# Patient Record
Sex: MALE | Race: WHITE | NOT HISPANIC OR LATINO | Employment: FULL TIME | URBAN - NONMETROPOLITAN AREA
[De-identification: names, ages, dates, MRNs, and addresses within clinical notes are randomized per-mention and may not be internally consistent; named-entity substitution may affect disease eponyms.]

---

## 2018-01-26 ENCOUNTER — TELEPHONE (OUTPATIENT)
Dept: SURGERY | Facility: CLINIC | Age: 53
End: 2018-01-26

## 2018-02-06 ENCOUNTER — PREP FOR SURGERY (OUTPATIENT)
Dept: OTHER | Facility: HOSPITAL | Age: 53
End: 2018-02-06

## 2018-02-06 DIAGNOSIS — Z12.11 ENCOUNTER FOR SCREENING COLONOSCOPY: Primary | ICD-10-CM

## 2018-02-07 PROBLEM — Z12.11 ENCOUNTER FOR SCREENING COLONOSCOPY: Status: ACTIVE | Noted: 2018-02-07

## 2018-02-08 ENCOUNTER — PREP FOR SURGERY (OUTPATIENT)
Dept: OTHER | Facility: HOSPITAL | Age: 53
End: 2018-02-08

## 2018-02-16 RX ORDER — VITAMIN E 268 MG
400 CAPSULE ORAL DAILY
Status: ON HOLD | COMMUNITY
End: 2019-09-09

## 2018-02-16 RX ORDER — ASPIRIN 325 MG
325 TABLET ORAL DAILY
Status: ON HOLD | COMMUNITY
End: 2019-09-09

## 2018-02-16 RX ORDER — MULTIPLE VITAMINS W/ MINERALS TAB 9MG-400MCG
1 TAB ORAL DAILY
Status: ON HOLD | COMMUNITY
End: 2019-09-09

## 2018-02-16 RX ORDER — LANOLIN ALCOHOL/MO/W.PET/CERES
1000 CREAM (GRAM) TOPICAL 3 TIMES WEEKLY
Status: ON HOLD | COMMUNITY
End: 2019-09-09

## 2018-02-16 RX ORDER — CHLORAL HYDRATE 500 MG
1000 CAPSULE ORAL
Status: ON HOLD | COMMUNITY
End: 2019-09-09

## 2018-02-16 RX ORDER — LOSARTAN POTASSIUM 50 MG/1
50 TABLET ORAL DAILY
Status: ON HOLD | COMMUNITY
End: 2019-09-09

## 2018-02-16 RX ORDER — CHOLECALCIFEROL (VITAMIN D3) 25 MCG
2000 CAPSULE ORAL 2 TIMES DAILY
Status: ON HOLD | COMMUNITY
End: 2019-09-09

## 2018-05-18 ENCOUNTER — HOSPITAL ENCOUNTER (OUTPATIENT)
Facility: HOSPITAL | Age: 53
Setting detail: HOSPITAL OUTPATIENT SURGERY
Discharge: HOME | End: 2018-05-18
Attending: SURGERY | Admitting: SURGERY

## 2018-05-18 ENCOUNTER — ANESTHESIA EVENT (OUTPATIENT)
Dept: GASTROENTEROLOGY | Facility: HOSPITAL | Age: 53
End: 2018-05-18

## 2018-05-18 ENCOUNTER — ANESTHESIA (OUTPATIENT)
Dept: GASTROENTEROLOGY | Facility: HOSPITAL | Age: 53
End: 2018-05-18

## 2018-05-18 VITALS
BODY MASS INDEX: 25.73 KG/M2 | HEART RATE: 68 BPM | WEIGHT: 190 LBS | RESPIRATION RATE: 16 BRPM | HEIGHT: 72 IN | TEMPERATURE: 98.7 F | SYSTOLIC BLOOD PRESSURE: 116 MMHG | OXYGEN SATURATION: 95 % | DIASTOLIC BLOOD PRESSURE: 72 MMHG

## 2018-05-18 DIAGNOSIS — Z12.11 ENCOUNTER FOR SCREENING COLONOSCOPY: ICD-10-CM

## 2018-05-18 PROCEDURE — S0260 H&P FOR SURGERY: HCPCS | Performed by: SURGERY

## 2018-05-18 PROCEDURE — 25010000002 PROPOFOL 10 MG/ML EMULSION: Performed by: NURSE ANESTHETIST, CERTIFIED REGISTERED

## 2018-05-18 PROCEDURE — 25010000002 PROPOFOL 1000 MG/ML EMULSION: Performed by: NURSE ANESTHETIST, CERTIFIED REGISTERED

## 2018-05-18 PROCEDURE — 25010000002 ONDANSETRON PER 1 MG: Performed by: NURSE ANESTHETIST, CERTIFIED REGISTERED

## 2018-05-18 PROCEDURE — 25010000002 FENTANYL CITRATE (PF) 100 MCG/2ML SOLUTION: Performed by: NURSE ANESTHETIST, CERTIFIED REGISTERED

## 2018-05-18 PROCEDURE — 25010000002 MIDAZOLAM PER 1 MG: Performed by: NURSE ANESTHETIST, CERTIFIED REGISTERED

## 2018-05-18 RX ORDER — ONDANSETRON 2 MG/ML
INJECTION INTRAMUSCULAR; INTRAVENOUS AS NEEDED
Status: DISCONTINUED | OUTPATIENT
Start: 2018-05-18 | End: 2018-05-18 | Stop reason: SURG

## 2018-05-18 RX ORDER — LIDOCAINE HYDROCHLORIDE 20 MG/ML
INJECTION, SOLUTION INFILTRATION; PERINEURAL AS NEEDED
Status: DISCONTINUED | OUTPATIENT
Start: 2018-05-18 | End: 2018-05-18 | Stop reason: SURG

## 2018-05-18 RX ORDER — PROPOFOL 10 MG/ML
VIAL (ML) INTRAVENOUS AS NEEDED
Status: DISCONTINUED | OUTPATIENT
Start: 2018-05-18 | End: 2018-05-18 | Stop reason: SURG

## 2018-05-18 RX ORDER — FENTANYL CITRATE 50 UG/ML
INJECTION, SOLUTION INTRAMUSCULAR; INTRAVENOUS AS NEEDED
Status: DISCONTINUED | OUTPATIENT
Start: 2018-05-18 | End: 2018-05-18 | Stop reason: SURG

## 2018-05-18 RX ORDER — MAGNESIUM HYDROXIDE 1200 MG/15ML
LIQUID ORAL AS NEEDED
Status: DISCONTINUED | OUTPATIENT
Start: 2018-05-18 | End: 2018-05-18 | Stop reason: HOSPADM

## 2018-05-18 RX ORDER — MIDAZOLAM HYDROCHLORIDE 1 MG/ML
INJECTION INTRAMUSCULAR; INTRAVENOUS AS NEEDED
Status: DISCONTINUED | OUTPATIENT
Start: 2018-05-18 | End: 2018-05-18 | Stop reason: SURG

## 2018-05-18 RX ORDER — SODIUM CHLORIDE 0.9 % (FLUSH) 0.9 %
3 SYRINGE (ML) INJECTION AS NEEDED
Status: DISCONTINUED | OUTPATIENT
Start: 2018-05-18 | End: 2018-05-18 | Stop reason: HOSPADM

## 2018-05-18 RX ORDER — ONDANSETRON 2 MG/ML
4 INJECTION INTRAMUSCULAR; INTRAVENOUS ONCE AS NEEDED
Status: DISCONTINUED | OUTPATIENT
Start: 2018-05-18 | End: 2018-05-18 | Stop reason: HOSPADM

## 2018-05-18 RX ORDER — SODIUM CHLORIDE, SODIUM LACTATE, POTASSIUM CHLORIDE, CALCIUM CHLORIDE 600; 310; 30; 20 MG/100ML; MG/100ML; MG/100ML; MG/100ML
1000 INJECTION, SOLUTION INTRAVENOUS CONTINUOUS
Status: DISCONTINUED | OUTPATIENT
Start: 2018-05-18 | End: 2018-05-18 | Stop reason: HOSPADM

## 2018-05-18 RX ADMIN — PROPOFOL 120 MCG/KG/MIN: 10 INJECTION, EMULSION INTRAVENOUS at 13:00

## 2018-05-18 RX ADMIN — PROPOFOL 50 MG: 10 INJECTION, EMULSION INTRAVENOUS at 12:59

## 2018-05-18 RX ADMIN — SODIUM CHLORIDE, POTASSIUM CHLORIDE, SODIUM LACTATE AND CALCIUM CHLORIDE 1000 ML: 600; 310; 30; 20 INJECTION, SOLUTION INTRAVENOUS at 10:47

## 2018-05-18 RX ADMIN — ONDANSETRON 4 MG: 2 INJECTION INTRAMUSCULAR; INTRAVENOUS at 12:58

## 2018-05-18 RX ADMIN — FENTANYL CITRATE 50 MCG: 50 INJECTION, SOLUTION INTRAMUSCULAR; INTRAVENOUS at 12:58

## 2018-05-18 RX ADMIN — LIDOCAINE HYDROCHLORIDE 80 MG: 20 INJECTION, SOLUTION INFILTRATION; PERINEURAL at 12:59

## 2018-05-18 RX ADMIN — PROPOFOL 50 MG: 10 INJECTION, EMULSION INTRAVENOUS at 13:08

## 2018-05-18 RX ADMIN — MIDAZOLAM HYDROCHLORIDE 1 MG: 1 INJECTION, SOLUTION INTRAMUSCULAR; INTRAVENOUS at 12:58

## 2018-05-18 NOTE — DISCHARGE INSTRUCTIONS
Pt instructed on PAT PASS information.  Pt verbalizes understanding.  DO NOT EAT, DRINK, SMOKE, USE SMOKELESS TOBACCO OR CHEW GUM AFTER MIDNIGHT THE NIGHT BEFORE SURGERY.  THIS ALSO INCLUDES HARD CANDIES AND MINTS.    DO NOT SHAVE THE AREA TO BE OPERATED ON AT LEAST 48 HOURS PRIOR TO THE PROCEDURE.  DO NOT WEAR MAKE UP OR NAIL POLISH.  DO NOT LEAVE IN ANY PIERCING OR WEAR JEWELRY THE DAY OF SURGERY.      DO NOT USE ADHESIVES IF YOU WEAR DENTURES.    DO NOT WEAR EYE CONTACTS; BRING IN YOUR GLASSES.    ONLY TAKE MEDICATION THE MORNING OF YOUR PROCEDURE IF INSTRUCTED BY YOUR SURGEON WITH ENOUGH WATER TO SWALLOW THE MEDICATION.  IF YOUR SURGEON DID NOT SPECIFY WHICH MEDICATIONS TO TAKE, YOU WILL NEED TO CALL THEIR OFFICE FOR FURTHER INSTRUCTIONS AND DO AS THEY INSTRUCT.    POST-OP    Rest today  No pushing,pulling,tugging,heavy lifting, or strenuous activity   No major decision making,driving,or drinking alcoholic beverages for 24 hours due to the sedation you received  Always use good hand hygiene/washing technique  No driving on pain medication.    To assist you in voiding:  Drink plenty of fluids  Listen to running water while attempting to void.    If you are unable to urinate and you have an uncomfortable urge to void or it has been   6 hours since you were discharged, return to the Emergency room.    Recommend 5 year follow up for colonoscopy.

## 2018-05-18 NOTE — ANESTHESIA POSTPROCEDURE EVALUATION
Patient: Yousuf Barrera    Procedure Summary     Date:  05/18/18 Room / Location:  [facility]    Anesthesia Start:  1255 Anesthesia Stop:      Procedure:  COLONOSCOPY (N/A Anus) Diagnosis:       Encounter for screening colonoscopy      (Encounter for screening colonoscopy [Z12.11])    Surgeon:  Carlos A Moreno MD Provider:  Joshua Davila CRNA    Anesthesia Type:  MAC ASA Status:  2          Anesthesia Type: MAC  Last vitals  BP   116/67   Temp   98   Pulse   65   Resp   20    SpO2   98     Post Anesthesia Care and Evaluation    Patient location during evaluation: bedside  Patient participation: complete - patient participated  Level of consciousness: awake and alert  Pain score: 0  Pain management: adequate  Airway patency: patent  Anesthetic complications: No anesthetic complications  PONV Status: none  Cardiovascular status: acceptable  Respiratory status: acceptable and nasal cannula  Hydration status: acceptable

## 2018-05-18 NOTE — H&P
"    [facility]   HISTORY AND PHYSICAL      Name:  Yousuf Barrera   Age:  53 y.o.  Sex:  male  :  1965  MRN:  [id number]   Visit Number:  [id number]  Admission Date:  2018  Date Of Service:  18  Primary Care Physician:  MELVIN Urias    Chief Complaint:     I need a colonoscopy    History Of Presenting Illness:      Patient here for screening colonoscopy.  Denies any GI complaints.  No family history of colon cancer.  Never had a colonoscopy in the past.    Review Of Systems:     General ROS: Patient denies any fevers, chills or loss of consciousness.  No complaints of generalized weakness  Psychological ROS: Denies any hallucinations and delusions.  Respiratory ROS: Denies cough or shortness of breath.   Cardiovascular ROS: Denies chest pain or palpitations. No history of exertional chest pain.   Gastrointestinal ROS: Denies nausea and vomiting. Denies any abdominal pain. No diarrhea.   Genito-Urinary ROS: Denies dysuria or hematuria.  Neurological ROS: Denies any focal weakness. No loss of consciousness. Denies any numbness.   Dermatological ROS: Denies any redness or pruritis.     Past Medical History:    Past Medical History:   Diagnosis Date   • H/O exercise stress test 2015    HAD DONE WITH WORK.  \"IT WAS OK\"   • Hypertension    • Wears glasses        Past Surgical history:    Past Surgical History:   Procedure Laterality Date   • CYSTOSCOPY  2016   • WISDOM TOOTH EXTRACTION         Social History:    Social History     Social History   • Marital status:      Spouse name: N/A   • Number of children: N/A   • Years of education: N/A     Occupational History   • Not on file.     Social History Main Topics   • Smoking status: Never Smoker   • Smokeless tobacco: Current User     Types: Snuff   • Alcohol use Yes      Comment: OCCASSIONAL-4 DRINKS PER WEEK.  BEER   • Drug use: No   • Sexual activity: Defer     Other Topics Concern   • Not on file     Social " History Narrative   • No narrative on file       Family History:    Family History   Problem Relation Age of Onset   • Hypertension Mother    • Hypertension Father        Allergies:      Tetracyclines & related    Home Medications:    Prior to Admission Medications     Prescriptions Last Dose Informant Patient Reported? Taking?    aspirin 325 MG tablet 5/17/2018  Yes Yes    Take 325 mg by mouth Daily.    Cholecalciferol (VITAMIN D-3) 1000 units capsule 5/17/2018  Yes Yes    Take 2,000 Units by mouth 2 (Two) Times a Day.    losartan (COZAAR) 50 MG tablet 5/18/2018  Yes Yes    Take 50 mg by mouth Daily.    MethylPREDNISolone (MEDROL, LIA,) 4 MG tablet   No No    Starting January 16, 2018 six-day taper as directed.    Multiple Vitamins-Minerals (MULTIVITAMIN WITH MINERALS) tablet tablet 5/17/2018  Yes Yes    Take 1 tablet by mouth Daily.    Omega-3 Fatty Acids (FISH OIL) 1000 MG capsule capsule 5/17/2018  Yes Yes    Take 1,000 mg by mouth Daily With Breakfast.    promethazine-dextromethorphan (PROMETHAZINE-DM) 6.25-15 MG/5ML syrup   No No    Take 5 mL by mouth 4 (Four) Times a Day As Needed for cough (No driving while taking).    Promethazine-Phenyleph-Codeine 6.25-5-10 MG/5ML syrup syrup   No No    Take 5 mL by mouth Every 6 (Six) Hours As Needed (cough).    vitamin B-12 (CYANOCOBALAMIN) 1000 MCG tablet 5/15/2018  Yes Yes    Take 1,000 mcg by mouth 3 (Three) Times a Week.    vitamin E 400 UNIT capsule 5/15/2018  Yes Yes    Take 400 Units by mouth Daily.             ED Medications:    Medications   lactated ringers infusion 1,000 mL (1,000 mL Intravenous New Bag 5/18/18 1047)       Vital Signs:    Temp:  [98.4 °F (36.9 °C)] 98.4 °F (36.9 °C)  Heart Rate:  [72] 72  Resp:  [16] 16  BP: (163)/(98) 163/98    1    02/16/18  1443 05/15/18  0855 05/18/18  1019   Weight: 86.2 kg (190 lb) 86.2 kg (190 lb) 86.2 kg (190 lb)       Body mass index is 25.77 kg/m².    Physical Exam:      General Appearance:  Alert and cooperative,  not in any acute distress.   Head:  Atraumatic and normocephalic, without obvious abnormality.   Eyes:          PERRLA, conjunctivae and sclerae normal, no Icterus. No pallor. Extra-occular movements are within normal limits.   Ears:  Ears appear intact with no abnormalities noted.   Respiratory/Lungs:   Breath sounds heard bilaterally equally.  No crackles or wheezing. No Pleural rub or bronchial breathing. Normal respiratory effort.    Cardiovascular/Heart:  Normal S1 and S2,    GI/Abdomen:   No masses, no hepatosplenomegaly. Soft, non-tender, non-distended, no hernia                 Musculoskeletal/ Extremities:   Moves all extremities well   Skin: No bleeding, bruising or rash, no induration   Psychiatric : Alert and oriented ×3.  No depression or anxiety    Neurologic: Cranial nerves 2 - 12 grossly intact, sensation intact, Motor power is normal and equal bilaterally.       EKG:          Labs:    Lab Results (last 24 hours)     ** No results found for the last 24 hours. **          Radiology:    Imaging Results (last 72 hours)     ** No results found for the last 72 hours. **          Assessment:    Screening colonoscopy     Plan:     I recommend a screening colonoscopy to the patient.  The patient understands the procedure and the reason for the procedure.  The patient also understands the risks which include but are not limited to bleeding and perforation and they understand the ramifications of these potential complications and wish to proceed.    Carlos A Moreno MD  05/18/18  1:01 PM

## 2018-05-23 LAB
LAB AP CASE REPORT: NORMAL
Lab: NORMAL
PATH REPORT.FINAL DX SPEC: NORMAL

## 2018-09-08 ENCOUNTER — TRANSCRIBE ORDERS (OUTPATIENT)
Dept: ADMINISTRATIVE | Facility: HOSPITAL | Age: 53
End: 2018-09-08

## 2018-10-28 ENCOUNTER — HOSPITAL ENCOUNTER (EMERGENCY)
Facility: HOSPITAL | Age: 53
Discharge: HOME | End: 2018-10-28
Attending: EMERGENCY MEDICINE | Admitting: EMERGENCY MEDICINE

## 2018-10-28 ENCOUNTER — APPOINTMENT (OUTPATIENT)
Dept: GENERAL RADIOLOGY | Facility: HOSPITAL | Age: 53
End: 2018-10-28

## 2018-10-28 VITALS
OXYGEN SATURATION: 96 % | HEIGHT: 72 IN | TEMPERATURE: 97.8 F | RESPIRATION RATE: 16 BRPM | SYSTOLIC BLOOD PRESSURE: 154 MMHG | HEART RATE: 86 BPM | DIASTOLIC BLOOD PRESSURE: 89 MMHG | WEIGHT: 185.8 LBS | BODY MASS INDEX: 25.17 KG/M2

## 2018-10-28 DIAGNOSIS — R07.89 ATYPICAL CHEST PAIN: Primary | ICD-10-CM

## 2018-10-28 LAB
ALBUMIN SERPL-MCNC: 4.8 G/DL (ref 3.5–5)
ALBUMIN/GLOB SERPL: 1.7 G/DL (ref 1–2)
ALP SERPL-CCNC: 68 U/L (ref 38–126)
ALT SERPL W P-5'-P-CCNC: 58 U/L (ref 13–69)
ANION GAP SERPL CALCULATED.3IONS-SCNC: 15.3 MMOL/L (ref 10–20)
AST SERPL-CCNC: 52 U/L (ref 15–46)
BASOPHILS # BLD AUTO: 0.04 10*3/MM3 (ref 0–0.2)
BASOPHILS NFR BLD AUTO: 0.5 % (ref 0–2.5)
BILIRUB SERPL-MCNC: 0.4 MG/DL (ref 0.2–1.3)
BUN BLD-MCNC: 8 MG/DL (ref 7–20)
BUN/CREAT SERPL: 8.9 (ref 6.3–21.9)
CALCIUM SPEC-SCNC: 9.3 MG/DL (ref 8.4–10.2)
CHLORIDE SERPL-SCNC: 104 MMOL/L (ref 98–107)
CO2 SERPL-SCNC: 28 MMOL/L (ref 26–30)
CREAT BLD-MCNC: 0.9 MG/DL (ref 0.6–1.3)
DEPRECATED RDW RBC AUTO: 40.2 FL (ref 37–54)
EOSINOPHIL # BLD AUTO: 0.19 10*3/MM3 (ref 0–0.7)
EOSINOPHIL NFR BLD AUTO: 2.6 % (ref 0–7)
ERYTHROCYTE [DISTWIDTH] IN BLOOD BY AUTOMATED COUNT: 12 % (ref 11.5–14.5)
GFR SERPL CREATININE-BSD FRML MDRD: 89 ML/MIN/1.73
GLOBULIN UR ELPH-MCNC: 2.8 GM/DL
GLUCOSE BLD-MCNC: 98 MG/DL (ref 74–98)
HCT VFR BLD AUTO: 51.3 % (ref 42–52)
HGB BLD-MCNC: 18.2 G/DL (ref 14–18)
HOLD SPECIMEN: NORMAL
HOLD SPECIMEN: NORMAL
IMM GRANULOCYTES # BLD: 0.05 10*3/MM3 (ref 0–0.06)
IMM GRANULOCYTES NFR BLD: 0.7 % (ref 0–0.6)
LIPASE SERPL-CCNC: 126 U/L (ref 23–300)
LYMPHOCYTES # BLD AUTO: 2.16 10*3/MM3 (ref 0.6–3.4)
LYMPHOCYTES NFR BLD AUTO: 29 % (ref 10–50)
MCH RBC QN AUTO: 32.7 PG (ref 27–31)
MCHC RBC AUTO-ENTMCNC: 35.5 G/DL (ref 30–37)
MCV RBC AUTO: 92.1 FL (ref 80–94)
MONOCYTES # BLD AUTO: 0.45 10*3/MM3 (ref 0–0.9)
MONOCYTES NFR BLD AUTO: 6 % (ref 0–12)
NEUTROPHILS # BLD AUTO: 4.56 10*3/MM3 (ref 2–6.9)
NEUTROPHILS NFR BLD AUTO: 61.2 % (ref 37–80)
NRBC BLD MANUAL-RTO: 0 /100 WBC (ref 0–0)
PLATELET # BLD AUTO: 193 10*3/MM3 (ref 130–400)
PMV BLD AUTO: 9 FL (ref 6–12)
POTASSIUM BLD-SCNC: 4.3 MMOL/L (ref 3.5–5.1)
PROT SERPL-MCNC: 7.6 G/DL (ref 6.3–8.2)
RBC # BLD AUTO: 5.57 10*6/MM3 (ref 4.7–6.1)
SODIUM BLD-SCNC: 143 MMOL/L (ref 137–145)
TROPONIN I SERPL-MCNC: <0.012 NG/ML (ref 0–0.03)
TROPONIN I SERPL-MCNC: <0.012 NG/ML (ref 0–0.03)
WBC NRBC COR # BLD: 7.45 10*3/MM3 (ref 4.8–10.8)
WHOLE BLOOD HOLD SPECIMEN: NORMAL
WHOLE BLOOD HOLD SPECIMEN: NORMAL

## 2018-10-28 PROCEDURE — 85025 COMPLETE CBC W/AUTO DIFF WBC: CPT

## 2018-10-28 PROCEDURE — 83690 ASSAY OF LIPASE: CPT | Performed by: PHYSICIAN ASSISTANT

## 2018-10-28 PROCEDURE — 71045 X-RAY EXAM CHEST 1 VIEW: CPT

## 2018-10-28 PROCEDURE — 84484 ASSAY OF TROPONIN QUANT: CPT

## 2018-10-28 PROCEDURE — 99284 EMERGENCY DEPT VISIT MOD MDM: CPT

## 2018-10-28 PROCEDURE — 93005 ELECTROCARDIOGRAM TRACING: CPT

## 2018-10-28 PROCEDURE — 80053 COMPREHEN METABOLIC PANEL: CPT

## 2018-10-28 PROCEDURE — 84484 ASSAY OF TROPONIN QUANT: CPT | Performed by: PHYSICIAN ASSISTANT

## 2018-10-28 RX ORDER — SODIUM CHLORIDE 0.9 % (FLUSH) 0.9 %
10 SYRINGE (ML) INJECTION AS NEEDED
Status: DISCONTINUED | OUTPATIENT
Start: 2018-10-28 | End: 2018-10-28 | Stop reason: HOSPADM

## 2018-10-28 RX ORDER — CLONIDINE HYDROCHLORIDE 0.1 MG/1
0.1 TABLET ORAL ONCE
Status: COMPLETED | OUTPATIENT
Start: 2018-10-28 | End: 2018-10-28

## 2018-10-28 RX ADMIN — CLONIDINE HYDROCHLORIDE 0.1 MG: 0.1 TABLET ORAL at 16:27

## 2019-09-08 ENCOUNTER — HOSPITAL ENCOUNTER (EMERGENCY)
Facility: HOSPITAL | Age: 54
Discharge: PSYCHIATRIC OR SUBSTANCE USE FACILITY | End: 2019-09-09
Attending: STUDENT IN AN ORGANIZED HEALTH CARE EDUCATION/TRAINING PROGRAM | Admitting: STUDENT IN AN ORGANIZED HEALTH CARE EDUCATION/TRAINING PROGRAM

## 2019-09-08 DIAGNOSIS — F32.A DEPRESSION, UNSPECIFIED DEPRESSION TYPE: Primary | ICD-10-CM

## 2019-09-08 DIAGNOSIS — F41.9 ANXIETY: ICD-10-CM

## 2019-09-08 LAB
ALBUMIN SERPL-MCNC: 4.9 G/DL (ref 3.5–5.2)
ALBUMIN/GLOB SERPL: 1.8 G/DL
ALP SERPL-CCNC: 41 U/L (ref 39–117)
ALT SERPL W P-5'-P-CCNC: 24 U/L (ref 1–41)
AMPHET+METHAMPHET UR QL: NEGATIVE
AMPHETAMINES UR QL: NEGATIVE
ANION GAP SERPL CALCULATED.3IONS-SCNC: 19.3 MMOL/L (ref 5–15)
APAP SERPL-MCNC: <5 MCG/ML (ref 10–30)
AST SERPL-CCNC: 31 U/L (ref 1–40)
BACTERIA UR QL AUTO: ABNORMAL /HPF
BARBITURATES UR QL SCN: NEGATIVE
BASOPHILS # BLD AUTO: 0.07 10*3/MM3 (ref 0–0.2)
BASOPHILS NFR BLD AUTO: 0.8 % (ref 0–1.5)
BENZODIAZ UR QL SCN: NEGATIVE
BILIRUB SERPL-MCNC: 0.5 MG/DL (ref 0.2–1.2)
BILIRUB UR QL STRIP: NEGATIVE
BUN BLD-MCNC: 9 MG/DL (ref 6–20)
BUN/CREAT SERPL: 11.1 (ref 7–25)
BUPRENORPHINE SERPL-MCNC: NEGATIVE NG/ML
CALCIUM SPEC-SCNC: 9.9 MG/DL (ref 8.6–10.5)
CANNABINOIDS SERPL QL: NEGATIVE
CHLORIDE SERPL-SCNC: 106 MMOL/L (ref 98–107)
CLARITY UR: CLEAR
CO2 SERPL-SCNC: 22.7 MMOL/L (ref 22–29)
COCAINE UR QL: NEGATIVE
COLOR UR: YELLOW
CREAT BLD-MCNC: 0.81 MG/DL (ref 0.76–1.27)
DEPRECATED RDW RBC AUTO: 41.1 FL (ref 37–54)
EOSINOPHIL # BLD AUTO: 0.33 10*3/MM3 (ref 0–0.4)
EOSINOPHIL NFR BLD AUTO: 3.9 % (ref 0.3–6.2)
ERYTHROCYTE [DISTWIDTH] IN BLOOD BY AUTOMATED COUNT: 11.9 % (ref 12.3–15.4)
ETHANOL BLD-MCNC: 188 MG/DL (ref 0–10)
ETHANOL BLD-MCNC: 343 MG/DL (ref 0–10)
ETHANOL UR QL: 0.19 %
ETHANOL UR QL: 0.34 %
GFR SERPL CREATININE-BSD FRML MDRD: 100 ML/MIN/1.73
GLOBULIN UR ELPH-MCNC: 2.7 GM/DL
GLUCOSE BLD-MCNC: 81 MG/DL (ref 65–99)
GLUCOSE UR STRIP-MCNC: NEGATIVE MG/DL
HCT VFR BLD AUTO: 53.8 % (ref 37.5–51)
HGB BLD-MCNC: 18.3 G/DL (ref 13–17.7)
HGB UR QL STRIP.AUTO: ABNORMAL
HOLD SPECIMEN: NORMAL
HYALINE CASTS UR QL AUTO: ABNORMAL /LPF
IMM GRANULOCYTES # BLD AUTO: 0.12 10*3/MM3 (ref 0–0.05)
IMM GRANULOCYTES NFR BLD AUTO: 1.4 % (ref 0–0.5)
KETONES UR QL STRIP: ABNORMAL
LEUKOCYTE ESTERASE UR QL STRIP.AUTO: NEGATIVE
LYMPHOCYTES # BLD AUTO: 3.35 10*3/MM3 (ref 0.7–3.1)
LYMPHOCYTES NFR BLD AUTO: 40.1 % (ref 19.6–45.3)
MCH RBC QN AUTO: 32 PG (ref 26.6–33)
MCHC RBC AUTO-ENTMCNC: 34 G/DL (ref 31.5–35.7)
MCV RBC AUTO: 94.2 FL (ref 79–97)
METHADONE UR QL SCN: NEGATIVE
MONOCYTES # BLD AUTO: 0.51 10*3/MM3 (ref 0.1–0.9)
MONOCYTES NFR BLD AUTO: 6.1 % (ref 5–12)
MUCOUS THREADS URNS QL MICRO: ABNORMAL /HPF
NEUTROPHILS # BLD AUTO: 3.98 10*3/MM3 (ref 1.7–7)
NEUTROPHILS NFR BLD AUTO: 47.7 % (ref 42.7–76)
NITRITE UR QL STRIP: NEGATIVE
NRBC BLD AUTO-RTO: 0 /100 WBC (ref 0–0.2)
OPIATES UR QL: NEGATIVE
OXYCODONE UR QL SCN: NEGATIVE
PCP UR QL SCN: NEGATIVE
PH UR STRIP.AUTO: 6 [PH] (ref 5–8)
PLATELET # BLD AUTO: 206 10*3/MM3 (ref 140–450)
PMV BLD AUTO: 9 FL (ref 6–12)
POTASSIUM BLD-SCNC: 4.1 MMOL/L (ref 3.5–5.2)
PROPOXYPH UR QL: NEGATIVE
PROT SERPL-MCNC: 7.6 G/DL (ref 6–8.5)
PROT UR QL STRIP: NEGATIVE
RBC # BLD AUTO: 5.71 10*6/MM3 (ref 4.14–5.8)
RBC # UR: ABNORMAL /HPF
REF LAB TEST METHOD: ABNORMAL
SALICYLATES SERPL-MCNC: 0.9 MG/DL
SODIUM BLD-SCNC: 148 MMOL/L (ref 136–145)
SP GR UR STRIP: 1.01 (ref 1–1.03)
SQUAMOUS #/AREA URNS HPF: ABNORMAL /HPF
TRICYCLICS UR QL SCN: NEGATIVE
TSH SERPL DL<=0.05 MIU/L-ACNC: 0.5 UIU/ML (ref 0.27–4.2)
UROBILINOGEN UR QL STRIP: ABNORMAL
WBC NRBC COR # BLD: 8.36 10*3/MM3 (ref 3.4–10.8)
WBC UR QL AUTO: ABNORMAL /HPF
WHOLE BLOOD HOLD SPECIMEN: NORMAL
WHOLE BLOOD HOLD SPECIMEN: NORMAL

## 2019-09-08 PROCEDURE — 25010000002 MAGNESIUM SULFATE PER 500 MG OF MAGNESIUM: Performed by: STUDENT IN AN ORGANIZED HEALTH CARE EDUCATION/TRAINING PROGRAM

## 2019-09-08 PROCEDURE — 96365 THER/PROPH/DIAG IV INF INIT: CPT

## 2019-09-08 PROCEDURE — 80307 DRUG TEST PRSMV CHEM ANLYZR: CPT | Performed by: STUDENT IN AN ORGANIZED HEALTH CARE EDUCATION/TRAINING PROGRAM

## 2019-09-08 PROCEDURE — 80306 DRUG TEST PRSMV INSTRMNT: CPT | Performed by: STUDENT IN AN ORGANIZED HEALTH CARE EDUCATION/TRAINING PROGRAM

## 2019-09-08 PROCEDURE — 99285 EMERGENCY DEPT VISIT HI MDM: CPT

## 2019-09-08 PROCEDURE — 80053 COMPREHEN METABOLIC PANEL: CPT | Performed by: STUDENT IN AN ORGANIZED HEALTH CARE EDUCATION/TRAINING PROGRAM

## 2019-09-08 PROCEDURE — 85025 COMPLETE CBC W/AUTO DIFF WBC: CPT | Performed by: STUDENT IN AN ORGANIZED HEALTH CARE EDUCATION/TRAINING PROGRAM

## 2019-09-08 PROCEDURE — 81001 URINALYSIS AUTO W/SCOPE: CPT | Performed by: STUDENT IN AN ORGANIZED HEALTH CARE EDUCATION/TRAINING PROGRAM

## 2019-09-08 PROCEDURE — 93005 ELECTROCARDIOGRAM TRACING: CPT | Performed by: STUDENT IN AN ORGANIZED HEALTH CARE EDUCATION/TRAINING PROGRAM

## 2019-09-08 PROCEDURE — 25010000002 THIAMINE PER 100 MG: Performed by: STUDENT IN AN ORGANIZED HEALTH CARE EDUCATION/TRAINING PROGRAM

## 2019-09-08 PROCEDURE — 84443 ASSAY THYROID STIM HORMONE: CPT | Performed by: STUDENT IN AN ORGANIZED HEALTH CARE EDUCATION/TRAINING PROGRAM

## 2019-09-08 RX ORDER — ACETAMINOPHEN 500 MG
1000 TABLET ORAL ONCE
Status: COMPLETED | OUTPATIENT
Start: 2019-09-08 | End: 2019-09-08

## 2019-09-08 RX ADMIN — ACETAMINOPHEN 1000 MG: 500 TABLET, FILM COATED ORAL at 17:22

## 2019-09-08 RX ADMIN — FOLIC ACID 1000 ML/HR: 5 INJECTION, SOLUTION INTRAMUSCULAR; INTRAVENOUS; SUBCUTANEOUS at 16:34

## 2019-09-08 NOTE — ED PROVIDER NOTES
"Subjective   54-year-old male is brought to the emergency department by wife for depression.  Wife states he has stress from work as well as home.  Patient at this time doing little more than crying and nodding his head.  Patient's daughter does have psychiatric issues and has been seen in this facility before.  Patient works as a  at the [employer] locally.  Wife states they have had a lot of stress upon him here recently but she is not exactly sure what or why because it is a closed shop.  Currently patient acknowledges that he wants help and that is why he is here.  Wife states that he is taking Zoloft and was started on this 3 to 4 weeks ago for depression.  Wife states that he requested that she take him to [facility], but she brought him here first because she wanted to make sure there was nothing medically wrong with him.  Patient has no history of psychiatric hospitalizations.            Review of Systems   All other systems reviewed and are negative.      Past Medical History:   Diagnosis Date   • Alcohol abuse    • H/O exercise stress test 2015    HAD DONE WITH WORK.  \"IT WAS OK\"   • Hypertension    • Wears glasses    • Withdrawal symptoms, alcohol (CMS/[facility])        Allergies   Allergen Reactions   • Tetracyclines & Related Other (See Comments)     Unsure of reaction-its been a long time ago       Past Surgical History:   Procedure Laterality Date   • COLONOSCOPY N/A 5/18/2018    Procedure: COLONOSCOPY WITH COLD SNARE POLYECTOMY X 1;  Surgeon: Carlos A Moreno MD;  Location: [facility] ENDOSCOPY;  Service: Gastroenterology   • CYSTOSCOPY  2016   • WISDOM TOOTH EXTRACTION         Family History   Problem Relation Age of Onset   • Hypertension Mother    • Hypertension Father        Social History     Socioeconomic History   • Marital status:      Spouse name: Not on file   • Number of children: Not on file   • Years of education: Not on file   • Highest education level: Not on file "   Tobacco Use   • Smoking status: Never Smoker   • Smokeless tobacco: Current User     Types: Snuff   Substance and Sexual Activity   • Alcohol use: Yes     Comment: OCCASSIONAL-4 DRINKS PER WEEK.  BEER   • Drug use: No   • Sexual activity: Defer           Objective   Physical Exam   Nursing note and vitals reviewed.    GEN: Patient is tearful but in no life-threatening distress  Head: Normocephalic, atraumatic  Eyes: Pupils equal round reactive to light  ENT: Posterior pharynx normal in appearance, oral mucosa is moist  Chest: Nontender to palpation  Cardiovascular: Tachycardic in 110s  Lungs: Clear to auscultation bilaterally  Abdomen: Soft, nontender, nondistended, no peritoneal signs  Extremities: No edema, normal appearance  Neuro: GCS 15  Psych: Poor eye contact, tearful and crying at times, seems sad and depressed to me    Procedures           ED Course  ED Course as of Sep 09 0716   Sun Sep 08, 2019   1409 EKG shows sinus tachycardia with a rate of 115.  Left axis deviation.  No significant ST segments.  Abnormal EKG secondary to rate.  Interpreted by me.  [DT]      ED Course User Index  [DT] Hunter Braun MD                  MDM  Number of Diagnoses or Management Options  Anxiety:   Depression, unspecified depression type:   Diagnosis management comments: I called Jolynn with behavioral health after talking with the patient and wife.  Patient does voluntarily want to be placed at [facility].  I did explain that I was unsure if we could do this but we do everything in our power to assist him.  Patient did express that he is not homicidal or suicidal, but he is just depressed and needs help.       Amount and/or Complexity of Data Reviewed  Clinical lab tests: reviewed  Decide to obtain previous medical records or to obtain history from someone other than the patient: yes  Obtain history from someone other than the patient: yes  Review and summarize past medical records: yes        Final diagnoses:    Depression, unspecified depression type   Anxiety              Hunter Braun MD  09/09/19 0716       Hunter Braun MD  09/09/19 0716

## 2019-09-08 NOTE — CONSULTS
1450 to 1600 and ongoing    D:  Received request for behavioral health assessment from Dr. Braun at 1400.  Arrived at 1445 and reviewed chart.  Reji is a 54 year old  male residing in [facility] with his wife and daughter.  He presents to ED requesting referral to inpatient for psych.  He is not being monitored 1-1 by security due to denying SI/HI.  Reji serves as his own guardian and was agreeable to speak with me.  He was accompanied by his wife and requested she remain in the room during the consult.  I met with Yousuf and his wife, Rosalie, at bedside.  Upon assessment, Yousuf is very tearful and has difficulty engaging in the assessment.  He reports he has been under a lot of stress lately.  He reports his daughter has been to [facility] for inpatient and that this was helpful for her so he is hoping it will help him.  Rc and Rosalie do not wish to elaborate on their daughter's mental health concerns but indicate an event or situation r/t their daughter has been very stressful on the family as a whole.  Yousuf reports working full time at the [employer] and having difficulty concentrating and focusing on his work throughout the week.  Yousuf and Rosalie both report Yousuf has not been sleep well for several weeks and he has hardly had any appetite for months.  They report he has lost 30 pounds unintentionally since November of last year.  Yousuf blames himself for what the family is going through and states he worries constantly about his wife and and daughter.  He denies SI and HI, although he reports impaired functioining due to stress and worry.  He initially denies any alcohol or substance abuse, reporting drinking 1-2 beers daily.  When his bloodwork resulted, his PEDRITO was found to be 0.343.  Dr. Braun and I discussed this and spoke with Yousuf and Rosalie about his alcohol level and needing to wait until it is below 0.200 before a referral can be sent to [facility].  Yousuf later disclosed drinking half a  "pint of whiskey last night through this morning.  He continues to deny drinking more than 1-2 beers regularly and reports he last drank liquor over a week ago prior to last night.  Robert requested to speak with me individually and Yousuf provided his consent.  She reports she and Yousuf live in the same home but have been living \"separate lives\" for quite some time.  She was unaware of Yousuf's drinking although reports noticing some bourbon missing.    A:  Upon assessment, pt appears disheveled.  He was cooperative but evasive regarding specific stressors or motivation for seeking inpatient treatment other than \"I just know I need help.\"  He displays normal psychomotor activity and poor eye contact.  Mood is hopeless, helpless, frightened, guilty, and \"confused.\"  He is very tearful throughout the interview.  He rates his anxiety and depression both at a 10/10 (10 = most severe).  Affect is congruent.  Speech is normal in tone and was mildly slurred (likely due to intoxication), no aphasia noted.  C-SSRS was administered and pt denies having a death wish, suicidal thoughts, or preparatory behavior.  He denies any Hx of attempts or self-injurious behavior.  He reports declining ADLs due to stress and anxiety, with poor sleep and no appetite.  He reports difficulty with concentration and functioning at work.  He denies AVH and does not appear to be responding to internal stimuli.  Thought is worried, blocking, no delusional thought content.  Pt is alert and oriented x4.  Impulse control poor, judgement impaired, insight limited.    P:  Yousuf and Rosalie are requesting referral to [facility] for inpatient psychiatric treatment.  I believe it would be worth sending a referral and that Yousuf could benefit from this level of care given his impairment in daily functioning.  Given Yousuf's PEDRITO and presentation, I suspect he may be drinking more frequently than he reports and may also benefit from treatment for this, as well.  " Yousuf and Rosalie are aware that his PEDRITO will need to be .200 or below before we can send the referral to [facility] and .100 before we can send to [facility].  Yousuf is agreeable to remain in the ED and will be given fluids and monitored for alcohol withdrawal symptoms.  I will brief the night shift therapist, ABENA Clancy.  Dr. Braun is also agreeable with this plan.    Jolynn Bolaños [facility]

## 2019-09-09 ENCOUNTER — HOSPITAL ENCOUNTER (INPATIENT)
Facility: HOSPITAL | Age: 54
LOS: 6 days | Discharge: HOME | End: 2019-09-15
Attending: PSYCHIATRY & NEUROLOGY | Admitting: PSYCHIATRY & NEUROLOGY

## 2019-09-09 VITALS
TEMPERATURE: 98.3 F | DIASTOLIC BLOOD PRESSURE: 92 MMHG | HEART RATE: 84 BPM | HEIGHT: 72 IN | SYSTOLIC BLOOD PRESSURE: 135 MMHG | OXYGEN SATURATION: 95 % | BODY MASS INDEX: 23.03 KG/M2 | RESPIRATION RATE: 16 BRPM | WEIGHT: 170 LBS

## 2019-09-09 PROBLEM — F33.2 SEVERE EPISODE OF RECURRENT MAJOR DEPRESSIVE DISORDER, WITHOUT PSYCHOTIC FEATURES: Status: ACTIVE | Noted: 2019-09-09

## 2019-09-09 PROBLEM — F10.239 ALCOHOL DEPENDENCE WITH WITHDRAWAL: Status: ACTIVE | Noted: 2019-09-09

## 2019-09-09 LAB
ETHANOL BLD-MCNC: 91 MG/DL (ref 0–10)
ETHANOL UR QL: 0.09 %
MAGNESIUM SERPL-MCNC: 2.2 MG/DL (ref 1.6–2.6)

## 2019-09-09 PROCEDURE — 80307 DRUG TEST PRSMV CHEM ANLYZR: CPT | Performed by: STUDENT IN AN ORGANIZED HEALTH CARE EDUCATION/TRAINING PROGRAM

## 2019-09-09 PROCEDURE — 83735 ASSAY OF MAGNESIUM: CPT | Performed by: STUDENT IN AN ORGANIZED HEALTH CARE EDUCATION/TRAINING PROGRAM

## 2019-09-09 PROCEDURE — 99222 1ST HOSP IP/OBS MODERATE 55: CPT | Performed by: PSYCHIATRY & NEUROLOGY

## 2019-09-09 PROCEDURE — HZ2ZZZZ DETOXIFICATION SERVICES FOR SUBSTANCE ABUSE TREATMENT: ICD-10-PCS | Performed by: PSYCHIATRY & NEUROLOGY

## 2019-09-09 RX ORDER — ECHINACEA PURPUREA EXTRACT 125 MG
2 TABLET ORAL AS NEEDED
Status: DISCONTINUED | OUTPATIENT
Start: 2019-09-09 | End: 2019-09-15 | Stop reason: HOSPADM

## 2019-09-09 RX ORDER — LORAZEPAM 1 MG/1
1 TABLET ORAL
Status: COMPLETED | OUTPATIENT
Start: 2019-09-12 | End: 2019-09-12

## 2019-09-09 RX ORDER — LORAZEPAM 2 MG/1
2 TABLET ORAL
Status: DISPENSED | OUTPATIENT
Start: 2019-09-09 | End: 2019-09-10

## 2019-09-09 RX ORDER — ACETAMINOPHEN 325 MG/1
650 TABLET ORAL EVERY 6 HOURS PRN
Status: DISCONTINUED | OUTPATIENT
Start: 2019-09-09 | End: 2019-09-15 | Stop reason: HOSPADM

## 2019-09-09 RX ORDER — LORAZEPAM 0.5 MG/1
0.5 TABLET ORAL
Status: COMPLETED | OUTPATIENT
Start: 2019-09-13 | End: 2019-09-13

## 2019-09-09 RX ORDER — LORAZEPAM 1 MG/1
1 TABLET ORAL EVERY 4 HOURS PRN
Status: ACTIVE | OUTPATIENT
Start: 2019-09-12 | End: 2019-09-13

## 2019-09-09 RX ORDER — ONDANSETRON 4 MG/1
4 TABLET, FILM COATED ORAL EVERY 6 HOURS PRN
Status: DISCONTINUED | OUTPATIENT
Start: 2019-09-09 | End: 2019-09-15 | Stop reason: HOSPADM

## 2019-09-09 RX ORDER — BENZONATATE 100 MG/1
100 CAPSULE ORAL 3 TIMES DAILY PRN
Status: DISCONTINUED | OUTPATIENT
Start: 2019-09-09 | End: 2019-09-15 | Stop reason: HOSPADM

## 2019-09-09 RX ORDER — BENZTROPINE MESYLATE 1 MG/ML
1 INJECTION INTRAMUSCULAR; INTRAVENOUS ONCE AS NEEDED
Status: DISCONTINUED | OUTPATIENT
Start: 2019-09-09 | End: 2019-09-15 | Stop reason: HOSPADM

## 2019-09-09 RX ORDER — MULTIVITAMIN
1 TABLET ORAL DAILY
Status: DISCONTINUED | OUTPATIENT
Start: 2019-09-09 | End: 2019-09-15 | Stop reason: HOSPADM

## 2019-09-09 RX ORDER — VITAMIN C
2 TAB ORAL DAILY
Status: DISCONTINUED | OUTPATIENT
Start: 2019-09-09 | End: 2019-09-15 | Stop reason: HOSPADM

## 2019-09-09 RX ORDER — THIAMINE MONONITRATE (VIT B1) 100 MG
100 TABLET ORAL DAILY
Status: DISCONTINUED | OUTPATIENT
Start: 2019-09-09 | End: 2019-09-15 | Stop reason: HOSPADM

## 2019-09-09 RX ORDER — NICOTINE 21 MG/24HR
1 PATCH, TRANSDERMAL 24 HOURS TRANSDERMAL
Status: DISCONTINUED | OUTPATIENT
Start: 2019-09-09 | End: 2019-09-15 | Stop reason: HOSPADM

## 2019-09-09 RX ORDER — ALUMINA, MAGNESIA, AND SIMETHICONE 2400; 2400; 240 MG/30ML; MG/30ML; MG/30ML
15 SUSPENSION ORAL EVERY 6 HOURS PRN
Status: DISCONTINUED | OUTPATIENT
Start: 2019-09-09 | End: 2019-09-15 | Stop reason: HOSPADM

## 2019-09-09 RX ORDER — TRAZODONE HYDROCHLORIDE 50 MG/1
50 TABLET ORAL NIGHTLY PRN
Status: DISCONTINUED | OUTPATIENT
Start: 2019-09-09 | End: 2019-09-15 | Stop reason: HOSPADM

## 2019-09-09 RX ORDER — LOPERAMIDE HYDROCHLORIDE 2 MG/1
2 CAPSULE ORAL
Status: DISCONTINUED | OUTPATIENT
Start: 2019-09-09 | End: 2019-09-15 | Stop reason: HOSPADM

## 2019-09-09 RX ORDER — LORAZEPAM 2 MG/1
2 TABLET ORAL EVERY 4 HOURS PRN
Status: ACTIVE | OUTPATIENT
Start: 2019-09-10 | End: 2019-09-11

## 2019-09-09 RX ORDER — LORAZEPAM 2 MG/1
2 TABLET ORAL
Status: COMPLETED | OUTPATIENT
Start: 2019-09-10 | End: 2019-09-10

## 2019-09-09 RX ORDER — HYDROXYZINE 50 MG/1
50 TABLET, FILM COATED ORAL EVERY 6 HOURS PRN
Status: DISCONTINUED | OUTPATIENT
Start: 2019-09-09 | End: 2019-09-15 | Stop reason: HOSPADM

## 2019-09-09 RX ORDER — LORAZEPAM 0.5 MG/1
2 TABLET ORAL ONCE
Status: COMPLETED | OUTPATIENT
Start: 2019-09-09 | End: 2019-09-09

## 2019-09-09 RX ORDER — LOSARTAN POTASSIUM 50 MG/1
50 TABLET ORAL DAILY
COMMUNITY

## 2019-09-09 RX ORDER — LORAZEPAM 0.5 MG/1
0.5 TABLET ORAL EVERY 4 HOURS PRN
Status: DISPENSED | OUTPATIENT
Start: 2019-09-13 | End: 2019-09-14

## 2019-09-09 RX ORDER — FAMOTIDINE 20 MG/1
20 TABLET, FILM COATED ORAL 2 TIMES DAILY PRN
Status: DISCONTINUED | OUTPATIENT
Start: 2019-09-09 | End: 2019-09-15 | Stop reason: HOSPADM

## 2019-09-09 RX ORDER — IBUPROFEN 400 MG/1
400 TABLET ORAL EVERY 6 HOURS PRN
Status: DISCONTINUED | OUTPATIENT
Start: 2019-09-09 | End: 2019-09-15 | Stop reason: HOSPADM

## 2019-09-09 RX ORDER — LOSARTAN POTASSIUM 50 MG/1
50 TABLET ORAL DAILY
Status: DISCONTINUED | OUTPATIENT
Start: 2019-09-09 | End: 2019-09-15 | Stop reason: HOSPADM

## 2019-09-09 RX ORDER — BENZTROPINE MESYLATE 1 MG/1
2 TABLET ORAL ONCE AS NEEDED
Status: DISCONTINUED | OUTPATIENT
Start: 2019-09-09 | End: 2019-09-15 | Stop reason: HOSPADM

## 2019-09-09 RX ADMIN — VITAMIN C 2 TABLET: TAB at 08:09

## 2019-09-09 RX ADMIN — Medication 100 MG: at 08:09

## 2019-09-09 RX ADMIN — Medication 1 TABLET: at 08:09

## 2019-09-09 RX ADMIN — HYDROXYZINE HYDROCHLORIDE 50 MG: 50 TABLET, FILM COATED ORAL at 15:31

## 2019-09-09 RX ADMIN — LORAZEPAM 2 MG: 2 TABLET ORAL at 08:10

## 2019-09-09 RX ADMIN — LOSARTAN POTASSIUM 50 MG: 50 TABLET, FILM COATED ORAL at 13:27

## 2019-09-09 RX ADMIN — LORAZEPAM 2 MG: 2 TABLET ORAL at 15:31

## 2019-09-09 RX ADMIN — HYDROXYZINE HYDROCHLORIDE 50 MG: 50 TABLET, FILM COATED ORAL at 08:10

## 2019-09-09 RX ADMIN — SERTRALINE 50 MG: 50 TABLET, FILM COATED ORAL at 13:29

## 2019-09-09 RX ADMIN — LORAZEPAM 2 MG: 0.5 TABLET ORAL at 04:01

## 2019-09-09 RX ADMIN — LORAZEPAM 2 MG: 2 TABLET ORAL at 20:13

## 2019-09-09 NOTE — ED NOTES
RN attempted to call report to Rowena at the [facility] however it was noted that the pt had not had a magnesium level collected with his earlier lab draw on dayshift. Admitting RN stated that they could not take the pt until the magnesium level had resulted. MD Ogden notified as well as Mica Angeles with . Will add magnesium on to be resulted.      Tiffany Galan RN  09/09/19 0316

## 2019-09-09 NOTE — CONSULTS
"[id number]    Data:  Therapist completed reassessment of pt after BAC reached .91 at 0040 for presentation to the [facility].  Pt presented to the ED accompanied by his wife and was assessed by on call therapist, Jolynn Bolaños [facility] per request of Dr. Braun (see other consult note).  Pt presented initially due to depression, but the labs showed elevated BAC at .343 at 1421.  Pt requested admission to [facility] for depression and possibly detox, but minimized drinking amount and frequency earlier in the ED visit.  Night shift therapist reassessed pt for possible admission to [facility] after a denial by [facility] as they accept referrals at .200.  Met with pt alone at bedside as pt's wife Rosalie had gone home to sleep and tend to their daughter.  Inquired re: current stressors that resulted in ED trip.  Pt reported, consistent with earlier report, that he hs been \"drinking heavily\" since June.  Pt reports he drinks 1-2 beers daily, and reports he drinks liquor on the weekends.  Pt reported that he drank a pint of Kana beam last night and after he got up early this morning to let the dogs out, he drank 2 shots of Weakley.  Pt stated, \"I wish I knew why I did it.\"  Pt reported he went back to bed around 11 and his wife was unable to reach him after Bahai and became very worried about his mental state.  Pt reports when she got home he was \"super emotional\" and told his wife \"I need help\" and then they came to the ED.  Pt reports their 13 year old daughter has had two stays at [facility] adolescent unit due to mental health issues and SI.  Pt also reports that he has been so worried about his daughter that \"I forgot about our marriage.\" Pt stated regarding their daughter \"her mental health changed our lives.\"  Pt reported his daughter is doing well now, so when pressed regarding why he's feeling so depressed if she is doing well, pt stated that he \"worries about losing her\" and reports he \"fears she may " "be taken away.\"  Pt reports that he and his wife have been seeing a therapist at [facility] every 2 weeks.  Pt also reports taking Zoloft for about one month. Pt continues to deny SI but cried all throughout the assessment.  When asked about his desire for psychiatric care vs. Detox, pt states \"I think I need both.\"    Assessment:  At time of reassessment pt was A & O x 4, disheveled, and more cooperative with assessment than with previous assessment, but still guarded with some responses.  Pt's psychomotor activity ws normal, eye contact fair.  Pt endorsed feelings of anxiety, depression, fear, guilt, hopelessness and helplessness. Speech/tone/fluency all were normal.  Pt denies HI/SI.  Therapist administered C-SSRS, results show pt to deny death wish, plan, and preparatory behaviors.  Pt also denies AH/VH, no perceptual disturbances noted.  Pt's thought processes appeared to be depressed in nature as evidenced by crying throughout the assessment.  Pt was somewhat guarded about sharing information, though he did share more during this assessment than in previous ones.  Pt's impulse control appears to be poor, judgment intact, insight poor.  Pt continues to be voluntary and wants help.  Therapist reassessed CIWA score and found score to be 8 evidenced by mild tremor, observed sweat/redness in face, mild anxiety, and mild headache.     Plan:    Faxed referral to the [facility] per pt request.  Phoned and spoke with Kirstie, Lead RN, at 0120 who confirmed updated information received by fax.  She consulted with Dr. Nino who has accepted pt to the detox unit and requested 2 mg of Ativan prior to transport due to possibility of increased withdrawal sx.  Medical team notified.  [facility] contacted, ETA 0320.  Report to be called to Abigail at ext. [phone].    Mica Batista,  MSW, CSW  Certified Therapist  "

## 2019-09-09 NOTE — PLAN OF CARE
Problem: Patient Care Overview  Goal: Plan of Care Review  Outcome: Ongoing (interventions implemented as appropriate)   09/09/19 0629   Coping/Psychosocial   Plan of Care Reviewed With patient   Coping/Psychosocial   Patient Agreement with Plan of Care agrees   Plan of Care Review   Progress no change   OTHER   Outcome Summary Pt new admit. pt with flat affect and some type speech impediment.        Problem: Overarching Goals (Adult)  Goal: Adheres to Safety Considerations for Self and Others  Outcome: Ongoing (interventions implemented as appropriate)    Goal: Optimized Coping Skills in Response to Life Stressors  Outcome: Ongoing (interventions implemented as appropriate)    Goal: Develops/Participates in The[facility] [place] to Support Successful Transition  Outcome: Ongoing (interventions implemented as appropriate)      Problem: Impaired Control (Excessive Substance Use) (Adult)  Goal: Participates in Recovery Program (Excessive Substance Use)  Outcome: Ongoing (interventions implemented as appropriate)      Problem: Social/Occupational/Functional Impairment (Excessive Substance Use) (Adult)  Goal: Improved Social/Occupational/Functional Skills (Excessive Substance Use)  Outcome: Ongoing (interventions implemented as appropriate)      Problem: Safety Awareness Impairment (Excessive Substance Use) (Adult)  Goal: Enhanced Safety Awareness (Excessive Substance Use)  Outcome: Ongoing (interventions implemented as appropriate)      Problem: Physiological Impairment (Excessive Substance Use) (Adult)  Goal: Improved Physiologic Symptoms (Excessive Substance Use)  Outcome: Ongoing (interventions implemented as appropriate)

## 2019-09-09 NOTE — ED NOTES
"Brief Updates    [id number]    On call therapist, Jolynn Bolaños, notified this night shift therapist at 1623 that pt was in ED and BAC would be redrawn at approximately 1900.  Arrived to facility for shift and staffed case directly with Jolynn Bolaños [id number], she reported BAC would be redrawn at 2000. BAC redrawn at 2021, level resulted at 2040 and found to be .188, acceptable for referral to [facility].  Reassessed pt at 2030-2050, pt accompanied by his wife Rosalie at bedside.  Pt continues to report desire for inpatient admission r/t sx of depression.  Confirmed pt had been feeling depressed to the point of functional limitation for the past two days.  Pt nodded affirmatively, but wife spoke up and stated, \"it's been a few months.\"  Advised we would be sending referral to [facility] shortly per their request, pt remains agreeable for secondary referral to the [facility].  Therapist completed CIWA score; score found to be 10 based on mild nausea, sweating/flushing, anxiety, severe headache; also, elevated blood pressure noted.  Continued to discuss referral based on detox/psych.  Pt continues to maintain daily drinking of \"1-2 beers daily\" but reports consumption of 1/2 pint of Kana Beam \"last night.\"  Inquired re: why pt drank last night, pt states, \"because it is Saturday.\"  Pt's wife then spoke up and stated, \"This is all a surprise to me, but I don't think this is an isolated event.\"  Completed a new C-SSRS, results show pt to continue to deny death wish, suicidal thoughts, planning, and preparatory behaviors.    After assessment in room, pt's wife spoke to therapist alone in hallway, she was tearful and reported that pt had disclosed he had been drinking heavily since June.  She was very upset because she was unaware of the issue completely.        2130 Referral faxed to [facility]  following printing of all clinicals and obtaining MD signature. Disposition remains pending at this time. "     [id number] Facilitated contact between [facility] intake and pt.  After phone call, pt stated he wanted to pursue inpatient at [facility] if denied by [facility].      2300 Received phone call from Craisa Wiley in intake.  Their psychiatrist had denied pt admission due to lack of acute psych factors and pt's reported minimized amounts of drinking, though he does recommend rehab.  Carisa disclosed to therapist that pt reported to her he had been drinking a fifth daily for the past week.    Therapist to fax to [facility], disposition remains pending.    2320 Faxed to [facility].  Spoke with lead GLADIS Thacker.  Advised we would be redrawing ETOH level around 0030 and after the redraw would send an updated CIWA, brief note, and ETOH level to [facility] for review by the psychiatrist.    0041  ETOH resulted and found to be .91.  Reassessment of pt to be completed for [facility].  See new note.    KARY Clancy, CSW  Certified Therapist                       Mica Batista CSW  09/09/19 0131       Mica Batista CSW  09/09/19 0220

## 2019-09-09 NOTE — PLAN OF CARE
Problem: Patient Care Overview  Goal: Plan of Care Review  Outcome: Ongoing (interventions implemented as appropriate)   09/09/19 1037   Coping/Psychosocial   Plan of Care Reviewed With patient   Coping/Psychosocial   Patient Agreement with Plan of Care agrees   Plan of Care Review   Progress no change   OTHER   Outcome Summary Began social history assessment. Reviewed treatment plans.    Coping/Psychosocial   Consent Given to Review Plan with No consent at this time.      Goal: Individualization and Mutuality  Outcome: Ongoing (interventions implemented as appropriate)   09/09/19 1037   Personal Strengths/Vulnerabilities   Patient Personal Strengths resilient;resourceful;family/social support;self-reliant;positive vocational history   Patient Vulnerabilities Alcohol misuse. Ineffective coping.    Individualization   Patient Specific Goals (Include Timeframe) Identify 1-2 cognitive distortions.    Patient Specific Interventions CBT.      Goal: Discharge Needs Assessment  Outcome: Ongoing (interventions implemented as appropriate)   09/09/19 1037   Discharge Needs Assessment   Readmission Within the Last 30 Days no previous admission in last 30 days   Concerns to be Addressed discharge planning;substance/tobacco abuse/use   Patient/Family Anticipates Transition to home with family   Patient/Family Anticipated Services at Transition mental health services   Transportation Concerns car, none   Transportation Anticipated family or friend will provide   Offered/Gave Vendor List no   Patient's Choice of Community Agency(s) Patient interested in outpatient therapy, but unable to discuss options at this time.    Current Discharge Risk substance use/abuse   Discharge Coordination/Progress Patient interested in outpatient therapy.    Discharge Needs Assessment,    Outpatient/Agency/Support Group Needs outpatient counseling   Anticipated Discharge Disposition home or self-care     Goal: Interprofessional Rounds/Family  Conf  Outcome: Ongoing (interventions implemented as appropriate)   09/09/19 1037   Interdisciplinary Rounds/Family Conf   Summary Will discuss patient's case with the treatment team. Discussed with nursing staff post-session. Some disorientation apparent based on their assessment.    Interdisciplinary Rounds/Family Conf   Participants patient;social work;psychiatrist;nursing       [id number]  D: Met with the patient in the office, beginning the social history and reviewing treatment plans. Patient agreeable. The patient is a 54-year-old  male currently residing in [facility] where he has been living with his spouse.  He has high school education. He is currently employed at the [employer]. He has been drinking alcohol heavily since June. He reported stress related to his daughter's recent hospitalization at [facility]. It was difficult to complete the social history assessment with the patient, as he was falling asleep between questions and seemed somewhat disoriented and confused. He did report interest in oupatient therapy upon dishcarge.     A: The patient's affect appeared depressed. He appeared to be experiencing difficulty articulating responses to questions, beginning his answers with a word or two and then pausing long enough to fall asleep. He was polite. Mild disorientation apparent.     P: The patient has been placed on a detox regimen. He will be provided with individual and group therapy. He will follow up with outpatient therapy. Options will be discussed with the patient when he better able to comprehend such information.

## 2019-09-09 NOTE — H&P
"INITIAL PSYCHIATRIC HISTORY & PHYSICAL    Patient Identification:  Name:   Yousuf Barrera  Age:   54 y.o.  Sex:   male  :   1965  MRN:   [id number]  Visit Number:   [id number]  Primary Care Physician:   Isidra Pemberton APRN    SUBJECTIVE    CC/Focus of Exam: alcohol abuse      HPI: Yousuf Barrera is a 54 y.o. male who was admitted on 2019 with complaints of alcohol abuse. Patient is a Direct Admission from [facility] where he initially presented along with Wife reporting depression, further requesting assistance with detoxification.  Review of intake documentation indicates Patient has endorsed drinking 1 pint of Kana Beam for the past month daily.  Patient currently displays fatigue , gives vague, minimal responses information regarding use and duration has varied, reliability  is questionable,  Collateral information has been gathered from medical record and staff. Patient currently reports drinking \" booze\" for the past couple of months at rate of \"typically\" 1-2 drinks. Noted initial ethanol level of 343 in Ed. UDS is negative. He  has denied drug use. When questioned about history of etoh and drug use he reports \" partied\" in high school, does not elaborate.  According to intake,   Patient inially presented to [facility] reporting depression, including recently starting on medication Zoloft 3-4 weeks ago.  Per note,  Patient's initially reported being  under stress at work and home. Patient initially reported  working full time at the [employer] and having difficulty concentrating and focusing on his work throughout the week. Reportedly, he's  not been sleeping well for weeks  . It is reported  he has lost 30 pounds unintentionally since November of last year.  Noted CIWA score of 8 on Unit following medication Ativan for signs and symptoms of withdrawal . He denies suicidal or homicidal ideation. He denies hallucination . He was admitted to the Adult Psychiatric Unit for safety and further " "stabilization.         repNoted initial ethanol level of 343 .   Available medical/psychiatric records reviewed and incorporated into the current document.     PAST PSYCHIATRIC HX: Patient has denied   history of psychiatric hospitalizations.    SUBSTANCE USE HX:  UDS is negative. See hpi for current use .     SOCIAL HX:.  Patient is ,  currently residing in [facility] where he has been living with his spouse. He is high school educated and currently employed at the [employer] Per note,  Patient's has been under stress at work and home, reportedly Daughter  ( stepDaughter) has history of psychiatric issues, treatment .    Past Medical History:   Diagnosis Date   • Alcohol abuse    • H/O exercise stress test 2015    HAD DONE WITH WORK.  \"IT WAS OK\"   • Hypertension    • Wears glasses    • Withdrawal symptoms, alcohol (CMS/HCC)        Past Surgical History:   Procedure Laterality Date   • COLONOSCOPY N/A 5/18/2018    Procedure: COLONOSCOPY WITH COLD SNARE POLYECTOMY X 1;  Surgeon: Carlos A Moreno MD;  Location: [facility] ENDOSCOPY;  Service: Gastroenterology   • CYSTOSCOPY  2016   • WISDOM TOOTH EXTRACTION         Family History   Problem Relation Age of Onset   • Hypertension Mother    • Hypertension Father          Medications Prior to Admission   Medication Sig Dispense Refill Last Dose   • losartan (COZAAR) 50 MG tablet Take 50 mg by mouth Daily.   9/8/2019 at Unknown time   • sertraline (ZOLOFT) 50 MG tablet Take 50 mg by mouth Daily.   9/8/2019 at Unknown time           ALLERGIES:  Tetracyclines & related    Temp:  [97.3 °F (36.3 °C)-98.4 °F (36.9 °C)] 98 °F (36.7 °C)  Heart Rate:  [] 76  Resp:  [16-20] 18  BP: (130-152)/() 143/78    REVIEW OF SYSTEMS:  Constitutional: Negative.    HENT: Negative.    Eyes: Negative.    Respiratory: Negative.    Cardiovascular: Negative.    Gastrointestinal: Negative.    Endocrine: Negative.    Genitourinary: Negative.    Musculoskeletal: Negative.  "   Skin: Negative.    Allergic/Immunologic: Negative.    Neurological: Negative.    Psychiatric/Behavioral: Positive for dysphoric mood. The patient is nervous/anxious.       OBJECTIVE    PHYSICAL EXAM:  Constitutional: oriented to person, place, and time. Appears well-developed and well-nourished.   HENT:   Head: Normocephalic and atraumatic.   Right Ear: External ear normal.   Left Ear: External ear normal.   Mouth/Throat: Oropharynx is clear and moist.   Eyes: Pupils are equal, round, and reactive to light. Conjunctivae and EOM are normal.   Neck: Normal range of motion. Neck supple.   Cardiovascular: Normal rate, regular rhythm and normal heart sounds.    Pulmonary/Chest: Effort normal and breath sounds normal. No respiratory distress. No wheezes.   Abdominal: Soft. Bowel sounds are normal.No distension. There is no tenderness.   Musculoskeletal: Normal range of motion. No edema or deformity.   Neurological:Alert and oriented to person, place, and time. No cranial nerve deficit. Coordination normal.   Skin: Skin is warm and dry. No rash noted. No erythema.     MENTAL STATUS EXAM:   Hygiene:   fair  Cooperation:  Cooperative  Eye Contact:  Poor  Psychomotor Behavior:  Slow  Affect:  Fatigued   Hopelessness: Denies  Speech:  Minimal  Thought Progress:  Linear  Thought Content:  Mood congruent  Suicidal:  None  Homicidal:  None  Hallucinations:  None  Delusion:  None  Memory:  Deficits  Orientation:  Person and Situation  Reliability:  fair  Insight:  Fair  Judgement:  Poor  Impulse Control:  Poor  Physical/Medical Issues:  See medical list       Imaging Results (last 24 hours)     ** No results found for the last 24 hours. **           ECG/EMG Results (most recent)     None           Lab Results   Component Value Date    GLUCOSE 81 09/08/2019    BUN 9 09/08/2019    CREATININE 0.81 09/08/2019    EGFRIFNONA 100 09/08/2019    BCR 11.1 09/08/2019    CO2 22.7 09/08/2019    CALCIUM 9.9 09/08/2019    ALBUMIN 4.90  09/08/2019    AST 31 09/08/2019    ALT 24 09/08/2019       Lab Results   Component Value Date    WBC 8.36 09/08/2019    HGB 18.3 (H) 09/08/2019    HCT 53.8 (H) 09/08/2019    MCV 94.2 09/08/2019     09/08/2019       Pain Management Panel     Pain Management Panel Latest Ref Rng & Units 9/8/2019    AMPHETAMINES SCREEN, URINE Negative Negative    BARBITURATES SCREEN Negative Negative    BENZODIAZEPINE SCREEN, URINE Negative Negative    BUPRENORPHINEUR Negative Negative    COCAINE SCREEN, URINE Negative Negative    METHADONE SCREEN, URINE Negative Negative    METHAMPHETAMINEUR Negative Negative          Brief Urine Lab Results  (Last result in the past 365 days)      Color   Clarity   Blood   Leuk Est   Nitrite   Protein   CREAT   Urine HCG        09/08/19 1547 Yellow Clear Trace Negative Negative Negative               Reviewed labs and studies done with this admission.       ASSESSMENT & PLAN:        Alcohol dependence with withdrawal (CMS/HCC)  - Ativan detox       Severe episode of recurrent major depressive disorder, without psychotic features (CMS/HCC)  - Zoloft      The patient has been admitted for safety and stabilization.  Patient will be monitored for suicidality daily and maintained on 30 minute rounds for safety   The patient will have individual and group therapy with a master's level therapist. A master treatment plan will be developed and agreed upon by the patient and his/her treatment team.  The patient's estimated length of stay in the hospital is 5-7 days.       Written by Flor Buchanan RN, acting as scribe for Dr. TRISH Littlejohn . Dr. TRISH Littlejohn's signature on this note affirms that the note adequately documents the care provided.     Flor Buchanan RN  09/09/19  12:21 PM      IElise MD, personally performed the services described in this documentation as scribed by the above named individual in my presence, and it is both accurate and complete.

## 2019-09-09 NOTE — PLAN OF CARE
Problem: Patient Care Overview  Goal: Plan of Care Review  Outcome: Ongoing (interventions implemented as appropriate)   09/09/19 0942   Coping/Psychosocial   Plan of Care Reviewed With patient   Coping/Psychosocial   Patient Agreement with Plan of Care agrees   Plan of Care Review   Progress no change   OTHER   Outcome Summary Patient avoids social contact. Appears to be minimizing symptoms. Patient disoriented to date thought it was October. Rates anxiety 6. Denies depression, cravings, or wd's symptoms. Patient has had elevated B/p and fatigue. Will continue to monitor.       Problem: Overarching Goals (Adult)  Goal: Adheres to Safety Considerations for Self and Others  Outcome: Ongoing (interventions implemented as appropriate)    Goal: Optimized Coping Skills in Response to Life Stressors  Outcome: Ongoing (interventions implemented as appropriate)    Goal: Develops/Participates in Therapeutic [place] to Support Successful Transition  Outcome: Ongoing (interventions implemented as appropriate)

## 2019-09-10 PROCEDURE — 99232 SBSQ HOSP IP/OBS MODERATE 35: CPT | Performed by: PSYCHIATRY & NEUROLOGY

## 2019-09-10 RX ADMIN — LORAZEPAM 2 MG: 2 TABLET ORAL at 21:16

## 2019-09-10 RX ADMIN — Medication 100 MG: at 08:18

## 2019-09-10 RX ADMIN — LORAZEPAM 2 MG: 2 TABLET ORAL at 15:17

## 2019-09-10 RX ADMIN — SERTRALINE 50 MG: 50 TABLET, FILM COATED ORAL at 08:18

## 2019-09-10 RX ADMIN — VITAMIN C 2 TABLET: TAB at 08:18

## 2019-09-10 RX ADMIN — LOSARTAN POTASSIUM 50 MG: 50 TABLET, FILM COATED ORAL at 08:18

## 2019-09-10 RX ADMIN — Medication 1 TABLET: at 08:18

## 2019-09-10 RX ADMIN — LORAZEPAM 2 MG: 2 TABLET ORAL at 08:18

## 2019-09-10 RX ADMIN — HYDROXYZINE HYDROCHLORIDE 50 MG: 50 TABLET, FILM COATED ORAL at 21:16

## 2019-09-10 NOTE — PLAN OF CARE
Problem: Patient Care Overview  Goal: Plan of Care Review  Outcome: Ongoing (interventions implemented as appropriate)   09/10/19 4383   Coping/Psychosocial   Plan of Care Reviewed With patient   Coping/Psychosocial   Patient Agreement with Plan of Care agrees   Plan of Care Review   Progress improving

## 2019-09-10 NOTE — PROGRESS NOTES
"INPATIENT PSYCHIATRIC PROGRESS NOTE    Name:  Yousuf Barrera  :  1965  MRN:  [id number]  Visit Number:  [id number]  Length of stay:  1    SUBJECTIVE  CC/Focus of Exam: alcohol use and withdrawals    INTERVAL HISTORY:  The patient reports he is feeling better and his mood is also becoming more stable.  Depression rating 5/10  Anxiety rating 4/10  Sleep: good  Withdrawal sx: denies  Cravin/10    Review of Systems   Respiratory: Negative.    Cardiovascular: Negative.        OBJECTIVE    Temp:  [97.4 °F (36.3 °C)-97.8 °F (36.6 °C)] 97.8 °F (36.6 °C)  Heart Rate:  [62-85] 73  Resp:  [18-20] 18  BP: (133-163)/() 153/90    MENTAL STATUS EXAM:  Appearance:Casually dressed, good hygeine.   Cooperation:Cooperative  Psychomotor: No psychomotor agitation/retardation, No EPS, No motor tics  Speech-normal rate, amount.  Mood \"good\"   Affect-congruent, appropriate, stable  Thought Content-goal directed, no delusional material present  Thought process-linear, organized.  Suicidality: No SI  Homicidality: No HI  Perception: No AH/VH  Insight-fair   Judgement-fair    Lab Results (last 24 hours)     ** No results found for the last 24 hours. **             Imaging Results (last 24 hours)     ** No results found for the last 24 hours. **             ECG/EMG Results (most recent)     None           ALLERGIES: Tetracyclines & related      Current Facility-Administered Medications:   •  acetaminophen (TYLENOL) tablet 650 mg, 650 mg, Oral, Q6H PRN, Taras Nino MD  •  aluminum-magnesium hydroxide-simethicone (MAALOX MAX) 400-400-40 MG/5ML suspension 15 mL, 15 mL, Oral, Q6H PRN, Taras Nino MD  •  benzonatate (TESSALON) capsule 100 mg, 100 mg, Oral, TID PRN, Taras Nino MD  •  benztropine (COGENTIN) tablet 2 mg, 2 mg, Oral, Once PRN **OR** benztropine (COGENTIN) injection 1 mg, 1 mg, Intramuscular, Once PRN, Taras Nino MD  •  famotidine (PEPCID) tablet 20 mg, 20 mg, Oral, BID PRN, Taras Nino MD  •  " hydrOXYzine (ATARAX) tablet 50 mg, 50 mg, Oral, Q6H PRN, Taras Nino MD, 50 mg at 09/09/19 1531  •  ibuprofen (ADVIL,MOTRIN) tablet 400 mg, 400 mg, Oral, Q6H PRN, Taras Nino MD  •  loperamide (IMODIUM) capsule 2 mg, 2 mg, Oral, Q2H PRN, Taras Nino MD  •  LORazepam (ATIVAN) tablet 2 mg, 2 mg, Oral, 3 times per day, 2 mg at 09/10/19 0818 **FOLLOWED BY** [START ON 9/11/2019] LORazepam (ATIVAN) tablet 1.5 mg, 1.5 mg, Oral, 3 times per day **FOLLOWED BY** [START ON 9/12/2019] LORazepam (ATIVAN) tablet 1 mg, 1 mg, Oral, 3 times per day **FOLLOWED BY** [START ON 9/13/2019] LORazepam (ATIVAN) tablet 0.5 mg, 0.5 mg, Oral, 3 times per day, Taras Nino MD  •  LORazepam (ATIVAN) tablet 2 mg, 2 mg, Oral, Q4H PRN **FOLLOWED BY** [START ON 9/11/2019] LORazepam (ATIVAN) tablet 1.5 mg, 1.5 mg, Oral, Q4H PRN **FOLLOWED BY** [START ON 9/12/2019] LORazepam (ATIVAN) tablet 1 mg, 1 mg, Oral, Q4H PRN **FOLLOWED BY** [START ON 9/13/2019] LORazepam (ATIVAN) tablet 0.5 mg, 0.5 mg, Oral, Q4H PRN, Taras Nino MD  •  losartan (COZAAR) tablet 50 mg, 50 mg, Oral, Daily, Elise Littlejohn MD, 50 mg at 09/10/19 0818  •  magnesium hydroxide (MILK OF MAGNESIA) suspension 2400 mg/10mL 10 mL, 10 mL, Oral, Daily PRN, Taras Nino MD  •  multivitamin (DAILY BRETT) tablet 1 tablet, 1 tablet, Oral, Daily, Taras Nino MD, 1 tablet at 09/10/19 0818  •  nicotine (NICODERM CQ) 21 MG/24HR patch 1 patch, 1 patch, Transdermal, Q24H, Taras Nino MD  •  ondansetron (ZOFRAN) tablet 4 mg, 4 mg, Oral, Q6H PRN, Taras Nino MD  •  sertraline (ZOLOFT) tablet 50 mg, 50 mg, Oral, Daily, Elise Littlejohn MD, 50 mg at 09/10/19 0818  •  sodium chloride nasal spray 2 spray, 2 spray, Each Nare, PRN, Taras Nino MD  •  thiamine (VITAMIN B-1) tablet 100 mg, 100 mg, Oral, Daily, Taras Nino MD, 100 mg at 09/10/19 0818  •  traZODone (DESYREL) tablet 50 mg, 50 mg, Oral, Nightly PRN, Taras Nino MD  •  vitamin b complex (TOTAL B/C) tablet 2 tablet, 2  tablet, Oral, Daily, Taras Nino MD, 2 tablet at 09/10/19 0818    ASSESSMENT & PLAN:      Alcohol dependence with withdrawal (CMS/HCC)  - Ativan detox       Severe episode of recurrent major depressive disorder, without psychotic features (CMS/HCC)  - Zoloft    Suicide precautions: Suicide precuation Level 4 (q30 min checks)    Behavioral Health Treatment Plan and Problem List: I have reviewed and approved the Behavioral Health Treatment Plan and Problem list.  The patient has had a chance to review and agrees with the treatment plan.     Clinician:  Elise Littlejohn MD  09/10/19  2:42 PM

## 2019-09-10 NOTE — PLAN OF CARE
Problem: Mood Impairment (Depressive Signs/Symptoms) (Adult)  Goal: Improved Mood Symptoms (Depressive Signs/Symptoms)  Outcome: Ongoing (interventions implemented as appropriate)      Problem: Feelings of Worthlessness, Hopelessness, Excessive Guilt (Depressive Signs/Symptoms) (Adult)  Goal: Enhanced Self-Esteem/Confidence (Depressive Signs/Symptoms)  Outcome: Ongoing (interventions implemented as appropriate)      Problem: Decreased Participation/Engagement (Depressive Signs/Symptoms) (Adult)  Goal: Increased Participation/Engagement (Depressive Signs/Symptoms)  Outcome: Ongoing (interventions implemented as appropriate)      Problem: Sleep Impairment (Depressive Signs/Symptoms) (Adult)  Goal: Improved Sleep Hygiene (Depressive Signs/Symptoms)  Outcome: Ongoing (interventions implemented as appropriate)      Problem: Weight/Appetite Change (Depressive Signs/Symptoms) (Adult)  Goal: Nutrition/Weight Optimized (Depressive Signs/Symptoms)  Outcome: Ongoing (interventions implemented as appropriate)

## 2019-09-10 NOTE — NURSING NOTE
Pt had to woke up for assessment. Pt ambulated down the [place]wick and was slow to respond. Voice very low, mumbling and evasive to questions. Pt finally states that in May his 13 year old daughter broke up with her boyfriend and 2 days later pictures started being shown on social media, Pt reports his daughter was upset and became suicidal and went to [facility] for 6 days. Pt reports he is having a hard time dealing with the situation and that's when he started drinking. Pt still evasive when he talks about that situation. Noted that pt is slow to respond to answers.

## 2019-09-10 NOTE — PROGRESS NOTES
[id number]  D: Met with the patient in the office, assessing his needs, discussing aftercare treatment plans, and completing the social history assessment.  Patient agreeable.  (See social history assessment for additional information).  The patient did not identify himself as an alcoholic.  He believed he would be able to continue drinking socially with his friends after work.  It is also notable the patient reported feeling alone he reported believing his coworkers and friends could not relate to the situation he was facing with his daughter.  He was evasive about the situation with his daughter, only disclosing that she had been admitted to [facility] at the psychiatric unit some months ago.  It seemed she had experienced a suicide attempt, but the patient did not disclose this detail.  The patient reported his daughter had also experienced a falling out with her youth group at [employer], and the patient and his wife had been questioning whether or not they wanted to continue attending there.    The situation had reached a point where the patient had begun to contemplate moving away with is family and starting over someplace else. He felt judged by his peers, and was struggling with thoughts of unfairness.     Utilized CBT techniques to lightly challenge the patient's distorted thoughts.     A: The patient's affect appeared depressed.  He was tearful on more than one occasion.  He seemed to be experiencing feelings of resentment, and it appeared alcohol had been the patient's way of coping with the situations of late.  The patient seemed to have a poor degree of insight in regard to his alcohol consumption.  It was not clear based on his report whether he could be considered an alcoholic or not.  Perhaps the patient was minimizing his alcohol use.  He did seem, however, the patient was minimizing the risk of continued drinking post discharge.    P: The patient will continue hospitalization.  He reported already  having been established with a therapist in the [facility].  He and his wife had began seeing a therapist around the time that his daughter was hospitalized.  He will follow-up with his usual therapist upon discharge.  He has not yet disclosed the agency where he receives treatment.

## 2019-09-10 NOTE — PLAN OF CARE
Problem: Patient Care Overview  Goal: Plan of Care Review  Outcome: Ongoing (interventions implemented as appropriate)  Pt with flat affect. Pt slow to answer. Pt very evasive. Pt did report incident that happened in May see note   09/09/19 0264   Coping/Psychosocial   Plan of Care Reviewed With patient   Coping/Psychosocial   Patient Agreement with Plan of Care agrees   Plan of Care Review   Progress no change       Problem: Overarching Goals (Adult)  Goal: Adheres to Safety Considerations for Self and Others  Outcome: Ongoing (interventions implemented as appropriate)    Goal: Optimized Coping Skills in Response to Life Stressors  Outcome: Ongoing (interventions implemented as appropriate)    Goal: Develops/Participates in Therapeutic [place] to Support Successful Transition  Outcome: Ongoing (interventions implemented as appropriate)      Problem: Impaired Control (Excessive Substance Use) (Adult)  Goal: Participates in Recovery Program (Excessive Substance Use)  Outcome: Ongoing (interventions implemented as appropriate)      Problem: Social/Occupational/Functional Impairment (Excessive Substance Use) (Adult)  Goal: Improved Social/Occupational/Functional Skills (Excessive Substance Use)  Outcome: Ongoing (interventions implemented as appropriate)      Problem: Safety Awareness Impairment (Excessive Substance Use) (Adult)  Goal: Enhanced Safety Awareness (Excessive Substance Use)  Outcome: Ongoing (interventions implemented as appropriate)      Problem: Physiological Impairment (Excessive Substance Use) (Adult)  Goal: Improved Physiologic Symptoms (Excessive Substance Use)  Outcome: Ongoing (interventions implemented as appropriate)

## 2019-09-11 PROCEDURE — 99232 SBSQ HOSP IP/OBS MODERATE 35: CPT | Performed by: PSYCHIATRY & NEUROLOGY

## 2019-09-11 RX ADMIN — Medication 1 TABLET: at 10:23

## 2019-09-11 RX ADMIN — Medication 100 MG: at 10:23

## 2019-09-11 RX ADMIN — TRAZODONE HYDROCHLORIDE 50 MG: 50 TABLET ORAL at 21:08

## 2019-09-11 RX ADMIN — LOSARTAN POTASSIUM 50 MG: 50 TABLET, FILM COATED ORAL at 10:23

## 2019-09-11 RX ADMIN — LORAZEPAM 1.5 MG: 1 TABLET ORAL at 21:08

## 2019-09-11 RX ADMIN — SERTRALINE 50 MG: 50 TABLET, FILM COATED ORAL at 10:23

## 2019-09-11 RX ADMIN — VITAMIN C 2 TABLET: TAB at 10:23

## 2019-09-11 RX ADMIN — LORAZEPAM 1.5 MG: 1 TABLET ORAL at 14:18

## 2019-09-11 RX ADMIN — LORAZEPAM 1.5 MG: 1 TABLET ORAL at 08:21

## 2019-09-11 NOTE — PROGRESS NOTES
"[id number]    DATA:      Therapist met individually with patient this date assessing his needs, discussing aftercare plans, and contacting his wife, Roslaie, via speaker phone. Patient agreeable. Patient reported feeling better from a withdrawal standpoint. The patient reported the worst part was not having his choice of tobacco product. He reported feeling distressed about not knowing his next steps. He had been talking with some of his peers on the unit, and they were going to 30-day-programs upon discharge. He wondered if he needed such a level of treatment, and he reported feelings of guilt for having been in treatment here already. He did not like the idea that his wife had to compensate for his responsibilities while he was away. The therapist suggested he would not benefit from residential program because he did not consider himself an alcoholic. Suggested outpatient therapy instead. The patient seemed agreeable, but did not directly verbalize a decision. He and his wife had been seeing a couples therapist on a weekly basis for some months now.     The patient then began to passive-aggressively complain about the hospital having yet to contact his wife. He made statements such as, \"At [facility] they would call every day with an update, and I don't think that's happening here.\" The therapist had offered to call his wife yesterday during their session and speak with her together on speaker phone. The patient did not make a decision. It was the same during the session this day. The therapist shared his observation that the patient was struggling with indecisiveness, and set the boundary that the patient needed to make the decision rather than the therapist making the decision. However, the patient continued to avoid making a specific decision. Instead, he began to verbalize worrisome thoughts, stating, \"I just don't know how this conversation will go. Maybe it would be better to call her later.\"    He " "eventually decided to call Rosalie. Introduced self as the patient's therapist. Explained we were calling simply to make contact, without an agenda for the conversation. Asked if she had questions. She reported no questions, but she did have a concern. After having talked with the patient last night over the phone she was concerned the patient wanted to abstain from liquor, and that he wanted to continue drinking beer. Gave the patient an opportunity to respond. He reported her report was accurate, and then he changed the subject, explaining he was calling because they hadn't received a packet of information from this facility, not like they had from [facility], and he wanted her to be informed. Rosalie reported no further questions.    After the phone call, the patient reported believing there was more to Rosalie's concerns. Earlier in the session, the patient had reported having doubts that his marriage would still be there. He reported he was \"reading between the lines\" to come to this conclusion.     Clinical Maneuvering/Intervention:     Therapist assisted patient in processing above session content; acknowledged and normalized patient’s thoughts, feelings, and concerns. Allowed patient to freely discuss issues without interruption or judgment. Provided safe, confidential environment to facilitate the development of positive therapeutic relationship and encourage open, honest communication. At the end of the session, introduced the patient to CBT and provided an information worksheet as an introduction. Also, provided the U Guide Map on the [place] Prayer, encouraging the patient to complete the worksheet today.      ASSESSMENT:      The patient affect appeared depressed. He was tearful. His speech was of slower rate, and his tone was quiet. His motor movements seemed slower as well. His presentation seemed indicative of an experience of depression and strong feelings of sadness. He made the comment " "early in the session, \"Maybe it's too late to get help.\" He seemed to be experiencing a large degree of hopelessness, sometimes answering questions with, \"I don't know.\" It seemed he was also experiencing difficulty assuming responsibility for the part he had played, as evidenced by his passive-aggressive comments, which seemed to be directed at placing blame on the hospital. When asked about his wife's concerns, that he wanted to continue drinking beer, he became tearful and appeared to be experiencing grief at the idea of losing alcohol in his life.      PLAN:       Patient to remain hospitalized this date.     Treatment team will focus efforts on stabilizing patient's acute symptoms while providing education on healthy coping and crisis management to reduce hospitalizations.   Patient requires daily psychiatrist evaluation and 24/7 nursing supervision to promote patient  safety.     Therapist will offer 1-4 individual sessions (20-30 minutes each), 1 therapy group daily, family education, and appropriate referral.    Therapist recommends outpatient therapy.   "

## 2019-09-11 NOTE — PROGRESS NOTES
"INPATIENT PSYCHIATRIC PROGRESS NOTE    Name:  Yousuf Barrera  :  1965  MRN:  [id number]  Visit Number:  [id number]  Length of stay:  2    SUBJECTIVE  CC/Focus of Exam: alcohol use and withdrawals    INTERVAL HISTORY:  The patient reports he thinks he has come a long way since he came to the hospital. States that he is not craving alcohol at this time but he would like to use tobacco and is trying to figure out a way to get some snuff.  Depression rating 2/10  Anxiety rating 2/10  Sleep: good  Withdrawal sx: denies  Craving: None for alcohol but high for snuff    Review of Systems   Respiratory: Negative.    Cardiovascular: Negative.        OBJECTIVE    Temp:  [97.3 °F (36.3 °C)-98.3 °F (36.8 °C)] 97.8 °F (36.6 °C)  Heart Rate:  [62-85] 74  Resp:  [18] 18  BP: (129-155)/() 150/100    MENTAL STATUS EXAM:  Appearance:Casually dressed, good hygeine.   Cooperation:Cooperative  Psychomotor: No psychomotor agitation/retardation, No EPS, No motor tics  Speech-normal rate, amount.  Mood \"good\"   Affect-congruent, appropriate, stable  Thought Content-goal directed, no delusional material present  Thought process-linear, organized.  Suicidality: No SI  Homicidality: No HI  Perception: No AH/VH  Insight-fair   Judgement-fair    Lab Results (last 24 hours)     ** No results found for the last 24 hours. **             Imaging Results (last 24 hours)     ** No results found for the last 24 hours. **             ECG/EMG Results (most recent)     None           ALLERGIES: Tetracyclines & related      Current Facility-Administered Medications:   •  acetaminophen (TYLENOL) tablet 650 mg, 650 mg, Oral, Q6H PRN, Taras Nino MD  •  aluminum-magnesium hydroxide-simethicone (MAALOX MAX) 400-400-40 MG/5ML suspension 15 mL, 15 mL, Oral, Q6H PRN, Taras Nino MD  •  benzonatate (TESSALON) capsule 100 mg, 100 mg, Oral, TID PRN, Taras Nino MD  •  benztropine (COGENTIN) tablet 2 mg, 2 mg, Oral, Once PRN **OR** benztropine " (COGENTIN) injection 1 mg, 1 mg, Intramuscular, Once PRN, Taras Nino MD  •  famotidine (PEPCID) tablet 20 mg, 20 mg, Oral, BID PRN, Taras Nino MD  •  hydrOXYzine (ATARAX) tablet 50 mg, 50 mg, Oral, Q6H PRN, Taras Nino MD, 50 mg at 09/10/19 2116  •  ibuprofen (ADVIL,MOTRIN) tablet 400 mg, 400 mg, Oral, Q6H PRN, Taras Nino MD  •  loperamide (IMODIUM) capsule 2 mg, 2 mg, Oral, Q2H PRN, Taras Nino MD  •  [COMPLETED] LORazepam (ATIVAN) tablet 2 mg, 2 mg, Oral, 3 times per day, 2 mg at 09/10/19 2116 **FOLLOWED BY** LORazepam (ATIVAN) tablet 1.5 mg, 1.5 mg, Oral, 3 times per day, 1.5 mg at 19 0821 **FOLLOWED BY** [START ON 2019] LORazepam (ATIVAN) tablet 1 mg, 1 mg, Oral, 3 times per day **FOLLOWED BY** [START ON 2019] LORazepam (ATIVAN) tablet 0.5 mg, 0.5 mg, Oral, 3 times per day, Taras Nino MD  •  [] LORazepam (ATIVAN) tablet 2 mg, 2 mg, Oral, Q4H PRN **FOLLOWED BY** LORazepam (ATIVAN) tablet 1.5 mg, 1.5 mg, Oral, Q4H PRN **FOLLOWED BY** [START ON 2019] LORazepam (ATIVAN) tablet 1 mg, 1 mg, Oral, Q4H PRN **FOLLOWED BY** [START ON 2019] LORazepam (ATIVAN) tablet 0.5 mg, 0.5 mg, Oral, Q4H PRN, Taras Nino MD  •  losartan (COZAAR) tablet 50 mg, 50 mg, Oral, Daily, Elise Littlejohn MD, 50 mg at 19 1023  •  magnesium hydroxide (MILK OF MAGNESIA) suspension 2400 mg/10mL 10 mL, 10 mL, Oral, Daily PRN, Taras Nino MD  •  multivitamin (DAILY BRETT) tablet 1 tablet, 1 tablet, Oral, Daily, Taras Nino MD, 1 tablet at 19 1023  •  nicotine (NICODERM CQ) 21 MG/24HR patch 1 patch, 1 patch, Transdermal, Q24H, Taras Nino MD  •  ondansetron (ZOFRAN) tablet 4 mg, 4 mg, Oral, Q6H PRN, Taras Nino MD  •  sertraline (ZOLOFT) tablet 50 mg, 50 mg, Oral, Daily, Elise Littlejohn MD, 50 mg at 19 1023  •  sodium chloride nasal spray 2 spray, 2 spray, Each Nare, PRN, Taras Nino MD  •  thiamine (VITAMIN B-1) tablet 100 mg, 100 mg, Oral, Daily, Taras Nino,  MD, 100 mg at 09/11/19 1023  •  traZODone (DESYREL) tablet 50 mg, 50 mg, Oral, Nightly PRN, Taras Nino MD  •  vitamin b complex (TOTAL B/C) tablet 2 tablet, 2 tablet, Oral, Daily, Taras Nino MD, 2 tablet at 09/11/19 1023    ASSESSMENT & PLAN:      Alcohol dependence with withdrawal (CMS/HCC)  - Ativan detox       Severe episode of recurrent major depressive disorder, without psychotic features (CMS/HCC)  - Zoloft      HTN  - Cozaar    Suicide precautions: Suicide precuation Level 4 (q30 min checks)    Behavioral Health Treatment Plan and Problem List: I have reviewed and approved the Behavioral Health Treatment Plan and Problem list.  The patient has had a chance to review and agrees with the treatment plan.     Clinician:  Elise Littlejohn MD  09/11/19  1:59 PM

## 2019-09-11 NOTE — PLAN OF CARE
Problem: Patient Care Overview  Goal: Plan of Care Review  Outcome: Ongoing (interventions implemented as appropriate)   09/11/19 0142   Coping/Psychosocial   Plan of Care Reviewed With patient   Coping/Psychosocial   Patient Agreement with Plan of Care agrees   Plan of Care Review   Progress improving       Problem: Overarching Goals (Adult)  Goal: Adheres to Safety Considerations for Self and Others  Outcome: Ongoing (interventions implemented as appropriate)    Goal: Optimized Coping Skills in Response to Life Stressors  Outcome: Outcome(s) achieved Date Met: 09/11/19    Goal: Develops/Participates in Therapeutic [place] to Support Successful Transition  Outcome: Ongoing (interventions implemented as appropriate)      Problem: Impaired Control (Excessive Substance Use) (Adult)  Goal: Participates in Recovery Program (Excessive Substance Use)  Outcome: Ongoing (interventions implemented as appropriate)      Problem: Social/Occupational/Functional Impairment (Excessive Substance Use) (Adult)  Goal: Improved Social/Occupational/Functional Skills (Excessive Substance Use)  Outcome: Ongoing (interventions implemented as appropriate)      Problem: Safety Awareness Impairment (Excessive Substance Use) (Adult)  Goal: Enhanced Safety Awareness (Excessive Substance Use)  Outcome: Ongoing (interventions implemented as appropriate)      Problem: Physiological Impairment (Excessive Substance Use) (Adult)  Goal: Improved Physiologic Symptoms (Excessive Substance Use)  Outcome: Outcome(s) achieved Date Met: 09/11/19      Problem: Mood Impairment (Depressive Signs/Symptoms) (Adult)  Goal: Improved Mood Symptoms (Depressive Signs/Symptoms)  Outcome: Ongoing (interventions implemented as appropriate)      Problem: Feelings of Worthlessness, Hopelessness, Excessive Guilt (Depressive Signs/Symptoms) (Adult)  Goal: Enhanced Self-Esteem/Confidence (Depressive Signs/Symptoms)  Outcome: Ongoing (interventions implemented as  appropriate)      Problem: Decreased Participation/Engagement (Depressive Signs/Symptoms) (Adult)  Goal: Increased Participation/Engagement (Depressive Signs/Symptoms)  Outcome: Ongoing (interventions implemented as appropriate)      Problem: Sleep Impairment (Depressive Signs/Symptoms) (Adult)  Goal: Improved Sleep Hygiene (Depressive Signs/Symptoms)  Outcome: Outcome(s) achieved Date Met: 09/11/19      Problem: Weight/Appetite Change (Depressive Signs/Symptoms) (Adult)  Goal: Nutrition/Weight Optimized (Depressive Signs/Symptoms)  Outcome: Ongoing (interventions implemented as appropriate)

## 2019-09-11 NOTE — PLAN OF CARE
Problem: Patient Care Overview  Goal: Plan of Care Review  Outcome: Ongoing (interventions implemented as appropriate)   09/11/19 7925   Coping/Psychosocial   Plan of Care Reviewed With patient   Coping/Psychosocial   Patient Agreement with Plan of Care agrees   Plan of Care Review   Progress improving   OTHER   Outcome Summary pt calm and cooperative.

## 2019-09-12 PROCEDURE — 99232 SBSQ HOSP IP/OBS MODERATE 35: CPT | Performed by: PSYCHIATRY & NEUROLOGY

## 2019-09-12 RX ADMIN — LORAZEPAM 1 MG: 1 TABLET ORAL at 14:08

## 2019-09-12 RX ADMIN — VITAMIN C 2 TABLET: TAB at 08:22

## 2019-09-12 RX ADMIN — TRAZODONE HYDROCHLORIDE 50 MG: 50 TABLET ORAL at 21:08

## 2019-09-12 RX ADMIN — LOSARTAN POTASSIUM 50 MG: 50 TABLET, FILM COATED ORAL at 08:22

## 2019-09-12 RX ADMIN — Medication 1 TABLET: at 08:22

## 2019-09-12 RX ADMIN — LORAZEPAM 1 MG: 1 TABLET ORAL at 08:25

## 2019-09-12 RX ADMIN — SERTRALINE 50 MG: 50 TABLET, FILM COATED ORAL at 08:22

## 2019-09-12 RX ADMIN — Medication 100 MG: at 08:22

## 2019-09-12 RX ADMIN — LORAZEPAM 1 MG: 1 TABLET ORAL at 21:08

## 2019-09-12 NOTE — PROGRESS NOTES
[id number]  [id number]  [id number]  DATA:      Therapist met individually with patient this date, assessing his needs and discussing aftercare plans. The patient again reported he did not know what to do from here. The therapist explained he could not make that decision for him. Presented the options of residential treatment and IOP. Explained his decision would be based on his values and beliefs. Provided illustrations of these concepts and related them back to his recovery and the choices therein. The patient was agreeable. He seemed to be more inclined to choose IOP, hoping his supervisor would allow him to work some hours, and reasoning it would be better that he was home and able to help with responsibilities there.    Met again with the patient for the purpose of contacting his spouse, Rosalie. She was able to talk for about 10 minutes. Explained the option of IOP briefly. Rosalie informed the therapist and patient she was experiencing difficulty considering any options for the patient because she was still so angry and upset about the patient's behavior. She explained how distressed she had been this week with having to take on extra responsibilities at home, including the stress related to their daughter's situations. She did not share details of this situation. She seemed to be stifling tears as she expressed her feelings of anger and resentment. The patient was silent. When prompted by the therapist to provide a response, the patient apologized for his behavior.     Attempted to contact the patient's supervisor Nicko twice during both sessions. Left messages both times. The patient needs to talk to his supervisor about FMLA and IOP.     Able to reach Nicko using the patient's personal cell phone during the last meeting today. Patient was informed they would be flexible with his IOP hours. The supervisor was supportive of the patient's recovery. His job was still secure.      Clinical  Maneuvering/Intervention:     Therapist assisted patient in processing above session content; acknowledged and normalized patient’s thoughts, feelings, and concerns. Allowed patient to freely discuss issues without interruption or judgment. Provided safe, confidential environment to facilitate the development of positive therapeutic relationship and encourage open, honest communication.       ASSESSMENT:      The patient's affect appeared depressed. He seemed discouraged to the point of indecisiveness. He also seemed discouraged to the point he was withdrawing from conversation when confronted by his spouse. He seemed to be experiencing strong feelings of guilt, while also feeling strong feelings of resentment, which he was keeping to himself.      PLAN:       Patient to remain hospitalized this date.     Treatment team will focus efforts on stabilizing patient's acute symptoms while providing education on healthy coping and crisis management to reduce hospitalizations.   Patient requires daily psychiatrist evaluation and 24/7 nursing supervision to promote patient  safety.     Therapist will offer 1-4 individual sessions (20-30 minutes each), 1 therapy group daily, family education, and appropriate referral.    Therapist recommends IOP. Patient will most likely have an intake appointment scheduled for Monday around 12:00. Attempted to contact Ivana at [facility] to schedule an appointment, and she was not available at this time.

## 2019-09-12 NOTE — DISCHARGE INSTR - APPOINTMENTS
[facility]  Intensive Outpatient Program (IOP)  [address]  [address]  [place], [place]  [phone]  Appt: 9-16-19@ 1:00Pm with Jolynn

## 2019-09-12 NOTE — PLAN OF CARE
Problem: Patient Care Overview  Goal: Plan of Care Review  Outcome: Ongoing (interventions implemented as appropriate)   09/12/19 0930   Coping/Psychosocial   Plan of Care Reviewed With patient   Coping/Psychosocial   Patient Agreement with Plan of Care agrees   Plan of Care Review   Progress improving   OTHER   Outcome Summary Patient calm and cooperative. Rates anxiety and depression 2/10. Denies cravings or wd's. Pt. concerned about transportation back home at discharge. No problems noted at this time.        Problem: Overarching Goals (Adult)  Goal: Adheres to Safety Considerations for Self and Others  Outcome: Ongoing (interventions implemented as appropriate)    Goal: Develops/Participates in Therapeutic [place] to Support Successful Transition  Outcome: Ongoing (interventions implemented as appropriate)

## 2019-09-12 NOTE — PROGRESS NOTES
"INPATIENT PSYCHIATRIC PROGRESS NOTE    Name:  Yousuf Barrera  :  1965  MRN:  [id number]  Visit Number:  [id number]  Length of stay:  3    SUBJECTIVE  CC/Focus of Exam: alcohol use and withdrawals    INTERVAL HISTORY:  The patient reports he is feeling better and the withdrawals are not as intense as previously.  Depression rating 2/10  Anxiety rating 3/10  Sleep: good  Withdrawal sx: denies  Craving: None for alcohol but high for snuff    Review of Systems   Respiratory: Negative.    Cardiovascular: Negative.        OBJECTIVE    Temp:  [97.3 °F (36.3 °C)-98.1 °F (36.7 °C)] 97.3 °F (36.3 °C)  Heart Rate:  [66-84] 66  Resp:  [18] 18  BP: (109-168)/() 147/90    MENTAL STATUS EXAM:  Appearance:Casually dressed, good hygeine.   Cooperation:Cooperative  Psychomotor: No psychomotor agitation/retardation, No EPS, No motor tics  Speech-normal rate, amount.  Mood \"good\"   Affect-congruent, appropriate, stable  Thought Content-goal directed, no delusional material present  Thought process-linear, organized.  Suicidality: No SI  Homicidality: No HI  Perception: No AH/VH  Insight-fair   Judgement-fair    Lab Results (last 24 hours)     ** No results found for the last 24 hours. **             Imaging Results (last 24 hours)     ** No results found for the last 24 hours. **             ECG/EMG Results (most recent)     None           ALLERGIES: Tetracyclines & related      Current Facility-Administered Medications:   •  acetaminophen (TYLENOL) tablet 650 mg, 650 mg, Oral, Q6H PRN, Taras Nino MD  •  aluminum-magnesium hydroxide-simethicone (MAALOX MAX) 400-400-40 MG/5ML suspension 15 mL, 15 mL, Oral, Q6H PRN, Taras Nino MD  •  benzonatate (TESSALON) capsule 100 mg, 100 mg, Oral, TID PRN, Taras Nino MD  •  benztropine (COGENTIN) tablet 2 mg, 2 mg, Oral, Once PRN **OR** benztropine (COGENTIN) injection 1 mg, 1 mg, Intramuscular, Once PRN, Taras Nino MD  •  famotidine (PEPCID) tablet 20 mg, 20 mg, Oral, " BID PRN, Taras Nino MD  •  hydrOXYzine (ATARAX) tablet 50 mg, 50 mg, Oral, Q6H PRN, Taras Nino MD, 50 mg at 09/10/19 2116  •  ibuprofen (ADVIL,MOTRIN) tablet 400 mg, 400 mg, Oral, Q6H PRN, Taras Nino MD  •  loperamide (IMODIUM) capsule 2 mg, 2 mg, Oral, Q2H PRN, Taras Nino MD  •  [COMPLETED] LORazepam (ATIVAN) tablet 2 mg, 2 mg, Oral, 3 times per day, 2 mg at 09/10/19 2116 **FOLLOWED BY** [COMPLETED] LORazepam (ATIVAN) tablet 1.5 mg, 1.5 mg, Oral, 3 times per day, 1.5 mg at 19 **FOLLOWED BY** LORazepam (ATIVAN) tablet 1 mg, 1 mg, Oral, 3 times per day, 1 mg at 19 **FOLLOWED BY** [START ON 2019] LORazepam (ATIVAN) tablet 0.5 mg, 0.5 mg, Oral, 3 times per day, Taras Nino MD  •  [] LORazepam (ATIVAN) tablet 2 mg, 2 mg, Oral, Q4H PRN **FOLLOWED BY** [] LORazepam (ATIVAN) tablet 1.5 mg, 1.5 mg, Oral, Q4H PRN **FOLLOWED BY** LORazepam (ATIVAN) tablet 1 mg, 1 mg, Oral, Q4H PRN **FOLLOWED BY** [START ON 2019] LORazepam (ATIVAN) tablet 0.5 mg, 0.5 mg, Oral, Q4H PRN, Taras Nino MD  •  losartan (COZAAR) tablet 50 mg, 50 mg, Oral, Daily, Elise Littlejohn MD, 50 mg at 19 0822  •  magnesium hydroxide (MILK OF MAGNESIA) suspension 2400 mg/10mL 10 mL, 10 mL, Oral, Daily PRN, Taras Nino MD  •  multivitamin (DAILY BRETT) tablet 1 tablet, 1 tablet, Oral, Daily, Taras Nino MD, 1 tablet at 19  •  nicotine (NICODERM CQ) 21 MG/24HR patch 1 patch, 1 patch, Transdermal, Q24H, Taras Nino MD  •  ondansetron (ZOFRAN) tablet 4 mg, 4 mg, Oral, Q6H PRN, Taras Nino MD  •  sertraline (ZOLOFT) tablet 50 mg, 50 mg, Oral, Daily, Elise Littlejohn MD, 50 mg at 19  •  sodium chloride nasal spray 2 spray, 2 spray, Each Nare, PRN, Taras Nino MD  •  thiamine (VITAMIN B-1) tablet 100 mg, 100 mg, Oral, Daily, Taras Nino MD, 100 mg at 19  •  traZODone (DESYREL) tablet 50 mg, 50 mg, Oral, Nightly PRN, Taras Nino MD, 50 mg at  09/11/19 2108  •  vitamin b complex (TOTAL B/C) tablet 2 tablet, 2 tablet, Oral, Daily, Taras Nnio MD, 2 tablet at 09/12/19 0822    ASSESSMENT & PLAN:      Alcohol dependence with withdrawal (CMS/HCC)  - Ativan detox       Severe episode of recurrent major depressive disorder, without psychotic features (CMS/HCC)  - Zoloft      HTN  - Cozaar    Suicide precautions: Suicide precuation Level 4 (q30 min checks)    Behavioral Health Treatment Plan and Problem List: I have reviewed and approved the Behavioral Health Treatment Plan and Problem list.  The patient has had a chance to review and agrees with the treatment plan.     Clinician:  Elise Littlejohn MD  09/12/19  12:58 PM

## 2019-09-13 PROCEDURE — 99232 SBSQ HOSP IP/OBS MODERATE 35: CPT | Performed by: PSYCHIATRY & NEUROLOGY

## 2019-09-13 RX ADMIN — VITAMIN C 2 TABLET: TAB at 08:28

## 2019-09-13 RX ADMIN — HYDROXYZINE HYDROCHLORIDE 50 MG: 50 TABLET, FILM COATED ORAL at 21:59

## 2019-09-13 RX ADMIN — LORAZEPAM 0.5 MG: 0.5 TABLET ORAL at 14:11

## 2019-09-13 RX ADMIN — LORAZEPAM 0.5 MG: 0.5 TABLET ORAL at 21:58

## 2019-09-13 RX ADMIN — LORAZEPAM 0.5 MG: 0.5 TABLET ORAL at 18:41

## 2019-09-13 RX ADMIN — Medication 100 MG: at 08:28

## 2019-09-13 RX ADMIN — TRAZODONE HYDROCHLORIDE 50 MG: 50 TABLET ORAL at 21:58

## 2019-09-13 RX ADMIN — LOSARTAN POTASSIUM 50 MG: 50 TABLET, FILM COATED ORAL at 08:28

## 2019-09-13 RX ADMIN — SERTRALINE 50 MG: 50 TABLET, FILM COATED ORAL at 08:28

## 2019-09-13 RX ADMIN — LORAZEPAM 0.5 MG: 0.5 TABLET ORAL at 08:30

## 2019-09-13 RX ADMIN — Medication 1 TABLET: at 08:28

## 2019-09-13 RX ADMIN — IBUPROFEN 400 MG: 400 TABLET, FILM COATED ORAL at 18:41

## 2019-09-13 NOTE — PROGRESS NOTES
Data: Met with patient and introduced myself as covering therapist. Patient was agreeable. Today is the patient's last day of detox. He was agreeable for IOP referral at [facility]. Coordinated an appt for patient for 9-16-19.     Patient has concerns about his wife. He fears that she will divorce him. He says that she had no idea that he was abusing alcohol. He says that he thinks he will be ok without the alcohol when he leaves but it having a much more difficult time going without nicotene.     We discussed patient getting a sponsor; attending AA meetings and celebrate Recovery.    Assessment: Patient was somewhat tearful during our conversation especially when he was talking about his family. He is afraid that his wife will divorce him. Encouraged patient to focus on himself at this time.    Plan: Patient will plan to discharge home tomorrow. Will follow up at [facility]

## 2019-09-13 NOTE — PROGRESS NOTES
"INPATIENT PSYCHIATRIC PROGRESS NOTE    Name:  Yousuf Barrera  :  1965  MRN:  [id number]  Visit Number:  [id number]  Length of stay:  4    SUBJECTIVE  CC/Focus of Exam: alcohol use and withdrawals    INTERVAL HISTORY:  The patient reports he has not had any problems with alcohol withdrawals but is struggling with nicotine cravings. Today is his last day of detox and he is agreeable to being discharged tomorrow.  Depression rating 2/10  Anxiety rating 3/10  Sleep: good  Withdrawal sx: denies  Craving: None for alcohol but high for snuff    Review of Systems   Respiratory: Negative.    Cardiovascular: Negative.        OBJECTIVE    Temp:  [96.9 °F (36.1 °C)-97.7 °F (36.5 °C)] 97.4 °F (36.3 °C)  Heart Rate:  [59-72] 61  Resp:  [18] 18  BP: (106-156)/() 138/85    MENTAL STATUS EXAM:  Appearance:Casually dressed, good hygeine.   Cooperation:Cooperative  Psychomotor: No psychomotor agitation/retardation, No EPS, No motor tics  Speech-normal rate, amount.  Mood \"good\"   Affect-congruent, appropriate, stable  Thought Content-goal directed, no delusional material present  Thought process-linear, organized.  Suicidality: No SI  Homicidality: No HI  Perception: No AH/VH  Insight-fair   Judgement-fair    Lab Results (last 24 hours)     ** No results found for the last 24 hours. **             Imaging Results (last 24 hours)     ** No results found for the last 24 hours. **             ECG/EMG Results (most recent)     None           ALLERGIES: Tetracyclines & related      Current Facility-Administered Medications:   •  acetaminophen (TYLENOL) tablet 650 mg, 650 mg, Oral, Q6H PRN, Taras Nino MD  •  aluminum-magnesium hydroxide-simethicone (MAALOX MAX) 400-400-40 MG/5ML suspension 15 mL, 15 mL, Oral, Q6H PRN, Taras Nino MD  •  benzonatate (TESSALON) capsule 100 mg, 100 mg, Oral, TID PRN, Taras Nino MD  •  benztropine (COGENTIN) tablet 2 mg, 2 mg, Oral, Once PRN **OR** benztropine (COGENTIN) injection 1 mg, " 1 mg, Intramuscular, Once PRN, Taras Nino MD  •  famotidine (PEPCID) tablet 20 mg, 20 mg, Oral, BID PRN, Taras Nino MD  •  hydrOXYzine (ATARAX) tablet 50 mg, 50 mg, Oral, Q6H PRN, Taras Nino MD, 50 mg at 09/10/19 2116  •  ibuprofen (ADVIL,MOTRIN) tablet 400 mg, 400 mg, Oral, Q6H PRN, Taras Nino MD  •  loperamide (IMODIUM) capsule 2 mg, 2 mg, Oral, Q2H PRN, Taras Nino MD  •  [COMPLETED] LORazepam (ATIVAN) tablet 2 mg, 2 mg, Oral, 3 times per day, 2 mg at 09/10/19 2116 **FOLLOWED BY** [COMPLETED] LORazepam (ATIVAN) tablet 1.5 mg, 1.5 mg, Oral, 3 times per day, 1.5 mg at 19 **FOLLOWED BY** [COMPLETED] LORazepam (ATIVAN) tablet 1 mg, 1 mg, Oral, 3 times per day, 1 mg at 19 **FOLLOWED BY** LORazepam (ATIVAN) tablet 0.5 mg, 0.5 mg, Oral, 3 times per day, Taras Nino MD, 0.5 mg at 19 0830  •  [] LORazepam (ATIVAN) tablet 2 mg, 2 mg, Oral, Q4H PRN **FOLLOWED BY** [] LORazepam (ATIVAN) tablet 1.5 mg, 1.5 mg, Oral, Q4H PRN **FOLLOWED BY** [] LORazepam (ATIVAN) tablet 1 mg, 1 mg, Oral, Q4H PRN **FOLLOWED BY** LORazepam (ATIVAN) tablet 0.5 mg, 0.5 mg, Oral, Q4H PRN, Taras Nion MD  •  losartan (COZAAR) tablet 50 mg, 50 mg, Oral, Daily, Elise Littlejohn MD, 50 mg at 19 0828  •  magnesium hydroxide (MILK OF MAGNESIA) suspension 2400 mg/10mL 10 mL, 10 mL, Oral, Daily PRN, Taras Nino MD  •  multivitamin (DAILY BRETT) tablet 1 tablet, 1 tablet, Oral, Daily, Taras Nino MD, 1 tablet at 19  •  nicotine (NICODERM CQ) 21 MG/24HR patch 1 patch, 1 patch, Transdermal, Q24H, Taras Nino MD  •  ondansetron (ZOFRAN) tablet 4 mg, 4 mg, Oral, Q6H PRN, Taras Nino MD  •  sertraline (ZOLOFT) tablet 50 mg, 50 mg, Oral, Daily, Elise Littlejohn MD, 50 mg at 19  •  sodium chloride nasal spray 2 spray, 2 spray, Each Nare, PRN, Taras Nino MD  •  thiamine (VITAMIN B-1) tablet 100 mg, 100 mg, Oral, Daily, Taras iNno MD, 100 mg at  09/13/19 0828  •  traZODone (DESYREL) tablet 50 mg, 50 mg, Oral, Nightly PRN, Taras Nino MD, 50 mg at 09/12/19 2108  •  vitamin b complex (TOTAL B/C) tablet 2 tablet, 2 tablet, Oral, Daily, Taras Nino MD, 2 tablet at 09/13/19 0828    ASSESSMENT & PLAN:      Alcohol dependence with withdrawal (CMS/HCC)  - Ativan detox       Severe episode of recurrent major depressive disorder, without psychotic features (CMS/HCC)  - Zoloft      HTN  - Cozaar    Suicide precautions: Suicide precuation Level 4 (q30 min checks)    Behavioral Health Treatment Plan and Problem List: I have reviewed and approved the Behavioral Health Treatment Plan and Problem list.  The patient has had a chance to review and agrees with the treatment plan.     Clinician:  Elise Littlejohn MD  09/13/19  9:21 AM

## 2019-09-13 NOTE — PLAN OF CARE
Problem: Patient Care Overview  Goal: Plan of Care Review  Outcome: Ongoing (interventions implemented as appropriate)   09/13/19 0534   Coping/Psychosocial   Plan of Care Reviewed With patient   Coping/Psychosocial   Patient Agreement with Plan of Care agrees   Plan of Care Review   Progress improving       Problem: Overarching Goals (Adult)  Goal: Adheres to Safety Considerations for Self and Others  Outcome: Ongoing (interventions implemented as appropriate)    Goal: Develops/Participates in Therapeutic [place] to Support Successful Transition  Outcome: Ongoing (interventions implemented as appropriate)      Problem: Impaired Control (Excessive Substance Use) (Adult)  Goal: Participates in Recovery Program (Excessive Substance Use)  Outcome: Ongoing (interventions implemented as appropriate)      Problem: Social/Occupational/Functional Impairment (Excessive Substance Use) (Adult)  Goal: Improved Social/Occupational/Functional Skills (Excessive Substance Use)  Outcome: Ongoing (interventions implemented as appropriate)      Problem: Safety Awareness Impairment (Excessive Substance Use) (Adult)  Goal: Enhanced Safety Awareness (Excessive Substance Use)  Outcome: Ongoing (interventions implemented as appropriate)      Problem: Mood Impairment (Depressive Signs/Symptoms) (Adult)  Goal: Improved Mood Symptoms (Depressive Signs/Symptoms)  Outcome: Ongoing (interventions implemented as appropriate)      Problem: Feelings of Worthlessness, Hopelessness, Excessive Guilt (Depressive Signs/Symptoms) (Adult)  Goal: Enhanced Self-Esteem/Confidence (Depressive Signs/Symptoms)  Outcome: Ongoing (interventions implemented as appropriate)      Problem: Decreased Participation/Engagement (Depressive Signs/Symptoms) (Adult)  Goal: Increased Participation/Engagement (Depressive Signs/Symptoms)  Outcome: Ongoing (interventions implemented as appropriate)      Problem: Weight/Appetite Change (Depressive Signs/Symptoms) (Adult)  Goal:  Nutrition/Weight Optimized (Depressive Signs/Symptoms)  Outcome: Ongoing (interventions implemented as appropriate)

## 2019-09-13 NOTE — PLAN OF CARE
Problem: Patient Care Overview  Goal: Plan of Care Review  Outcome: Ongoing (interventions implemented as appropriate)   09/13/19 7691   Coping/Psychosocial   Plan of Care Reviewed With patient   Coping/Psychosocial   Patient Agreement with Plan of Care agrees   Plan of Care Review   Progress improving   OTHER   Outcome Summary pt to be discharged tomorrow

## 2019-09-13 NOTE — PLAN OF CARE
Problem: Patient Care Overview  Goal: Plan of Care Review  Outcome: Ongoing (interventions implemented as appropriate)   09/13/19 0525   Coping/Psychosocial   Plan of Care Reviewed With patient   Coping/Psychosocial   Patient Agreement with Plan of Care agrees   Plan of Care Review   Progress improving       Problem: Overarching Goals (Adult)  Goal: Adheres to Safety Considerations for Self and Others  Outcome: Ongoing (interventions implemented as appropriate)    Goal: Develops/Participates in Therapeutic [place] to Support Successful Transition  Outcome: Ongoing (interventions implemented as appropriate)      Problem: Impaired Control (Excessive Substance Use) (Adult)  Goal: Participates in Recovery Program (Excessive Substance Use)  Outcome: Ongoing (interventions implemented as appropriate)      Problem: Social/Occupational/Functional Impairment (Excessive Substance Use) (Adult)  Goal: Improved Social/Occupational/Functional Skills (Excessive Substance Use)  Outcome: Ongoing (interventions implemented as appropriate)      Problem: Safety Awareness Impairment (Excessive Substance Use) (Adult)  Goal: Enhanced Safety Awareness (Excessive Substance Use)  Outcome: Ongoing (interventions implemented as appropriate)      Problem: Mood Impairment (Depressive Signs/Symptoms) (Adult)  Goal: Improved Mood Symptoms (Depressive Signs/Symptoms)  Outcome: Ongoing (interventions implemented as appropriate)      Problem: Feelings of Worthlessness, Hopelessness, Excessive Guilt (Depressive Signs/Symptoms) (Adult)  Goal: Enhanced Self-Esteem/Confidence (Depressive Signs/Symptoms)  Outcome: Ongoing (interventions implemented as appropriate)      Problem: Decreased Participation/Engagement (Depressive Signs/Symptoms) (Adult)  Goal: Increased Participation/Engagement (Depressive Signs/Symptoms)  Outcome: Ongoing (interventions implemented as appropriate)      Problem: Weight/Appetite Change (Depressive Signs/Symptoms) (Adult)  Goal:  Nutrition/Weight Optimized (Depressive Signs/Symptoms)  Outcome: Ongoing (interventions implemented as appropriate)

## 2019-09-14 PROCEDURE — 99232 SBSQ HOSP IP/OBS MODERATE 35: CPT | Performed by: PSYCHIATRY & NEUROLOGY

## 2019-09-14 RX ADMIN — HYDROXYZINE HYDROCHLORIDE 50 MG: 50 TABLET, FILM COATED ORAL at 21:26

## 2019-09-14 RX ADMIN — Medication 1 TABLET: at 08:50

## 2019-09-14 RX ADMIN — VITAMIN C 2 TABLET: TAB at 08:50

## 2019-09-14 RX ADMIN — HYDROXYZINE HYDROCHLORIDE 50 MG: 50 TABLET, FILM COATED ORAL at 08:50

## 2019-09-14 RX ADMIN — LOSARTAN POTASSIUM 50 MG: 50 TABLET, FILM COATED ORAL at 08:50

## 2019-09-14 RX ADMIN — Medication 100 MG: at 08:50

## 2019-09-14 RX ADMIN — IBUPROFEN 400 MG: 400 TABLET, FILM COATED ORAL at 08:54

## 2019-09-14 RX ADMIN — TRAZODONE HYDROCHLORIDE 50 MG: 50 TABLET ORAL at 21:26

## 2019-09-14 RX ADMIN — SERTRALINE 50 MG: 50 TABLET, FILM COATED ORAL at 08:50

## 2019-09-14 RX ADMIN — IBUPROFEN 400 MG: 400 TABLET, FILM COATED ORAL at 21:26

## 2019-09-14 NOTE — PLAN OF CARE
Problem: Patient Care Overview  Goal: Plan of Care Review  Outcome: Ongoing (interventions implemented as appropriate)   09/14/19 9187   Coping/Psychosocial   Plan of Care Reviewed With patient   Coping/Psychosocial   Patient Agreement with Plan of Care agrees   Plan of Care Review   Progress improving

## 2019-09-14 NOTE — PLAN OF CARE
Problem: Patient Care Overview  Goal: Plan of Care Review  Outcome: Ongoing (interventions implemented as appropriate)   09/13/19 2123   Coping/Psychosocial   Plan of Care Reviewed With patient   Coping/Psychosocial   Patient Agreement with Plan of Care agrees   Plan of Care Review   Progress improving   OTHER   Outcome Summary Pt calm. Pt reports discharge tomorrow. Pt rates anxiety and depression 3/10. Pt denies SI, HI or AVH. Pt reports appetite as fair and sleep as good. 9/13/19 2136       Problem: Overarching Goals (Adult)  Goal: Adheres to Safety Considerations for Self and Others  Outcome: Ongoing (interventions implemented as appropriate)    Goal: Develops/Participates in Therapeutic [place] to Support Successful Transition  Outcome: Ongoing (interventions implemented as appropriate)

## 2019-09-14 NOTE — PROGRESS NOTES
"INPATIENT PSYCHIATRIC PROGRESS NOTE    Name:  Yousuf Barrera  :  1965  MRN:  [id number]  Visit Number:  [id number]  Length of stay:  5    SUBJECTIVE  CC/Focus of Exam: alcohol use and withdrawals    INTERVAL HISTORY:  The patient reports he is feeling anxious and his blood pressure was up earlier today and he doesn't feel ready to go and wants to wait one more day for stabilization otherwise he is afraid that he may relapse.  Depression rating 2/10  Anxiety rating 5/10  Sleep: good  Withdrawal sx: denies  Craving: None for alcohol but high for snuff    Review of Systems   Respiratory: Negative.    Cardiovascular: Negative.        OBJECTIVE    Temp:  [97 °F (36.1 °C)-98 °F (36.7 °C)] 97.4 °F (36.3 °C)  Heart Rate:  [62-83] 62  Resp:  [16-18] 18  BP: (123-171)/() 140/84    MENTAL STATUS EXAM:  Appearance:Casually dressed, good hygeine.   Cooperation:Cooperative  Psychomotor: No psychomotor agitation/retardation, No EPS, No motor tics  Speech-normal rate, amount.  Mood \"good\"   Affect-congruent, appropriate, stable  Thought Content-goal directed, no delusional material present  Thought process-linear, organized.  Suicidality: No SI  Homicidality: No HI  Perception: No AH/VH  Insight-fair   Judgement-fair    Lab Results (last 24 hours)     ** No results found for the last 24 hours. **             Imaging Results (last 24 hours)     ** No results found for the last 24 hours. **             ECG/EMG Results (most recent)     None           ALLERGIES: Tetracyclines & related      Current Facility-Administered Medications:   •  acetaminophen (TYLENOL) tablet 650 mg, 650 mg, Oral, Q6H PRN, Taras Nino MD  •  aluminum-magnesium hydroxide-simethicone (MAALOX MAX) 400-400-40 MG/5ML suspension 15 mL, 15 mL, Oral, Q6H PRN, Taras Nino MD  •  benzonatate (TESSALON) capsule 100 mg, 100 mg, Oral, TID PRN, Taras Nino MD  •  benztropine (COGENTIN) tablet 2 mg, 2 mg, Oral, Once PRN **OR** benztropine (COGENTIN) " injection 1 mg, 1 mg, Intramuscular, Once PRN, Taras Nino MD  •  famotidine (PEPCID) tablet 20 mg, 20 mg, Oral, BID PRN, Taras Nino MD  •  hydrOXYzine (ATARAX) tablet 50 mg, 50 mg, Oral, Q6H PRN, Taras Nino MD, 50 mg at 09/14/19 0850  •  ibuprofen (ADVIL,MOTRIN) tablet 400 mg, 400 mg, Oral, Q6H PRN, Taras Nino MD, 400 mg at 09/14/19 0854  •  loperamide (IMODIUM) capsule 2 mg, 2 mg, Oral, Q2H PRN, Taras Nino MD  •  losartan (COZAAR) tablet 50 mg, 50 mg, Oral, Daily, Elise Littlejohn MD, 50 mg at 09/14/19 0850  •  magnesium hydroxide (MILK OF MAGNESIA) suspension 2400 mg/10mL 10 mL, 10 mL, Oral, Daily PRN, Taras Nino MD  •  multivitamin (DAILY BRETT) tablet 1 tablet, 1 tablet, Oral, Daily, Taras Nino MD, 1 tablet at 09/14/19 0850  •  nicotine (NICODERM CQ) 21 MG/24HR patch 1 patch, 1 patch, Transdermal, Q24H, Taras Nino MD  •  ondansetron (ZOFRAN) tablet 4 mg, 4 mg, Oral, Q6H PRN, Taras Nino MD  •  sertraline (ZOLOFT) tablet 50 mg, 50 mg, Oral, Daily, Elise Littlejohn MD, 50 mg at 09/14/19 0850  •  sodium chloride nasal spray 2 spray, 2 spray, Each Nare, PRN, Taras Nion MD  •  thiamine (VITAMIN B-1) tablet 100 mg, 100 mg, Oral, Daily, Taras Nino MD, 100 mg at 09/14/19 0850  •  traZODone (DESYREL) tablet 50 mg, 50 mg, Oral, Nightly PRN, Taras Nino MD, 50 mg at 09/13/19 2158  •  vitamin b complex (TOTAL B/C) tablet 2 tablet, 2 tablet, Oral, Daily, Taras Nino MD, 2 tablet at 09/14/19 0850    ASSESSMENT & PLAN:      Alcohol dependence with withdrawal (CMS/HCC)  - Ativan detox       Severe episode of recurrent major depressive disorder, without psychotic features (CMS/HCC)  - Zoloft      HTN  - Cozaar    Suicide precautions: Suicide precuation Level 4 (q30 min checks)    Behavioral Health Treatment Plan and Problem List: I have reviewed and approved the Behavioral Health Treatment Plan and Problem list.  The patient has had a chance to review and agrees with the treatment  plan.     Clinician:  Elise Littlejohn MD  09/14/19  1:45 PM

## 2019-09-15 VITALS
RESPIRATION RATE: 18 BRPM | TEMPERATURE: 97.4 F | OXYGEN SATURATION: 98 % | SYSTOLIC BLOOD PRESSURE: 152 MMHG | DIASTOLIC BLOOD PRESSURE: 103 MMHG | HEIGHT: 72 IN | HEART RATE: 75 BPM | BODY MASS INDEX: 23 KG/M2 | WEIGHT: 169.8 LBS

## 2019-09-15 PROBLEM — F10.20 ALCOHOL DEPENDENCE: Status: ACTIVE | Noted: 2019-09-15

## 2019-09-15 PROCEDURE — 99238 HOSP IP/OBS DSCHRG MGMT 30/<: CPT | Performed by: PSYCHIATRY & NEUROLOGY

## 2019-09-15 RX ORDER — TRAZODONE HYDROCHLORIDE 50 MG/1
50 TABLET ORAL NIGHTLY PRN
Qty: 30 TABLET | Refills: 0 | Status: SHIPPED | OUTPATIENT
Start: 2019-09-15 | End: 2019-10-09 | Stop reason: SDUPTHER

## 2019-09-15 RX ORDER — HYDROXYZINE HYDROCHLORIDE 25 MG/1
25 TABLET, FILM COATED ORAL EVERY 8 HOURS PRN
Qty: 90 TABLET | Refills: 0 | Status: SHIPPED | OUTPATIENT
Start: 2019-09-15 | End: 2019-10-09 | Stop reason: SDUPTHER

## 2019-09-15 RX ADMIN — Medication 1 TABLET: at 08:07

## 2019-09-15 RX ADMIN — HYDROXYZINE HYDROCHLORIDE 50 MG: 50 TABLET, FILM COATED ORAL at 13:49

## 2019-09-15 RX ADMIN — Medication 100 MG: at 08:07

## 2019-09-15 RX ADMIN — LOSARTAN POTASSIUM 50 MG: 50 TABLET, FILM COATED ORAL at 08:07

## 2019-09-15 RX ADMIN — IBUPROFEN 400 MG: 400 TABLET, FILM COATED ORAL at 13:51

## 2019-09-15 RX ADMIN — VITAMIN C 2 TABLET: TAB at 08:07

## 2019-09-15 RX ADMIN — SERTRALINE 50 MG: 50 TABLET, FILM COATED ORAL at 08:07

## 2019-09-15 NOTE — PLAN OF CARE
Problem: Patient Care Overview  Goal: Plan of Care Review  Outcome: Ongoing (interventions implemented as appropriate)   09/14/19 2046   Coping/Psychosocial   Plan of Care Reviewed With patient   Coping/Psychosocial   Patient Agreement with Plan of Care agrees   Plan of Care Review   Progress improving   OTHER   Outcome Summary Pt calm and cooperative. Pt reports discharge delayed until 9/15/19. Pt rates anxiety and depression 6/10. Pt denies SI, HI, or AVH. Pt reports appetite as fair and sleep as good. Pt denies cravings and w/d at this time. 9/14/19 2048       Problem: Overarching Goals (Adult)  Goal: Adheres to Safety Considerations for Self and Others  Outcome: Ongoing (interventions implemented as appropriate)    Goal: Develops/Participates in Therapeutic [place] to Support Successful Transition  Outcome: Ongoing (interventions implemented as appropriate)

## 2019-09-16 ENCOUNTER — APPOINTMENT (OUTPATIENT)
Dept: PSYCHIATRY | Facility: HOSPITAL | Age: 54
End: 2019-09-16

## 2019-09-16 ENCOUNTER — OFFICE VISIT (OUTPATIENT)
Dept: PSYCHIATRY | Facility: CLINIC | Age: 54
End: 2019-09-16

## 2019-09-16 DIAGNOSIS — F10.20 UNCOMPLICATED ALCOHOL DEPENDENCE (HCC): Primary | ICD-10-CM

## 2019-09-16 DIAGNOSIS — F33.2 SEVERE EPISODE OF RECURRENT MAJOR DEPRESSIVE DISORDER, WITHOUT PSYCHOTIC FEATURES (HCC): ICD-10-CM

## 2019-09-16 PROCEDURE — 90837 PSYTX W PT 60 MINUTES: CPT | Performed by: COUNSELOR

## 2019-09-16 NOTE — PROGRESS NOTES
"Date of Service: September 16, 2019  Time In: 1325   Time Out: 1425      PROGRESS NOTE  Data:  Yousuf Barrera is a 54 y.o. male who presents to [facility] for IOP screening and hospital follow-up with Jolynn Bolaños [facility].     HPI:  Yousuf presents as a hospital follow-up after being discharged from [facility] detox unit yesterday after a 6 day inpatient stay for alcohol abuse.  I am familiar with Yousuf as I completed his initial assessment for inpatient level of care on 9/8/19 when he presented to the [facility] ED.  At that time, Yousuf did not disclose he had been consuming alcohol and his PEDRITO resulted at 0.343.  He did eventual disclose he had been drinking up to half a pint of whiskey daily for the past 3-4 months.  Today, Yousuf reports he has been feeling much better and less depressed.  He states \"I was hiding behind a bottle\" and discussed more openly about his daughter's struggles with her own mental health and how he began drinking as a way of coping.  Yousuf reports his daughter was admitted and treated inpatient on two separate occassions for running away, self-harming, and suicidal ideation.  He reports difficulty coping at that time and states \"I guess we didn't know where to go for help ourselves\" referring to himself and his wife, Rosalie.  He reports her and his wife did ultimately start seeing a marriage counselor every two weeks at [facility].  He reports no other treatment history.  He reports using alcohol at age 20 when he would mostly drink beer socially.  He reports drinking liquor again during social occasions such as football games.  He estimates drinking 1-2 beers daily for most of his life until May of this year when he started drinking liquor almost every day.  He was evasive about his pattern of use leading up to that point.  He identifies hiding his drinking from his wife and family until it reached a point where his wife was concerned " for his mental health, prompting their trip to the ED on 9/8/19.  At that time, his wife was unaware of how much Yousuf was drinking and thought much of his behavior (memory problems, confusion, impaired functioning, etc) were due to anxiety and depression.  He denies any family history of substance use or mental health problems except for his daughter.  He and his wife Rosalie have been  for 19 years, and they have the one daughter together.  Yousuf is currently employed at the [employer] and reports his supervisor and employer are supportive of Yousuf's desire to participate in IOP to help him maintain sobriety.  He reports his wife is supportive, although there is strain in the relationship due to his dishonesty about his alcohol use.  He as informed of the family education component of IOP and encouraged to invite family.  He answered yes  to experiencing two or more of the following problems related to substance use in the past 12 months: using more than intended or over longer period than intended; difficulty quitting or cutting back use; spending a great deal of time obtaining, using, or recovering from using; craving or strong desire or urge to use;  work and/or school problems; financial problems; family problems; using in dangerous situations; physical or mental health problems; relapse; feelings of guilt or remorse about use; times when used and/or drank alone; needing to use more in order to achieve the desired effect; illness or withdrawal when stopping or cutting back use; using to relieve or avoid getting ill or developing withdrawal symptoms; and black outs and/or memory issues when using.  He reports having strong cravings yesterday when he got home from detox.  He reports he was able to avoid using by staying busy and considering the consequences if he were to use.      Clinical Maneuvering/Intervention:  Assisted patient in processing above session content; acknowledged and normalized  patient’s thoughts, feelings, and concerns. Allowed patient to freely discuss issues without interruption or judgment. Provided safe, confidential environment to facilitate the development of positive therapeutic relationship and encourage open, honest communication. Assisted patient in identifying risk factors which would indicate the need for higher level of care including thoughts to harm self or others and/or self-harming behavior and encouraged patient to contact this office, call 911, or present to the nearest emergency room should any of these events occur. Discussed crisis intervention services and means to access.  Patient adamantly and convincingly denies current suicidal or homicidal ideation or perceptual disturbance.    Assessment     Diagnoses and all orders for this visit:    Uncomplicated alcohol dependence (CMS/HCC)    Severe episode of recurrent major depressive disorder, without psychotic features (CMS/HCC)           Mental Status Exam  Hygiene:  fair  Dress:  casual  Attitude:  Cooperative  Motor Activity:  Restless  Speech:  Talkative  Mood:  euthymic  Affect:  calm and pleasant  Thought Processes:  Goal directed and Linear  Thought Content:  normal  Suicidal Thoughts:  denies  Homicidal Thoughts:  denies  Crisis Safety Plan: yes, to come to the emergency room.  Hallucinations:  denies    Patient's Support Network Includes:  wife, daughter and employer    Progress toward goal: Not at goal    Functional Status: Moderate impairment     Level of Functioning/Impairment (ASAM):  I.    Intoxication/Withdrawal:  No problem = 0  II.   Medical Conditions/Complications: No problem = 0  III.  Behavioral/Emotional/Cognitive: Mild problem = 1 (MDD)  IV.  Readiness to Change: Moderate problem = 2 (external and internal motivation for change, limited insight into relapse risk, ambivalence re: abstaining from all alcohol)  V.   Relapse Risk: Moderate problem = 2 (moderate to severe cravings, limited insight  into relapse risk, poor coping skills, limited recovery experience)  VI:  Recovery Environment: Mild problem = 1 (family supportive, limited knowledge on the nature of addiction as a family disease, strained relationships, no involvement w/ support groups)  Total ASAM Score = 6    Prognosis: Fair with Ongoing Treatment     Plan     Patient is voluntarily requesting admission to IOP Phase I at [facility].  Patient is receptive to [facility] for assistance with maintaining sobriety.  Patient is agreeable to explore 12-step support group attendance.  Patient expressed strong desire to maintain sobriety and participate in group therapy.  Patient weaknesses include a history of substance misuse and lack of healthy coping skills.  Patient strengths include educated, articulate, and employed.  Patient is agreeable to reporting any issues that may pose a threat to his/her sobriety and is also agreeable to reporting relapse on alcohol or other drugs.  Patient is aware that failure to comply with the group rules, attendance, and substance use policies may result in a referral to a higher level of care or dismissal from [facility].      Patient will be admitted to the IOP program and will start on the next scheduled day Tuesday 9/17/19 at 9AM.  Patient will meet the APRN for medication assessment and medical screening.  Patient will adhere to medication regimen as prescribed and report any adverse side effects.  Patient will contact this office, call 911, or present to the nearest emergency room should suicidal or homicidal ideations occur.  Therapist will use Motivation Interviewing, Cognitive Behavioral Therapy, Solution Focused Therapy, and psychoeducation to assist patient with improving functioning, maintaining stability, and avoiding decompensation and the need for higher level of care.             JES Avina

## 2019-09-17 ENCOUNTER — OFFICE VISIT (OUTPATIENT)
Dept: PSYCHIATRY | Facility: HOSPITAL | Age: 54
End: 2019-09-17

## 2019-09-17 VITALS — BODY MASS INDEX: 22.75 KG/M2 | HEIGHT: 72 IN | WEIGHT: 168 LBS

## 2019-09-17 DIAGNOSIS — F41.9 ANXIETY DISORDER, UNSPECIFIED TYPE: ICD-10-CM

## 2019-09-17 DIAGNOSIS — F33.2 SEVERE EPISODE OF RECURRENT MAJOR DEPRESSIVE DISORDER, WITHOUT PSYCHOTIC FEATURES (HCC): ICD-10-CM

## 2019-09-17 DIAGNOSIS — F10.20 UNCOMPLICATED ALCOHOL DEPENDENCE (HCC): Primary | ICD-10-CM

## 2019-09-17 DIAGNOSIS — G47.00 INSOMNIA, UNSPECIFIED TYPE: ICD-10-CM

## 2019-09-17 PROCEDURE — 99214 OFFICE O/P EST MOD 30 MIN: CPT | Performed by: NURSE PRACTITIONER

## 2019-09-17 RX ORDER — NALTREXONE HYDROCHLORIDE 50 MG/1
50 TABLET, FILM COATED ORAL DAILY
Qty: 30 TABLET | Refills: 0 | Status: SHIPPED | OUTPATIENT
Start: 2019-09-17 | End: 2019-10-09

## 2019-09-17 NOTE — PROGRESS NOTES
"Yousuf Barrera is a 54 y.o. male who is here today for initial appointment.     Chief Complaint:     ICD-10-CM ICD-9-CM   1. Uncomplicated alcohol dependence (CMS/HCC) F10.20 303.90   2. Insomnia, unspecified type G47.00 780.52   3. Anxiety disorder, unspecified type F41.9 300.00       HPI: Pt presents for initial visit and medication management of anxiety symptoms and alcohol dependency.  Pt is currently taking Hydroxyzine prn BID and Trazodone 50 mg. Pt reports psychiatric medication history of Zoloft. Pt was discharged from the [facility] on 9/15 for alcohol abuse. Pt is currently 9 days sober. Pt states he began abusing alcohol 112 days prior to his admission. States he began with a couple of shots after work then escalated to 6 shots of liquor. Pt reports that he was having issues with his 13 year old daughter and he did not know how to cope with those issues and \"hid behind alcohol\". States his daughter is doing well now.m,Pt denies any prior history of heavy alcohol consumption. Pt denies any current depressive symptoms. States his anxiety has been up and down since his discharge from the hospital. Reports he has some craving for alcohol when he is in certain environments. States he went into [employer] the other day for groceries and thought about alcohol since that is where he purchased is alcohol.    Pt reports the presence/absence of the following anxiety symptoms: (-) excessive worry, (-) excessive fear, (+) difficulty relaxing, (+) restlessness, (-) insomnia, (-) easily fatigued, (-) irritability, (-) poor concentration, (+) racing thoughts, and (-) panic episodes.     Pt reports the presence/absence of the following depressive symptoms: (-) depressed mood, (-) reduced appetite, (-) increased appetite, (-) poor concentration, (-) hopelessness, (-) worthlessness, (-) insomnia, (-) hypersomnia, (-) psychomotor agitation, (-) irritability, (-) anhedonia, (-) amotivation, (-) fatigue, (-) suicidal ideation, " "(-) suicidal plan, (-) suicidal intent, (-) recurrent thoughts about death, and (-) homicidal ideation.    Past Medical History:   Past Medical History:   Diagnosis Date   • Alcohol abuse    • Anxiety    • H/O exercise stress test 2015    HAD DONE WITH WORK.  \"IT WAS OK\"   • Hypertension    • Wears glasses    • Withdrawal symptoms, alcohol (CMS/HCC)        Past Surgical History:   Past Surgical History:   Procedure Laterality Date   • COLONOSCOPY N/A 5/18/2018    Procedure: COLONOSCOPY WITH COLD SNARE POLYECTOMY X 1;  Surgeon: Carlos A Moreno MD;  [facility] ENDOSCOPY;  Service: Gastroenterology   • CYSTOSCOPY  2016   • WISDOM TOOTH EXTRACTION         Social History:   Social History     Socioeconomic History   • Marital status:      Spouse name: Not on file   • Number of children: 1   • Years of education: Not on file   • Highest education level: Some college, no degree   Tobacco Use   • Smoking status: Never Smoker   • Smokeless tobacco: Current User     Types: Snuff   Substance and Sexual Activity   • Alcohol use: No     Frequency: Never   • Drug use: No   • Sexual activity: Defer       Allergy:  Allergies   Allergen Reactions   • Tetracyclines & Related Other (See Comments)     Unsure of reaction-its been a long time ago       Current Medications:   Current Outpatient Medications   Medication Sig Dispense Refill   • hydrOXYzine (ATARAX) 25 MG tablet Take 1 tablet by mouth Every 8 (Eight) Hours As Needed for Anxiety. 90 tablet 0   • losartan (COZAAR) 50 MG tablet Take 50 mg by mouth Daily.     • traZODone (DESYREL) 50 MG tablet Take 1 tablet by mouth At Night As Needed for Sleep. 30 tablet 0   • naltrexone (DEPADE) 50 MG tablet Take 1 tablet by mouth Daily. 30 tablet 0     No current facility-administered medications for this visit.        Lab Results:   Admission on 09/08/2019, Discharged on 09/09/2019   Component Date Value Ref Range Status   • Extra Tube 09/08/2019 hold for add-on   Final    " Auto resulted   • Extra Tube 09/08/2019 Hold for add-ons.   Final    Auto resulted.   • Extra Tube 09/08/2019 hold for add-on   Final    Auto resulted   • Extra Tube 09/08/2019 Hold for add-ons.   Final    Auto resulted.   • Extra Tube 09/08/2019 Hold for add-ons.   Final    Auto resulted.   • Glucose 09/08/2019 81  65 - 99 mg/dL Final   • BUN 09/08/2019 9  6 - 20 mg/dL Final   • Creatinine 09/08/2019 0.81  0.76 - 1.27 mg/dL Final   • Sodium 09/08/2019 148* 136 - 145 mmol/L Final   • Potassium 09/08/2019 4.1  3.5 - 5.2 mmol/L Final   • Chloride 09/08/2019 106  98 - 107 mmol/L Final   • CO2 09/08/2019 22.7  22.0 - 29.0 mmol/L Final   • Calcium 09/08/2019 9.9  8.6 - 10.5 mg/dL Final   • Total Protein 09/08/2019 7.6  6.0 - 8.5 g/dL Final   • Albumin 09/08/2019 4.90  3.50 - 5.20 g/dL Final   • ALT (SGPT) 09/08/2019 24  1 - 41 U/L Final   • AST (SGOT) 09/08/2019 31  1 - 40 U/L Final   • Alkaline Phosphatase 09/08/2019 41  39 - 117 U/L Final   • Total Bilirubin 09/08/2019 0.5  0.2 - 1.2 mg/dL Final   • eGFR Non African Amer 09/08/2019 100  >60 mL/min/1.73 Final   • Globulin 09/08/2019 2.7  gm/dL Final   • A/G Ratio 09/08/2019 1.8  g/dL Final   • BUN/Creatinine Ratio 09/08/2019 11.1  7.0 - 25.0 Final   • Anion Gap 09/08/2019 19.3* 5.0 - 15.0 mmol/L Final   • Color, UA 09/08/2019 Yellow  Yellow, Straw Final   • Appearance, UA 09/08/2019 Clear  Clear Final   • pH, UA 09/08/2019 6.0  5.0 - 8.0 Final   • Specific Gravity, UA 09/08/2019 1.011  1.005 - 1.030 Final   • Glucose, UA 09/08/2019 Negative  Negative Final   • Ketones, UA 09/08/2019 Trace* Negative Final   • Bilirubin, UA 09/08/2019 Negative  Negative Final   • Blood, UA 09/08/2019 Trace* Negative Final   • Protein, UA 09/08/2019 Negative  Negative Final   • Leuk Esterase, UA 09/08/2019 Negative  Negative Final   • Nitrite, UA 09/08/2019 Negative  Negative Final   • Urobilinogen, UA 09/08/2019 0.2 E.U./dL  0.2 - 1.0 E.U./dL Final   • Ethanol 09/08/2019 343* 0 - 10 mg/dL  Final   • Ethanol % 09/08/2019 0.343  % Final   • Acetaminophen 09/08/2019 <5.0* 10.0 - 30.0 mcg/mL Final   • THC, Screen, Urine 09/08/2019 Negative  Negative Final   • Phencyclidine (PCP), Urine 09/08/2019 Negative  Negative Final   • Cocaine Screen, Urine 09/08/2019 Negative  Negative Final   • Methamphetamine, Ur 09/08/2019 Negative  Negative Final   • Opiate Screen 09/08/2019 Negative  Negative Final   • Amphetamine Screen, Urine 09/08/2019 Negative  Negative Final   • Benzodiazepine Screen, Urine 09/08/2019 Negative  Negative Final   • Tricyclic Antidepressants Screen 09/08/2019 Negative  Negative Final   • Methadone Screen, Urine 09/08/2019 Negative  Negative Final   • Barbiturates Screen, Urine 09/08/2019 Negative  Negative Final   • Oxycodone Screen, Urine 09/08/2019 Negative  Negative Final   • Propoxyphene Screen 09/08/2019 Negative  Negative Final   • Buprenorphine, Screen, Urine 09/08/2019 Negative  Negative Final   • Salicylate 09/08/2019 0.9  <=30.0 mg/dL Final   • TSH 09/08/2019 0.504  0.270 - 4.200 uIU/mL Final   • WBC 09/08/2019 8.36  3.40 - 10.80 10*3/mm3 Final   • RBC 09/08/2019 5.71  4.14 - 5.80 10*6/mm3 Final   • Hemoglobin 09/08/2019 18.3* 13.0 - 17.7 g/dL Final   • Hematocrit 09/08/2019 53.8* 37.5 - 51.0 % Final   • MCV 09/08/2019 94.2  79.0 - 97.0 fL Final   • MCH 09/08/2019 32.0  26.6 - 33.0 pg Final   • MCHC 09/08/2019 34.0  31.5 - 35.7 g/dL Final   • RDW 09/08/2019 11.9* 12.3 - 15.4 % Final   • RDW-SD 09/08/2019 41.1  37.0 - 54.0 fl Final   • MPV 09/08/2019 9.0  6.0 - 12.0 fL Final   • Platelets 09/08/2019 206  140 - 450 10*3/mm3 Final   • Neutrophil % 09/08/2019 47.7  42.7 - 76.0 % Final   • Lymphocyte % 09/08/2019 40.1  19.6 - 45.3 % Final   • Monocyte % 09/08/2019 6.1  5.0 - 12.0 % Final   • Eosinophil % 09/08/2019 3.9  0.3 - 6.2 % Final   • Basophil % 09/08/2019 0.8  0.0 - 1.5 % Final   • Immature Grans % 09/08/2019 1.4* 0.0 - 0.5 % Final   • Neutrophils, Absolute 09/08/2019 3.98  1.70  "- 7.00 10*3/mm3 Final   • Lymphocytes, Absolute 09/08/2019 3.35* 0.70 - 3.10 10*3/mm3 Final   • Monocytes, Absolute 09/08/2019 0.51  0.10 - 0.90 10*3/mm3 Final   • Eosinophils, Absolute 09/08/2019 0.33  0.00 - 0.40 10*3/mm3 Final   • Basophils, Absolute 09/08/2019 0.07  0.00 - 0.20 10*3/mm3 Final   • Immature Grans, Absolute 09/08/2019 0.12* 0.00 - 0.05 10*3/mm3 Final   • nRBC 09/08/2019 0.0  0.0 - 0.2 /100 WBC Final   • RBC, UA 09/08/2019 3-5* None Seen /HPF Final   • WBC, UA 09/08/2019 None Seen  None Seen /HPF Final   • Bacteria, UA 09/08/2019 None Seen  None Seen /HPF Final   • Squamous Epithelial Cells, UA 09/08/2019 None Seen  None Seen, 0-2 /HPF Final   • Hyaline Casts, UA 09/08/2019 None Seen  None Seen /LPF Final   • Mucus, UA 09/08/2019 Trace  None Seen, Trace /HPF Final   • Methodology 09/08/2019 Manual Light Microscopy   Final   • Ethanol 09/08/2019 188* 0 - 10 mg/dL Final   • Ethanol % 09/08/2019 0.188  % Final   • Ethanol 09/09/2019 91* 0 - 10 mg/dL Final   • Ethanol % 09/09/2019 0.091  % Final   • Magnesium 09/09/2019 2.2  1.6 - 2.6 mg/dL Final       Review of Symptoms:   Review of Systems   Constitutional: Negative.  Negative for activity change, appetite change, unexpected weight gain and unexpected weight loss.   Respiratory: Negative.    Cardiovascular: Negative.  Negative for chest pain.   Gastrointestinal: Negative.  Negative for diarrhea, nausea and vomiting.   Genitourinary: Negative.    Musculoskeletal: Negative.    Skin: Negative for rash and bruise.   Neurological: Negative.  Negative for dizziness, seizures and speech difficulty.   Psychiatric/Behavioral: Negative.  Negative for agitation, behavioral problems, decreased concentration, dysphoric mood, hallucinations, self-injury, sleep disturbance, suicidal ideas, negative for hyperactivity, depressed mood and stress. The patient is not nervous/anxious.        Physical Exam:   Physical Exam  Height 182.9 cm (72\"), weight 76.2 kg (168 " lb).    Mental Status Exam:   Appearance: appropriate  Hygiene:   good  Cooperation:  Cooperative  Eye Contact:  Good  Psychomotor Behavior:  Appropriate  Mood:euthymic  Affect:  Appropriate  Hopelessness: Denies  Speech:  Normal  Thought Process:  Goal directed  Thought Content:  Normal  Suicidal:  None  Homicidal:  None  Hallucinations:  None  Delusion:  None  Memory:  Intact  Orientation:  Person, Place, Time and Situation  Reliability:  good  Insight:  Fair  Judgement:  Good  Impulse Control:  Good  Physical/Medical Issues:  No           Assessment/Plan   Diagnoses and all orders for this visit:    Uncomplicated alcohol dependence (CMS/HCC)  -     naltrexone (DEPADE) 50 MG tablet; Take 1 tablet by mouth Daily.    Insomnia, unspecified type    Anxiety disorder, unspecified type    Continue current doses of Hydroxyzine prn and Trazodone    Add Naltrexone      A psychological evaluation was conducted in order to assess past and current level of functioning. Areas assessed included, but were not limited to: perception of social support, perception of ability to face and deal with challenges in life (positive functioning), anxiety symptoms, depressive symptoms, perspective on beliefs/belief system, coping skills for stress, intelligence level,  Therapeutic rapport was established. Interventions conducted today were geared towards incorporating medication management along with support for continued therapy. Education was also provided as to the med management with this provider and what to expect in subsequent sessions.    We discussed risks, benefits,goals and side effects of the above medication and the patient was agreeable with the plan.Patient was educated on the importance of compliance with treatment and follow-up appointments. Patient is aware to contact the [facility] with any worsening of symptoms. To call for questions or concerns and return early if necessary. Patent is agreeable to go to the Emergency  Department or call 911 should they begin SI/HI.     Treatment Plan: stabilize mood,  patient will stay out of the hospital and be at optimal level of functioning, take all medication as prescribed. Patient verbalized  understanding and agreement to plan.     Return in about 4 weeks (around 10/15/2019) for Follow Up.    Rosanne John, JUAN, APRN, PMHNP-BC, FNP-C

## 2019-09-17 NOTE — PROGRESS NOTES
NAME: Yousuf Barrera  DATE: 09/17/2019    Phase I  9:00AM - 12:00PM    IOP GROUP NOTE    DATA:     3 hour IOP group therapy session (Check-ins, Coping Skills, Relapse Prevention)     Check Ins:  Therapist introduced self and welcomed new group members.  Therapist reviewed IOP group rules and guidelines.   Therapist continued facilitation of rapport building strategies between group members. Therapist asked that each patient check in with home life and recovery efforts and identify triggers, cravings, and high risk situations that arise between group sessions.  Group members discussed barriers and benefits of attending recovery meetings.  Therapist provided empathy and support during group session.       Session Content/Coping Skills:  Therapist facilitated learning activity to promote group interaction, team building, and self-disclosure.  Therapist assisted group members with identifying coping skills or “Sobriety Survival Skills.”  Group members openly discussed times when they have used healthy coping skills effectively and times when they have not.  For the last hour of group, a peer support leader facilitated discussion on experience in early recovery and the value of self-help/support groups.    Response:  Yousuf was present for IOP group and was a positive participant.  Today was his first day in IOP group.  He introduced himself and shared about what led him to seek treatment for alcohol abuse.  He disclosed drinking liquor for 112 days straight and identified that initial stage of going from drinking beer to liquor as a changing point for when his alcohol use became out of control.  He initially focused on the 112 days of heavier drinking, reporting he would drink up to 6 or 7 shots of whiskey per day.  He was receptive to feedback about whether he might have been drinking more heavily than he ever realized since he disclosed his alcohol level when he first presented to the hospital for treatment was in the  "300s.  He reported completing 6 days of detox and not craving at all until he got home on Sunday.  He reported going to a tobacco and alcohol store for snuff because it was cheaper there and how he realizes now that this was putting himself in a high-risk situation.  He opened up more as group went along disclosing that he started drinking liquor as a way to cope with feelings of fear and inadequacy after his 13 year old daughter ran away from home and was admitted inpt for suicidal ideation.  He explored how this was a situation he never anticipated and did not know how to deal when it happened.  He found that drinking a couple of shots of liquor helped him return to \"normal\" and this quickly progressed to him drinking several shots of liquor everyday.  He made a comment about not being as \"severe\" as some of his peers on the detox unit because he had not been drinking as much or for as long and was very receptive when another peer pointed out he was drinking enough to where his PEDRITO was in the 300s yet he was still very composed to the point his wife had no idea (per pt report).    ASSESSMENT:     Lab Review  Pending    Mental Status Exam  Hygiene:  good  Dress: casual  Attitude: cooperative and agreeable   Motor Activity: appropriate  Eye Contact:  good  Speech: regular rate and rhythm   Mood:  calm and conversant  Affect:  Appropriate  Thought Processes:  Goal directed and Linear  Thought Content:  Normal  Suicidal Thoughts:  denies  Homicidal Thoughts:  denies  Crisis Safety Plan: yes, to come to the emergency room.  Hallucinations:  denies  Reliability: fair  Insight: fair  Judgement: fair  Impulse Control: fair    Patient's Support Network Includes: The patient receives support from his wife, daughter and employer.    Progress toward goal: Not at goal    Level of Functioning/Impairment (ASAM):  I.    Intoxication/Withdrawal:  No problem = 0  II.   Medical Conditions/Complications: No problem = 0  III. "  Behavioral/Emotional/Cognitive: Mild problem = 1 (MDD)  IV.  Readiness to Change: Moderate problem = 2 (external and internal motivation for change, limited insight into relapse risk, ambivalence re: abstaining from all alcohol)  V.   Relapse Risk: Moderate problem = 2 (moderate to severe cravings, limited insight into relapse risk, poor coping skills, limited recovery experience)  VI:  Recovery Environment: Mild problem = 1 (family supportive, limited knowledge on the nature of addiction as a family disease, strained relationships, no involvement w/ support groups)  Total ASAM Score = 6     Prognosis: Fair with Ongoing Treatment     Family issues related to recovery:  limited knowledge on the disease concept, limited family recovery experience    Recovery/spiritual support group attendance: no    Sponsor:  no    Identification of environmental and personal barriers to recovery: coping with limited and/or negative emotions, feelings of remorse and guilt, work, family, overall understanding of disease concept     Motivation for treatment:  improved mental and physical health, improving overall relationships, and long-term sobriety    Self-reported number of days sober:  9    Impression/Formulation:     Diagnosis Plan   1. Uncomplicated alcohol dependence (CMS/HCC)  naltrexone (DEPADE) 50 MG tablet   2. Insomnia, unspecified type     3. Anxiety disorder, unspecified type         CLINICAL MANUVERING/INTERVENTIONS:  Therapist utilized a person-centered approach to build rapport with group member.  Therapist implemented motivational interviewing techniques to assist client with exploring and resolving ambivalence associated with commitment to change behaviors related to substance use and addiction.  Therapist applied cognitive behavioral strategies to facilitate identification of maladaptive patterns of thinking and behavior that contribute to cleint's risk for continued substance use and relapse.  Therapist employed  group interaction activities to build rapport among group members, promote sobriety, and emphasize relapse prevention.  Therapist promoted safe nonjudgmental environment by providing group members with unconditional positive regard and encouraging group members to comply with group rules and guidelines.  Therapist utilized dialectical behavior techniques to teach and model emotional regulation and relaxation.  Therapist assisted group member with identifying and implementing healthier coping strategies.  Group member was encouraged to attend community support group meetings, make positive daily choices, and maintain healthy boundaries.  Group member was encouraged to invite family members to family educational group on Wednesdays.      BASELINE SCORES 09/17/19  DASES: 82  URICA (Alcohol): 11.42 (Preparation/Action)  URICA (Drugs): 2.57 (Pre-contemplation)  PHQ-9: 10  HALI-7: 12    UPDATED SCORES n/a    PLAN:  Continue [facility] IOP Phase I   Aftercare:  [facility] Phase II  Program Assignments:  Personal recovery plan, relapse prevention plan, attendance of recovery support group meetings, exploration of sponsorship, drug/alcohol screens.      Please note that portions of this note were completed with a voice recognition program. Efforts were made to edit dictation, but occasionally words are mistranscribed.      This document signed by Jolynn Bolaños [facility], September 17, 2019, 1:04 PM

## 2019-09-17 NOTE — TREATMENT PLAN
[facility]    Intensive Outpatient Program for Chemical Dependence (CD IOP)      Individualized Treatment Plan    DATE: 9/17/19   TIME: 1334         Long Term Goal:  Yousuf Barrera will establish a sustained recovery and will maintain total abstinence from mind-altering substances other than what is prescribed and/or approved by the attending physician or psychiatric nurse practitioner.  He will learn, practice, and utilize behavioral and cognitive coping skills to help maintain sobriety.    Patient Care Needs:  Yousuf Barrera presents with severe alcohol dependence and is at high risk for relapse without continued treatment.  He has a history of consistently using alcohol/drugs until intoxicated or passed out.  He has been unable to stop or cut down use of alcohol/drugs once starting despite verbalized desire to do so and the negative consequences from continued use.  Brents use has negatively impacted social, occupational, and/or physical functioning.    Objectives:      1.  Patient will participate in a medical evaluation to assess the effects of chemical dependence and will cooperate with an evaluation by the attending psychiatrist or psychiatric nurse practitioner for psychotropic medication if appropriate.    2.  Patient will adhere to the [facility] group rules and guidelines.    3.  Patient will attend group sessions as scheduled.  Patient will notify therapist and support staff of any absences or tardies.  Patient will provide a valid and verifiable excuse for absences to be considered excused.    4.  Patient will participate in random drug and alcohol screenings and will exhibit negative results on said screenings.    5.  Patient will identify high risk situations, people, and places to avoid or manage in order to maintain sobriety.    6.  Patient will participate in group discussions by giving and receiving feedback appropriately.    7.  Patient will develop a positive network of  support by attending recovery groups or other spiritual fellowships each week outside of IOP group and by exploring sponsorship.    8.  Patient will identify, practice, and implement at least 5 healthy coping strategies to manage difficult emotions while remaining abstinent.    9.  Patient will develop a personal recovery plan and share it with the group prior to discharge.    10.  Patient will encourage family participation in weekly family education groups, if appropriate.    11.  Patient will exhibit increased internal and external motivation to change as assessed by observable behaviors in conjunction with the URICA assessment tool.    12.  Patient will utilize healthy coping skills to manage depressive symptoms without using alcohol or other mind-altering substances.  The PHQ-9 depression scale will be utilized to help monitor frequency of depressive symptoms.    13.  Patient will  utilize healthy coping skills to manage depressive symptoms without using alcohol or other mind-altering substances.  The HALI-7 questionnaire will be utilized to help monitor frequency of anxiety symptoms.     Interventions:  Patient will attend biweekly appointments with the attending psychiatrist or psychiatric nurse practitioner for medication management unless scheduled otherwise.  Patient will comply with recommendations and appointments with his/her medical treatment team.  Therapist will introduce and review IOP group rules, and patient will be provided a hard copy of the group rules upon entering the program.  IOP group sessions will be offered 4 times per week, Monday through Thursday, from 9 AM to 12 PM, during Phase I of treatment, with the exception of some federal holidays. Patient will be provided with random and regular urine drug and alcohol screenings. Therapist will utilize motivational interviewing techniques to assist patient with exploring and resolving ambivalence associated with commitment to change behaviors  related to substance use and addiction.  Therapist will utilize behavioral and cognitive techniques to assist patient with identifying and recognizing patterns of irrational beliefs that contribute to patient's risk for continued substance abuse and relapse.  Therapist will provide psychoeducation to assist patient in increasing *his/her knowledge on the disease concept and the effects of chemical dependence.  Therapist will provide and utilize evidenced-based recovery literature to assist patient in identifying healthier coping strategies.  Therapist will assist patient in developing a relapse prevention plan and a personal recovery plan.  Therapist will assist patient with exploring self-help strategies and working the 12 Steps.  Therapist will encourage patient to maintain sponsorship and/or interpersonal connection with [facility].  Therapist will provide weekly family education groups and will encourage patient to invite family member(s) to participate. Treatment team members will provide patient with progress reports as needed.    Team Members:  Patient - Yousuf Barrera  Medical Provider - MELVIN Eaton  [facility] Licensed Therapist - Jolynn Bolaños [facility] Director - Omer George LCSW, [facility]  Support Staff       I have discussed and reviewed this treatment plan with the patient.  It has been printed for signatures.

## 2019-09-18 ENCOUNTER — OFFICE VISIT (OUTPATIENT)
Dept: PSYCHIATRY | Facility: HOSPITAL | Age: 54
End: 2019-09-18

## 2019-09-18 DIAGNOSIS — G47.00 INSOMNIA, UNSPECIFIED TYPE: ICD-10-CM

## 2019-09-18 DIAGNOSIS — F41.9 ANXIETY DISORDER, UNSPECIFIED TYPE: ICD-10-CM

## 2019-09-18 DIAGNOSIS — F10.20 UNCOMPLICATED ALCOHOL DEPENDENCE (HCC): Primary | ICD-10-CM

## 2019-09-18 NOTE — PROGRESS NOTES
NAME: Yousuf Barrera  DATE: 09/18/2019    Phase I  9:00AM - 12:00PM    IOP GROUP NOTE    DATA:     3 hour IOP group therapy session (Check-ins, Coping Skills, Relapse Prevention)     Check Ins:  Therapist continued facilitation of rapport building strategies between group members. Therapist asked that each patient check in with home life and recovery efforts and identify triggers, cravings, and high risk situations that arise between group sessions.  Group members discussed barriers and benefits of attending recovery meetings.  Therapist provided empathy and support during group session.        Session Content/Coping Skills:  Therapist introduced the concept of change as a natural process.  Therapist used psychoeducation to introduce the stages of change: not thinking (pre-contemplation), thinking (contemplation), planning (preparation), changing (action), sustaining (maintenance), and relapsing.  Group members openly discussed the stages of change and applied them to their own experiences in addiction and recovery.  Therapist introduced the disease model and provided psychoeducation on the chronic and progressive nature of substance dependence.  Therapist utilized a video on classical conditioning to demonstrate how a trigger produces an automatic physical and emotional response.  Group members openly discussed their biggest triggers and brainstormed ways to avoid and manage triggers and high-risk situations.  Group members were given a homework assignment to develop a relapse prevention plan identifying 3 warnings signs, 3 supports, 3 coping skills, 3 outcomes of relapse, and 3 outcomes of sobriety.    Response:  Yousuf was present for IOP group and was a positive participant.  He was very engaged in group discussion, asking questions and relating the topics to his own experiences.  He reported relating to another group member in that he feels like a bad father and a bad  because of his alcohol use.  He  "discussed lying about and hiding his use from his family and everyone else.  He was able to identify many qualities and actions in himself that are actually evidence against these negative thoughts of being a bad father and .  He reflected on not being present and supportive of his wife when she needed him when their daughter was dealing with mental health issues.  He also discussed realizing he and his wife both needed their own support to help cope with their own thoughts and feelings when their daughter was dealing with her mental health.  Yousuf reported feeling shocked and scared when his daughter ran away because he had no idea she had been struggling so much.  He also reported feeling embarrassed that his perception of his family was distorted and he didn't \"see this coming.\"  He discussed feeling more vulnerable to triggers now that he is back home and back to work.  He is going to try Naltrexone for cravings management and plans on taking it an hour before leaving work since between work and home is when he would typically obtain alcohol then use it throughout the evening.  He discussed how the last 2 weeks of using, he was starting to drink in the mornings on weekends which is something he never did before.     ASSESSMENT:      Lab Review  Pending     Mental Status Exam  Hygiene:  good  Dress: casual  Attitude: cooperative and agreeable   Motor Activity: appropriate  Eye Contact:  good  Speech: regular rate and rhythm   Mood:  calm and conversant  Affect:  Appropriate  Thought Processes:  Goal directed and Linear  Thought Content:  Normal  Suicidal Thoughts:  denies  Homicidal Thoughts:  denies  Crisis Safety Plan: yes, to come to the emergency room.  Hallucinations:  denies  Reliability: fair  Insight: fair  Judgement: fair  Impulse Control: fair     Patient's Support Network Includes: The patient receives support from his wife, daughter and employer.     Progress toward goal: Not at goal     Level " of Functioning/Impairment (ASAM):  I.    Intoxication/Withdrawal:  No problem = 0  II.   Medical Conditions/Complications: No problem = 0  III.  Behavioral/Emotional/Cognitive: Mild problem = 1 (MDD)  IV.  Readiness to Change: Moderate problem = 2 (external and internal motivation for change, limited insight into relapse risk, ambivalence re: abstaining from all alcohol)  V.   Relapse Risk: Moderate problem = 2 (moderate to severe cravings, limited insight into relapse risk, poor coping skills, limited recovery experience)  VI:  Recovery Environment: Mild problem = 1 (family supportive, limited knowledge on the nature of addiction as a family disease, strained relationships, no involvement w/ support groups)  Total ASAM Score = 6     Prognosis: Fair with Ongoing Treatment      Family issues related to recovery:  limited knowledge on the disease concept, limited family recovery experience     Recovery/spiritual support group attendance: no     Sponsor:  no     Identification of environmental and personal barriers to recovery: coping with limited and/or negative emotions, feelings of remorse and guilt, work, family, overall understanding of disease concept      Motivation for treatment:  improved mental and physical health, improving overall relationships, and long-term sobriety     Self-reported number of days sober:  10    Impression/Formulation:     Diagnosis Plan   1. Uncomplicated alcohol dependence (CMS/HCC)     2. Insomnia, unspecified type     3. Anxiety disorder, unspecified type         CLINICAL MANUVERING/INTERVENTIONS:  Therapist utilized a person-centered approach to build rapport with group member.  Therapist implemented motivational interviewing techniques to assist client with exploring and resolving ambivalence associated with commitment to change behaviors related to substance use and addiction.  Therapist applied cognitive behavioral strategies to facilitate identification of maladaptive patterns of  thinking and behavior that contribute to dimitri's risk for continued substance use and relapse.  Therapist employed group interaction activities to build rapport among group members, promote sobriety, and emphasize relapse prevention.  Therapist promoted safe nonjudgmental environment by providing group members with unconditional positive regard and encouraging group members to comply with group rules and guidelines.  Therapist utilized dialectical behavior techniques to teach and model emotional regulation and relaxation.  Therapist assisted group member with identifying and implementing healthier coping strategies.  Group member was encouraged to attend community support group meetings, make positive daily choices, and maintain healthy boundaries.  Group member was encouraged to invite family members to family educational group on Wednesdays.      BASELINE SCORES 09/17/19  DASES: 82  URICA (Alcohol): 11.42 (Preparation/Action)  URICA (Drugs): 2.57 (Pre-contemplation)  PHQ-9: 10  HALI-7: 12     UPDATED SCORES n/a    PLAN:  Continue [facility] IOP Phase I   Aftercare:  [facility] Phase II  Program Assignments:  Personal recovery plan, relapse prevention plan, attendance of recovery support group meetings, exploration of sponsorship, drug/alcohol screens.      Please note that portions of this note were completed with a voice recognition program. Efforts were made to edit dictation, but occasionally words are mistranscribed.      This document signed by Jolynn Bolaños [facility], September 18, 2019, 1:17 PM

## 2019-09-19 ENCOUNTER — OFFICE VISIT (OUTPATIENT)
Dept: PSYCHIATRY | Facility: HOSPITAL | Age: 54
End: 2019-09-19

## 2019-09-19 DIAGNOSIS — F10.20 UNCOMPLICATED ALCOHOL DEPENDENCE (HCC): Primary | ICD-10-CM

## 2019-09-19 DIAGNOSIS — F41.9 ANXIETY DISORDER, UNSPECIFIED TYPE: ICD-10-CM

## 2019-09-19 DIAGNOSIS — F33.2 MAJOR DEPRESSIVE DISORDER, RECURRENT, SEVERE WITHOUT PSYCHOTIC FEATURES (HCC): ICD-10-CM

## 2019-09-19 DIAGNOSIS — G47.00 INSOMNIA, UNSPECIFIED TYPE: ICD-10-CM

## 2019-09-19 NOTE — PROGRESS NOTES
"NAME: Yousuf Barrera  DATE: 09/19/2019    Phase I  9:00AM - 12:00PM    IOP GROUP NOTE    DATA:     3 hour IOP group therapy session (Check-ins, Coping Skills, Relapse Prevention)     Check Ins:  Therapist continued facilitation of rapport building strategies between group members. Therapist asked that each patient check in with home life and recovery efforts and identify triggers, cravings, and high risk situations that arise between group sessions.  Group members discussed barriers and benefits of attending recovery meetings.  Therapist provided empathy and support during group session.      Session Content/Coping Skills:  Therapist assisted group members with processing relapse within the group.  Group members discussed what relapse means to them and shared their relapse prevention plans from last night’s homework assignment.  Therapist introduced the modality of artistic expression in therapy and addiction recovery. Therapist facilitated discussion by asking group members to brainstorm the benefits of creating art in therapy versus traditional talk therapy.  Therapist facilitated an art activity: [employer].  Group members were instructed to write about something they are wanting to let go or move on from.  Group members were invited to share this with the rest of the group.    Response:  Yousuf was present for IOP group and was a positive participant.  He reported taking Naltrexone yesterday afternoon with the only side effect being some unsteadiness.  When processing group member relapse, he had to be reminded to focus on his own feelings and experience rather than the person who relapsed.  He was able to identify feeling \"shocked\" but had difficulty relating this to his own feelings about relapse.  He shared his relapse prevention plan with the group, identifying getting off work as a trigger and isolating and boredom as warning signs.  He discussed taking alternate routes to and from work to avoid " "going by the store he typically purchased alcohol from.  He identified his therapist, wife, and supervisor as social supports he can reach out to.  He reported disclosing his alcohol use problem with his supervisor who was very supportive and assisted Yousuf with arranging his schedule so he can attend IOP group.  Yousuf identified staying busy, taking it one step at a time, and thinking about the consequences as effective coping skills.  He discussed the possibility of losing his marriage, his confidence, and his job if he were to relapse and keeping these things if he stayed sober.  During the art activity, Yousuf shared that he was letting go of \"my frustration over ridiculous medical and hospital bills\" explaining he has been sent multiple copies of varying bills from the hospital and his insurance company over his daughter's hospitalization.  I attempted to explore this further with him.  He denied feeling stressed over finances and expenses and maintained the issue was the fact that he was being sent multiple copies of the same bill.  He then went on to vent about his frustrations over his work having multiple operating systems requiring Yousuf and his co-workers to have multiple log-ins and passwords as well as changing their passwords every so often.  He appeared to keep it surface level as we attempted to explore his thoughts and feelings on a deeper level stating \"I know it's a third party vendor, but it just doesn't make any sense.  We all talk about it at work.\"       ASSESSMENT:      Lab Review  Pending     Mental Status Exam  Hygiene:  good  Dress: casual  Attitude: cooperative and agreeable   Motor Activity: appropriate  Eye Contact:  good  Speech: regular rate and rhythm   Mood:  calm and conversant  Affect:  Appropriate  Thought Processes:  Goal directed and Linear  Thought Content:  Normal  Suicidal Thoughts:  denies  Homicidal Thoughts:  denies  Crisis Safety Plan: yes, to come to the emergency " room.  Hallucinations:  denies  Reliability: fair  Insight: fair  Judgement: fair  Impulse Control: fair     Patient's Support Network Includes: The patient receives support from his wife, daughter and employer.     Progress toward goal: Not at goal     Level of Functioning/Impairment (ASAM):  I.    Intoxication/Withdrawal:  No problem = 0  II.   Medical Conditions/Complications: No problem = 0  III.  Behavioral/Emotional/Cognitive: Mild problem = 1 (MDD)  IV.  Readiness to Change: Moderate problem = 2 (external and internal motivation for change, limited insight into relapse risk, ambivalence re: abstaining from all alcohol)  V.   Relapse Risk: Moderate problem = 2 (moderate to severe cravings, limited insight into relapse risk, poor coping skills, limited recovery experience)  VI:  Recovery Environment: Mild problem = 1 (family supportive, limited knowledge on the nature of addiction as a family disease, strained relationships, no involvement w/ support groups)  Total ASAM Score = 6     Prognosis: Fair with Ongoing Treatment      Family issues related to recovery:  limited knowledge on the disease concept, limited family recovery experience     Recovery/spiritual support group attendance: no     Sponsor:  no     Identification of environmental and personal barriers to recovery: coping with limited and/or negative emotions, feelings of remorse and guilt, work, family, overall understanding of disease concept      Motivation for treatment:  improved mental and physical health, improving overall relationships, and long-term sobriety     Self-reported number of days sober:  11    Impression/Formulation:     Diagnosis Plan   1. Uncomplicated alcohol dependence (CMS/HCC)     2. Insomnia, unspecified type     3. Anxiety disorder, unspecified type         CLINICAL MANUVERING/INTERVENTIONS:  Therapist utilized a person-centered approach to build rapport with group member.  Therapist implemented motivational interviewing  techniques to assist client with exploring and resolving ambivalence associated with commitment to change behaviors related to substance use and addiction.  Therapist applied cognitive behavioral strategies to facilitate identification of maladaptive patterns of thinking and behavior that contribute to cleint's risk for continued substance use and relapse.  Therapist employed group interaction activities to build rapport among group members, promote sobriety, and emphasize relapse prevention.  Therapist promoted safe nonjudgmental environment by providing group members with unconditional positive regard and encouraging group members to comply with group rules and guidelines.  Therapist utilized dialectical behavior techniques to teach and model emotional regulation and relaxation.  Therapist assisted group member with identifying and implementing healthier coping strategies.  Group member was encouraged to attend community support group meetings, make positive daily choices, and maintain healthy boundaries.  Group member was encouraged to invite family members to family educational group on Wednesdays.      BASELINE SCORES 09/17/19  DASES: 82  URICA (Alcohol): 11.42 (Preparation/Action)  URICA (Drugs): 2.57 (Pre-contemplation)  PHQ-9: 10  HALI-7: 12     UPDATED SCORES n/a    PLAN:  Continue [facility] IOP Phase I   Aftercare:  [facility] Phase II  Program Assignments:  Personal recovery plan, relapse prevention plan, attendance of recovery support group meetings, exploration of sponsorship, drug/alcohol screens.      Please note that portions of this note were completed with a voice recognition program. Efforts were made to edit dictation, but occasionally words are mistranscribed.      This document signed by Jolynn Bolaños [facility], September 19, 2019, 2:33 PM

## 2019-09-23 ENCOUNTER — OFFICE VISIT (OUTPATIENT)
Dept: PSYCHIATRY | Facility: HOSPITAL | Age: 54
End: 2019-09-23

## 2019-09-23 DIAGNOSIS — F10.20 UNCOMPLICATED ALCOHOL DEPENDENCE (HCC): Primary | ICD-10-CM

## 2019-09-23 DIAGNOSIS — G47.00 INSOMNIA, UNSPECIFIED TYPE: ICD-10-CM

## 2019-09-23 DIAGNOSIS — F41.9 ANXIETY DISORDER, UNSPECIFIED TYPE: ICD-10-CM

## 2019-09-23 NOTE — PROGRESS NOTES
"NAME: Yousuf Barrera  DATE: 09/23/2019    Phase I  9:00AM - 12:00PM    IOP GROUP NOTE    DATA:     3 hour IOP group therapy session (Check-ins, Coping Skills, Relapse Prevention)     Check Ins:  Therapist continued facilitation of rapport building strategies between group members. Therapist asked that each patient check in with home life and recovery efforts and identify triggers, cravings, and high risk situations that arise between group sessions.  Group members discussed barriers and benefits of attending recovery meetings.  Therapist provided empathy and support during group session.  Therapist facilitated reading the daily motivation passages from Daily Reflections ([employer] 1991) and Just for Today (The 12 Steps and 12 Traditions, 1991) and invited group members to reflect and discuss.      Session Content/Coping Skills:    Therapist facilitated continuation of the art activity: T[employer].  Group members were instructed to write about something they are wanting to let go or move on from.  They were then directed to tear up their page of writing and use the pieces along with various art materials to create a work of art.  Group members reflected on their experience and shared new insights gained from the activity.     Response:  Yousuf was present for IOP group and was a positive participant.  He reported \"testing\" himself this weekend by not taking Naltrexone and stated \"Boy, that was a mistake.\"  He reported having intense cravings to use alcohol Saturday morning.  He could not identify a specific trigger.  He reported leaving the house early with the plan of attending an AA meeting at noon.  He reported driving to various places but intentionally avoiding any route that had a liquor store.  He was able to manage not using and went to an AA meeting at noon.  He discussed going to his first AA meeting the night before and feeling uncomfortable because he was one of only 2 men in the group.  He " reported feeling much more comfortable at the second meeting and shared his story.  He asked questions about sponsorship and working the steps.  He plans on attending AA meetings regularly and likened them to his men's group at [employer] which he really enjoys.  He participated in the art activity and shared about his experience with the rest of the group.  He reflected on how frustrated he had been last week when he started writing about letting go of frustrations at work and with redundant medical bills.  He reflected on how today he was much more relaxed.  He shared his final art piece and explained it had a plane on it because it represented peace and calm.      ASSESSMENT:      Lab Review  Pending     Mental Status Exam  Hygiene:  good  Dress: casual  Attitude: cooperative and agreeable   Motor Activity: appropriate  Eye Contact:  good  Speech: regular rate and rhythm   Mood:  calm and conversant  Affect:  Appropriate  Thought Processes:  Goal directed and Linear  Thought Content:  Normal  Suicidal Thoughts:  denies  Homicidal Thoughts:  denies  Crisis Safety Plan: yes, to come to the emergency room.  Hallucinations:  denies  Reliability: fair  Insight: fair  Judgement: fair  Impulse Control: fair     Patient's Support Network Includes: The patient receives support from his wife, daughter and employer.     Progress toward goal: Not at goal     Level of Functioning/Impairment (ASAM):  I.    Intoxication/Withdrawal:  No problem = 0  II.   Medical Conditions/Complications: No problem = 0  III.  Behavioral/Emotional/Cognitive: Mild problem = 1 (MDD)  IV.  Readiness to Change: Moderate problem = 2 (external and internal motivation for change, limited insight into relapse risk, ambivalence re: abstaining from all alcohol)  V.   Relapse Risk: Moderate problem = 2 (moderate to severe cravings, limited insight into relapse risk, poor coping skills, limited recovery experience)  VI:  Recovery Environment: Mild problem = 1  (family supportive, limited knowledge on the nature of addiction as a family disease, strained relationships, no involvement w/ support groups)  Total ASAM Score = 6     Prognosis: Fair with Ongoing Treatment      Family issues related to recovery:  limited knowledge on the disease concept, limited family recovery experience     Recovery/spiritual support group attendance: no     Sponsor:  no     Identification of environmental and personal barriers to recovery: coping with limited and/or negative emotions, feelings of remorse and guilt, work, family, overall understanding of disease concept      Motivation for treatment:  improved mental and physical health, improving overall relationships, and long-term sobriety     Self-reported number of days sober:  15    Impression/Formulation:     Diagnosis Plan   1. Uncomplicated alcohol dependence (CMS/HCC)     2. Insomnia, unspecified type     3. Anxiety disorder, unspecified type         CLINICAL MANUVERING/INTERVENTIONS:  Therapist utilized a person-centered approach to build rapport with group member.  Therapist implemented motivational interviewing techniques to assist client with exploring and resolving ambivalence associated with commitment to change behaviors related to substance use and addiction.  Therapist applied cognitive behavioral strategies to facilitate identification of maladaptive patterns of thinking and behavior that contribute to cleint's risk for continued substance use and relapse.  Therapist employed group interaction activities to build rapport among group members, promote sobriety, and emphasize relapse prevention.  Therapist promoted safe nonjudgmental environment by providing group members with unconditional positive regard and encouraging group members to comply with group rules and guidelines.  Therapist utilized dialectical behavior techniques to teach and model emotional regulation and relaxation.  Therapist assisted group member with  identifying and implementing healthier coping strategies.  Group member was encouraged to attend community support group meetings, make positive daily choices, and maintain healthy boundaries.  Group member was encouraged to invite family members to family educational group on Wednesdays.      BASELINE SCORES 09/17/19  DASES: 82  URICA (Alcohol): 11.42 (Preparation/Action)  URICA (Drugs): 2.57 (Pre-contemplation)  PHQ-9: 10  HALI-7: 12     UPDATED SCORES n/a    PLAN:  Continue [facility] IOP Phase I   Aftercare:  [facility] Phase II  Program Assignments:  Personal recovery plan, relapse prevention plan, attendance of recovery support group meetings, exploration of sponsorship, drug/alcohol screens.      Please note that portions of this note were completed with a voice recognition program. Efforts were made to edit dictation, but occasionally words are mistranscribed.      This document signed by Jolynn Bolaños [facility], September 23, 2019, 12:56 PM

## 2019-09-24 ENCOUNTER — LAB (OUTPATIENT)
Dept: LAB | Facility: HOSPITAL | Age: 54
End: 2019-09-24

## 2019-09-24 ENCOUNTER — OFFICE VISIT (OUTPATIENT)
Dept: PSYCHIATRY | Facility: HOSPITAL | Age: 54
End: 2019-09-24

## 2019-09-24 DIAGNOSIS — F33.2 MAJOR DEPRESSIVE DISORDER, RECURRENT, SEVERE WITHOUT PSYCHOTIC FEATURES (HCC): ICD-10-CM

## 2019-09-24 DIAGNOSIS — F10.20 UNCOMPLICATED ALCOHOL DEPENDENCE (HCC): ICD-10-CM

## 2019-09-24 DIAGNOSIS — F10.20 UNCOMPLICATED ALCOHOL DEPENDENCE (HCC): Primary | ICD-10-CM

## 2019-09-24 DIAGNOSIS — G47.00 INSOMNIA, UNSPECIFIED TYPE: ICD-10-CM

## 2019-09-24 DIAGNOSIS — F41.9 ANXIETY DISORDER, UNSPECIFIED TYPE: ICD-10-CM

## 2019-09-24 LAB
AMPHET+METHAMPHET UR QL: NEGATIVE
AMPHETAMINES UR QL: NEGATIVE
BARBITURATES UR QL SCN: NEGATIVE
BENZODIAZ UR QL SCN: NEGATIVE
BUPRENORPHINE SERPL-MCNC: NEGATIVE NG/ML
CANNABINOIDS SERPL QL: NEGATIVE
COCAINE UR QL: NEGATIVE
METHADONE UR QL SCN: NEGATIVE
OPIATES UR QL: NEGATIVE
OXYCODONE UR QL SCN: NEGATIVE
PCP UR QL SCN: NEGATIVE
PROPOXYPH UR QL: NEGATIVE
TRICYCLICS UR QL SCN: NEGATIVE

## 2019-09-24 PROCEDURE — 80306 DRUG TEST PRSMV INSTRMNT: CPT

## 2019-09-24 PROCEDURE — 80307 DRUG TEST PRSMV CHEM ANLYZR: CPT

## 2019-09-24 NOTE — PROGRESS NOTES
NAME: Yousuf Barrera  DATE: 09/24/2019    Phase I  9:00AM - 12:00PM    IOP GROUP NOTE    DATA:     3 hour IOP group therapy session (Check-ins, Coping Skills, Relapse Prevention)     Check Ins:  Therapist introduced self and welcomed new group members.  Therapist reviewed IOP group rules and guidelines.  Therapist continued facilitation of rapport building strategies between group members. Therapist asked that each patient check in with home life and recovery efforts and identify triggers, cravings, and high risk situations that arise between group sessions.  Group members discussed barriers and benefits of attending recovery meetings.  Therapist provided empathy and support during group session.  Therapist facilitated reading the daily motivation passages from Daily Reflections ([employer] 1991) and Just for Today (The 12 Steps and 12 Traditions, 1991) and invited group members to reflect and discuss.      Session Content/Coping Skills:  Therapist introduced Step One of the 12 Steps and facilitated discussion by inviting group members to share their personal experiences with denial while in active addiction.  Group members openly shared about their own justifications and rationalizations for using and areas of life where they feel/have felt powerless.  Group members explored unmanageability in specific areas of their lives more in depth, including legal, family and social relationships, finances, and spirituality due to their substance use behavior.  Group members openly discussed the concept of unmanageability.  The last hour of group was co-facilitated by a .    Response:  Yousuf was present for IOP group and was a positive participant.  He introduced himself to the new group member and shared about what led him to IOP group.  He reflected on the concept of powerlessness and being powerless over alcohol sharing how even though he was drinking up to a half pint of whiskey daily, he would not drink on  "Sundays which gave him a false sense of control.  The last 3 weeks before going to detox, his drinking had progressed to every day including Sundays.  He reflected on being in denial and telling himself he did not have a problem because he was able to not use on Sundays despite needing to use every other day of the week.  He shared more about having his first intense craving over the weekend and how his family responded.  He had left the house early and when his wife asked why he was honest with her and told her because he was having a craving and needed to get out.  She assumed that meant he went out and drank.  He reported feeling frustrated by this initially but then recognizing how he had been hiding his drinking from her the whole time and understanding why she would jump to that conclusion.  He discussed feeling frustrated that his wife doesn't understand that this is a normal part of recovery and considered having her come with him to a meeting to help her understand.  He was receptive to feedback from the group about expecting someone who does not have the \"baffling, cunning, and powerful diease\" to understand when the person in recovery sometimes doesn't understand either.  He was receptive to considering Al-Anon for his wife for her own benefit rather than going to AA with him for his own benefit.  He discussed going to 2 AA meetings last week and how helpful he found them to be and his plans to attend more moving forward.     ASSESSMENT:      Lab Review  Negative on 9/24/19, ethanol still pending     Mental Status Exam  Hygiene:  good  Dress: casual  Attitude: cooperative and agreeable   Motor Activity: appropriate  Eye Contact:  good  Speech: regular rate and rhythm   Mood:  calm and conversant  Affect:  Appropriate  Thought Processes:  Goal directed and Linear  Thought Content:  Normal  Suicidal Thoughts:  denies  Homicidal Thoughts:  denies  Crisis Safety Plan: yes, to come to the emergency " room.  Hallucinations:  denies  Reliability: fair  Insight: fair  Judgement: fair  Impulse Control: fair     Patient's Support Network Includes: The patient receives support from his wife, daughter and employer.     Progress toward goal: Not at goal     Level of Functioning/Impairment (ASAM):  I.    Intoxication/Withdrawal:  No problem = 0  II.   Medical Conditions/Complications: No problem = 0  III.  Behavioral/Emotional/Cognitive: Mild problem = 1 (anxiety)  IV.  Readiness to Change: Moderate problem = 2 (external and internal motivation for change, limited insight into relapse risk, ambivalence re: abstaining from all alcohol)  V.   Relapse Risk: Moderate problem = 2 (moderate to severe cravings, limited insight into relapse risk, poor coping skills, limited recovery experience)  VI:  Recovery Environment: Mild problem = 1 (family supportive, limited knowledge on the nature of addiction as a family disease, strained relationships)  Total ASAM Score = 6     Prognosis: Fair with Ongoing Treatment      Family issues related to recovery:  limited knowledge on the disease concept, limited family recovery experience     Recovery/spiritual support group attendance: yes, attended 2 last week     Sponsor:  no, searching     Identification of environmental and personal barriers to recovery: coping with limited and/or negative emotions, feelings of remorse and guilt, work, family, overall understanding of disease concept      Motivation for treatment:  improved mental and physical health, improving overall relationships, and long-term sobriety     Self-reported number of days sober:  16    Impression/Formulation:     Diagnosis Plan   1. Uncomplicated alcohol dependence (CMS/HCC)     2. Insomnia, unspecified type     3. Anxiety disorder, unspecified type         CLINICAL MANUVERING/INTERVENTIONS:  Therapist utilized a person-centered approach to build rapport with group member.  Therapist implemented motivational  interviewing techniques to assist client with exploring and resolving ambivalence associated with commitment to change behaviors related to substance use and addiction.  Therapist applied cognitive behavioral strategies to facilitate identification of maladaptive patterns of thinking and behavior that contribute to cleint's risk for continued substance use and relapse.  Therapist employed group interaction activities to build rapport among group members, promote sobriety, and emphasize relapse prevention.  Therapist promoted safe nonjudgmental environment by providing group members with unconditional positive regard and encouraging group members to comply with group rules and guidelines.  Therapist utilized dialectical behavior techniques to teach and model emotional regulation and relaxation.  Therapist assisted group member with identifying and implementing healthier coping strategies.  Group member was encouraged to attend community support group meetings, make positive daily choices, and maintain healthy boundaries.  Group member was encouraged to invite family members to family educational group on Wednesdays.      BASELINE SCORES 09/17/19  DASES: 82  URICA (Alcohol): 11.42 (Preparation/Action)  URICA (Drugs): 2.57 (Pre-contemplation)  PHQ-9: 10  HALI-7: 12     UPDATED SCORES n/a    PLAN:  Continue [facility] IOP Phase I   Aftercare:  [facility] Phase II  Program Assignments:  Personal recovery plan, relapse prevention plan, attendance of recovery support group meetings, exploration of sponsorship, drug/alcohol screens.      Please note that portions of this note were completed with a voice recognition program. Efforts were made to edit dictation, but occasionally words are mistranscribed.      This document signed by Jolynn Bolaños [facility], September 24, 2019, 12:29 PM

## 2019-09-25 ENCOUNTER — OFFICE VISIT (OUTPATIENT)
Dept: PSYCHIATRY | Facility: HOSPITAL | Age: 54
End: 2019-09-25

## 2019-09-25 DIAGNOSIS — F10.20 UNCOMPLICATED ALCOHOL DEPENDENCE (HCC): Primary | ICD-10-CM

## 2019-09-25 DIAGNOSIS — F41.9 ANXIETY DISORDER, UNSPECIFIED TYPE: ICD-10-CM

## 2019-09-25 LAB — ETHANOL UR-MCNC: NEGATIVE %

## 2019-09-25 NOTE — PROGRESS NOTES
"NAME: Yousuf Barrera  DATE: 09/25/2019    Phase I  9:00AM - 12:00PM    IOP GROUP NOTE    DATA:     3 hour IOP group therapy session (Check-ins, Coping Skills, Relapse Prevention)     Check Ins:  Therapist continued facilitation of rapport building strategies between group members. Therapist asked that each patient check in with home life and recovery efforts and identify triggers, cravings, and high risk situations that arise between group sessions.  Group members discussed barriers and benefits of attending recovery meetings.  Therapist provided empathy and support during group session.  Therapist facilitated reading the daily motivation passages from Daily Reflections ([employer] 1991) and Just for Today (The 12 Steps and 12 Traditions, 1991) and invited group members to reflect and discuss.      Session Content/Coping Skills:  Therapist facilitated discussion by inviting group members to brainstorm the benefits of support group meetings and sharing their stories with others in recovery.  Group members discussed challenges in early recovery and brainstormed ways to overcome these challenges and avoid using.  Group members discussed examples of their own denial when in active addiction and examples of what denial might look like in recovery and contribute to relapse if not checked.    Response:  Yousuf was present for IOP group and was a positive participant.  He was less conversant today but was attentive and did engage in the group discussion.  He reported he could relate to another group member regarding keeping his emotions and feelings to himself, particularly in the context of the fear, shame, and helplessness he felt when his daughter was struggling with her own mental health issues.  He reflected on a \"hiding behind a bottle\" and using alcohol to mask his feelings.  His daughter knows he was hospitalized for alcohol use and that he had been intoxicated the day he went to the hospital, but they have not spoken " about it or Yousuf's current recovery efforts since.  He stated he does not know if he will talk to her about it unless she comes to him and asks him directly.  He said his wife talk to her some while he was gone but they have not really talked about it together as a family and he is hesitant to initiate the conversation himself.  He reflected on when he had his first craving over the weekend and how he expected it to happen during the week after work since this is when he would typically drink.  He stated he realizes now that during the week he is busy with work and does not have as much downtime.  He is working on coming up with a plan for this coming weekend to help himself stay busy.  He also plans on taking naltrexone as an added protective factor against cravings.  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  He plans on attending his men's group at [facility] this evening and then going to an AA meeting after.     ASSESSMENT:      Lab Review  Negative on 9/24/19, ethanol still pending     Mental Status Exam  Hygiene:  good  Dress: casual  Attitude: cooperative and agreeable   Motor Activity: appropriate  Eye Contact:  good  Speech: regular rate and rhythm   Mood:  calm  Affect:  Appropriate  Thought Processes:  Goal directed and Linear  Thought Content:  Normal  Suicidal Thoughts:  denies  Homicidal Thoughts:  denies  Crisis Safety Plan: yes, to come to the emergency room.  Hallucinations:  denies  Reliability: fair  Insight: fair  Judgement: fair  Impulse Control: fair     Patient's Support Network Includes: The patient receives support from his wife, daughter and employer.     Progress toward goal: Not at goal     Level of Functioning/Impairment (ASAM):  I.    Intoxication/Withdrawal:  No problem = 0  II.   Medical  Conditions/Complications: No problem = 0  III.  Behavioral/Emotional/Cognitive: Mild problem = 1 (anxiety)  IV.  Readiness to Change: Moderate problem = 2 (external and internal motivation for change, limited insight into relapse risk, ambivalence re: abstaining from all alcohol)  V.   Relapse Risk: Moderate problem = 2 (moderate to severe cravings, limited insight into relapse risk, poor coping skills, limited recovery experience)  VI:  Recovery Environment: Mild problem = 1 (family supportive, limited knowledge on the nature of addiction as a family disease, strained relationships)  Total ASAM Score = 6     Prognosis: Fair with Ongoing Treatment      Family issues related to recovery:  limited knowledge on the disease concept, limited family recovery experience     Recovery/spiritual support group attendance: yes, attended 2 last week     Sponsor:  no, searching     Identification of environmental and personal barriers to recovery: coping with limited and/or negative emotions, feelings of remorse and guilt, work, family, overall understanding of disease concept      Motivation for treatment:  improved mental and physical health, improving overall relationships, and long-term sobriety     Self-reported number of days sober:  17    Impression/Formulation:     Diagnosis Plan   1. Uncomplicated alcohol dependence (CMS/HCC)     2. Anxiety disorder, unspecified type         CLINICAL MANUVERING/INTERVENTIONS:  Therapist utilized a person-centered approach to build rapport with group member.  Therapist implemented motivational interviewing techniques to assist client with exploring and resolving ambivalence associated with commitment to change behaviors related to substance use and addiction.  Therapist applied cognitive behavioral strategies to facilitate identification of maladaptive patterns of thinking and behavior that contribute to cleint's risk for continued substance use and relapse.  Therapist employed group  interaction activities to build rapport among group members, promote sobriety, and emphasize relapse prevention.  Therapist promoted safe nonjudgmental environment by providing group members with unconditional positive regard and encouraging group members to comply with group rules and guidelines.  Therapist utilized dialectical behavior techniques to teach and model emotional regulation and relaxation.  Therapist assisted group member with identifying and implementing healthier coping strategies.  Group member was encouraged to attend community support group meetings, make positive daily choices, and maintain healthy boundaries.  Group member was encouraged to invite family members to family educational group on Wednesdays.      BASELINE SCORES 09/17/19  DASES: 82  URICA (Alcohol): 11.42 (Preparation/Action)  URICA (Drugs): 2.57 (Pre-contemplation)  PHQ-9: 10  HALI-7: 12     UPDATED SCORES n/a    PLAN:  Continue [facility] IOP Phase I   Aftercare:  [facility] Phase II  Program Assignments:  Personal recovery plan, relapse prevention plan, attendance of recovery support group meetings, exploration of sponsorship, drug/alcohol screens.      Please note that portions of this note were completed with a voice recognition program. Efforts were made to edit dictation, but occasionally words are mistranscribed.      This document signed by Jolynn Bolaños [facility], September 25, 2019, 12:42 PM

## 2019-09-26 ENCOUNTER — OFFICE VISIT (OUTPATIENT)
Dept: PSYCHIATRY | Facility: HOSPITAL | Age: 54
End: 2019-09-26

## 2019-09-26 DIAGNOSIS — F41.9 ANXIETY DISORDER, UNSPECIFIED TYPE: ICD-10-CM

## 2019-09-26 DIAGNOSIS — F10.20 UNCOMPLICATED ALCOHOL DEPENDENCE (HCC): Primary | ICD-10-CM

## 2019-09-26 NOTE — PROGRESS NOTES
"NAME: Mohsen Barrera  DATE: 09/26/2019    Phase I  9:00AM - 12:00PM    IOP GROUP NOTE    DATA:     3 hour IOP group therapy session (Check-ins, Coping Skills, Relapse Prevention)     Check Ins:  Therapist continued facilitation of rapport building strategies between group members. Therapist asked that each patient check in with home life and recovery efforts and identify triggers, cravings, and high risk situations that arise between group sessions.  Group members discussed barriers and benefits of attending recovery meetings.  Therapist provided empathy and support during group session.  Therapist facilitated reading the daily motivation passages from Daily Reflections ([employer] 1991) and Just for Today (The 12 Steps and 12 Traditions, 1991) and invited group members to reflect and discuss.      Session Content/Coping Skills:  Therapist utilized an article on self-acceptance to facilitate discussion.   Group members reflected and discussed self-acceptance versus condoning.  Group members reflected on the article and discussed their own struggles and successes with accepting where they are in their recovery, where they would like to see themselves, and what actions they can take to get there.    Response:  Mohsen was present for IOP group and was a positive participant.  He reported going to an AA meeting last night and how the meeting was \"more intense\" than the previous one he attended because of the number of people and content of the meeting.  He ran into someone he previously met through Buddhism but this person didn't recognize him.  When mohsen talked to the person, the person seemed like they might have been impaired or recovering from recent substance use.  Mohsen reflected on feeling neutral in that moment and thinking about how the AA community has been so accepting of him as well as others regardless of where they are in their addiction and/or recovery.  He recommended the meetings he has been attending and " "shared about how they have been helpful for him.  He discussed accepting himself as a person recovering from alcoholism and where is at in his recovery today.  He reported changing his \"start\" date of sobriety to the day after he entered detox since he had been intoxicated the day he went in.  He considers that day the beginning of his recovery but not technically his first day sober.  He discussed not wanting to bring up his progress with his family (wife and daughter) and waiting for them to bring it up.  He reflected on believing his wife still has a lot of resentment over his secrecy when he was using.  He is unsure if he is afraid to bring up his substance use because it is uncomfortable for him or if because it is uncomfortable for his family or both.     ASSESSMENT:      Lab Review  Negative on 9/24/19     Mental Status Exam  Hygiene:  good  Dress: casual  Attitude: cooperative and agreeable   Motor Activity: appropriate  Eye Contact:  good  Speech: regular rate and rhythm   Mood:  calm  Affect:  Appropriate  Thought Processes:  Goal directed and Linear  Thought Content:  Normal  Suicidal Thoughts:  denies  Homicidal Thoughts:  denies  Crisis Safety Plan: yes, to come to the emergency room.  Hallucinations:  denies  Reliability: fair  Insight: fair  Judgement: fair  Impulse Control: fair     Patient's Support Network Includes: The patient receives support from his wife, daughter and employer.     Progress toward goal: Not at goal     Level of Functioning/Impairment (ASAM):  I.    Intoxication/Withdrawal:  No problem = 0  II.   Medical Conditions/Complications: No problem = 0  III.  Behavioral/Emotional/Cognitive: Mild problem = 1 (anxiety)  IV.  Readiness to Change: Moderate problem = 2 (external and internal motivation for change, limited insight into relapse risk, ambivalence re: abstaining from all alcohol)  V.   Relapse Risk: Moderate problem = 2 (moderate to severe cravings, limited insight into relapse " risk, poor coping skills, limited recovery experience)  VI:  Recovery Environment: Mild problem = 1 (family supportive, limited knowledge on the nature of addiction as a family disease, strained relationships)  Total ASAM Score = 6     Prognosis: Fair with Ongoing Treatment      Family issues related to recovery:  limited knowledge on the disease concept, limited family recovery experience     Recovery/spiritual support group attendance: yes, attended 2 last week     Sponsor:  no, searching     Identification of environmental and personal barriers to recovery: coping with limited and/or negative emotions, feelings of remorse and guilt, work, family, overall understanding of disease concept      Motivation for treatment:  improved mental and physical health, improving overall relationships, and long-term sobriety     Self-reported number of days sober:  17    Impression/Formulation:     Diagnosis Plan   1. Uncomplicated alcohol dependence (CMS/HCC)     2. Anxiety disorder, unspecified type         CLINICAL MANUVERING/INTERVENTIONS:  Therapist utilized a person-centered approach to build rapport with group member.  Therapist implemented motivational interviewing techniques to assist client with exploring and resolving ambivalence associated with commitment to change behaviors related to substance use and addiction.  Therapist applied cognitive behavioral strategies to facilitate identification of maladaptive patterns of thinking and behavior that contribute to cleint's risk for continued substance use and relapse.  Therapist employed group interaction activities to build rapport among group members, promote sobriety, and emphasize relapse prevention.  Therapist promoted safe nonjudgmental environment by providing group members with unconditional positive regard and encouraging group members to comply with group rules and guidelines.  Therapist utilized dialectical behavior techniques to teach and model emotional  regulation and relaxation.  Therapist assisted group member with identifying and implementing healthier coping strategies.  Group member was encouraged to attend community support group meetings, make positive daily choices, and maintain healthy boundaries.  Group member was encouraged to invite family members to family educational group on Wednesdays.      BASELINE SCORES 09/17/19  DASES: 82  URICA (Alcohol): 11.42 (Preparation/Action)  URICA (Drugs): 2.57 (Pre-contemplation)  PHQ-9: 10  HALI-7: 12     UPDATED SCORES n/a    PLAN:  Continue [facility] IOP Phase I   Aftercare:  [facility] Phase II  Program Assignments:  Personal recovery plan, relapse prevention plan, attendance of recovery support group meetings, exploration of sponsorship, drug/alcohol screens.      Please note that portions of this note were completed with a voice recognition program. Efforts were made to edit dictation, but occasionally words are mistranscribed.      This document signed by Jolynn Bolaños [facility], September 26, 2019, 12:31 PM

## 2019-09-30 ENCOUNTER — OFFICE VISIT (OUTPATIENT)
Dept: PSYCHIATRY | Facility: HOSPITAL | Age: 54
End: 2019-09-30

## 2019-09-30 DIAGNOSIS — F10.20 UNCOMPLICATED ALCOHOL DEPENDENCE (HCC): Primary | ICD-10-CM

## 2019-09-30 DIAGNOSIS — F41.9 ANXIETY DISORDER, UNSPECIFIED TYPE: ICD-10-CM

## 2019-09-30 NOTE — PROGRESS NOTES
NAME: Yousuf Barrera  DATE: 09/30/2019    Phase I  9:00AM - 12:00PM    IOP GROUP NOTE    DATA:     3 hour IOP group therapy session (Check-ins, Coping Skills, Relapse Prevention)     Check Ins:  Therapist continued facilitation of rapport building strategies between group members. Therapist asked that each patient check in with home life and recovery efforts and identify triggers, cravings, and high risk situations that arise between group sessions.  Group members discussed barriers and benefits of attending recovery meetings.  Therapist provided empathy and support during group session.  Therapist facilitated reading the daily motivation passages from Daily Reflections ([employer] 1991) and Just for Today (The 12 Steps and 12 Traditions, 1991) and invited group members to reflect and discuss.      Session Content/Coping Skills:  Therapist introduced the developmental process of recovery: the pink cloud or honeymoon, reality, decisions, I can do it myself, and emotional recovery (Jared, Brandy, and Alejandro) and common challenges in early recovery.  Therapist facilitated discussion throughout by inviting group members to relate the material to their own experiences in recovery.  Group members identified high-risk situations that may pose a risk to recovery and discussed new approaches for avoiding and handling those situations.    Response:  Yousuf was present for IOP group and was a positive participant.  He discussed having another strong craving over the weekend Saturday morning.  He reported having taken Naltrexone a few hours prior and did not think it was very effective.  He reported keeping himself busy around the house until it was time for him to go to an AA meeting.  He spoke about it at the meeting and was given positive feedback from others afterwards about how what he spoke on had been helpful to them.  He also reported having a lot of cravings for sweets since he has been sober.  He identified himself in the  "honeymoon stage of recovery and discussed the toll his secrecy while in active addiction has taken on his relationship with his wife.  He and another group member related on how when they got out of detox they thought they were \"cured\" and how ongoing therapy and psychoeducation has been helpful during times where they have been craving or thinking about using.  He also discussed how helpful going to AA has been and what he has been learning from that.     ASSESSMENT:      Lab Review  Negative on 9/24/19     Mental Status Exam  Hygiene:  good  Dress: casual  Attitude: cooperative and agreeable   Motor Activity: appropriate  Eye Contact:  good  Speech: regular rate and rhythm   Mood:  calm  Affect:  Appropriate  Thought Processes:  Goal directed and Linear  Thought Content:  Normal  Suicidal Thoughts:  denies  Homicidal Thoughts:  denies  Crisis Safety Plan: yes, to come to the emergency room.  Hallucinations:  denies  Reliability: fair  Insight: fair  Judgement: fair  Impulse Control: fair     Patient's Support Network Includes: The patient receives support from his wife, daughter and employer.     Progress toward goal: Not at goal     Level of Functioning/Impairment (ASAM):  I.    Intoxication/Withdrawal:  No problem = 0  II.   Medical Conditions/Complications: No problem = 0  III.  Behavioral/Emotional/Cognitive: Mild problem = 1 (anxiety)  IV.  Readiness to Change: Moderate problem = 2 (external and internal motivation for change, limited insight into relapse risk, ambivalence re: abstaining from all alcohol)  V.   Relapse Risk: Moderate problem = 2 (moderate to severe cravings, limited insight into relapse risk, poor coping skills, limited recovery experience)  VI:  Recovery Environment: Mild problem = 1 (family supportive, limited knowledge on the nature of addiction as a family disease, strained relationships)  Total ASAM Score = 6     Prognosis: Fair with Ongoing Treatment      Family issues related to " recovery:  limited knowledge on the disease concept, limited family recovery experience     Recovery/spiritual support group attendance: yes, 2-3 per week     Sponsor:  nopamela     Identification of environmental and personal barriers to recovery: coping with limited and/or negative emotions, feelings of remorse and guilt, work, family, overall understanding of disease concept      Motivation for treatment:  improved mental and physical health, improving overall relationships, and long-term sobriety     Self-reported number of days sober:  21    Impression/Formulation:     Diagnosis Plan   1. Uncomplicated alcohol dependence (CMS/HCC)     2. Anxiety disorder, unspecified type         CLINICAL MANUVERING/INTERVENTIONS:  Therapist utilized a person-centered approach to build rapport with group member.  Therapist implemented motivational interviewing techniques to assist client with exploring and resolving ambivalence associated with commitment to change behaviors related to substance use and addiction.  Therapist applied cognitive behavioral strategies to facilitate identification of maladaptive patterns of thinking and behavior that contribute to cleint's risk for continued substance use and relapse.  Therapist employed group interaction activities to build rapport among group members, promote sobriety, and emphasize relapse prevention.  Therapist promoted safe nonjudgmental environment by providing group members with unconditional positive regard and encouraging group members to comply with group rules and guidelines.  Therapist utilized dialectical behavior techniques to teach and model emotional regulation and relaxation.  Therapist assisted group member with identifying and implementing healthier coping strategies.  Group member was encouraged to attend community support group meetings, make positive daily choices, and maintain healthy boundaries.  Group member was encouraged to invite family members to  family educational group on Wednesdays.      BASELINE SCORES 09/17/19  DASES: 82  URICA (Alcohol): 11.42 (Preparation/Action)  URICA (Drugs): 2.57 (Pre-contemplation)  PHQ-9: 10  HALI-7: 12     UPDATED SCORES n/a    PLAN:  Continue [facility] IOP Phase I   Aftercare:  [facility] Phase II  Program Assignments:  Personal recovery plan, relapse prevention plan, attendance of recovery support group meetings, exploration of sponsorship, drug/alcohol screens.      Please note that portions of this note were completed with a voice recognition program. Efforts were made to edit dictation, but occasionally words are mistranscribed.      This document signed by Jolynn Bolaños [facility], September 30, 2019, 3:10 PM

## 2019-10-01 ENCOUNTER — OFFICE VISIT (OUTPATIENT)
Dept: PSYCHIATRY | Facility: HOSPITAL | Age: 54
End: 2019-10-01

## 2019-10-01 DIAGNOSIS — F10.20 UNCOMPLICATED ALCOHOL DEPENDENCE (HCC): Primary | ICD-10-CM

## 2019-10-01 DIAGNOSIS — F41.9 ANXIETY DISORDER, UNSPECIFIED TYPE: ICD-10-CM

## 2019-10-01 PROCEDURE — H0015 ALCOHOL AND/OR DRUG SERVICES: HCPCS | Performed by: SOCIAL WORKER

## 2019-10-01 PROCEDURE — 99213 OFFICE O/P EST LOW 20 MIN: CPT | Performed by: NURSE PRACTITIONER

## 2019-10-01 NOTE — PROGRESS NOTES
".NAME: Yousuf Barrera  DATE: 10/01/2019    Phase I  9:00AM - 12:00PM    IOP GROUP NOTE    DATA:     3 hour IOP group therapy session (Check-ins, Coping Skills, Relapse Prevention)     Check Ins:  Therapist introduced self to group and continued facilitation of rapport building strategies between group members. Therapist asked that each patient check in with home life and recovery efforts and identify triggers, cravings, and high risk situations that arise between group sessions.  Group members discussed barriers and benefits of attending recovery meetings.  Therapist provided empathy and support during group session.  Therapist encouraged group members to reflect on their individual stories in addiction and share what led them to seek treatment.    Session Content/Coping Skills:  Therapist reviewed Stages of Change in Recovery and engaged group members to identify, define, and discuss Stages of Change as applicable to addiction recovery.  Therapist engaged group members to identify individual examples of each stage of change in their own individual recovery efforts, and apply this to recovery in general.  Therapist facilitated discussion stemming from previous group session of the developmental processes of recovery.  Therapist facilitated group discussion for group members to identify specific internal and external motivators that help move us through various stages of change in recovery.    Response:  Yousuf was present and timely for IOP group and was an engaged participant.  Yousuf discussed having cravings over the weekend, and reflected on how during the week he finds \"staying busy\" or \"keeping distracted\" often \"leaves less room for cravings.\"  Yousuf reflected on how distractions can be healthy, but having too unscheduled free time, particularly on the weekends, is a trigger for him.  He discussed that his wife is off work this Saturday morning, and he is \"interested to see how that will have an effect on my cravings " "or not.\"  He spoke about being on \"the [place]\" yet also recognizing that he \"has a long way to go within the journey of recovery.\"   Yousuf discussed how he feels as though he is just in the beginning of his recovery journey, yet also demonstrated signs of the \"maintenance stage.\"  We redirected to the importance of taking things one day at a time.  He shared that he \"hid behind the bottle\" for so long.  He discussed some interpersonal relationship struggles with his wife, and voiced feeling somewhat misunderstood by her in regards to his addiction.  We discussed how family education and support within addiction can be very helpful, yet also explored Yousuf developing his own support network specifically through AA and the recovery community.  Yousuf reports feeling anxious about the 12 Steps and reported to be somewhat fearful of immersing himself fully.  We processed how Yousuf feels \"like I need to know all about the steps and what is expected before I go into it and commit.\"  Yousuf was able to acknowledge the benefit of starting at [facility] and taking it one day at a time.    ASSESSMENT:     Lab Review  Negative on 9/24/19    Mental Status Exam  Hygiene:  good  Dress: casual  Attitude: cooperative and agreeable   Motor Activity: appropriate  Eye Contact:  good  Speech: regular rate and rhythm   Mood:  calm and conversant  Affect:  Appropriate  Thought Processes:  Linear  Thought Content:  Normal  Suicidal Thoughts:  denies  Homicidal Thoughts:  denies  Crisis Safety Plan: yes, to come to the emergency room.  Hallucinations:  denies  Reliability: fair  Insight: fair  Judgement: fair  Impulse Control: fair    Patient's Support Network Includes: The patient receives support from his wife, daughter, and employer.  Patient is exploring AA Meetings and wants to establish a home group.    Progress toward goal: Not at goal    Level of Functioning/Impairment (ASAM):  I.    Intoxication/Withdrawal:  No problem = 0  II.   " "Medical Conditions/Complications: No problem = 0  III.  Behavioral/Emotional/Cognitive: Mild problem = 1 (anxiety, anticipatory anxious)  IV.  Readiness to Change: Moderate problem = 2 (external and internal motivation for change, limited insight into relapse risk, ambivalence re: abstaining from all alcohol)  V.   Relapse Risk: Moderate problem = 2 (moderate to severe cravings, limited insight into relapse risk, poor coping skills, limited recovery experience)  VI:  Recovery Environment: Mild problem = 1 (family supportive, limited knowledge on the nature of addiction as a family disease, strained relationships, not currently established in recovery community (no home group or sponsor))  Total ASAM Score = 6     Prognosis: Fair with Ongoing Treatment     Family issues related to recovery: limited knowledge on the disease concept, limited family recovery experience.  Patient voices perceiving his wife as resentful and disappointed in him at times - highly suggest family psycho education.    Recovery/spiritual support group attendance: yes, 2-3 per week    Sponsor:  no, searching    Identification of environmental and personal barriers to recovery: coping with limited and/or negative emotions, feelings of remorse and guilt, work, family, overall understanding of disease concept     Motivation for treatment: Improved mental and physical health, improving overall relationships with emphasis on being a \"\" and \"father\", and long-term sobriety    Self-reported number of days sober:  22    Impression/Formulation:    1. Uncomplicated alcohol dependence (CMS/HCC)      2. Anxiety disorder, unspecified type         CLINICAL MANUVERING/INTERVENTIONS:  Therapist utilized a person-centered approach to build rapport with group member.  Therapist implemented motivational interviewing techniques to assist client with exploring and resolving ambivalence associated with commitment to change behaviors related to substance use " and addiction.  Therapist applied cognitive behavioral strategies to facilitate identification of maladaptive patterns of thinking and behavior that contribute to cleint's risk for continued substance use and relapse.  Therapist employed group interaction activities to build rapport among group members, promote sobriety, and emphasize relapse prevention.  Therapist promoted safe nonjudgmental environment by providing group members with unconditional positive regard and encouraging group members to comply with group rules and guidelines.  Therapist utilized dialectical behavior techniques to teach and model emotional regulation and relaxation.  Therapist assisted group member with identifying and implementing healthier coping strategies.  Group member was encouraged to attend community support group meetings, make positive daily choices, and maintain healthy boundaries.  Group member was encouraged to invite family members to family educational group on Wednesdays.      BASELINE SCORES 10/01/19  DASES: 82  URICA (Alcohol): 11.42 (Preparation/Action)  URICA (Drugs): 2.57 (Pre-contemplation)  PHQ-9: 10  HALI-7: 12    UPDATED SCORES:  N/A    PLAN:  Continue [facility] IOP Phase I   Aftercare:  [facility] Phase II  Program Assignments:  Personal recovery plan, relapse prevention plan, attendance of recovery support group meetings, exploration of sponsorship, drug/alcohol screens.      Please note that portions of this note were completed with a voice recognition program. Efforts were made to edit dictation, but occasionally words are mistranscribed.      This document signed by Chelsea Salazar [facility], October 1, 2019, 12:38 PM

## 2019-10-02 ENCOUNTER — OFFICE VISIT (OUTPATIENT)
Dept: PSYCHIATRY | Facility: HOSPITAL | Age: 54
End: 2019-10-02

## 2019-10-02 DIAGNOSIS — F10.20 UNCOMPLICATED ALCOHOL DEPENDENCE (HCC): Primary | ICD-10-CM

## 2019-10-02 DIAGNOSIS — F41.9 ANXIETY DISORDER, UNSPECIFIED TYPE: ICD-10-CM

## 2019-10-02 PROCEDURE — H0015 ALCOHOL AND/OR DRUG SERVICES: HCPCS | Performed by: SOCIAL WORKER

## 2019-10-07 ENCOUNTER — OFFICE VISIT (OUTPATIENT)
Dept: PSYCHIATRY | Facility: HOSPITAL | Age: 54
End: 2019-10-07

## 2019-10-07 DIAGNOSIS — F10.20 UNCOMPLICATED ALCOHOL DEPENDENCE (HCC): Primary | ICD-10-CM

## 2019-10-07 DIAGNOSIS — F41.9 ANXIETY DISORDER, UNSPECIFIED TYPE: ICD-10-CM

## 2019-10-07 NOTE — TREATMENT PLAN
[facility]    Intensive Outpatient Program for Chemical Dependence (CD IOP)      Treatment Plan Reassessment (biweekly)       Long Term Goal:  Yousuf will establish a sustained recovery and will maintain total abstinence from mind-altering substances other than what is prescribed and/or approved by the attending physician. He will learn, practice, and utilize behavioral and cognitive coping skills to help maintain sobriety.    Patient Care Needs:  Yousuf presents with severe alcohol dependence an co-occurring unspecified anxiety disorder and is at high risk for relapse without continued treatment.  He has a history of using drugs/alcohol until intoxicated or passed out.  He has been unable to stop or cut down use of alcohol/drugs once starting despite verbalized desire to do so and the negative consequences from continued use.  Yousuf's use has negatively impacted social, occupational, and/or physical functioning.     1.  Patient will participate in a medical evaluation to assess the effects of chemical dependence and will cooperate with an evaluation by the attending psychiatrist or psychiatric nurse practitioner for psychotropic medication if appropriate.    2.  Patient will adhere to the [facility] group rules.    3.  Patient will attend group sessions as scheduled. Patient will notify therapist and support staff of any absences or tardies. Patient will provide a valid and verifiable excuse for absences to be considered excused.    4.  Patient will participate in random drug and alcohol screenings and will exhibit negative results on said screenings.    5.  Patient will identify high risk situations, people, and places to avoid or manage in order to maintain sobriety.    6.  Patient will participate in group discussions by giving and receiving feedback appropriately.    7.  Patient will develop a positive network of support by attending a minimum of three recovery/spiritual support  groups each week (two outside of IOP group) and by obtaining or maintaining a sponsor or sober support person.    8.  Patient will identify, practice, and implement at least 5 healthy coping strategies to manage difficult emotions while remaining abstinent.    9.  Patient will develop a relapse prevention plan and share it with the group.    10.  Patient will write a personal recovery plan and share it with the group prior to discharge.    11.  Patient will encourage family participation in weekly family education groups, if appropriate.    12.  Patient will exhibit increased internal and external motivation to change as assessed by his/her cooperation, behavior, and URICA score.    13. Patient will show a decrease in score on the PHQ-9 depression scale indicating improvement in frequency of depressive symptoms.    14. Patient will demonstrate a decrease in score on the HALI-7 questionnaire indicating improvement in frequency of anxiety symptoms.  1. Patient participated in an evaluation for medication management with MELVIN Eaton.  Patient is meeting with medical provider as scheduled for medication management and follow-up.    2.  Patient read and signed IOP Group Rules. Patient is adhering to group rules.    3.  Patient has 0 unexcused absences.    4.  Patient has displayed negative results on urine drug and alcohol screenings thus far.    5.  Patient has encountered the following high-risk situations since beginning treatment: strong cravings, family with limited knowledge of addiction as a family disease, and work stress.     6.  Patient provides and receives feedback daily. When not actively participating, patient is attentive and appropriate.    7.  Patient attends recovery/spiritual support group meetings at least 2 times per week. Patient does not have a sponsor/sober support person and is not working the 12 steps.    8.  Patient has identified and implemented the following coping strategies during  high risk situations: riding it out, going to a meeting, and staying busy.    9.  Patient has identified high-risk people, places, and things as well as healthy coping strategies for avoiding relapse.    10.  Patient is developing a personal recovery plan.    11.  No family participation.    12.  Patient reports weekly cravings that are severe.  he is currently taking Naltrexone. Patient's URICA score and behavior indicate he is in the preparation/action stage of change.    13.  Current score on PHQ-9 is 2. Score has decreased.     14.  Current score on HALI-7 is 1. Score has   decreased. 1.  Partially completed.  Patient participated initial evaluation for medication management with MELVIN Eaton.  Patient will continue participating in as scheduled medication management and follow-up appointments with psychiatric medical provider.    2.  Patient is making progress toward goal and will continue working toward objective.     3.  Patient will continue working toward objective.     4.  Patient will continue working toward objective.    5.  Partially completed. Patient will continue working toward objective.    6.  Partially completed. Patient will continue working toward objective.    7.  Patient is making progress toward goal.     8.  Partially completed. Patient will continue working toward objective.      9.  Partially completed. Patient will continue working toward objective.     10.  Partially completed. Patient will continue working toward objective.     11.  Patient will continue to encouraged family participation.    12.  Partially completed.  Patient will continue implementing behavioral changes to promote long-term sobriety and recovery.    13.  Patient's PHQ-9 score indicates a decrease in frequency of depressive symptoms.    14.  Patient's HALI-7 score indicates a decrease in frequency of anxiety symptoms.      Next Target Date: 10/15/19    Team Members:  Patient - Yousuf Barrera  Medical Provider -  Rosanne John, MELVIN  [facility] Licensed Therapist - Jolynn Bolaños, [facility]  IOP Director - Omer George, [facility]  Support Staff         I have discussed and reviewed this treatment plan with the patient.  It has been printed for signatures.

## 2019-10-07 NOTE — PROGRESS NOTES
"NAME: Yousuf Barrera  DATE: 10/07/2019    Phase I  9:00AM - 12:00PM    IOP GROUP NOTE    DATA:     3 hour IOP group therapy session (Check-ins, Coping Skills, Relapse Prevention)     Check Ins:  Therapist continued facilitation of rapport building strategies between group members. Therapist asked that each patient check in with home life and recovery efforts and identify triggers, cravings, and high risk situations that arise between group sessions.  Group members discussed barriers and benefits of attending recovery meetings.  Therapist provided empathy and support during group session.  Therapist facilitated reading the daily motivation passages from Daily Reflections ([employer] 1991) and Just for Today (The 12 Steps and 12 Traditions, 1991) and invited group members to reflect and discuss.      Session Content/Coping Skills:  Therapist introduced the film “My Name is Campos JACKSON” about the  of Alcoholics Anonymous and his journey through active addiction, the then new concept of addiction as a disease, and community support assisting with one’s recovery.  Therapist facilitated discussion by inviting group members to reflect on various themes of the film thus far and their own journeys through addiction and recovery.    Response:  Yousuf was present for IOP group and was a positive participant.  Yousuf discussed what he learned last week with different therapists covering IOP group.  He reflected on discussion regarding effective communication and was able to connect healthy communication to setting healthy boundaries.  He reported feeling \"in a funk\" today and for much of the weekend and wondered if it was part of recovery, due to the change in weather, or both.  He reported going out to dinner with his wife and in-laws of theirs and having strong cravings for alcohol jail through the dinner after a  walked by with lavern.  He discussed how he had not reacted so strongly to the smell or sight of beer but " when he smelled the bourbon he had immediate strong cravings.  He reflected on communication with his wife and feeling like he can't be open with her about his struggles because she doesn't understand and can come across what sounds like as more judgmental than supportive.  He ended up getting through the craving by distracting himself in conversation with his father-in-law and trying to focus on different things on the televisions in the restaurant.  He reflected on how at this particular restaurant, he would often times stop at the bar and order a double shot of bourbon on his way to or from the bathroom without anyone in his party knowing.  Yousuf also reflected on the film thus far, commenting on how severe the main character's alcoholism was and how if Yousuf himself had continued to drink the way he had been he would likely be dead.     ASSESSMENT:      Lab Review  Negative on 9/24/19     Mental Status Exam  Hygiene:  good  Dress: casual  Attitude: cooperative and agreeable   Motor Activity: appropriate  Eye Contact:  good  Speech: regular rate and rhythm   Mood:  calm and conversant  Affect:  Appropriate  Thought Processes:  Linear  Thought Content:  Normal  Suicidal Thoughts:  denies  Homicidal Thoughts:  denies  Crisis Safety Plan: yes, to come to the emergency room.  Hallucinations:  denies  Reliability: fair  Insight: fair  Judgement: fair  Impulse Control: fair     Patient's Support Network Includes: The patient receives support from his wife, daughter, and employer.  Patient is exploring AA Meetings and wants to establish a home group.     Progress toward goal: Not at goal     Level of Functioning/Impairment (ASAM):  I.    Intoxication/Withdrawal:  No problem = 0  II.   Medical Conditions/Complications: No problem = 0  III.  Behavioral/Emotional/Cognitive: Mild problem = 1 (anxiety, anticipatory anxiety)  IV.  Readiness to Change: Moderate problem = 2 (external and internal motivation for change, limited  insight into relapse risk, ambivalence re: abstaining from all alcohol)  V.   Relapse Risk: Moderate problem = 2 (moderate to severe cravings, limited insight into relapse risk, poor coping skills, limited recovery experience)  VI:  Recovery Environment: Mild problem = 1 (family supportive, limited knowledge on the nature of addiction as a family disease, strained relationships, not currently established in recovery community (no home group or sponsor))  Total ASAM Score = 6     Prognosis: Fair with Ongoing Treatment      Family issues related to recovery: limited knowledge on the disease concept, limited family recovery experience.  Patient voices perceiving his wife as resentful and disappointed in him at times - highly suggest family psycho education.     Recovery/spiritual support group attendance: yes, 2-3 per week     Sponsor:  no, searching     Identification of environmental and personal barriers to recovery: coping with limited and/or negative emotions, feelings of remorse and guilt, work, family, overall understanding of disease concept      Motivation for treatment: Improved mental and physical health, improving overall relationships with emphasis on being a  and father, and long-term sobriety     Self-reported number of days sober:  28    Impression/Formulation:     Diagnosis Plan   1. Uncomplicated alcohol dependence (CMS/HCC)     2. Anxiety disorder, unspecified type         CLINICAL MANUVERING/INTERVENTIONS:  Therapist utilized a person-centered approach to build rapport with group member.  Therapist implemented motivational interviewing techniques to assist client with exploring and resolving ambivalence associated with commitment to change behaviors related to substance use and addiction.  Therapist applied cognitive behavioral strategies to facilitate identification of maladaptive patterns of thinking and behavior that contribute to cleint's risk for continued substance use and relapse.   Therapist employed group interaction activities to build rapport among group members, promote sobriety, and emphasize relapse prevention.  Therapist promoted safe nonjudgmental environment by providing group members with unconditional positive regard and encouraging group members to comply with group rules and guidelines.  Therapist utilized dialectical behavior techniques to teach and model emotional regulation and relaxation.  Therapist assisted group member with identifying and implementing healthier coping strategies.  Group member was encouraged to attend community support group meetings, make positive daily choices, and maintain healthy boundaries.  Group member was encouraged to invite family members to family educational group on Wednesdays.      BASELINE SCORES 09/17/19  DASES: 82  URICA (Alcohol): 11.42 (Preparation/Action)  URICA (Drugs): 2.57 (Pre-contemplation)  PHQ-9: 10  HALI-7: 12    UPDATED SCORES:  10/1/19  PHQ-9: 2  HALI-7: 1       PLAN:  Continue [facility] IOP Phase I   Aftercare:  [facility] Phase II  Program Assignments:  Personal recovery plan, relapse prevention plan, attendance of recovery support group meetings, exploration of sponsorship, drug/alcohol screens.      Please note that portions of this note were completed with a voice recognition program. Efforts were made to edit dictation, but occasionally words are mistranscribed.      This document signed by Jolynn Bolaños [facility], October 7, 2019, 12:11 PM

## 2019-10-08 ENCOUNTER — OFFICE VISIT (OUTPATIENT)
Dept: PSYCHIATRY | Facility: HOSPITAL | Age: 54
End: 2019-10-08

## 2019-10-08 ENCOUNTER — LAB (OUTPATIENT)
Dept: LAB | Facility: HOSPITAL | Age: 54
End: 2019-10-08

## 2019-10-08 DIAGNOSIS — F10.20 UNCOMPLICATED ALCOHOL DEPENDENCE (HCC): ICD-10-CM

## 2019-10-08 DIAGNOSIS — F41.9 ANXIETY DISORDER, UNSPECIFIED TYPE: ICD-10-CM

## 2019-10-08 DIAGNOSIS — F33.2 MAJOR DEPRESSIVE DISORDER, RECURRENT, SEVERE WITHOUT PSYCHOTIC FEATURES (HCC): ICD-10-CM

## 2019-10-08 DIAGNOSIS — F10.20 UNCOMPLICATED ALCOHOL DEPENDENCE (HCC): Primary | ICD-10-CM

## 2019-10-08 PROCEDURE — 80307 DRUG TEST PRSMV CHEM ANLYZR: CPT

## 2019-10-08 PROCEDURE — 80306 DRUG TEST PRSMV INSTRMNT: CPT

## 2019-10-08 NOTE — PROGRESS NOTES
NAME: Yousuf Barrera  DATE: 10/08/2019    Phase I  9:00AM - 12:00PM    IOP GROUP NOTE    DATA:     3 hour IOP group therapy session (Check-ins, Coping Skills, Relapse Prevention)     Check Ins:  Therapist continued facilitation of rapport building strategies between group members. Therapist asked that each patient check in with home life and recovery efforts and identify triggers, cravings, and high risk situations that arise between group sessions.  Group members discussed barriers and benefits of attending recovery meetings.  Therapist provided empathy and support during group session.  Therapist facilitated reading the daily motivation passages from Daily Reflections ([employer] 1991) and Just for Today (The 12 Steps and 12 Traditions, 1991) and invited group members to reflect and discuss.      Session Content/Coping Skills:  Therapist facilitated discussion by inviting group members to review the film thus far.  Therapist facilitated viewing of the remainder of the film “My Name is Campos BONILLA.”  Group members identified and discussed signs of denial, such as minimization, justification, lying, and rationalization from the film and their own experiences.  Therapist utilized a handout from [employer] to assist group members with learning about and identifying high risk situations for relapse as well as coping skills for each situation.       Response:  Yousuf was present for IOP group and was a positive participant.  He reported being in a better mood today and discussed the daily reflection regarding making and sticking to changes in lifestyle and behavior in order to promote long-term sobriety and keep from from getting complacent.  He did not make it to an AA meeting last night and reflected on how he can tell a difference in his mood when he goes several days without attending.  He plans on going tonight and discussed more about his desire to obtain a sponsor and what he is looking for in a sponsor.  He identified  "negative emotions as one of his personal high risk situations and gave the example of fear relating to his daughter's struggle with mental issues.  He also identified enhancement of positive emotions as a recent trigger and shared about having thoughts about using while watching a TV show with his wife that depicts several scenes of people drinking and partying.  He reflected on \"making up something\" to allow him to get up and remove himself from the particular triggering scene of the show.  He discussed his wife's efforts to work on her own resentments regarding his drinking and secrecy by going to therapy and reflected on the experience of the co-dependent in the film.     ASSESSMENT:      Lab Review  Negative on 9/24/19  Negative on 10/8/19     Mental Status Exam  Hygiene:  good  Dress: casual  Attitude: cooperative and agreeable   Motor Activity: appropriate  Eye Contact:  good  Speech: regular rate and rhythm   Mood:  calm and conversant  Affect:  Appropriate  Thought Processes:  Linear  Thought Content:  Normal  Suicidal Thoughts:  denies  Homicidal Thoughts:  denies  Crisis Safety Plan: yes, to come to the emergency room.  Hallucinations:  denies  Reliability: fair  Insight: fair  Judgement: fair  Impulse Control: fair     Patient's Support Network Includes: The patient receives support from his wife, daughter, and employer.  Patient is exploring AA Meetings and wants to establish a home group.     Progress toward goal: Not at goal     Level of Functioning/Impairment (ASAM):  I.    Intoxication/Withdrawal:  No problem = 0  II.   Medical Conditions/Complications: No problem = 0  III.  Behavioral/Emotional/Cognitive: Mild problem = 1 (anxiety, anticipatory anxiety)  IV.  Readiness to Change: Moderate problem = 2 (external and internal motivation for change, limited insight into relapse risk, ambivalence re: abstaining from all alcohol)  V.   Relapse Risk: Moderate problem = 2 (moderate to severe cravings, " limited insight into relapse risk, poor coping skills, limited recovery experience)  VI:  Recovery Environment: Mild problem = 1 (family supportive, limited knowledge on the nature of addiction as a family disease, strained relationships, not currently established in recovery community (no home group or sponsor))  Total ASAM Score = 6     Prognosis: Fair with Ongoing Treatment      Family issues related to recovery: limited knowledge on the disease concept, limited family recovery experience.  Patient voices perceiving his wife as resentful and disappointed in him at times - highly suggest family psycho education.     Recovery/spiritual support group attendance: yes, 2-3 per week     Sponsor:  no, searching     Identification of environmental and personal barriers to recovery: coping with limited and/or negative emotions, feelings of remorse and guilt, work, family, overall understanding of disease concept      Motivation for treatment: Improved mental and physical health, improving overall relationships with emphasis on being a  and father, and long-term sobriety     Self-reported number of days sober:  29    Impression/Formulation:     Diagnosis Plan   1. Uncomplicated alcohol dependence (CMS/HCC)     2. Anxiety disorder, unspecified type         CLINICAL MANUVERING/INTERVENTIONS:  Therapist utilized a person-centered approach to build rapport with group member.  Therapist implemented motivational interviewing techniques to assist client with exploring and resolving ambivalence associated with commitment to change behaviors related to substance use and addiction.  Therapist applied cognitive behavioral strategies to facilitate identification of maladaptive patterns of thinking and behavior that contribute to cleint's risk for continued substance use and relapse.  Therapist employed group interaction activities to build rapport among group members, promote sobriety, and emphasize relapse prevention.   Therapist promoted safe nonjudgmental environment by providing group members with unconditional positive regard and encouraging group members to comply with group rules and guidelines.  Therapist utilized dialectical behavior techniques to teach and model emotional regulation and relaxation.  Therapist assisted group member with identifying and implementing healthier coping strategies.  Group member was encouraged to attend community support group meetings, make positive daily choices, and maintain healthy boundaries.  Group member was encouraged to invite family members to family educational group on Wednesdays.      BASELINE SCORES 09/17/19  DASES: 82  URICA (Alcohol): 11.42 (Preparation/Action)  URICA (Drugs): 2.57 (Pre-contemplation)  PHQ-9: 10  HALI-7: 12     UPDATED SCORES:  10/1/19  PHQ-9: 2  HALI-7: 1    PLAN:  Continue [facility] IOP Phase I   Aftercare:  [facility] Phase II  Program Assignments:  Personal recovery plan, relapse prevention plan, attendance of recovery support group meetings, exploration of sponsorship, drug/alcohol screens.      Please note that portions of this note were completed with a voice recognition program. Efforts were made to edit dictation, but occasionally words are mistranscribed.      This document signed by Jolynn Bolaños [facility], October 8, 2019, 2:51 PM

## 2019-10-09 ENCOUNTER — OFFICE VISIT (OUTPATIENT)
Dept: PSYCHIATRY | Facility: HOSPITAL | Age: 54
End: 2019-10-09

## 2019-10-09 VITALS — WEIGHT: 170 LBS | HEIGHT: 72 IN | BODY MASS INDEX: 23.03 KG/M2

## 2019-10-09 DIAGNOSIS — F41.9 ANXIETY DISORDER, UNSPECIFIED TYPE: ICD-10-CM

## 2019-10-09 DIAGNOSIS — G47.00 INSOMNIA, UNSPECIFIED TYPE: ICD-10-CM

## 2019-10-09 DIAGNOSIS — F10.20 UNCOMPLICATED ALCOHOL DEPENDENCE (HCC): Primary | ICD-10-CM

## 2019-10-09 LAB — ETHANOL UR-MCNC: NEGATIVE %

## 2019-10-09 PROCEDURE — 99214 OFFICE O/P EST MOD 30 MIN: CPT | Performed by: NURSE PRACTITIONER

## 2019-10-09 RX ORDER — HYDROXYZINE HYDROCHLORIDE 25 MG/1
25 TABLET, FILM COATED ORAL EVERY 8 HOURS PRN
Qty: 60 TABLET | Refills: 2 | Status: SHIPPED | OUTPATIENT
Start: 2019-10-09 | End: 2020-01-14 | Stop reason: SDUPTHER

## 2019-10-09 RX ORDER — ACAMPROSATE CALCIUM 333 MG/1
666 TABLET, DELAYED RELEASE ORAL 3 TIMES DAILY
Qty: 180 TABLET | Refills: 0 | Status: SHIPPED | OUTPATIENT
Start: 2019-10-09 | End: 2019-11-25 | Stop reason: SDUPTHER

## 2019-10-09 RX ORDER — TRAZODONE HYDROCHLORIDE 50 MG/1
50 TABLET ORAL NIGHTLY PRN
Qty: 30 TABLET | Refills: 2 | Status: SHIPPED | OUTPATIENT
Start: 2019-10-09 | End: 2020-01-14 | Stop reason: SDUPTHER

## 2019-10-09 NOTE — PROGRESS NOTES
"NAME: Yousuf Barrera  DATE: 10/09/2019    Phase I  9:00AM - 12:00PM    IOP GROUP NOTE    DATA:     3 hour IOP group therapy session (Check-ins, Coping Skills, Relapse Prevention)     Check Ins:  Therapist continued facilitation of rapport building strategies between group members. Therapist asked that each patient check in with home life and recovery efforts and identify triggers, cravings, and high risk situations that arise between group sessions.  Group members discussed barriers and benefits of attending recovery meetings.  Therapist provided empathy and support during group session.  Therapist facilitated reading the daily motivation passages from Daily Reflections ([employer] 1991) and Just for Today (The 12 Steps and 12 Traditions, 1991) and invited group members to reflect and discuss.      Session Content/Coping Skills:  Therapist facilitated further discussion from the Daily Reflection on the concept of a “spiritual awakening” by reading from the Big Book (AA, 1939) and inviting group members to reflect and share about their own conceptualization of a Higher Power.  Therapist provided psychoeducation on managing cravings and triggers utilizing a handout from [employer].  Group members explored previous cravings, discussing the identified trigger and emotional state leading up to the craving.  Group members explored effective coping skills for managing cravings and reflected on what has been helpful for them thus far.    Response:  Yousuf was present for IOP group and was a positive participant.  He discussed going to \"the worst (A.A.) meeting\" last night and what he did not like about the meeting.  He vented about there being a lot of distractions with people coming in and out and being disruptive.  As we explored this he was able to describe feeling disappointed as he had expected the meeting to go differently and has been looking forward to attending a meeting after not going for a few days.  He required some " "redirection but was able to identify positive aspects of the meeting, including the last 10 minutes after the facilitator set limits and reigned in the meeting.  He was receptive to feedback from others about how he stuck with it even though it was not a good experience and did not use that as an excuse to use or stop going to meetings all together.  He discussed more about what he is looking for in a sponsor and stated \"I just need to do it to find out if that's what I need\" in regards to a more strict sponsor who requires daily check-ins, weekly face to face meetings, and will help hold Yousuf accountable while still sharing their own experiences as someone recovering from alcohol addiction.  Despite the negative experience at the meeting last night, when exploring tools for managing cravings, he identified going to an AA meeting as being very helpful for him.  He reflected more on his goal to attend meetings at least twice a week regardless of his mental state in order to stay on track.  He was also prescribed a new medication to help address cravings and is hoping that it will be helpful.  Although his cravings have only been on the weekends, they continue to be intense and he has not seen much improvement in their duration or intensity with Naltrexone.     ASSESSMENT:      Lab Review  Negative on 9/24/19  Negative on 10/8/19     Mental Status Exam  Hygiene:  good  Dress: casual  Attitude: cooperative and agreeable   Motor Activity: appropriate  Eye Contact:  good  Speech: regular rate and rhythm   Mood:  calm and conversant  Affect:  Appropriate  Thought Processes:  Linear  Thought Content:  Normal  Suicidal Thoughts:  denies  Homicidal Thoughts:  denies  Crisis Safety Plan: yes, to come to the emergency room.  Hallucinations:  denies  Reliability: fair  Insight: fair  Judgement: fair  Impulse Control: fair     Patient's Support Network Includes: The patient receives support from his wife, daughter, and employer. "  Patient is exploring AA Meetings and wants to establish a home group.     Progress toward goal: Not at goal     Level of Functioning/Impairment (ASAM):  I.    Intoxication/Withdrawal:  No problem = 0  II.   Medical Conditions/Complications: No problem = 0  III.  Behavioral/Emotional/Cognitive: Mild problem = 1 (anxiety, anticipatory anxiety)  IV.  Readiness to Change: Moderate problem = 2 (external and internal motivation for change, limited insight into relapse risk, ambivalence re: abstaining from all alcohol)  V.   Relapse Risk: Moderate problem = 2 (moderate to severe cravings, limited insight into relapse risk, poor coping skills, limited recovery experience)  VI:  Recovery Environment: Mild problem = 1 (family supportive, limited knowledge on the nature of addiction as a family disease, strained relationships, not currently established in recovery community (no home group or sponsor))  Total ASAM Score = 6     Prognosis: Fair with Ongoing Treatment      Family issues related to recovery: limited knowledge on the disease concept, limited family recovery experience.  Patient voices perceiving his wife as resentful and disappointed in him at times - highly suggest family psycho education.     Recovery/spiritual support group attendance: yes, 2-3 per week     Sponsor:  no, searching     Identification of environmental and personal barriers to recovery: coping with limited and/or negative emotions, feelings of remorse and guilt, work, family, overall understanding of disease concept      Motivation for treatment: Improved mental and physical health, improving overall relationships with emphasis on being a  and father, and long-term sobriety     Self-reported number of days sober:  30    Impression/Formulation:     Diagnosis Plan   1. Uncomplicated alcohol dependence (CMS/HCC)  acamprosate (CAMPRAL) 333 MG EC tablet   2. Anxiety disorder, unspecified type  hydrOXYzine (ATARAX) 25 MG tablet   3. Insomnia,  unspecified type  traZODone (DESYREL) 50 MG tablet       CLINICAL MANUVERING/INTERVENTIONS:  Therapist utilized a person-centered approach to build rapport with group member.  Therapist implemented motivational interviewing techniques to assist client with exploring and resolving ambivalence associated with commitment to change behaviors related to substance use and addiction.  Therapist applied cognitive behavioral strategies to facilitate identification of maladaptive patterns of thinking and behavior that contribute to cleint's risk for continued substance use and relapse.  Therapist employed group interaction activities to build rapport among group members, promote sobriety, and emphasize relapse prevention.  Therapist promoted safe nonjudgmental environment by providing group members with unconditional positive regard and encouraging group members to comply with group rules and guidelines.  Therapist utilized dialectical behavior techniques to teach and model emotional regulation and relaxation.  Therapist assisted group member with identifying and implementing healthier coping strategies.  Group member was encouraged to attend community support group meetings, make positive daily choices, and maintain healthy boundaries.  Group member was encouraged to invite family members to family educational group on Wednesdays.      BASELINE SCORES 09/17/19  DASES: 82  URICA (Alcohol): 11.42 (Preparation/Action)  URICA (Drugs): 2.57 (Pre-contemplation)  PHQ-9: 10  HALI-7: 12     UPDATED SCORES:  10/1/19  PHQ-9: 2  HALI-7: 1    PLAN:  Continue [facility] IOP Phase I   Aftercare:  [facility] Phase II  Program Assignments:  Personal recovery plan, relapse prevention plan, attendance of recovery support group meetings, exploration of sponsorship, drug/alcohol screens.      Please note that portions of this note were completed with a voice recognition program. Efforts were  made to edit dictation, but occasionally words are mistranscribed.      This document signed by Jolynn Bolaños [facility], October 9, 2019, 1:20 PM

## 2019-10-10 ENCOUNTER — OFFICE VISIT (OUTPATIENT)
Dept: PSYCHIATRY | Facility: HOSPITAL | Age: 54
End: 2019-10-10

## 2019-10-10 DIAGNOSIS — F41.9 ANXIETY DISORDER, UNSPECIFIED TYPE: ICD-10-CM

## 2019-10-10 DIAGNOSIS — G47.00 INSOMNIA, UNSPECIFIED TYPE: ICD-10-CM

## 2019-10-10 DIAGNOSIS — F10.20 UNCOMPLICATED ALCOHOL DEPENDENCE (HCC): Primary | ICD-10-CM

## 2019-10-10 NOTE — PROGRESS NOTES
NAME: Yousuf Barrera  DATE: 10/10/2019    Phase I  9:00AM - 12:00PM    IOP GROUP NOTE    DATA:     3 hour IOP group therapy session (Check-ins, Coping Skills, Relapse Prevention)     Check Ins:  Therapist introduced self and welcomed new group member.  Therapist reviewed IOP group rules and norms.  Therapist continued facilitation of rapport building strategies between group members. Therapist asked that each patient check in with home life and recovery efforts and identify triggers, cravings, and high risk situations that arise between group sessions.  Group members discussed barriers and benefits of attending recovery meetings.  Therapist provided empathy and support during group session.  Therapist facilitated reading the daily motivation passages from Daily Reflections ([employer] 1991) and Just for Today (The 12 Steps and 12 Traditions, 1991) and invited group members to reflect and discuss.      Session Content/Coping Skills:    Therapist invited group members to share about what led them to treatment and what they have learned about themselves thus far.  Group members shared their stories and welcomed the newcomer to the group.  Group members discussed commonalities and what has been helpful for them so far in recovery.  Therapist facilitated Recovery Bingo to promote introspection, disclosure, and learning helpful concepts related to addiction recovery.  Group members answered questions and prompts corresponding to BINGO squares about themselves and addiction recovery.    Response:  Yousuf was present for IOP group and was a positive participant.  He was open and engaged and shared his story with the new group member.  He took an active role engaging the newcomer and accepting the newcomer into the group by sharing about his own experience when he first started with IOP group and what the process of group has been like for him so far.  He discussed how he is not sure what actually prompted him to come to the ER  "for help that first day but is glad that he did reflecting on the real possibility that he could be dead if he had not sought treatment when did.  He shared about his own denial hiding his alcohol use from his family and justifying that he had \"control\" over his drinking because he was able to stop drinking on Sundays, until it got to where he was drinking every single day of the week.  He discussed his fears that he won't \"be ready\" to move on to Phase 2 and spent some time exploring how he will know or what it will look like when he is ready.  He stated he did not know yet what it would look like and was encouraged to explore this more in the group and on his own.  He reflected that he is not sure he will be ready to move on to Phase 2 until he finds a home group and has a sponsor and has been actively searching and working on reaching this goal.       ASSESSMENT:      Lab Review  Negative on 9/24/19  Negative on 10/8/19     Mental Status Exam  Hygiene:  good  Dress: casual  Attitude: cooperative and agreeable   Motor Activity: appropriate  Eye Contact:  good  Speech: regular rate and rhythm   Mood:  calm and conversant  Affect:  Appropriate  Thought Processes:  Linear  Thought Content:  Normal  Suicidal Thoughts:  denies  Homicidal Thoughts:  denies  Crisis Safety Plan: yes, to come to the emergency room.  Hallucinations:  denies  Reliability: fair  Insight: fair  Judgement: fair  Impulse Control: fair     Patient's Support Network Includes: The patient receives support from his wife, daughter, and employer.  Patient is exploring AA Meetings and wants to establish a home group.     Progress toward goal: Not at goal     Level of Functioning/Impairment (ASAM):  I.    Intoxication/Withdrawal:  No problem = 0  II.   Medical Conditions/Complications: No problem = 0  III.  Behavioral/Emotional/Cognitive: Mild problem = 1 (anxiety, insomnia)  IV.  Readiness to Change: Moderate problem = 2 (external and internal " motivation to change, fearful of future change, doesn't think he will be ready to phase up)  V.   Relapse Risk: Moderate problem = 2 (moderate to severe cravings, poor coping skills, limited recovery experience)  VI:  Recovery Environment: Mild problem = 1 (family supportive, limited knowledge on the nature of addiction as a family disease, strained relationships, not currently established in recovery community (no home group or sponsor))  Total ASAM Score = 6     Prognosis: Fair with Ongoing Treatment      Family issues related to recovery: limited knowledge on the disease concept, limited family recovery experience.  Patient voices perceiving his wife as resentful and disappointed in him at times - highly suggest family psycho education.     Recovery/spiritual support group attendance: yes, 2-3 per week     Sponsor:  no, searching     Identification of environmental and personal barriers to recovery: coping with limited and/or negative emotions, feelings of remorse and guilt, work, family, overall understanding of disease concept      Motivation for treatment: Improved mental and physical health, improving overall relationships with emphasis on being a  and father, and long-term sobriety     Self-reported number of days sober:  30    Impression/Formulation:     Diagnosis Plan   1. Uncomplicated alcohol dependence (CMS/HCC)     2. Anxiety disorder, unspecified type     3. Insomnia, unspecified type         CLINICAL MANUVERING/INTERVENTIONS:  Therapist utilized a person-centered approach to build rapport with group member.  Therapist implemented motivational interviewing techniques to assist client with exploring and resolving ambivalence associated with commitment to change behaviors related to substance use and addiction.  Therapist applied cognitive behavioral strategies to facilitate identification of maladaptive patterns of thinking and behavior that contribute to cleint's risk for continued substance  use and relapse.  Therapist employed group interaction activities to build rapport among group members, promote sobriety, and emphasize relapse prevention.  Therapist promoted safe nonjudgmental environment by providing group members with unconditional positive regard and encouraging group members to comply with group rules and guidelines.  Therapist utilized dialectical behavior techniques to teach and model emotional regulation and relaxation.  Therapist assisted group member with identifying and implementing healthier coping strategies.  Group member was encouraged to attend community support group meetings, make positive daily choices, and maintain healthy boundaries.  Group member was encouraged to invite family members to family educational group on Wednesdays.      BASELINE SCORES 09/17/19  DASES: 82  URICA (Alcohol): 11.42 (Preparation/Action)  URICA (Drugs): 2.57 (Pre-contemplation)  PHQ-9: 10  HALI-7: 12     UPDATED SCORES:  10/1/19  PHQ-9: 2  HALI-7: 1    PLAN:  Continue [facility] IOP Phase I   Aftercare:  [facility] Phase II  Program Assignments:  Personal recovery plan, relapse prevention plan, attendance of recovery support group meetings, exploration of sponsorship, drug/alcohol screens.      Please note that portions of this note were completed with a voice recognition program. Efforts were made to edit dictation, but occasionally words are mistranscribed.      This document signed by Jolynn Bolaños [facility], October 10, 2019, 12:57 PM

## 2019-10-14 ENCOUNTER — OFFICE VISIT (OUTPATIENT)
Dept: PSYCHIATRY | Facility: HOSPITAL | Age: 54
End: 2019-10-14

## 2019-10-14 DIAGNOSIS — F10.20 UNCOMPLICATED ALCOHOL DEPENDENCE (HCC): Primary | ICD-10-CM

## 2019-10-14 DIAGNOSIS — F41.9 ANXIETY DISORDER, UNSPECIFIED TYPE: ICD-10-CM

## 2019-10-14 NOTE — PROGRESS NOTES
"NAME: Yousuf Barrera  DATE: 10/02/2019    Phase I  9:00AM - 12:00PM    IOP GROUP NOTE    DATA:     3 hour IOP group therapy session (Check-ins, Coping Skills, Relapse Prevention)     Check Ins:  Therapist continued facilitation of rapport building strategies between group members. Therapist asked that each patient check in with home life and recovery efforts and identify triggers, cravings, and high risk situations that arise between group sessions.  Group members discussed barriers and benefits of attending recovery meetings.  Therapist provided empathy and support during group session.     Session Content/Coping Skills:  Facilitated discussion on the topic of healthy boundaries and healthy communication skills. Provided psychoeducation materials to group participants to aid them in identifying problems areas with their boundaries. Discussed communication styles and guided participants in identifying areas of improvement concerning their communication styles, so that they could more productively communicate their boundaries.     Response:  Positive participant. Reported he had decided to think about his progress as \"recovery\" rather than \"sobriety.\" In regard to personal boundaries, he reported struggling most often in the area of emotional boundaries. He also discussed how Saturday mornings had revealed themselves as difficult for him, and he planned to make some changes to help maintain his sobriety in those more difficult windows of time. He shared good insights, and demonstrated a positive attitude. Participant identified himself as struggling with inferential statements and with minimizing. He reported tending to do well in the areas of tactfulness and active listening.     ASSESSMENT:     Mental Status Exam  Hygiene:  good  Dress: casual  Attitude: cooperative and agreeable   Motor Activity: appropriate  Eye Contact:  good  Speech: regular rate and rhythm   Mood:  calm and conversant and talkative  Affect:  " Appropriate  Thought Processes:  Goal directed and Linear  Thought Content:  Normal  Suicidal Thoughts:  denies  Homicidal Thoughts:  denies  Crisis Safety Plan: yes, to come to the emergency room.  Hallucinations:  denies  Reliability: good  Insight: good  Judgement: good  Impulse Control: good    Patient's Support Network Includes: The patient receives support from his wife.    Progress toward goal: Not at goal    Level of Functioning/Impairment (ASAM):  I.    Intoxication/Withdrawal:  0  II.   Medical Conditions/Complications:  0  III.  Behavioral/Emotional/Cognitive: 1  IV.  Readiness to Change: 2  V.   Relapse Risk: 2  VI:  Recovery Environment: 1  Total ASAM Score = 6      Prognosis: Fair with Ongoing Treatment     Family issues related to recovery:  No change, see previous encounters    Recovery/spiritual support group attendance: 3 groups per week    Sponsor:  no,                      If no, are you looking?  yes    Identification of environmental and personal barriers to recovery: coping with limited and/or negative emotions, feelings of remorse and guilt, work, family, overall understanding of disease concept     Motivation for treatment:  family, improved mental and physical health, improving overall relationships, and long-term sobriety    Self-reported number of days sober:  23    Impression/Formulation:     Diagnosis Plan   1. Uncomplicated alcohol dependence (CMS/HCC)     2. Anxiety disorder, unspecified type         CLINICAL MANUVERING/INTERVENTIONS:  Therapist utilized a person-centered approach to build rapport with group member.  Therapist implemented motivational interviewing techniques to assist client with exploring and resolving ambivalence associated with commitment to change behaviors related to substance use and addiction.  Therapist applied cognitive behavioral strategies to facilitate identification of maladaptive patterns of thinking and behavior that contribute to cleint's risk for  continued substance use and relapse.  Therapist employed group interaction activities to build rapport among group members, promote sobriety, and emphasize relapse prevention.  Therapist promoted safe nonjudgmental environment by providing group members with unconditional positive regard and encouraging group members to comply with group rules and guidelines.  Therapist utilized dialectical behavior techniques to teach and model emotional regulation and relaxation.  Therapist assisted group member with identifying and implementing healthier coping strategies.  Group member was encouraged to attend community support group meetings, make positive daily choices, and maintain healthy boundaries.  Group member was encouraged to invite family members to family educational group on Wednesdays.      BASELINE SCORES 10/1/19  DASES: 82  URICA (Alcohol): 11.42 (Preparation/Action)  URICA (Drugs): 2.57 (Pre-contemplation)  PHQ-9: 10  HALI-7: 12    UPDATED SCORES N/A    PLAN:  Continue [facility] IOP Phase I   Aftercare:  [facility] Phase II  Program Assignments:  Personal recovery plan, relapse prevention plan, attendance of recovery support group meetings, exploration of sponsorship, drug/alcohol screens.      Please note that portions of this note were completed with a voice recognition program. Efforts were made to edit dictation, but occasionally words are mistranscribed.      This document signed by Joshua Hernandez LCSW, October 14, 2019, 9:56 AM

## 2019-10-14 NOTE — PROGRESS NOTES
"NAME: Yousuf Barrera  DATE: 10/14/2019    Phase I  9:00AM - 12:00PM    IOP GROUP NOTE    DATA:     3 hour IOP group therapy session (Check-ins, Coping Skills, Relapse Prevention)     Check Ins:  Therapist continued facilitation of rapport building strategies between group members. Therapist asked that each patient check in with home life and recovery efforts and identify triggers, cravings, and high risk situations that arise between group sessions.  Group members discussed barriers and benefits of attending recovery meetings.  Therapist provided empathy and support during group session.  Therapist facilitated reading the daily motivation passages from Daily Reflections ([employer] 1991) and Just for Today (The 12 Steps and 12 Traditions, 1991) and invited group members to reflect and discuss.      Session Content/Coping Skills:  Therapist facilitated continuation of Recovery Bingo activity initiated last week.  The goal of the activity is to promote introspection, self-disclosure, and learning new concepts related to addiction recovery.  Group members answered questions and prompts corresponding to BINGO squares about themselves and addiction recovery.  Topics included “white knuckling,” slip versus relapse, effects of substance use on others, causes of addiction, and financial and emotional consequences of substance use.    Response:  Yousuf was present for IOP group and was a positive participant.  He reported attending 2 different AA meetings over the weekend and finding what he believes might end up as his home group.  He reflected on the differences between some of the meetings he has attended so far and discussed his preference for a more strict and structured meeting where his peers will \"call me on my BS.\"  He discussed how he did not feel judged by this approach and was appreciative of the additional guidance he received.  He obtained some phone numbers and reached out just to make contact with them last " "night.  He believes he may have found his sponsor but has not formally asked the person, yet.  He demonstrated improved insight on his family dynamics, reflecting on how his daughter was his \"rock\" before she started struggling with her own mental health issues.  He stated in order to be the rock for his family, he needs his own rock/support outside of his family, particularly from people who are also in recovery and have been where he is.  He had difficulty completing the statement \"I feel hurt when...\" stating this was difficult for him to think about and he didn't know how to answer it.  He was encouraged to reflect on this more on his own and share with the group the following day.     ASSESSMENT:      Lab Review  Negative on 9/24/19  Negative on 10/8/19     Mental Status Exam  Hygiene:  good  Dress: casual  Attitude: cooperative and agreeable   Motor Activity: appropriate  Eye Contact:  good  Speech: regular rate and rhythm   Mood:  calm and conversant  Affect:  Appropriate  Thought Processes:  Linear  Thought Content:  Normal  Suicidal Thoughts:  denies  Homicidal Thoughts:  denies  Crisis Safety Plan: yes, to come to the emergency room.  Hallucinations:  denies  Reliability: fair  Insight: fair  Judgement: fair  Impulse Control: fair     Patient's Support Network Includes: The patient receives support from his wife, daughter, and employer.  Patient has been attending AA meetings and wants to establish a home group and find a sponsor.  He has received some phone numbers and reached out to peers.     Progress toward goal: Not at goal     Level of Functioning/Impairment (ASAM):  I.    Intoxication/Withdrawal:  No problem = 0  II.   Medical Conditions/Complications: No problem = 0  III.  Behavioral/Emotional/Cognitive: Mild problem = 1 (anxiety, insomnia)  IV.  Readiness to Change: Moderate problem = 2 (external and internal motivation to change, fearful of future change, doesn't think he will be ready to phase " up)  V.   Relapse Risk: Moderate problem = 2 (moderate to severe cravings, poor coping skills, limited recovery experience)  VI:  Recovery Environment: Mild problem = 1 (family supportive, limited knowledge on the nature of addiction as a family disease, strained relationships, not currently established in recovery community (no home group or sponsor))  Total ASAM Score = 6     Prognosis: Fair with Ongoing Treatment      Family issues related to recovery: limited knowledge on the disease concept, limited family recovery experience.  Patient voices perceiving his wife as resentful and disappointed in him at times - highly suggest family psycho education.     Recovery/spiritual support group attendance: yes, 2-3 per week     Sponsor:  no, searching     Identification of environmental and personal barriers to recovery: coping with limited and/or negative emotions, feelings of remorse and guilt, work stress, strained family dynamics      Motivation for treatment: Improved mental and physical health, improving overall relationships with emphasis on being a  and father, and long-term sobriety     Self-reported number of days sober:  35    Impression/Formulation:     Diagnosis Plan   1. Uncomplicated alcohol dependence (CMS/HCC)     2. Anxiety disorder, unspecified type         CLINICAL MANUVERING/INTERVENTIONS:  Therapist utilized a person-centered approach to build rapport with group member.  Therapist implemented motivational interviewing techniques to assist client with exploring and resolving ambivalence associated with commitment to change behaviors related to substance use and addiction.  Therapist applied cognitive behavioral strategies to facilitate identification of maladaptive patterns of thinking and behavior that contribute to cleint's risk for continued substance use and relapse.  Therapist employed group interaction activities to build rapport among group members, promote sobriety, and emphasize  relapse prevention.  Therapist promoted safe nonjudgmental environment by providing group members with unconditional positive regard and encouraging group members to comply with group rules and guidelines.  Therapist utilized dialectical behavior techniques to teach and model emotional regulation and relaxation.  Therapist assisted group member with identifying and implementing healthier coping strategies.  Group member was encouraged to attend community support group meetings, make positive daily choices, and maintain healthy boundaries.  Group member was encouraged to invite family members to family educational group on Wednesdays.      BASELINE SCORES 09/17/19  DASES: 82  URICA (Alcohol): 11.42 (Preparation/Action)  URICA (Drugs): 2.57 (Pre-contemplation)  PHQ-9: 10  HALI-7: 12     UPDATED SCORES:  10/1/19  PHQ-9: 2  HALI-7: 1    PLAN:  Continue [facility] IOP Phase I   Aftercare:  [facility] Phase II  Program Assignments:  Personal recovery plan, relapse prevention plan, attendance of recovery support group meetings, exploration of sponsorship, drug/alcohol screens.      Please note that portions of this note were completed with a voice recognition program. Efforts were made to edit dictation, but occasionally words are mistranscribed.      This document signed by Jolynn Bolaños [facility], October 14, 2019, 12:48 PM

## 2019-10-15 ENCOUNTER — OFFICE VISIT (OUTPATIENT)
Dept: PSYCHIATRY | Facility: HOSPITAL | Age: 54
End: 2019-10-15

## 2019-10-15 DIAGNOSIS — F10.20 UNCOMPLICATED ALCOHOL DEPENDENCE (HCC): Primary | ICD-10-CM

## 2019-10-15 DIAGNOSIS — F41.9 ANXIETY DISORDER, UNSPECIFIED TYPE: ICD-10-CM

## 2019-10-15 NOTE — TREATMENT PLAN
[facility]    Intensive Outpatient Program for Chemical Dependence (CD IOP)      Treatment Plan Reassessment (biweekly)       Long Term Goal:  Yousuf will establish a sustained recovery and will maintain total abstinence from mind-altering substances other than what is prescribed and/or approved by the attending physician. He will learn, practice, and utilize behavioral and cognitive coping skills to help maintain sobriety.    Patient Care Needs:  Yousuf presents with uncomplicated alcohol dependence and is at high risk for relapse without continued treatment.  He has a history of using drugs/alcohol until intoxicated or passed out.  He has been unable to stop or cut down use of alcohol/drugs once starting despite verbalized desire to do so and the negative consequences from continued use.  Yousuf's use has negatively impacted social, occupational, and/or physical functioning.     1.  Patient will participate in a medical evaluation to assess the effects of chemical dependence and will cooperate with an evaluation by the attending psychiatrist or psychiatric nurse practitioner for psychotropic medication if appropriate.    2.  Patient will adhere to the IOP group rules.    3.  Patient will attend group sessions as scheduled. Patient will notify therapist and support staff of any absences or tardies. Patient will provide a valid and verifiable excuse for absences to be considered excused.    4.  Patient will participate in random drug and alcohol screenings and will exhibit negative results on said screenings.    5.  Patient will identify high risk situations, people, and places to avoid or manage in order to maintain sobriety.    6.  Patient will participate in group discussions by giving and receiving feedback appropriately.    7.  Patient will develop a positive network of support by attending a minimum of three recovery/spiritual support groups each week (two outside of IOP group)  and by obtaining or maintaining a sponsor or sober support person.    8.  Patient will identify, practice, and implement at least 5 healthy coping strategies to manage difficult emotions while remaining abstinent.    9.  Patient will develop a relapse prevention plan and share it with the group.    10.  Patient will write a personal recovery plan and share it with the group prior to discharge.    11.  Patient will encourage family participation in weekly family education groups, if appropriate.    12.  Patient will exhibit increased internal and external motivation to change as assessed by his/her cooperation, behavior, and URICA score.    13. Patient will show a decrease in score on the PHQ-9 depression scale indicating improvement in frequency of depressive symptoms.    14. Patient will demonstrate a decrease in score on the HALI-7 questionnaire indicating improvement in frequency of anxiety symptoms.  1. Patient participated in an evaluation for medication management with MELVIN Eaton.  Patient is meeting with medical provider as scheduled for medication management and follow-up.    2.  Patient read and signed IOP Group Rules. Patient is adhering to group rules.    3.  Patient has 0 unexcused absences.    4.  Patient has displayed negative results on urine drug and alcohol screenings thus far.    5.  Patient has encountered the following high-risk situations since beginning treatment: relationship conflict, strong cravings, and being around alcohol.     6.  Patient provides and receives feedback daily. When not actively participating, patient is attentive and appropriate.    7.  Patient attends recovery/spiritual support group meetings at least 2-3 times per week. Patient does not have a sponsor/sober support person and is not working the 12 steps.    8.  Patient has identified and implemented the following coping strategies during high risk situations: go to a meeting and talk about it, distract  self/stay busy, pause, and play the tape through.    9.  Patient has identified high-risk people, places, and things as well as healthy coping strategies for avoiding relapse.    10.  Patient is/is not developing a personal recovery plan.    11.  No family participation.    12.  Patient reports cravings have improved.  Patient's URICA score and behavior indicate he is in the preparation/action stage of change.    13.  Current score on PHQ-9 is 3. Score has decreased from initial assessment, increased since most recent assessment.     14.  Current score on HALI-7 is 2. Score has decreased from initial assessment, increased since most recent assessment. 1.  Partially completed.  Patient participated initial evaluation for medication management with MELVIN Eaton.  Patient will continue participating in as scheduled medication management and follow-up appointments with psychiatric medical provider.    2.  Patient is making progress toward goal and will continue working toward objective.     3.  Patient will continue working toward objective.     4.  Patient will continue working toward objective.    5.  Partially completed. Patient will continue working toward objective.    6.  Partially completed. Patient will continue working toward objective.    7.  Patient is making progress toward goal.  He is currently considering someone for sponsorship.     8.  Partially completed. Patient will continue working toward objective.      9.  Partially completed. Patient will continue working toward objective.     10.  Partially completed. Patient will continue working toward objective.     11.  Patient will continue to encouraged family participation.    12.  Partially completed.  Patient will continue implementing behavioral changes to promote long-term sobriety and recovery.    13.  Patient's PHQ-9 score indicates a decrease in frequency of depressive symptoms from initial assessment, increase from previous.    14.   Patient's HALI-7 score indicates a decrease in frequency of depressive symptoms from initial assessment, increase from previous.      Next Target Date: 10/29/19    Team Members:  Patient - Yousuf Barrera  Medical Provider - MELVIN Eaton  [facility] Licensed Therapist - Jolynn Bolaños [facility] Director - Omer George LCSW, [facility]  Support Staff         I have discussed and reviewed this treatment plan with the patient.  It has been printed for signatures.

## 2019-10-15 NOTE — PROGRESS NOTES
"NAME: Yousuf Barrera  DATE: 10/15/2019    Phase I  9:00AM - 12:00PM    IOP GROUP NOTE    DATA:     3 hour IOP group therapy session (Check-ins, Coping Skills, Relapse Prevention)     Check Ins:  Therapist continued facilitation of rapport building strategies between group members. Therapist asked that each patient check in with home life and recovery efforts and identify triggers, cravings, and high risk situations that arise between group sessions.  Group members discussed barriers and benefits of attending recovery meetings.  Therapist provided empathy and support during group session.  Therapist facilitated reading the daily motivation passages from Daily Reflections ([employer] 1991) and Just for Today (The 12 Steps and 12 Traditions, 1991) and invited group members to reflect and discuss.      Session Content/Coping Skills:  Therapist facilitated discussion on relapse as a process versus a single event, introducing the stages of relapse: emotional, mental, and physical.  Therapist introduced the term “dry drunk” referring to the notion that recovery requires a change in attitudes, thoughts, behaviors, and lifestyle, not just abstinence from mind-altering addictive substances.  Group members discussed this concept and identified current relapse warning signs for themselves.  Group members brainstormed ways to interrupt the relapse process before reaching physical relapse.    Response:  Yousuf presented to IOP group and was a positive participant.  He completed the assignment from the day before by finishing the following statement \"I feel hurt when..\"  He reported feeling hurt when he was getting out of detox and was expecting that his marriage was over and his wife would be filing for divorce because Yousuf had been hiding his alcohol use from her.  He stated they had never talked about divorce, but he \"was sure\" that's where things were headed because of this incident.  Even after realizing his wife was not filing " "for divorce and was supportive of his sobriety, he still felt hurt by some of her comments and that she wasn't supportive in the way he needed.  He discussed needing someone he can talk to about his recovery and does not want to put the responsibility of this on his wife.  He reflected on how he thinks this is why he has been so involved with exploring AA groups so that he can find that positive support from someone who has been there and understands.  He and his wife had been attending couples therapy briefly leading up to his hospitalization and have not attended since.  He is interested in going to couples counseling again to process through the impact of his dishonesty; however, he is waiting for his wife to initiate this.  He discussed his embarrassment and feeling judged by his neighbors when his daughter run away and police and EMS were lined up on his street.  He reflected thinking \"when are we going to be a normal family again\" and how he realizes now that this was very selfish of him.      ASSESSMENT:     Lab Review  Negative on 9/24/19  Negative on 10/8/19    Mental Status Exam  Hygiene:  good  Dress: casual  Attitude: cooperative and agreeable   Motor Activity: appropriate  Eye Contact:  good  Speech: regular rate and rhythm   Mood:  calm and conversant  Affect:  Appropriate  Thought Processes:  Goal directed and Linear  Thought Content:  Normal  Suicidal Thoughts:  denies  Homicidal Thoughts:  denies  Crisis Safety Plan: yes, to come to the emergency room.  Hallucinations:  denies  Reliability: fair  Insight: fair  Judgement: fair  Impulse Control: fair    Patient's Support Network Includes: The patient receives support from his wife, daughter, and employer.  They are all supportive of his recovery efforts, although they are not directly involved in it.  Yousuf has been building a positive sober support network by attending AA meetings and exploring sponsorship.  He has also been attending a men's group " at [facility] for additional emotional and spiritual support.    Progress toward goal: Not at goal    Level of Functioning/Impairment (ASAM):  I.    Intoxication/Withdrawal:  No problem = 0  II.   Medical Conditions/Complications: No problem = 0  III.  Behavioral/Emotional/Cognitive: Mild problem = 1 (anxiety, worry about daughter's mental health even when she seems to be making progress)  IV.  Readiness to Change: Mild problem = 1 (external and internal motivation to change; ambivalence about initiating family conversations and discussions r/t his recovery)  V.   Relapse Risk: Moderate problem = 2 (moderate cravings, poor coping skills, limited recovery experience)  VI:  Recovery Environment: Mild problem = 1 (family supportive of his sobriety but not actively involved in family recovery, limited knowledge on the nature of addiction as a family disease, strained relationships, exploring home group and sponsorship)  Total ASAM Score = 5     Prognosis: Fair with Ongoing Treatment     Family issues related to recovery:  No change, see previous encounters    Recovery/spiritual support group attendance: At least 2-3 times per week    Sponsor:  no                    If no, are you looking?  yes      Identification of environmental and personal barriers to recovery: coping with limited and/or negative emotions, feelings of remorse and guilt, work stress, strained family dynamics      Motivation for treatment: Improved mental and physical health, improving overall relationships with emphasis on being a  and father, and long-term sobriety     Self-reported number of days sober:  36    Impression/Formulation:     Diagnosis Plan   1. Uncomplicated alcohol dependence (CMS/HCC)     2. Anxiety disorder, unspecified type         CLINICAL MANUVERING/INTERVENTIONS:  Therapist utilized a person-centered approach to build rapport with group member.  Therapist implemented motivational interviewing techniques to assist client with  exploring and resolving ambivalence associated with commitment to change behaviors related to substance use and addiction.  Therapist applied cognitive behavioral strategies to facilitate identification of maladaptive patterns of thinking and behavior that contribute to cleint's risk for continued substance use and relapse.  Therapist employed group interaction activities to build rapport among group members, promote sobriety, and emphasize relapse prevention.  Therapist promoted safe nonjudgmental environment by providing group members with unconditional positive regard and encouraging group members to comply with group rules and guidelines.  Therapist utilized dialectical behavior techniques to teach and model emotional regulation and relaxation.  Therapist assisted group member with identifying and implementing healthier coping strategies.  Group member was encouraged to attend community support group meetings, make positive daily choices, and maintain healthy boundaries.  Group member was encouraged to invite family members to family educational group on Wednesdays.      BASELINE SCORES 09/17/19  DASES: 82  URICA (Alcohol): 11.42 (Preparation/Action)  URICA (Drugs): 2.57 (Pre-contemplation)  PHQ-9: 10  HALI-7: 12     UPDATED SCORES:  10/15/19  PHQ-9: 3  HALI-7: 2    PLAN:  Continue [facility] IOP Phase I   Aftercare:  [facility] Phase II  Program Assignments:  Personal recovery plan, relapse prevention plan, attendance of recovery support group meetings, exploration of sponsorship, drug/alcohol screens.      Please note that portions of this note were completed with a voice recognition program. Efforts were made to edit dictation, but occasionally words are mistranscribed.      This document signed by Jolynn Bolaños [facility], October 15, 2019, 12:19 PM

## 2019-10-16 ENCOUNTER — LAB (OUTPATIENT)
Dept: LAB | Facility: HOSPITAL | Age: 54
End: 2019-10-16

## 2019-10-16 ENCOUNTER — OFFICE VISIT (OUTPATIENT)
Dept: PSYCHIATRY | Facility: HOSPITAL | Age: 54
End: 2019-10-16

## 2019-10-16 DIAGNOSIS — F10.20 UNCOMPLICATED ALCOHOL DEPENDENCE (HCC): Primary | ICD-10-CM

## 2019-10-16 DIAGNOSIS — F10.20 UNCOMPLICATED ALCOHOL DEPENDENCE (HCC): ICD-10-CM

## 2019-10-16 DIAGNOSIS — F41.9 ANXIETY DISORDER, UNSPECIFIED TYPE: ICD-10-CM

## 2019-10-16 DIAGNOSIS — F33.2 MAJOR DEPRESSIVE DISORDER, RECURRENT, SEVERE WITHOUT PSYCHOTIC FEATURES (HCC): ICD-10-CM

## 2019-10-16 PROCEDURE — 80307 DRUG TEST PRSMV CHEM ANLYZR: CPT

## 2019-10-16 NOTE — PROGRESS NOTES
"NAME: Yousuf Barrera  DATE: 10/16/2019    Phase I  9:00AM - 12:00PM    IOP GROUP NOTE    DATA:     3 hour IOP group therapy session (Check-ins, Coping Skills, Relapse Prevention)     Check Ins:  Therapist continued facilitation of rapport building strategies between group members. Therapist asked that each patient check in with home life and recovery efforts and identify triggers, cravings, and high risk situations that arise between group sessions.  Group members discussed barriers and benefits of attending recovery meetings.  Therapist provided empathy and support during group session.       Session Content/Coping Skills:  Guest speaker shared about his recovery journey and experience in all stages of recovery discussing denial, resistance, contemplation and preparation, group dynamics, committing to make behavioral change, and maintenance and aftercare.  Group members openly shared about their own experiences, struggles, and successes.      Response:  Yousuf was present for IOP and was a positive participant.  He introduced himself to the guest speaker and shared about what brought him to IOP.  He reflected on commonalities between their stories and engaged in discussion with the group.  He reported having a craving yesterday on his way home from work and stated \"but I know I'm always going to have them.\"  We explored this further, and he disucssed how yesterday's craving was not as strong and did not last as long as previous ones acknowledging improvement in this area.  He discussed his fears about not being ready to phase up in a couple of weeks and asked the guest speaker for feedback regarding that transition.    ASSESSMENT:     Lab Review  Negative on 9/24/19  Negative on 10/8/19  Negative on 10/16/19 (ethanol pending)     Mental Status Exam  Hygiene:  good  Dress: casual  Attitude: cooperative and agreeable   Motor Activity: appropriate  Eye Contact:  good  Speech: regular rate and rhythm   Mood:  calm and " conversant  Affect:  Appropriate  Thought Processes:  Goal directed and Linear  Thought Content:  Normal  Suicidal Thoughts:  denies  Homicidal Thoughts:  denies  Crisis Safety Plan: yes, to come to the emergency room.  Hallucinations:  denies  Reliability: fair  Insight: fair  Judgement: fair  Impulse Control: fair     Patient's Support Network Includes: The patient receives support from his wife, daughter, and employer.  They are all supportive of his recovery efforts, although they are not directly involved in it.  Yousuf has been building a positive sober support network by attending AA meetings and exploring sponsorship.  He has also been attending a men's group at [employer] for additional emotional and spiritual support.     Progress toward goal: Not at goal     Level of Functioning/Impairment (ASAM):  I.    Intoxication/Withdrawal:  No problem = 0  II.   Medical Conditions/Complications: No problem = 0  III.  Behavioral/Emotional/Cognitive: Mild problem = 1 (anxiety, worry about daughter's mental health even when she seems to be making progress)  IV.  Readiness to Change: Mild problem = 1 (external and internal motivation to change; ambivalence about initiating family conversations and discussions r/t his recovery)  V.   Relapse Risk: Moderate problem = 2 (moderate cravings, poor coping skills, limited recovery experience)  VI:  Recovery Environment: Mild problem = 1 (family supportive of his sobriety but not actively involved in family recovery, limited knowledge on the nature of addiction as a family disease, strained relationships, exploring home group and sponsorship)  Total ASAM Score = 5     Prognosis: Fair with Ongoing Treatment      Family issues related to recovery:  No change, see previous encounters     Recovery/spiritual support group attendance: At least 2-3 times per week     Sponsor:  no                     If no, are you looking?  yes        Identification of environmental and personal barriers to  recovery: coping with limited and/or negative emotions, feelings of remorse and guilt, work stress, strained family dynamics      Motivation for treatment: Improved mental and physical health, improving overall relationships with emphasis on being a  and father, and long-term sobriety     Self-reported number of days sober:  37    Impression/Formulation:     Diagnosis Plan   1. Uncomplicated alcohol dependence (CMS/HCC)     2. Anxiety disorder, unspecified type         CLINICAL MANUVERING/INTERVENTIONS:  Therapist utilized a person-centered approach to build rapport with group member.  Therapist implemented motivational interviewing techniques to assist client with exploring and resolving ambivalence associated with commitment to change behaviors related to substance use and addiction.  Therapist applied cognitive behavioral strategies to facilitate identification of maladaptive patterns of thinking and behavior that contribute to cleint's risk for continued substance use and relapse.  Therapist employed group interaction activities to build rapport among group members, promote sobriety, and emphasize relapse prevention.  Therapist promoted safe nonjudgmental environment by providing group members with unconditional positive regard and encouraging group members to comply with group rules and guidelines.  Therapist utilized dialectical behavior techniques to teach and model emotional regulation and relaxation.  Therapist assisted group member with identifying and implementing healthier coping strategies.  Group member was encouraged to attend community support group meetings, make positive daily choices, and maintain healthy boundaries.  Group member was encouraged to invite family members to family educational group on Wednesdays.      BASELINE SCORES 09/17/19  DASES: 82  URICA (Alcohol): 11.42 (Preparation/Action)  URICA (Drugs): 2.57 (Pre-contemplation)  PHQ-9: 10  HALI-7: 12     UPDATED  SCORES:  10/15/19  PHQ-9: 3  HALI-7: 2    PLAN:  Continue [facility] IOP Phase I   Aftercare:  [facility] Phase II  Program Assignments:  Personal recovery plan, relapse prevention plan, attendance of recovery support group meetings, exploration of sponsorship, drug/alcohol screens.      Please note that portions of this note were completed with a voice recognition program. Efforts were made to edit dictation, but occasionally words are mistranscribed.      This document signed by Jolynn Bolaños [facility], October 16, 2019, 1:54 PM

## 2019-10-17 ENCOUNTER — OFFICE VISIT (OUTPATIENT)
Dept: PSYCHIATRY | Facility: HOSPITAL | Age: 54
End: 2019-10-17

## 2019-10-17 DIAGNOSIS — F41.9 ANXIETY DISORDER, UNSPECIFIED TYPE: ICD-10-CM

## 2019-10-17 DIAGNOSIS — F10.20 UNCOMPLICATED ALCOHOL DEPENDENCE (HCC): Primary | ICD-10-CM

## 2019-10-17 LAB — ETHANOL UR-MCNC: NEGATIVE %

## 2019-10-17 NOTE — PROGRESS NOTES
"NAME: Yousuf Barrera  DATE: 10/17/2019    Phase I  9:00AM - 12:00PM    IOP GROUP NOTE    DATA:     3 hour IOP group therapy session (Check-ins, Coping Skills, Relapse Prevention)     Check Ins:  Therapist continued facilitation of rapport building strategies between group members. Therapist asked that each patient check in with home life and recovery efforts and identify triggers, cravings, and high risk situations that arise between group sessions.  Group members discussed barriers and benefits of attending recovery meetings.  Therapist provided empathy and support during group session.  Therapist facilitated reading the daily motivation passages from Daily Reflections ([employer] 1991) and Just for Today (The 12 Steps and 12 Traditions, 1991) and invited group members to reflect and discuss.      Session Content/Coping Skills:  Therapist facilitated discussion by inviting group members to reflect on yesterday’s group with the guest speaker.  Therapist continued exploring the stages of relapse and relapse warning signs.  Group members discussed relapse warning signs and identified examples of their own denial and other defense mechanisms.  Therapist assigned homework, asking group members to finish reading the material on relapse warning signs and brainstorm 3-5 recovery actions they can take when recognizing relapse warnings signs.     Response:  Yousuf was present for IOP and was a positive participant.  He discussed how helpful the guest speaker was yesterday and how much he could relate to his story.  Towards the end of group, he shared \"something I've been thinking about all week and want to get off my chest.\"  He discussed his fears that he will \"not be prepared\" for whenever his daughter has another mental health crisis stating \"because it's going to happen again, I know it will, and I was not prepared for it the first time.\"  He related his fears of not knowing how to handle the situation without alcohol as " alcohol became his crutch when his daughter first ran away.  We explored this some and discussed healthy fear versus destructive and debilitating fear.  He acknowledged he was not sure if there was anything he could have done previously to have been better prepared the first time his daughter ran away.  He reflected on how he was hoping  He would feel better after getting that off his chest but didn't.  He was provided with validation and emotional support.     ASSESSMENT:      Lab Review  Negative on 9/24/19  Negative on 10/8/19  Negative on 10/16/19 (ethanol pending)    Mental Status Exam  Hygiene:  good  Dress: casual  Attitude: cooperative and agreeable   Motor Activity: appropriate  Eye Contact:  good  Speech: regular rate and rhythm   Mood:  calm  Affect:  Appropriate  Thought Processes:  Linear  Thought Content:  Normal  Suicidal Thoughts:  denies  Homicidal Thoughts:  denies  Crisis Safety Plan: yes, to come to the emergency room.  Hallucinations:  denies  Reliability: fair  Insight: fair  Judgement: fair  Impulse Control: fair    Patient's Support Network Includes: The patient receives support from his wife, daughter, and employer.  They are all supportive of his recovery efforts, although they are not directly involved in it.  Yousuf has been building a positive sober support network by attending AA meetings and exploring sponsorship.  He has also been attending a [facility]'s group at [facility] for additional emotional and spiritual support.     Progress toward goal: Not at goal     Level of Functioning/Impairment (ASAM):  I.    Intoxication/Withdrawal:  No problem = 0  II.   Medical Conditions/Complications: No problem = 0  III.  Behavioral/Emotional/Cognitive: Mild problem = 1 (anxiety, worry about daughter's mental health even when she seems to be making progress)  IV.  Readiness to Change: Mild problem = 1 (external and internal motivation to change; ambivalence about initiating family conversations and  discussions r/t his recovery)  V.   Relapse Risk: Moderate problem = 2 (moderate cravings, poor coping skills, limited recovery experience)  VI:  Recovery Environment: Mild problem = 1 (family supportive of his sobriety but not actively involved in family recovery, limited knowledge on the nature of addiction as a family disease, strained relationships, exploring home group and sponsorship)  Total ASAM Score = 5     Prognosis: Fair with Ongoing Treatment      Family issues related to recovery:  No change, see previous encounters     Recovery/spiritual support group attendance: At least 2-3 times per week     Sponsor:  no                     If no, are you looking?  yes        Identification of environmental and personal barriers to recovery: coping with limited and/or negative emotions, feelings of remorse and guilt, work stress, strained family dynamics      Motivation for treatment: Improved mental and physical health, improving overall relationships with emphasis on being a  and father, and long-term sobriety     Self-reported number of days sober:  37    Impression/Formulation:     Diagnosis Plan   1. Uncomplicated alcohol dependence (CMS/HCC)     2. Anxiety disorder, unspecified type         CLINICAL MANUVERING/INTERVENTIONS:  Therapist utilized a person-centered approach to build rapport with group member.  Therapist implemented motivational interviewing techniques to assist client with exploring and resolving ambivalence associated with commitment to change behaviors related to substance use and addiction.  Therapist applied cognitive behavioral strategies to facilitate identification of maladaptive patterns of thinking and behavior that contribute to dimitri's risk for continued substance use and relapse.  Therapist employed group interaction activities to build rapport among group members, promote sobriety, and emphasize relapse prevention.  Therapist promoted safe nonjudgmental environment by  providing group members with unconditional positive regard and encouraging group members to comply with group rules and guidelines.  Therapist utilized dialectical behavior techniques to teach and model emotional regulation and relaxation.  Therapist assisted group member with identifying and implementing healthier coping strategies.  Group member was encouraged to attend community support group meetings, make positive daily choices, and maintain healthy boundaries.  Group member was encouraged to invite family members to family educational group on Wednesdays.      BASELINE SCORES 09/17/19  DASES: 82  URICA (Alcohol): 11.42 (Preparation/Action)  URICA (Drugs): 2.57 (Pre-contemplation)  PHQ-9: 10  HALI-7: 12     UPDATED SCORES:  10/15/19  PHQ-9: 3  HALI-7: 2    PLAN:  Continue [facility] IOP Phase I   Aftercare:  [facility] Phase II  Program Assignments:  Personal recovery plan, relapse prevention plan, attendance of recovery support group meetings, exploration of sponsorship, drug/alcohol screens.      Please note that portions of this note were completed with a voice recognition program. Efforts were made to edit dictation, but occasionally words are mistranscribed.      This document signed by Jolynn Bolaños [facility], October 17, 2019, 12:44 PM

## 2019-10-21 ENCOUNTER — OFFICE VISIT (OUTPATIENT)
Dept: PSYCHIATRY | Facility: HOSPITAL | Age: 54
End: 2019-10-21

## 2019-10-21 DIAGNOSIS — F10.20 UNCOMPLICATED ALCOHOL DEPENDENCE (HCC): Primary | ICD-10-CM

## 2019-10-21 DIAGNOSIS — F41.9 ANXIETY DISORDER, UNSPECIFIED TYPE: ICD-10-CM

## 2019-10-21 NOTE — PROGRESS NOTES
"NAME: Yousuf Barrera  DATE: 10/21/2019    Phase I  9:00AM - 12:00PM    IOP GROUP NOTE    DATA:     3 hour IOP group therapy session (Check-ins, Coping Skills, Relapse Prevention)     Check Ins:  Therapist continued facilitation of rapport building strategies between group members. Therapist asked that each patient check in with home life and recovery efforts and identify triggers, cravings, and high risk situations that arise between group sessions.  Group members discussed barriers and benefits of attending recovery meetings.  Therapist provided empathy and support during group session.  Therapist facilitated reading the daily motivation passages from Daily Reflections ([employer] 1991) and Just for Today (The 12 Steps and 12 Traditions, 1991) and invited group members to reflect and discuss.      Session Content/Coping Skills:  Therapist facilitated discussion on emotional and mental relapse.  Therapist continued providing psychoeducation and facilitation of discussion on changes at the thought level as warning signs of relapse, including loneliness leading to depression, irritation and anger, and impatience.  Group members identified recent warning signs of relapse and spent time processing these as a group.  Therapist facilitated more discussion on resentments by reading from Chapter 5 “How It Works” from the Big Book.    Response:  Yousuf was present for IOP and was a positive participant.  He disclosed relapsing over the weekend and participated in exploring his mental state and the events leading up to the relapse.  He reported that looking back on it he had been in an \"emoitonal relapse\" all week feeling anxious about how he would handle it \"when\" the next family crisis came as well as feeling resentful over the lack of support from his family.  He explored this more, stating he understood he broke trust in the relationship; however, his wife and daughter do not even acknowledge that Yousuf is in treatment for " "alcoholism.  He reported on Friday, they had plans to do something in the community as a family but his daughter no longer wanted to go.  They ended up going the next day, but his wife became frustrated \"that nobody is having any fun and this is supposed to be fun\" and went home.  While Yousuf was waiting to  his daughter from an extra-curricular activity that evening, he went to a [employer] restaurant that also describes itself as a \"beer emporium\" to watch football.  He checked out what they had on tap and saw that they had bourbon.  He ended up ordering and drinking 2 shots of bourbon that night.  He was able to acknowledge his emotional relapse and mental relapse when he essentially gave himself permission to use by going to that particular restaurant.  He went to a meeting the next day where he discussed the relapse and was given a lot of direct feedback about this.  He ended up getting a sponsor and reported feeling like he recovery was really starting now.  He was gently confronted about giving himself permission to relapse over the weekend by telling himself his recovery had not really started just because he did not have a home group or sponsor.  He was also given positive support and encouragement for not allowing the relapse to continue and by holding himself accountable the next day by going to a meeting and telling on himself.    ASSESSMENT:     Lab Review  Negative on 9/24/19  Negative on 10/8/19  Negative on 10/16/19    Mental Status Exam  Hygiene:  good  Dress: casual  Attitude: cooperative and agreeable   Motor Activity: appropriate  Eye Contact:  fair  Speech: regular rate and rhythm   Mood:  calm  Affect:  Appropriate  Thought Processes:  Linear  Thought Content:  Normal  Suicidal Thoughts:  denies  Homicidal Thoughts:  denies  Crisis Safety Plan: yes, to come to the emergency room.  Hallucinations:  denies  Reliability: fair  Insight: poor  Judgement: poor  Impulse Control: poor    Patient's " Support Network Includes:  The patient receives support from his wife, daughter, and employer, although his family is not as supportive as Yousuf would like and have yet to acknowledge that he is even in recovery.  Yousuf has been building a positive sober support network by attending AA meetings and exploring sponsorship.  He has also been attending a [facility]'s group at [facility] for additional emotional and spiritual support.    Progress toward goal: Not at goal    Level of Functioning/Impairment (ASAM):  I.    Intoxication/Withdrawal:  No problem = 0  II.   Medical Conditions/Complications: No problem = 0  III.  Behavioral/Emotional/Cognitive: Mild problem = 1 (anxiety, worry about daughter's mental health even when she seems to be making progress)  IV.  Readiness to Change: Moderate problem = 2 (external and internal motivation to change; ambivalence about initiating family conversations and discussions r/t his recovery; relapse over weekend)  V.   Relapse Risk: Moderate problem = 2 (moderate cravings, poor coping skills, limited recovery experience)  VI:  Recovery Environment: Mild problem = 1 (family supportive of his sobriety but not actively involved in family recovery, limited knowledge on the nature of addiction as a family disease, strained relationships, new sponsorship)  Total ASAM Score = 6     Prognosis: Fair with Ongoing Treatment      Family issues related to recovery:  No change, see previous encounters     Recovery/spiritual support group attendance: At least 2-3 times per week    Sponsor:  yes,  Have you made contact with  him/her this past week?  yes, plan to talk this evening via phone    Identification of environmental and personal barriers to recovery: coping with limited and/or negative emotions, feelings of remorse and guilt, work, family, overall understanding of disease concept     Identification of environmental and personal barriers to recovery: coping with limited and/or negative emotions,  feelings of remorse and guilt, work stress, strained family dynamics      Motivation for treatment: Improved mental and physical health, improving overall relationships with emphasis on being a  and father, and long-term sobriety     Self-reported number of days sober:  2    Impression/Formulation:     Diagnosis Plan   1. Uncomplicated alcohol dependence (CMS/HCC)     2. Anxiety disorder, unspecified type         CLINICAL MANUVERING/INTERVENTIONS:  Therapist utilized a person-centered approach to build rapport with group member.  Therapist implemented motivational interviewing techniques to assist client with exploring and resolving ambivalence associated with commitment to change behaviors related to substance use and addiction.  Therapist applied cognitive behavioral strategies to facilitate identification of maladaptive patterns of thinking and behavior that contribute to cleint's risk for continued substance use and relapse.  Therapist employed group interaction activities to build rapport among group members, promote sobriety, and emphasize relapse prevention.  Therapist promoted safe nonjudgmental environment by providing group members with unconditional positive regard and encouraging group members to comply with group rules and guidelines.  Therapist utilized dialectical behavior techniques to teach and model emotional regulation and relaxation.  Therapist assisted group member with identifying and implementing healthier coping strategies.  Group member was encouraged to attend community support group meetings, make positive daily choices, and maintain healthy boundaries.  Group member was encouraged to invite family members to family educational group on Wednesdays.      BASELINE SCORES 09/17/19  DASES: 82  URICA (Alcohol): 11.42 (Preparation/Action)  URICA (Drugs): 2.57 (Pre-contemplation)  PHQ-9: 10  HALI-7: 12     UPDATED SCORES:  10/15/19  PHQ-9: 3  HALI-7: 2    PLAN:  Continue [facility] Phase I   Aftercare:  [facility] Phase II  Program Assignments:  Personal recovery plan, relapse prevention plan, attendance of recovery support group meetings, exploration of sponsorship, drug/alcohol screens.      Please note that portions of this note were completed with a voice recognition program. Efforts were made to edit dictation, but occasionally words are mistranscribed.      This document signed by Jolynn Bolaños [facility], October 21, 2019, 12:52 PM

## 2019-10-22 ENCOUNTER — LAB (OUTPATIENT)
Dept: LAB | Facility: HOSPITAL | Age: 54
End: 2019-10-22

## 2019-10-22 ENCOUNTER — OFFICE VISIT (OUTPATIENT)
Dept: PSYCHIATRY | Facility: HOSPITAL | Age: 54
End: 2019-10-22

## 2019-10-22 VITALS — BODY MASS INDEX: 23.7 KG/M2 | HEIGHT: 72 IN | WEIGHT: 175 LBS

## 2019-10-22 DIAGNOSIS — F33.2 MAJOR DEPRESSIVE DISORDER, RECURRENT, SEVERE WITHOUT PSYCHOTIC FEATURES (HCC): ICD-10-CM

## 2019-10-22 DIAGNOSIS — F10.20 UNCOMPLICATED ALCOHOL DEPENDENCE (HCC): ICD-10-CM

## 2019-10-22 DIAGNOSIS — G47.00 INSOMNIA, UNSPECIFIED TYPE: ICD-10-CM

## 2019-10-22 DIAGNOSIS — F41.9 ANXIETY DISORDER, UNSPECIFIED TYPE: ICD-10-CM

## 2019-10-22 DIAGNOSIS — F10.20 UNCOMPLICATED ALCOHOL DEPENDENCE (HCC): Primary | ICD-10-CM

## 2019-10-22 PROCEDURE — 80307 DRUG TEST PRSMV CHEM ANLYZR: CPT

## 2019-10-22 PROCEDURE — 99213 OFFICE O/P EST LOW 20 MIN: CPT | Performed by: NURSE PRACTITIONER

## 2019-10-22 NOTE — PROGRESS NOTES
NAME: Yousuf Barrera  DATE: 10/22/2019    Phase I  9:00AM - 12:00PM    IOP GROUP NOTE    DATA:     3 hour IOP group therapy session (Check-ins, Coping Skills, Relapse Prevention)     Check Ins:  Therapist continued facilitation of rapport building strategies between group members. Therapist asked that each patient check in with home life and recovery efforts and identify triggers, cravings, and high risk situations that arise between group sessions.  Group members discussed barriers and benefits of attending recovery meetings.  Therapist provided empathy and support during group session.  Therapist facilitated reading the daily motivation passages from Daily Reflections ([employer] 1991) and Just for Today (The 12 Steps and 12 Traditions, 1991) and invited group members to reflect and discuss.      Session Content/Coping Skills:  Therapist checked in with group members on their homework assignment from yesterday in which they were asked to read and write a reflection on “There’s a Hole in My Sidewalk” by Briseida Gaines about identifying pitfalls and learning from past mistakes.  Group members identified their own pitfalls, how they found a way out before, denial, acceptance, and taking new action to avoid falling in the same pitfalls.  Therapist reviewed the cognitive behavioral model on the relationship between thoughts, feelings, and behavior.  Therapist introduced unhelpful thinking patterns and facilitated discussion by inviting group members to share and brainstorm examples from their own experiences.  Group members openly discussed their own patterns of unhelpful thoughts and how it relates to addiction and recovery.    Response:  Yousuf was present for [facility] and was a positive participant.  He reported talking to his sponsor last night and is looking forward to working with someone outside of [facility].  One of his assignments is to read the Serenity Prayer and another prayer from the Big Book in the morning and in the  "evening.  He reflected on how even though he read the Serenity Prayer daily while in detox, he never really connected it or was able to apply it to himself.  He discussed how the Serenity Prayer has started to have actual meaning to him since he has been reading it and reflecting on it more.  He also discussed having difficulty concentrating and difficulty with abstract thought and was receptive to the suggestion that this could be partially due to early recovery and post-acute withdrawal where his brain is still healing itself.  He spoke more about his relationship with his wife, and frustration with her lack of support or even acknowledgment.  He shared that his wife had discouraged him from telling their daughter about his alcohol addiction but then discussed it with their daughter herself without Yousuf.  His feelings were validated, and he was provided with emotional support.  We discussed healthy boundaries including our right to express our own needs and wants.  Yousuf reports he has avoided bringing up his alcohol use and recovery with his family, hoping that his wife will initiate the conversation.  When she has brought it up herself, it has been more resentful than supportive, for example \"How does it feel to be an alcoholic?\" and \"Do you really need to miss work to go to IOP?\"     ASSESSMENT:      Lab Review  Negative on 9/24/19  Negative on 10/8/19  Negative on 10/16/19     Mental Status Exam  Hygiene:  good  Dress: casual  Attitude: cooperative and agreeable   Motor Activity: appropriate  Eye Contact:  fair  Speech: regular rate and rhythm   Mood:  calm  Affect:  Appropriate  Thought Processes:  Linear  Thought Content:  Normal  Suicidal Thoughts:  denies  Homicidal Thoughts:  denies  Crisis Safety Plan: yes, to come to the emergency room.  Hallucinations:  denies  Reliability: fair  Insight: poor  Judgement: poor  Impulse Control: poor     Patient's Support Network Includes:  The patient receives support " from his wife, daughter, and employer, although his family is not as supportive as Yousuf would like and have yet to acknowledge that he is even in recovery.  Yousuf has been building a positive sober support network by attending AA meetings and exploring sponsorship.  He has also been attending a [facility]'s group at [facility] for additional emotional and spiritual support.     Progress toward goal: Not at goal     Level of Functioning/Impairment (ASAM):  I.    Intoxication/Withdrawal:  No problem = 0  II.   Medical Conditions/Complications: No problem = 0  III.  Behavioral/Emotional/Cognitive: Mild problem = 1 (anxiety, worry about daughter's mental health even when she seems to be making progress)  IV.  Readiness to Change: Moderate problem = 2 (external and internal motivation to change; ambivalence about initiating family conversations and discussions r/t his recovery; relapse over weekend)  V.   Relapse Risk: Moderate problem = 2 (moderate cravings, poor coping skills, limited recovery experience)  VI:  Recovery Environment: Mild problem = 1 (family supportive of his sobriety but not actively involved in family recovery, limited knowledge on the nature of addiction as a family disease, strained relationships, new sponsorship)  Total ASAM Score = 6     Prognosis: Fair with Ongoing Treatment      Family issues related to recovery:  No change, see previous encounters     Recovery/spiritual support group attendance: At least 2-3 times per week     Sponsor:  yes,  Have you made contact with  him/her this past week?  yes, plan to talk this evening via phone     Identification of environmental and personal barriers to recovery: coping with limited and/or negative emotions, feelings of remorse and guilt, work, family, overall understanding of disease concept      Identification of environmental and personal barriers to recovery: coping with limited and/or negative emotions, feelings of remorse and guilt, work stress, strained  family dynamics      Motivation for treatment: Improved mental and physical health, improving overall relationships with emphasis on being a  and father, and long-term sobriety     Self-reported number of days sober:  3    Impression/Formulation:     Diagnosis Plan   1. Uncomplicated alcohol dependence (CMS/HCC)     2. Anxiety disorder, unspecified type     3. Insomnia, unspecified type         CLINICAL MANUVERING/INTERVENTIONS:  Therapist utilized a person-centered approach to build rapport with group member.  Therapist implemented motivational interviewing techniques to assist client with exploring and resolving ambivalence associated with commitment to change behaviors related to substance use and addiction.  Therapist applied cognitive behavioral strategies to facilitate identification of maladaptive patterns of thinking and behavior that contribute to cleint's risk for continued substance use and relapse.  Therapist employed group interaction activities to build rapport among group members, promote sobriety, and emphasize relapse prevention.  Therapist promoted safe nonjudgmental environment by providing group members with unconditional positive regard and encouraging group members to comply with group rules and guidelines.  Therapist utilized dialectical behavior techniques to teach and model emotional regulation and relaxation.  Therapist assisted group member with identifying and implementing healthier coping strategies.  Group member was encouraged to attend community support group meetings, make positive daily choices, and maintain healthy boundaries.  Group member was encouraged to invite family members to family educational group on Wednesdays.      BASELINE SCORES 09/17/19  DASES: 82  URICA (Alcohol): 11.42 (Preparation/Action)  URICA (Drugs): 2.57 (Pre-contemplation)  PHQ-9: 10  HALI-7: 12     UPDATED SCORES:  10/15/19  PHQ-9: 3  HALI-7: 2    PLAN:  Continue [facility] IOP  Phase I   Aftercare:  [facility] Phase II  Program Assignments:  Personal recovery plan, relapse prevention plan, attendance of recovery support group meetings, exploration of sponsorship, drug/alcohol screens.      Please note that portions of this note were completed with a voice recognition program. Efforts were made to edit dictation, but occasionally words are mistranscribed.      This document signed by Jolynn Bolaños [facility], October 22, 2019, 12:21 PM

## 2019-10-22 NOTE — PROGRESS NOTES
Yousuf Barrera is a 54 y.o. male is here today for medication management follow-up from [facility].    Chief Complaint:      ICD-10-CM ICD-9-CM   1. Uncomplicated alcohol dependence (CMS/[facility]) F10.20 303.90   2. Anxiety disorder, unspecified type F41.9 300.00   3. Insomnia, unspecified type G47.00 780.52       History of Present Illness:  Pt presents for follow up visit and medication management of alcohol dependency. Pt reports that Acamprosate has helped control or lessen his alcohol cravings. Experienced some nausea and constipation initially but side effects resolved within a few days. Pt did have a relapse and had had two shots of Gage but did attend a meeting the next day and has a sponsor now. Repots that he has had more anxiety symptoms this week; pt has been taking Hydroxyzine twice a day.    Pt reports the presence/absence of the following anxiety symptoms: (-) excessive worry, (-) excessive fear, (+) difficulty relaxing, (-) restlessness, (-) insomnia, (-) easily fatigued, (-) irritability, (-) poor concentration, (+) racing thoughts, and (-) panic episodes.     The following portions of the patient's history were reviewed and updated as appropriate: allergies, current medications, past family history, past medical history, past social history, past surgical history and problem list.    Review of Systems;;  Review of Systems   Constitutional: Negative.  Negative for activity change, appetite change, unexpected weight gain and unexpected weight loss.   Respiratory: Negative.    Cardiovascular: Negative.  Negative for chest pain.   Gastrointestinal: Negative.  Negative for diarrhea, nausea and vomiting.   Genitourinary: Negative.    Musculoskeletal: Negative.    Skin: Negative for rash and bruise.   Neurological: Negative.  Negative for dizziness, seizures and speech difficulty.   Psychiatric/Behavioral: Negative for agitation, behavioral problems, decreased concentration, dysphoric mood, hallucinations,  "self-injury, sleep disturbance, suicidal ideas, negative for hyperactivity, depressed mood and stress. The patient is nervous/anxious.        Physical Exam;;  Physical Exam  Height 182.9 cm (72\"), weight 79.4 kg (175 lb).    Current Medications;;    Current Outpatient Medications:   •  acamprosate (CAMPRAL) 333 MG EC tablet, Take 2 tablets by mouth 3 (Three) Times a Day., Disp: 180 tablet, Rfl: 0  •  hydrOXYzine (ATARAX) 25 MG tablet, Take 1 tablet by mouth Every 8 (Eight) Hours As Needed for Anxiety., Disp: 60 tablet, Rfl: 2  •  losartan (COZAAR) 50 MG tablet, Take 50 mg by mouth Daily., Disp: , Rfl:   •  traZODone (DESYREL) 50 MG tablet, Take 1 tablet by mouth At Night As Needed for Sleep., Disp: 30 tablet, Rfl: 2    Lab Results:       Mental Status Exam:   Hygiene:   good  Cooperation:  Cooperative  Eye Contact:  Good  Psychomotor Behavior:  Appropriate  Mood:euthymic  Affect:  Appropriate  Hopelessness: Denies  Speech:  Normal  Thought Process:  Goal directed  Thought Content:  Normal  Suicidal:  None  Homicidal:  None  Hallucinations:  None  Delusion:  None  Memory:  Intact  Orientation:  Person, Place, Time and Situation  Reliability:  good  Insight:  Good  Judgement:  Good  Impulse Control:  Good  Physical/Medical Issues:  No       Assessment Plan;;  Diagnoses and all orders for this visit:    Uncomplicated alcohol dependence (CMS/HCC)    Anxiety disorder, unspecified type    Insomnia, unspecified type    Continue current doses of Acamprosate and Trazodone     May take Hydroxyzine three times per day prn    A psychological evaluation was conducted in order to assess past and current level of functioning. Areas assessed included, but were not limited to: perception of social support, perception of ability to face and deal with challenges in life (positive functioning), anxiety symptoms, depressive symptoms, perspective on beliefs/belief system, coping skills for stress, intelligence level,  Therapeutic rapport " was established. Interventions conducted today were geared towards incorporating medication management along with support for continued therapy. Education was also provided as to the med management with this provider and what to expect in subsequent sessions.    We discussed risks, benefits,goals and side effects of the above medication and the patient was agreeable with the plan.Patient was educated on the importance of compliance with treatment and follow-up appointments. Patient is aware to contact the [facility] with any worsening of symptoms. To call for questions or concerns and return early if necessary. Patent is agreeable to go to the Emergency Department or call 911 should they begin SI/HI.     Treatment Plan: stabilize mood,  patient will stay out of the hospital and be at optimal level of functioning, take all medication as prescribed. Patient verbalized  understanding and agreement to plan.    Return in about 3 weeks (around 11/12/2019) for Follow Up.    Rosanne John, DNP, APRN, PMHNP-BC, FNP-C

## 2019-10-23 ENCOUNTER — OFFICE VISIT (OUTPATIENT)
Dept: PSYCHIATRY | Facility: HOSPITAL | Age: 54
End: 2019-10-23

## 2019-10-23 DIAGNOSIS — F41.9 ANXIETY DISORDER, UNSPECIFIED TYPE: ICD-10-CM

## 2019-10-23 DIAGNOSIS — F10.20 UNCOMPLICATED ALCOHOL DEPENDENCE (HCC): Primary | ICD-10-CM

## 2019-10-23 LAB — ETHANOL UR-MCNC: NEGATIVE %

## 2019-10-23 NOTE — PROGRESS NOTES
NAME: Yousuf Barrera  DATE: 10/23/2019    Phase I  9:00AM - 12:00PM    IOP GROUP NOTE    DATA:     3 hour IOP group therapy session (Check-ins, Coping Skills, Relapse Prevention)     Check Ins:  Therapist continued facilitation of rapport building strategies between group members. Therapist asked that each patient check in with home life and recovery efforts and identify triggers, cravings, and high risk situations that arise between group sessions.  Group members discussed barriers and benefits of attending recovery meetings.  Therapist provided empathy and support during group session.  Therapist facilitated reading the daily motivation passages from Daily Reflections ([employer] 1991) and Just for Today (The 12 Steps and 12 Traditions, 1991) and invited group members to reflect and discuss.      Session Content/Coping Skills:  Therapist reviewed unhelpful thinking styles and facilitated further discussion on cognitive distortions.  Therapist utilized a video clip to demonstrate unhelpful thinking styles and invited group members to identify the types of distortions portrayed in the video.  Therapist introduced methods for challenging unhelpful/irrational thoughts, emphasizing the goal is not to avoid negative feelings entirely but to check our thoughts for accuracy and change them once we have identified them as irrational or inaccurate.      Response:  Yousuf was present for IOP and was a positive participant.  He shared about an interaction he had with his wife in which she expressed her needs to him.  He discussed how since he came back from detox, he has been wearing the same sweatshirt and hospital socks that he wore while he was down there.  His wife expressed how this was a reminder of his dishonesty with her and asked him not to wear those items anymore.  Yousuf stated he understood how that would be triggering for her and they also discussed how these sweaters had been a source of comfort for Yousuf.  He agreed  "it was time for him to let go of these items and reflected that he did not think he had even washed the sweatshirt yet.  He discussed feeling comfortable and understood while in detox and wearing the same sweatshirt he wore there gave him a sense of comfort in his home where he does not feel understood.  He reflected on how he did not express his own needs but felt it was a positive and productive conversation they had whereas they had been \"stuck\" and not communicating at all really.  He opened up a lot more and shared a lot more in depth about how his daughter's struggles affected him and the family.  He was able to discern that his daughter running away and all the emergency vehicles outside of their house that night completely changed his perception of his family as a \"normal\" family.  He remembers the date when this all happened and discussed having difficulty \"forgetting\" that date or putting it in the past.  He compared his relationship with his wife to that of another person he knows who is in recovery and whose wife is seemingly supportive.  He also compared his family dynamic and what his daughter is going through with that of \"normal\" families, reflecting on his thoughts that they are the only family going through this.  He was receptive to feedback disputing these social comparisons as not only unhelpful but also irrational as they are based on his own perceptions and partial information as opposed to concrete facts.      ASSESSMENT:      Lab Review  Negative on 9/24/19  Negative on 10/8/19  Negative on 10/16/19  Negative on 10/23/19 (ethanol pending)     Mental Status Exam  Hygiene:  good  Dress: casual  Attitude: cooperative and agreeable   Motor Activity: appropriate  Eye Contact:  fair  Speech: regular rate and rhythm   Mood:  calm  Affect:  Appropriate  Thought Processes:  Linear  Thought Content:  Normal  Suicidal Thoughts:  denies  Homicidal Thoughts:  denies  Crisis Safety Plan: yes, to come to " the emergency room.  Hallucinations:  denies  Reliability: fair  Insight: poor  Judgement: poor  Impulse Control: poor     Patient's Support Network Includes:  The patient receives support from his wife, daughter, and employer, although his family is not as supportive as Yousuf would like and have yet to acknowledge that he is even in recovery.  Yousuf has been building a positive sober support network by attending AA meetings and exploring sponsorship.  He has also been attending a [facility]'s group at [facility] for additional emotional and spiritual support.     Progress toward goal: Not at goal     Level of Functioning/Impairment (ASAM):  I.    Intoxication/Withdrawal:  No problem = 0  II.   Medical Conditions/Complications: No problem = 0  III.  Behavioral/Emotional/Cognitive: Mild problem = 1 (anxiety, worry about daughter's mental health even when she seems to be making progress)  IV.  Readiness to Change: Moderate problem = 2 (external and internal motivation to change; ambivalence about initiating family conversations and discussions r/t his recovery; relapse over weekend)  V.   Relapse Risk: Moderate problem = 2 (moderate cravings, poor coping skills, limited recovery experience)  VI:  Recovery Environment: Mild problem = 1 (family supportive of his sobriety but not actively involved in family recovery, limited knowledge on the nature of addiction as a family disease, strained relationships, new sponsorship)  Total ASAM Score = 6     Prognosis: Fair with Ongoing Treatment      Family issues related to recovery:  No change, see previous encounters     Recovery/spiritual support group attendance: At least 2-3 times per week     Sponsor:  yes,  Have you made contact with him/her this past week?  yes, daily     Identification of environmental and personal barriers to recovery: coping with limited and/or negative emotions, feelings of remorse and guilt, work stress, strained family dynamics      Motivation for  treatment: Improved mental and physical health, improving overall relationships with emphasis on being a  and father, long-term sobriety, and closer spiritual connection     Self-reported number of days sober:  4    Impression/Formulation:     Diagnosis Plan   1. Uncomplicated alcohol dependence (CMS/HCC)     2. Anxiety disorder, unspecified type         CLINICAL MANUVERING/INTERVENTIONS:  Therapist utilized a person-centered approach to build rapport with group member.  Therapist implemented motivational interviewing techniques to assist client with exploring and resolving ambivalence associated with commitment to change behaviors related to substance use and addiction.  Therapist applied cognitive behavioral strategies to facilitate identification of maladaptive patterns of thinking and behavior that contribute to cleint's risk for continued substance use and relapse.  Therapist employed group interaction activities to build rapport among group members, promote sobriety, and emphasize relapse prevention.  Therapist promoted safe nonjudgmental environment by providing group members with unconditional positive regard and encouraging group members to comply with group rules and guidelines.  Therapist utilized dialectical behavior techniques to teach and model emotional regulation and relaxation.  Therapist assisted group member with identifying and implementing healthier coping strategies.  Group member was encouraged to attend community support group meetings, make positive daily choices, and maintain healthy boundaries.  Group member was encouraged to invite family members to family educational group on Wednesdays.      BASELINE SCORES 09/17/19  DASES: 82  URICA (Alcohol): 11.42 (Preparation/Action)  URICA (Drugs): 2.57 (Pre-contemplation)  PHQ-9: 10  HALI-7: 12     UPDATED SCORES:  10/15/19  PHQ-9: 3  HALI-7: 2    PLAN:  Continue [facility] IOP Phase I   Aftercare:  [facility]  Program Assignments:  Personal recovery plan, relapse prevention plan, attendance of recovery support group meetings, exploration of sponsorship, drug/alcohol screens.      Please note that portions of this note were completed with a voice recognition program. Efforts were made to edit dictation, but occasionally words are mistranscribed.      This document signed by Jolynn Bolaños [facility], October 23, 2019, 12:28 PM

## 2019-10-24 ENCOUNTER — OFFICE VISIT (OUTPATIENT)
Dept: PSYCHIATRY | Facility: HOSPITAL | Age: 54
End: 2019-10-24

## 2019-10-24 DIAGNOSIS — F10.20 UNCOMPLICATED ALCOHOL DEPENDENCE (HCC): Primary | ICD-10-CM

## 2019-10-24 DIAGNOSIS — F41.9 ANXIETY DISORDER, UNSPECIFIED TYPE: ICD-10-CM

## 2019-10-24 NOTE — PROGRESS NOTES
NAME: Yousuf Barrera  DATE: 10/24/2019    Phase I  9:00AM - 12:00PM    IOP GROUP NOTE    DATA:     3 hour IOP group therapy session (Check-ins, Coping Skills, Relapse Prevention)     Check Ins:  Therapist continued facilitation of rapport building strategies between group members. Therapist asked that each patient check in with home life and recovery efforts and identify triggers, cravings, and high risk situations that arise between group sessions.  Group members discussed barriers and benefits of attending recovery meetings.  Therapist provided empathy and support during group session.  Therapist facilitated reading the daily motivation passages from Daily Reflections ([employer] 1991) and Just for Today (The 12 Steps and 12 Traditions, 1991) and invited group members to reflect and discuss.      Session Content/Coping Skills:  Therapist provided psychoeducation on the fight or flight response.   Group members openly discussed the relationship between anxiety, emotions, and avoidance and their experiences with anxiety and addiction.  Therapist introduced several relaxation techniques, including intentional breathing, guided imagery, and progressive muscle relaxation (PMR) as a relaxation technique.  Therapist facilitated instructional practice of each technique.  Therapist facilitated discussion by inviting group members to reflect on the exercises.    Response:  Yousuf was present for IOP and was a positive participant.  He took a less active role in group today as another group member discussed their own struggles.  He did provided emotional support to the other group member.  He also briefly discussed how his family dynamic changed when their foreign exchange student moved in, discussing how this makes it more difficult for he and his wife to freely discuss their issues.  He mostly discussed how he thought having an exchange student would be good for their daughter since she has had difficulty with her peers.  He  reflected on how he thought it has been good for his daughter because the exchange student is older and more emotional mature whereas his daughter is more academically advanced but not as emotionally mature as her peers.  He was receptive to feedback from another group member regarding the emotional roller coaster and pressures teen girls go through and how this can be unpredictable.  He participated in the relaxation exercises and reflected on feeling more relaxed after PMR.     ASSESSMENT:      Lab Review  Negative on 9/24/19  Negative on 10/8/19  Negative on 10/16/19  Negative on 10/23/19 (ethanol pending)     Mental Status Exam  Hygiene:  good  Dress: casual  Attitude: cooperative and agreeable   Motor Activity: appropriate  Eye Contact:  fair  Speech: regular rate and rhythm   Mood:  calm  Affect:  Appropriate  Thought Processes:  Linear  Thought Content:  Normal  Suicidal Thoughts:  denies  Homicidal Thoughts:  denies  Crisis Safety Plan: yes, to come to the emergency room.  Hallucinations:  denies  Reliability: fair  Insight: poor  Judgement: poor  Impulse Control: poor     Patient's Support Network Includes:  The patient receives support from his wife, daughter, and employer, although his family is not as supportive as Yousuf would like and have yet to acknowledge that he is even in recovery.  Yousuf has been building a positive sober support network by attending AA meetings and exploring sponsorship.  He has also been attending a men's group at [facility] for additional emotional and spiritual support.     Progress toward goal: Not at goal     Level of Functioning/Impairment (ASAM):  I.    Intoxication/Withdrawal:  No problem = 0  II.   Medical Conditions/Complications: No problem = 0  III.  Behavioral/Emotional/Cognitive: Mild problem = 1 (anxiety, worry about daughter's mental health even when she seems to be making progress)  IV.  Readiness to Change: Moderate problem = 2 (external and internal motivation to  change; ambivalence about initiating family conversations and discussions r/t his recovery; relapse over weekend)  V.   Relapse Risk: Moderate problem = 2 (moderate cravings, poor coping skills, limited recovery experience)  VI:  Recovery Environment: Mild problem = 1 (family supportive of his sobriety but not actively involved in family recovery, limited knowledge on the nature of addiction as a family disease, strained relationships, new sponsorship)  Total ASAM Score = 6     Prognosis: Fair with Ongoing Treatment      Family issues related to recovery:  No change, see previous encounters     Recovery/spiritual support group attendance: At least 2-3 times per week     Sponsor:  yes,  Have you made contact with him/her this past week?  yes, daily     Identification of environmental and personal barriers to recovery: coping with limited and/or negative emotions, feelings of remorse and guilt, work stress, strained family dynamics      Motivation for treatment: Improved mental and physical health, improving overall relationships with emphasis on being a  and father, long-term sobriety, and closer spiritual connection     Self-reported number of days sober:  5    Impression/Formulation:     Diagnosis Plan   1. Uncomplicated alcohol dependence (CMS/HCC)     2. Anxiety disorder, unspecified type         CLINICAL MANUVERING/INTERVENTIONS:  Therapist utilized a person-centered approach to build rapport with group member.  Therapist implemented motivational interviewing techniques to assist client with exploring and resolving ambivalence associated with commitment to change behaviors related to substance use and addiction.  Therapist applied cognitive behavioral strategies to facilitate identification of maladaptive patterns of thinking and behavior that contribute to cleint's risk for continued substance use and relapse.  Therapist employed group interaction activities to build rapport among group members, promote  sobriety, and emphasize relapse prevention.  Therapist promoted safe nonjudgmental environment by providing group members with unconditional positive regard and encouraging group members to comply with group rules and guidelines.  Therapist utilized dialectical behavior techniques to teach and model emotional regulation and relaxation.  Therapist assisted group member with identifying and implementing healthier coping strategies.  Group member was encouraged to attend community support group meetings, make positive daily choices, and maintain healthy boundaries.  Group member was encouraged to invite family members to family educational group on Wednesdays.      BASELINE SCORES 09/17/19  DASES: 82  URICA (Alcohol): 11.42 (Preparation/Action)  URICA (Drugs): 2.57 (Pre-contemplation)  PHQ-9: 10  HALI-7: 12     UPDATED SCORES:  10/15/19  PHQ-9: 3  HALI-7: 2    PLAN:  Continue [facility] IOP Phase I   Aftercare:  [facility] II  Program Assignments:  Personal recovery plan, relapse prevention plan, attendance of recovery support group meetings, exploration of sponsorship, drug/alcohol screens.      Please note that portions of this note were completed with a voice recognition program. Efforts were made to edit dictation, but occasionally words are mistranscribed.      This document signed by Jolynn Bolaños [facility], October 24, 2019, 1:10 PM

## 2019-10-28 ENCOUNTER — OFFICE VISIT (OUTPATIENT)
Dept: PSYCHIATRY | Facility: HOSPITAL | Age: 54
End: 2019-10-28

## 2019-10-28 DIAGNOSIS — F10.20 UNCOMPLICATED ALCOHOL DEPENDENCE (HCC): Primary | ICD-10-CM

## 2019-10-28 DIAGNOSIS — F41.9 ANXIETY DISORDER, UNSPECIFIED TYPE: ICD-10-CM

## 2019-10-28 NOTE — PROGRESS NOTES
NAME: Yousuf Barrera  DATE: 10/28/2019    Phase I  9:00AM - 12:00PM    IOP GROUP NOTE    DATA:     3 hour IOP group therapy session (Check-ins, Coping Skills, Relapse Prevention)     Check Ins:  Therapist continued facilitation of rapport building strategies between group members. Therapist asked that each patient check in with home life and recovery efforts and identify triggers, cravings, and high risk situations that arise between group sessions.  Group members discussed barriers and benefits of attending recovery meetings.  Therapist provided empathy and support during group session.  Therapist facilitated reading the daily motivation passages from Daily Reflections ([employer] 1991) and Just for Today (The 12 Steps and 12 Traditions, 1991) and invited group members to reflect and discuss.      Session Content/Coping Skills:  Therapist facilitated discussion by referring back to the daily reflection on attitude.  Therapist invited group members to discuss the connection between attitude and returning to old behaviors.  Therapist discussed change and encouraged group members to reflect on the things in their lives they cannot change and what they can.  Group members brainstormed actions they can take to change the things they have control over.  Group members journaled for the last 20 minutes of group and reflected on the exercise.    Response: Yousuf was present for IOP group and was a positive participant.  He reported having a very busy weekend between taking his daughter to a horse tack sale and therefore an exchange student having a birthday party and sleepover.  He initially commiserated on how bad the weather was, cold and rainy, while he stood outside with his daughter to get into this sale.  He then reflected on how much better his daughter handled the weather than he did.  He also reflected on how much his daughter talked to him and how nice it was to spend one-on-one time with her.  He attended a new  meeting on Thursday night and stated that he did not really care for it because he was 1 of only 2 men there and also felt like the meeting was not very focused.  He also discussed his disappointment with his sponsor.  He spoke to a sponsor Monday and Tuesday nights last week.  He tried to call his sponsor Wednesday night and got a voicemail.  He expected to hear from his sponsor after that and never did.  He also expressed frustration that no body from his home group reached out to him during the week or when he missed their meeting on Sunday afternoon.  He was receptive to group feedback about his disappointment stemming from his very specific expectations of his AA group and other people including a sponsor.  He independently acknowledged that he could have reached out to any of the folks in his AA group as well as his sponsor but did not.  He also reflected on how sometimes when he has shared in that group he felt like the other group members were not really listening to him.  On the other hand, he independently acknowledged the discrepancy between his thoughts that he is not being listened to (because they would call him out when he minimized) versus this being exactly what he liked about that particular meeting in the first place (the accountability and no BS).    ASSESSMENT:     Lab Review  Negative on 9/24/19  Negative on 10/8/19  Negative on 10/16/19  Negative on 10/23/19 (ethanol pending)     Mental Status Exam  Hygiene:  good  Dress: casual  Attitude: cooperative and agreeable   Motor Activity: appropriate  Eye Contact:  fair  Speech: regular rate and rhythm   Mood:  calm  Affect:  Appropriate  Thought Processes:  Linear  Thought Content:  Normal  Suicidal Thoughts:  denies  Homicidal Thoughts:  denies  Crisis Safety Plan: yes, to come to the emergency room.  Hallucinations:  denies  Reliability: fair  Insight: poor  Judgement: poor  Impulse Control: poor     Patient's Support Network Includes:  The  patient receives support from his wife, daughter, and employer, although his family is not as supportive as Yousuf would like and have yet to acknowledge that he is even in recovery.  Yousuf has been building a positive sober support network by attending AA meetings and exploring sponsorship.  He has also been attending a men's group at [facility] for additional emotional and spiritual support.     Progress toward goal: Not at goal     Level of Functioning/Impairment (ASAM):  I.    Intoxication/Withdrawal:  No problem = 0  II.   Medical Conditions/Complications: No problem = 0  III.  Behavioral/Emotional/Cognitive: Mild problem = 1 (anxiety, worry about daughter's mental health even when she seems to be making progress)  IV.  Readiness to Change: Moderate problem = 2 (external and internal motivation to change; ambivalence about initiating family conversations and discussions r/t his recovery; relapse over weekend)  V.   Relapse Risk: Moderate problem = 2 (moderate cravings, poor coping skills, limited recovery experience)  VI:  Recovery Environment: Mild problem = 1 (family supportive of his sobriety but not actively involved in family recovery, limited knowledge on the nature of addiction as a family disease, strained relationships, new sponsorship)  Total ASAM Score = 6     Prognosis: Fair with Ongoing Treatment      Family issues related to recovery:  No change, see previous encounters     Recovery/spiritual support group attendance: At least 2-3 times per week     Sponsor:  yes,  Have you made contact with him/her this past week?  yes, daily     Identification of environmental and personal barriers to recovery: coping with limited and/or negative emotions, feelings of remorse and guilt, work stress, strained family dynamics      Motivation for treatment: Improved mental and physical health, improving overall relationships with emphasis on being a  and father, long-term sobriety, and closer spiritual  connection     Self-reported number of days sober:  9    Impression/Formulation:     Diagnosis Plan   1. Uncomplicated alcohol dependence (CMS/HCC)     2. Anxiety disorder, unspecified type         CLINICAL MANUVERING/INTERVENTIONS:  Therapist utilized a person-centered approach to build rapport with group member.  Therapist implemented motivational interviewing techniques to assist client with exploring and resolving ambivalence associated with commitment to change behaviors related to substance use and addiction.  Therapist applied cognitive behavioral strategies to facilitate identification of maladaptive patterns of thinking and behavior that contribute to cleint's risk for continued substance use and relapse.  Therapist employed group interaction activities to build rapport among group members, promote sobriety, and emphasize relapse prevention.  Therapist promoted safe nonjudgmental environment by providing group members with unconditional positive regard and encouraging group members to comply with group rules and guidelines.  Therapist utilized dialectical behavior techniques to teach and model emotional regulation and relaxation.  Therapist assisted group member with identifying and implementing healthier coping strategies.  Group member was encouraged to attend community support group meetings, make positive daily choices, and maintain healthy boundaries.  Group member was encouraged to invite family members to family educational group on Wednesdays.      BASELINE SCORES 09/17/19  DASES: 82  URICA (Alcohol): 11.42 (Preparation/Action)  URICA (Drugs): 2.57 (Pre-contemplation)  PHQ-9: 10  AHLI-7: 12     UPDATED SCORES:  10/15/19  PHQ-9: 3  HALI-7: 2    PLAN:  Continue [facility] IOP Phase I   Aftercare:  [facility] Phase II  Program Assignments:  Personal recovery plan, relapse prevention plan, attendance of recovery support group meetings, exploration of  sponsorship, drug/alcohol screens.      Please note that portions of this note were completed with a voice recognition program. Efforts were made to edit dictation, but occasionally words are mistranscribed.      This document signed by Jolynn Bolaños [facility], October 28, 2019, 12:51 PM

## 2019-10-29 ENCOUNTER — OFFICE VISIT (OUTPATIENT)
Dept: PSYCHIATRY | Facility: HOSPITAL | Age: 54
End: 2019-10-29

## 2019-10-29 DIAGNOSIS — F10.20 UNCOMPLICATED ALCOHOL DEPENDENCE (HCC): Primary | ICD-10-CM

## 2019-10-29 DIAGNOSIS — F41.9 ANXIETY DISORDER, UNSPECIFIED TYPE: ICD-10-CM

## 2019-10-29 NOTE — TREATMENT PLAN
[facility]    Intensive Outpatient Program for Chemical Dependence (CD IOP)      Treatment Plan Reassessment (biweekly)      Long Term Goal:  Yousuf will establish a sustained recovery and will maintain total abstinence from mind-altering substances other than what is prescribed and/or approved by the attending physician. He will learn, practice, and utilize behavioral and cognitive coping skills to help maintain sobriety.    Patient Care Needs:  Yousuf presents with uncomplicated alcohol dependence and is at high risk for relapse without continued treatment.  He has a history of using drugs/alcohol until intoxicated or passed out.  He has been unable to stop or cut down use of alcohol/drugs once starting despite verbalized desire to do so and the negative consequences from continued use.  Yousuf's use has negatively impacted social, occupational, and/or physical functioning.    1.  Patient will participate in a medical evaluation to assess the effects of chemical dependence and will cooperate with an evaluation by the attending psychiatrist or psychiatric nurse practitioner for psychotropic medication if appropriate.     2.  Patient will adhere to the [facility] group rules.     3.  Patient will attend group sessions as scheduled. Patient will notify therapist and support staff of any absences or tardies. Patient will provide a valid and verifiable excuse for absences to be considered excused.     4.  Patient will participate in random drug and alcohol screenings and will exhibit negative results on said screenings.     5.  Patient will identify high risk situations, people, and places to avoid or manage in order to maintain sobriety.     6.  Patient will participate in group discussions by giving and receiving feedback appropriately.     7.  Patient will develop a positive network of support by attending a minimum of three recovery/spiritual support groups each week (two outside of [facility]  group) and by obtaining or maintaining a sponsor or sober support person.     8.  Patient will identify, practice, and implement at least 5 healthy coping strategies to manage difficult emotions while remaining abstinent.     9.  Patient will develop a relapse prevention plan and share it with the group.     10.  Patient will write a personal recovery plan and share it with the group prior to discharge.     11.  Patient will encourage family participation in weekly family education groups, if appropriate.     12.  Patient will exhibit increased internal and external motivation to change as assessed by his/her cooperation, behavior, and URICA score.     13. Patient will show a decrease in score on the PHQ-9 depression scale indicating improvement in frequency of depressive symptoms.     14. Patient will demonstrate a decrease in score on the HALI-7 questionnaire indicating improvement in frequency of anxiety symptoms.  1. Patient participated in an evaluation for medication management with MELVIN Eaton.  Patient is meeting with medical provider as scheduled for medication management and follow-up.     2.  Patient read and signed IOP Group Rules. Patient is adhering to group rules.     3.  Patient has 0 unexcused absences.     4.  Patient has displayed negative results on urine drug and alcohol screenings thus far.     5.  Patient has encountered the following high-risk situations since beginning treatment: relationship conflict, strong cravings, being around alcohol, and expectation trap disappointments.      6.  Patient provides and receives feedback daily. When not actively participating, patient is attentive and appropriate.     7.  Patient attends recovery/spiritual support group meetings at least 2-3 times per week. Patient does have a sponsor and is not working the 12 steps.     8.  Patient has identified and implemented the following coping strategies during high risk situations: go to a meeting and  talk about it, distract self/stay busy, pause, PMR, and thinking twice.     9.  Patient has identified high-risk people, places, and things as well as healthy coping strategies for avoiding relapse.     10.  Patient is developing a personal recovery plan.     11.  No family participation.     12.  Patient reports cravings have improved.  Patient's URICA score and behavior indicate he is in the preparation/action stage of change.     13.  Current score on PHQ-9 is 2. Score has decreased from initial assessment, increased since most recent assessment.     14.  Current score on HALI-7 is 2. Score has decreased from initial assessment, increased since most recent assessment. 1.  Partially completed.  Patient participated initial evaluation for medication management with MELVIN Eaton.  Patient will continue participating in as scheduled medication management and follow-up appointments with psychiatric medical provider.     2.  Patient is making progress toward goal and will continue working toward objective.     3.  Patient will continue working toward objective.     4.  Patient will continue working toward objective.     5.  Partially completed. Patient will continue working toward objective.     6.  Partially completed. Patient will continue working toward objective.     7.  Patient is making progress toward goal.  He is currently considering someone for sponsorship.     8.  Partially completed. Patient will continue working toward objective.      9.  Partially completed. Patient will continue working toward objective.      10.  Partially completed. Patient will continue working toward objective.      11.  Patient will continue to encouraged family participation.     12.  Partially completed.  Patient will continue implementing behavioral changes to promote long-term sobriety and recovery.     13.  Patient's PHQ-9 score indicates a decrease in frequency of depressive symptoms from initial assessment, increase  from previous.     14.  Patient's HALI-7 score indicates a decrease in frequency of depressive symptoms from initial assessment, increase from previous.       Next Target Date: 11/7/19    Team Members:  Patient - Yousuf Crookson  Medical Provider - MELVIN Eaton  [facility] Licensed Therapist - Jolynn Bolaños [facility] Director - Omer George, [facility], [facility]  Support Staff         I have discussed and reviewed this treatment plan with the patient.  It has been printed for signatures.

## 2019-10-29 NOTE — PROGRESS NOTES
NAME: Yousuf Barrera  DATE: 10/29/2019    Phase I  9:00AM - 12:00PM    IOP GROUP NOTE    DATA:     3 hour IOP group therapy session (Check-ins, Coping Skills, Relapse Prevention)     Check Ins:  Therapist continued facilitation of rapport building strategies between group members. Therapist asked that each patient check in with home life and recovery efforts and identify triggers, cravings, and high risk situations that arise between group sessions.  Group members discussed barriers and benefits of attending recovery meetings.  Therapist provided empathy and support during group session.  Therapist facilitated reading the daily motivation passages from Daily Reflections ([employer] 1991) and Just for Today (The 12 Steps and 12 Traditions, 1991) and invited group members to reflect and discuss.      Session Content/Coping Skills:  Therapist introduced the fable “The Bridge” by Rabbi Darrel Flores about life decisions, letting go, making choices, and not holding one’s self responsible for others’ decisions.  Therapist facilitated discussion by asking participants to share their own interpretations of the fable and how it applies to their own lives and recovery from addiction.  Therapist introduced utilized a short video to introduce the concept of learned helplessness and facilitated discussion by inviting group members to apply this to challenging unhelpful thinking styles.    Response: Yousuf was present for IOP group and was a positive participant.  He started the session by discussing his disappointment after not hearing from his sponsor or home group members.  He also discussed his disappointment with the small group size stating he had expected there to be a much bigger group when he started.  He referred back to a guest speaker who shared their story which included being part of a larger group.  Yousuf reflected on how he wished he had that experience.  We spent some time discussing the pros and cons of a smaller  "group.  Yousuf was encouraged to challenge his comparison of himself/his own experience to others (social comparisons) and his tendency to get \"caught\" in the expectation trap.  He has not reached out to his sponsor or anyone from his home group and was able to consider other possibilities as to why he has not heard from them other than \"they don't like me\" or \"they don't care about me.\"  He has been considering expanding his search for a sponsor and home group to his home [employer] which he had been avoiding up to this point out of fear that he would run into someone from [employer] that he knew like the  or .  He read and reflected on \"The Bridge\" fable comparing it to his own journey through addiction and recovery, facing barriers and setbacks.    ASSESSMENT:     Lab Review  Negative on 9/24/19  Negative on 10/8/19  Negative on 10/16/19  Negative on 10/23/19     Mental Status Exam  Hygiene:  good  Dress: casual  Attitude: cooperative and agreeable   Motor Activity: appropriate  Eye Contact:  fair  Speech: regular rate and rhythm   Mood:  calm  Affect:  Appropriate  Thought Processes:  Linear  Thought Content:  Normal  Suicidal Thoughts:  denies  Homicidal Thoughts:  denies  Crisis Safety Plan: yes, to come to the emergency room.  Hallucinations:  denies  Reliability: fair  Insight: poor  Judgement: poor  Impulse Control: poor     Patient's Support Network Includes:  The patient receives support from his wife, daughter, and employer, although his family is not as supportive as Yousuf would like and have yet to acknowledge that he is even in recovery.  Yousuf has been building a positive sober support network by attending AA meetings and exploring sponsorship.  He has also been attending a men's group at [employer] for additional emotional and spiritual support.     Progress toward goal: Not at goal     Level of Functioning/Impairment (ASAM):  I.    Intoxication/Withdrawal:  No problem = 0  II.   Medical " Conditions/Complications: No problem = 0  III.  Behavioral/Emotional/Cognitive: Mild problem = 1 (anxiety, worry about daughter's mental health even when she seems to be making progress)  IV.  Readiness to Change: Moderate problem = 2 (external and internal motivation to change; ambivalence about initiating family conversations and discussions r/t his recovery; relapse over weekend)  V.   Relapse Risk: Moderate problem = 2 (moderate cravings, poor coping skills, limited recovery experience)  VI:  Recovery Environment: Mild problem = 1 (family supportive of his sobriety but not actively involved in family recovery, limited knowledge on the nature of addiction as a family disease, strained relationships, new sponsorship)  Total ASAM Score = 6     Prognosis: Fair with Ongoing Treatment      Family issues related to recovery:  No change, see previous encounters     Recovery/spiritual support group attendance: At least 2-3 times per week     Sponsor:  yes,  Have you made contact with him/her this past week?  yes, daily     Identification of environmental and personal barriers to recovery: coping with limited and/or negative emotions, feelings of remorse and guilt, work stress, strained family dynamics      Motivation for treatment: Improved mental and physical health, improving overall relationships with emphasis on being a  and father, long-term sobriety, and closer spiritual connection     Self-reported number of days sober:  10    Impression/Formulation:     Diagnosis Plan   1. Uncomplicated alcohol dependence (CMS/HCC)     2. Anxiety disorder, unspecified type         CLINICAL MANUVERING/INTERVENTIONS:  Therapist utilized a person-centered approach to build rapport with group member.  Therapist implemented motivational interviewing techniques to assist client with exploring and resolving ambivalence associated with commitment to change behaviors related to substance use and addiction.  Therapist applied  cognitive behavioral strategies to facilitate identification of maladaptive patterns of thinking and behavior that contribute to cleadrienne's risk for continued substance use and relapse.  Therapist employed group interaction activities to build rapport among group members, promote sobriety, and emphasize relapse prevention.  Therapist promoted safe nonjudgmental environment by providing group members with unconditional positive regard and encouraging group members to comply with group rules and guidelines.  Therapist utilized dialectical behavior techniques to teach and model emotional regulation and relaxation.  Therapist assisted group member with identifying and implementing healthier coping strategies.  Group member was encouraged to attend community support group meetings, make positive daily choices, and maintain healthy boundaries.  Group member was encouraged to invite family members to family educational group on Wednesdays.      BASELINE SCORES 09/17/19  DASES: 82  URICA (Alcohol): 11.42 (Preparation/Action)  URICA (Drugs): 2.57 (Pre-contemplation)  PHQ-9: 10  HALI-7: 12     UPDATED SCORES:  10/29/19  PHQ-9: 2  HALI-7: 2    PLAN:  Continue [facility] IOP Phase I   Aftercare:  [facility] Phase II  Program Assignments:  Personal recovery plan, relapse prevention plan, attendance of recovery support group meetings, exploration of sponsorship, drug/alcohol screens.      Please note that portions of this note were completed with a voice recognition program. Efforts were made to edit dictation, but occasionally words are mistranscribed.      This document signed by Jolynn Bolaños [facility], October 29, 2019, 3:46 PM

## 2019-10-30 ENCOUNTER — OFFICE VISIT (OUTPATIENT)
Dept: PSYCHIATRY | Facility: HOSPITAL | Age: 54
End: 2019-10-30

## 2019-10-30 DIAGNOSIS — F10.20 UNCOMPLICATED ALCOHOL DEPENDENCE (HCC): Primary | ICD-10-CM

## 2019-10-30 DIAGNOSIS — F41.9 ANXIETY DISORDER, UNSPECIFIED TYPE: ICD-10-CM

## 2019-10-30 NOTE — PROGRESS NOTES
"NAME: Yousuf Barrera  DATE: 10/30/2019    Phase I  9:00AM - 12:00PM    IOP GROUP NOTE    DATA:     3 hour IOP group therapy session (Check-ins, Coping Skills, Relapse Prevention)     Check Ins:  Therapist introduced self and welcomed new group member.  Therapist reviewed IOP group rules and norms.  Therapist continued facilitation of rapport building strategies between group members. Therapist asked that each patient check in with home life and recovery efforts and identify triggers, cravings, and high risk situations that arise between group sessions.  Group members discussed barriers and benefits of attending recovery meetings.  Therapist provided empathy and support during group session.        Session Content/Coping Skills:  Therapist invited group members to share about what led them to treatment and what they have learned about themselves thus far.  Group members shared their stories and welcomed the newcomer to the group.  Group members discussed commonalities and what has been helpful for them so far in recovery.  Therapist facilitated discussion by reading the mid-morning devotional about “detours,” facing new challenges, and finding meaning in those detours in the context of the whole journey.    Response:  Yousuf was present for IOP group.  He introduced himself to the new group member and shared about what led him to coming to treatment and his motivations for sobriety.  He reached out to his sponsor last night and found out his sponsor had gotten a new phone and a new job in another Pending sale to [employer] which is why he had not heard from him.  He also reached out to the other members of his AA group and received responses back from all of them.  He was more withdrawn today and this was noticed and commented on by another group member.  He initially kept stating \"I'm good\" or \"I'm fine\" but eventually disclosed that he was feeling \"like I did that Saturday\" clarifying he was feeling like he did when he relapsed a couple of " "weeks ago.  He reported texting his sponsor while the group was on break and received a message back asking \"who is this?\" even though he had just called and spoke to him the night before.  He was able to identify what he would lose by taking a drink and what he would gain if he were to stay sober just for today.  He reported feeling better as the session progressed \"but the thoughts are still there.\"  He reflected on how hard recovery can be while using was so easy.  He was challenged on this asked how much time, money, and energy he spent thinking about using, going to obtain alcohol, using, sustaining his addition, and recovering from the effects of alcohol.  He reflected on how even though using would be an \"easy escape,\" it would not fix or change anything for the better in the long run.     ASSESSMENT:      Lab Review  Negative on 9/24/19  Negative on 10/8/19  Negative on 10/16/19  Negative on 10/23/19    Mental Status Exam  Hygiene:  good  Dress: casual  Attitude: more withdrawn  Motor Activity: appropriate  Eye Contact:  fair  Speech: regular rate and rhythm   Mood:  calm  Affect:  Restricted  Thought Processes:  Avoidant  Thought Content:  Normal  Suicidal Thoughts:  denies  Homicidal Thoughts:  denies  Crisis Safety Plan: yes, to come to the emergency room.  Hallucinations:  denies  Reliability: fair  Insight: poor  Judgement: fair  Impulse Control: fair    Patient's Support Network Includes:  The patient receives support from his wife, daughter, and employer, although his family is not as supportive as Yousuf would like and have yet to acknowledge that he is even in recovery.  Yousuf has been building a positive sober support network by attending AA meetings and exploring sponsorship.  He has also been attending a men's group at [employer] for additional emotional and spiritual support.     Progress toward goal: Not at goal     Level of Functioning/Impairment (ASAM):  I.    Intoxication/Withdrawal:  No problem = " 0  II.   Medical Conditions/Complications: No problem = 0  III.  Behavioral/Emotional/Cognitive: Mild problem = 1 (anxiety, worry about daughter's mental health even when she seems to be making progress)  IV.  Readiness to Change: Moderate problem = 2 (external and internal motivation to change; ambivalence about initiating family conversations and discussions r/t his recovery; relapse over weekend)  V.   Relapse Risk: Moderate problem = 2 (moderate cravings, poor coping skills, limited recovery experience)  VI:  Recovery Environment: Mild problem = 1 (family supportive of his sobriety but not actively involved in family recovery, limited knowledge on the nature of addiction as a family disease, strained relationships, new sponsorship)  Total ASAM Score = 6     Prognosis: Fair with Ongoing Treatment      Family issues related to recovery:  No change, see previous encounters     Recovery/spiritual support group attendance: Decreased to once per week.  Patient independently reflects that this is not enough for him and he needs to attend more meetings.     Sponsor:  yes,  Have you made contact with him/her this past week?  yes, last night     Identification of environmental and personal barriers to recovery: coping with limited and/or negative emotions, feelings of remorse and guilt, work stress, strained family dynamics      Motivation for treatment: Improved mental and physical health, improving overall relationships with emphasis on being a  and father, long-term sobriety, and closer spiritual connection     Self-reported number of days sober:  10    Impression/Formulation:     Diagnosis Plan   1. Uncomplicated alcohol dependence (CMS/HCC)     2. Anxiety disorder, unspecified type         CLINICAL MANUVERING/INTERVENTIONS:  Therapist utilized a person-centered approach to build rapport with group member.  Therapist implemented motivational interviewing techniques to assist client with exploring and resolving  ambivalence associated with commitment to change behaviors related to substance use and addiction.  Therapist applied cognitive behavioral strategies to facilitate identification of maladaptive patterns of thinking and behavior that contribute to cleint's risk for continued substance use and relapse.  Therapist employed group interaction activities to build rapport among group members, promote sobriety, and emphasize relapse prevention.  Therapist promoted safe nonjudgmental environment by providing group members with unconditional positive regard and encouraging group members to comply with group rules and guidelines.  Therapist utilized dialectical behavior techniques to teach and model emotional regulation and relaxation.  Therapist assisted group member with identifying and implementing healthier coping strategies.  Group member was encouraged to attend community support group meetings, make positive daily choices, and maintain healthy boundaries.  Group member was encouraged to invite family members to family educational group on Wednesdays.      BASELINE SCORES 09/17/19  DASES: 82  URICA (Alcohol): 11.42 (Preparation/Action)  URICA (Drugs): 2.57 (Pre-contemplation)  PHQ-9: 10  HALI-7: 12     UPDATED SCORES:  10/29/19  PHQ-9: 2  HALI-7: 2    PLAN:  Continue [facility] IOP Phase I   Aftercare:  [facility] Phase II  Program Assignments:  Personal recovery plan, relapse prevention plan, attendance of recovery support group meetings, exploration of sponsorship, drug/alcohol screens.      Please note that portions of this note were completed with a voice recognition program. Efforts were made to edit dictation, but occasionally words are mistranscribed.      This document signed by Jolynn Bolaños [facility], October 30, 2019, 1:12 PM

## 2019-10-31 ENCOUNTER — OFFICE VISIT (OUTPATIENT)
Dept: PSYCHIATRY | Facility: HOSPITAL | Age: 54
End: 2019-10-31

## 2019-10-31 ENCOUNTER — LAB (OUTPATIENT)
Dept: LAB | Facility: HOSPITAL | Age: 54
End: 2019-10-31

## 2019-10-31 DIAGNOSIS — F10.20 UNCOMPLICATED ALCOHOL DEPENDENCE (HCC): Primary | ICD-10-CM

## 2019-10-31 DIAGNOSIS — F41.9 ANXIETY DISORDER, UNSPECIFIED TYPE: ICD-10-CM

## 2019-10-31 DIAGNOSIS — F33.2 MAJOR DEPRESSIVE DISORDER, RECURRENT, SEVERE WITHOUT PSYCHOTIC FEATURES (HCC): ICD-10-CM

## 2019-10-31 DIAGNOSIS — F10.20 UNCOMPLICATED ALCOHOL DEPENDENCE (HCC): ICD-10-CM

## 2019-10-31 PROCEDURE — 80307 DRUG TEST PRSMV CHEM ANLYZR: CPT

## 2019-10-31 NOTE — PROGRESS NOTES
"NAME: Yousuf Barrera  DATE: 10/31/2019    Phase I  9:00AM - 12:00PM    IOP GROUP NOTE    DATA:     3 hour IOP group therapy session (Check-ins, Coping Skills, Relapse Prevention)     Check Ins:  Therapist continued facilitation of rapport building strategies between group members. Therapist asked that each patient check in with home life and recovery efforts and identify triggers, cravings, and high risk situations that arise between group sessions.  Group members discussed barriers and benefits of attending recovery meetings.  Therapist provided empathy and support during group session.  Therapist facilitated reading the daily motivation passages from Daily Reflections ([employer] 1991) and Just for Today (The 12 Steps and 12 Traditions, 1991) and invited group members to reflect and discuss.      Session Content/Coping Skills:  Therapist facilitated discussion by inviting group members to brainstorm the benefits of art/creative therapy versus traditional talk therapy.  Therapist introduced creativity and art as a therapeutic activity.  Therapist facilitated an art activity in which group members created a visual representation of their journeys through addiction and recovery.  Group members were encouraged to take creative license with the activity, emphasizing the importance of the process of creating rather than the aesthetic of the finished product.    Response:  Yousuf was present for IOP group and was a positive participant.  He started the session by disclosing he had another \"slip\" last night by going to the store and taking one \"shooter.\"  He stated he probably would have had more if he was going to be alone that evening.  As we processed this relapse, he independently identified himself as being \"dry drunk\" for the past 2 weeks.  Although he had attended a couple of meetings, he had a lot of thoughts about how they weren't \"good\" meetings and fears that he didn't pick the \"right\" sponsor.  He acknowledged " "having high expectations that every meeting would look a certain way and that he would have all these people to \"lean\" on, and when these expectations weren't met is when the emotional relapse started.  He caught himself making \"excuses\" as he explored the relapse, stating he had planned on going to a meeting but he got \"caught up\" with helping clean up at [employer] after an event his daughter wanted to attend at the last minute.  He caught himself and stated \"but I could've told them I had a prior commitment and left to go to the meeting for an hour.\"  He reflected on his tendency to put his own needs on the backburner, reflecting all the way back to when he started drinking more heavily in the first place.  He was able to acknowledge that the mental relapse was present yesterday in group and how looking back at it he was looking for an excuse to drink.  He discussed his disappointment and resentment with his family's [employer] after his daughter was essentially shunned by her youth group.  When his daughter wanted to go to an event at that same [employer] last night, he didn't think it was a good idea but they went anyway.  His daughter was welcomed with open arms by several of her friends, so he used that as an excuse to \"celebrate\" by taking a shot.    I met with Yousuf individually to check-in further and assist him with exploring the relapse further and coming up with a plan to prevent further relapse.  He identified attending a work meeting that was next to a bar where he and his coworkers would have their own \"meeting\" by going to the bar.  This is not where the physical relapse happened; however, he identified this as the point where he started looking for an excuse to use.  He discussed \"needing\" others to lean on and we discussed distinction between leaning on support and relying entirely on others for our own sobriety and emotional well-being.  He was given a \"Relapse Autopsy\" to further explore the behaviors and " choices leading up to the relapse, the denial used to justify the relapse, the events, situations, emotions, and physical sensations, kept secrets, and self-care tools implemented or not implemented leading up to the relapse, as well as the consequences of relapse, and motivation for staying sober prior to and during relapse.  He was encouraged to explore solitary activities he can engage in when support is not immediately available.  He identified finding speaker meetings online to utilize whenever there is not a meeting available at a certain time.  He also reflected on how he could start reading the Big Book to help pass time and re-focus his thoughts on his recovery.    ASSESSMENT:      Lab Review  Negative on 9/24/19  Negative on 10/8/19  Negative on 10/16/19  Negative on 10/23/19  Negative on 10/31/19 (ethanol pending)     Mental Status Exam:   Hygiene:   good  Cooperation:  Cooperative  Eye Contact:  Fair  Psychomotor Behavior:  Appropriate  Affect:  Restricted  Speech:  Normal  Thought Progress:  Linear  Thought Content:  Mood congruent  Suicidal:  None  Homicidal:  None  Hallucinations:  None  Delusion:  None  Memory:  Intact  Orientation:  Person, Place, Time and Situation  Reliability:  fair  Insight:  Fair  Judgement:  Poor  Impulse Control:  Poor  Attitude: Calm      Patient's Support Network Includes:  The patient receives support from his wife, daughter, and employer, although his family is not as supportive as Yousuf would like.  Yousuf has not been attending as many self-help meetings and does not have regular contact with his sponsor or his home group.  He has also stopped attending the men's group meeting at [employer].  He is interested in exploring additional meetings.     Progress toward goal: Not at goal     Level of Functioning/Impairment (ASAM):  I.    Intoxication/Withdrawal:  No problem = 0  II.   Medical Conditions/Complications: No problem = 0  III.  Behavioral/Emotional/Cognitive: Mild problem  "= 1 (increased anxiety and fear of daughter's autonomy)  IV.  Readiness to Change: Moderate problem = 2 (external and internal motivation to change; ambivalence about family involvement and assertiveness; fear of \"rocking the boat\")  V.   Relapse Risk: Moderate problem = 2 (moderate cravings, poor coping skills, limited recovery experience, relying on others to hold him accountable)  VI:  Recovery Environment: Mild problem = 1 (family supportive of his sobriety but not actively involved in family recovery, limited knowledge on the nature of addiction as a family disease, strained relationships, unrealistic expectations of others)  Total ASAM Score = 6     Prognosis: Fair with Ongoing Treatment     Family issues related to recovery:  No change, see previous encounters    Recovery/spiritual support group attendance: Has not been attending as many he had been, only once per week    Sponsor:  yes  Have you made contact with him/her this past week?  yes but communication has been poor    Identification of environmental and personal barriers to recovery: coping with limited and/or negative emotions; feelings of remorse and guilt; work stress; strained family dynamics; poor communication skills; and unrealistic expectations of others     Motivation for treatment: Improved mental and physical health; improving overall relationships with emphasis on being a  and father; long-term sobriety; and closer spiritual connection     Self-reported number of days sober:  1    Impression/Formulation:     Diagnosis Plan   1. Uncomplicated alcohol dependence (CMS/HCC)     2. Anxiety disorder, unspecified type         CLINICAL MANUVERING/INTERVENTIONS:  Therapist utilized a person-centered approach to build rapport with group member.  Therapist implemented motivational interviewing techniques to assist client with exploring and resolving ambivalence associated with commitment to change behaviors related to substance use and " addiction.  Therapist applied cognitive behavioral strategies to facilitate identification of maladaptive patterns of thinking and behavior that contribute to cleint's risk for continued substance use and relapse.  Therapist employed group interaction activities to build rapport among group members, promote sobriety, and emphasize relapse prevention.  Therapist promoted safe nonjudgmental environment by providing group members with unconditional positive regard and encouraging group members to comply with group rules and guidelines.  Therapist utilized dialectical behavior techniques to teach and model emotional regulation and relaxation.  Therapist assisted group member with identifying and implementing healthier coping strategies.  Group member was encouraged to attend community support group meetings, make positive daily choices, and maintain healthy boundaries.  Group member was encouraged to invite family members to family educational group on Wednesdays.      BASELINE SCORES 09/17/19  DASES: 82  URICA (Alcohol): 11.42 (Preparation/Action)  URICA (Drugs): 2.57 (Pre-contemplation)  PHQ-9: 10  HALI-7: 12     UPDATED SCORES:  10/29/19  PHQ-9: 2  HALI-7: 2    PLAN:  Continue [facility] IOP Phase I   Aftercare:  [facility] Phase II  Program Assignments:  Personal recovery plan, relapse prevention plan, attendance of recovery support group meetings, exploration of sponsorship, drug/alcohol screens.      Please note that portions of this note were completed with a voice recognition program. Efforts were made to edit dictation, but occasionally words are mistranscribed.      This document signed by Jolynn Bolaños [facility], October 31, 2019, 1:59 PM

## 2019-11-01 LAB — ETHANOL UR-MCNC: NEGATIVE %

## 2019-11-04 ENCOUNTER — OFFICE VISIT (OUTPATIENT)
Dept: PSYCHIATRY | Facility: HOSPITAL | Age: 54
End: 2019-11-04

## 2019-11-04 DIAGNOSIS — F41.9 ANXIETY DISORDER, UNSPECIFIED TYPE: ICD-10-CM

## 2019-11-04 DIAGNOSIS — F10.20 UNCOMPLICATED ALCOHOL DEPENDENCE (HCC): Primary | ICD-10-CM

## 2019-11-04 NOTE — PROGRESS NOTES
"NAME: Yousuf Barrera  DATE: 11/04/2019    Phase I  9:00AM - 12:00PM    IOP GROUP NOTE    DATA:     3 hour IOP group therapy session (Check-ins, Coping Skills, Relapse Prevention)     Check Ins:  Therapist continued facilitation of rapport building strategies between group members. Therapist asked that each patient check in with home life and recovery efforts and identify triggers, cravings, and high risk situations that arise between group sessions.  Group members discussed barriers and benefits of attending recovery meetings.  Therapist provided empathy and support during group session.  Therapist facilitated reading the daily motivation passages from Daily Reflections ([employer] 1991) and Just for Today (The 12 Steps and 12 Traditions, 1991) and invited group members to reflect and discuss.      Session Content/Coping Skills:  Therapist continued facilitation of collaging as a therapeutic art activity.  Group members completed their collages and shared them with the rest of the group.  Group members reflected on the activity as a whole, sharing what feelings were elicited throughout the activity.    Response:  Yousuf was present for IOP group and was a positive participant.  He reported feeling better mentally after attending 3 AA meetings over the weekend, including a new one he kept telling himself he would go to but hadn't gone yet.  He reflected on how he has continued to say the serenity prayer every morning and stated \"It really does help when you do it regularly.\"  He reported talking to his wife over the weekend about how important it is for him to find a better balance of his time to attend AA meetings to help him stay on track mentally and emotionally.  His wife responded positively and was supportive.  Later during the weekend, his wife asked him more about the AA meetings, how they have been helpful, and essentially inviting Yousuf to talk about his recovery which meant a lot to Yousuf.  He reflected \"I've " "still got a long way to go with the trust\" however he perceived this to be a step forward in rebuilding the relationship.  He has also been more communicative with his sponsor and other members of the recovery community as opposed to waiting for others to initiate.  He still lacks confidence in his ability to maintain sobriety without relying on IOP group.    ASSESSMENT:     Lab Review  Negative on 9/24/19  Negative on 10/8/19  Negative on 10/16/19  Negative on 10/23/19  Negative on 10/31/19     Mental Status Exam  Hygiene:   good  Cooperation:  Cooperative  Eye Contact:  Fair  Psychomotor Behavior:  Appropriate  Affect:  Restricted  Speech:  Normal  Thought Progress:  Linear  Thought Content:  Mood congruent  Suicidal:  None  Homicidal:  None  Hallucinations:  None  Delusion:  None  Memory:  Intact  Orientation:  Person, Place, Time and Situation  Reliability:  fair  Insight:  Fair  Judgement:  fair  Impulse Control:  fair  Attitude: Calm    Patient's Support Network Includes:  The patient receives support from his wife, daughter, and employer.  His wife is starting to communicate with him more and show interest in his recovery.     Progress toward goal: Not at goal     Level of Functioning/Impairment (ASAM):  I.    Intoxication/Withdrawal:  No problem = 0  II.   Medical Conditions/Complications: No problem = 0  III.  Behavioral/Emotional/Cognitive: Mild problem = 1 (increased anxiety and fear of daughter's autonomy)  IV.  Readiness to Change: Moderate problem = 2 (external and internal motivation to change; ambivalence about family involvement and assertiveness; fear of \"rocking the boat\")  V.   Relapse Risk: Moderate problem = 2 (poor coping skills, limited recovery experience, relying on others to hold him accountable)  VI:  Recovery Environment: Mild problem = 1 (family supportive of his sobriety but not actively involved in family recovery, limited knowledge on the nature of addiction as a family disease, " strained relationships, unrealistic expectations of others)  Total ASAM Score = 6     Prognosis: Fair with Ongoing Treatment     Family issues related to recovery:  poor communication, broken trust     Recovery/spiritual support group attendance: Attended 3 over the weekend     Sponsor:  yes  Have you made contact with him/her this past week?  yes      Identification of environmental and personal barriers to recovery: coping with limited and/or negative emotions; feelings of remorse and guilt; work stress; strained family dynamics; poor communication skills; and unrealistic expectations of others     Motivation for treatment: Improved mental and physical health; improving overall relationships with emphasis on being a  and father; long-term sobriety; and closer spiritual connection     Self-reported number of days sober:  5    Impression/Formulation:     Diagnosis Plan   1. Uncomplicated alcohol dependence (CMS/HCC)     2. Anxiety disorder, unspecified type         CLINICAL MANUVERING/INTERVENTIONS:  Therapist utilized a person-centered approach to build rapport with group member.  Therapist implemented motivational interviewing techniques to assist client with exploring and resolving ambivalence associated with commitment to change behaviors related to substance use and addiction.  Therapist applied cognitive behavioral strategies to facilitate identification of maladaptive patterns of thinking and behavior that contribute to cleint's risk for continued substance use and relapse.  Therapist employed group interaction activities to build rapport among group members, promote sobriety, and emphasize relapse prevention.  Therapist promoted safe nonjudgmental environment by providing group members with unconditional positive regard and encouraging group members to comply with group rules and guidelines.  Therapist utilized dialectical behavior techniques to teach and model emotional regulation and relaxation.   Therapist assisted group member with identifying and implementing healthier coping strategies.  Group member was encouraged to attend community support group meetings, make positive daily choices, and maintain healthy boundaries.  Group member was encouraged to invite family members to family educational group on Wednesdays.      BASELINE SCORES 09/17/19  DASES: 82  URICA (Alcohol): 11.42 (Preparation/Action)  URICA (Drugs): 2.57 (Pre-contemplation)  PHQ-9: 10  HALI-7: 12     UPDATED SCORES:  10/29/19  PHQ-9: 2  HALI-7: 2    PLAN:  Continue [facility] IOP Phase I   Aftercare:  [facility] Phase II  Program Assignments:  Personal recovery plan, relapse prevention plan, attendance of recovery support group meetings, exploration of sponsorship, drug/alcohol screens.      Please note that portions of this note were completed with a voice recognition program. Efforts were made to edit dictation, but occasionally words are mistranscribed.      This document signed by [facility]ynn Bolaños [facility], November 4, 2019, 1:16 PM

## 2019-11-05 ENCOUNTER — OFFICE VISIT (OUTPATIENT)
Dept: PSYCHIATRY | Facility: HOSPITAL | Age: 54
End: 2019-11-05

## 2019-11-05 DIAGNOSIS — F41.9 ANXIETY DISORDER, UNSPECIFIED TYPE: ICD-10-CM

## 2019-11-05 DIAGNOSIS — F10.20 UNCOMPLICATED ALCOHOL DEPENDENCE (HCC): Primary | ICD-10-CM

## 2019-11-05 NOTE — PROGRESS NOTES
"NAME: Yousuf Barrera  DATE: 11/05/2019    Phase I  9:00AM - 12:00PM    IOP GROUP NOTE    DATA:     3 hour IOP group therapy session (Check-ins, Coping Skills, Relapse Prevention)     Check Ins:  Therapist continued facilitation of rapport building strategies between group members. Therapist asked that each patient check in with home life and recovery efforts and identify triggers, cravings, and high risk situations that arise between group sessions.  Group members discussed barriers and benefits of attending recovery meetings.  Therapist provided empathy and support during group session.  Therapist facilitated reading the daily motivation passages from Daily Reflections ([employer] 1991) and Just for Today (The 12 Steps and 12 Traditions, 1991) and invited group members to reflect and discuss.      Session Content/Coping Skills:  Therapist introduced the theoretical framework of personal boundaries and explored different types of boundaries: rigid, lack of, partial, and healthy boundaries (Jared, Brandy, and Alejandro).  Therapist elicited discussion by asking group members to brainstorm examples of what the different types of boundaries.  Therapist provided examples/signs of unhealthy boundaries and modeled the two extremes: complete lack of sense of boundaries and overly protective boundaries to facilitate further discussion.  Therapist asked group members to share their personal struggles and progress with establishing and maintaining healthy boundaries in recovery.    Response:  Yousuf was present for IOP group.  He has been texting with his AA sponsor but is still unsure if this person will be a good sponsor for him in the long run.  He discussed trying to be more open minded and realistic about his expectations.  We explored his thoughts and feelings about moving on to phase 2 next week.  Yousuf has been very vocal about not being \"ready\" while also acknowledging \"I do not think I will ever be ready.\"  He became tearful " "and was able to identify feeling multiple emotions when thinking about this transition, including fear, sadness, and pride.  He discussed feeling comfortable within the safety of the group.  He discussed feeling similarly leaving detox and transitioning to IOP.  He referred to when he wore the same sweatshirt and hospital socks for weeks after he left detox until his wife said something about this.  Not only where those things a reminder to her of Yousuf's dishonesty, Yousuf realized they were his \"safety blanket.\"  He was able to identify turning to alcohol as a \"safety blanket\" during family crisis and having difficulty trusting himself to step outside of his identified \"safety zone.\"     ASSESSMENT:      Lab Review  Negative on 9/24/19  Negative on 10/8/19  Negative on 10/16/19  Negative on 10/23/19  Negative on 10/31/19      Mental Status Exam  Hygiene:   good  Cooperation:  Cooperative  Eye Contact:  Fair  Psychomotor Behavior:  Appropriate  Affect:  Restricted  Speech:  Normal  Thought Progress:  Linear  Thought Content:  Mood congruent  Suicidal:  None  Homicidal:  None  Hallucinations:  None  Delusion:  None  Memory:  Intact  Orientation:  Person, Place, Time and Situation  Reliability:  fair  Insight:  Fair  Judgement:  fair  Impulse Control:  fair  Attitude: Calm     Patient's Support Network Includes:  The patient receives support from his wife, daughter, and employer.  His wife is starting to communicate with him more and show interest in his recovery.     Progress toward goal: Not at goal     Level of Functioning/Impairment (ASAM):  I.    Intoxication/Withdrawal:  No problem = 0  II.   Medical Conditions/Complications: No problem = 0  III.  Behavioral/Emotional/Cognitive: Mild problem = 1 (increased anxiety and fear of daughter's autonomy)  IV.  Readiness to Change: Moderate problem = 2 (external and internal motivation to change; ambivalence about family involvement and assertiveness; fear of \"rocking the " "boat\")  V.   Relapse Risk: Moderate problem = 2 (poor coping skills, limited recovery experience, relying on others to hold him accountable)  VI:  Recovery Environment: Mild problem = 1 (family supportive of his sobriety but not actively involved in family recovery, limited knowledge on the nature of addiction as a family disease, strained relationships, unrealistic expectations of others)  Total ASAM Score = 6     Prognosis: Fair with Ongoing Treatment      Family issues related to recovery:  poor communication, broken trust     Recovery/spiritual support group attendance: Attended 3 over the weekend     Sponsor:  yes  Have you made contact with him/her this past week?  yes      Identification of environmental and personal barriers to recovery: coping with limited and/or negative emotions; feelings of remorse and guilt; work stress; strained family dynamics; poor communication skills; and unrealistic expectations of others     Motivation for treatment: Improved mental and physical health; improving overall relationships with emphasis on being a  and father; long-term sobriety; and closer spiritual connection     Self-reported number of days sober:  6    Impression/Formulation:     Diagnosis Plan   1. Uncomplicated alcohol dependence (CMS/HCC)     2. Anxiety disorder, unspecified type         CLINICAL MANUVERING/INTERVENTIONS:  Therapist utilized a person-centered approach to build rapport with group member.  Therapist implemented motivational interviewing techniques to assist client with exploring and resolving ambivalence associated with commitment to change behaviors related to substance use and addiction.  Therapist applied cognitive behavioral strategies to facilitate identification of maladaptive patterns of thinking and behavior that contribute to cleadrienne's risk for continued substance use and relapse.  Therapist employed group interaction activities to build rapport among group members, promote " sobriety, and emphasize relapse prevention.  Therapist promoted safe nonjudgmental environment by providing group members with unconditional positive regard and encouraging group members to comply with group rules and guidelines.  Therapist utilized dialectical behavior techniques to teach and model emotional regulation and relaxation.  Therapist assisted group member with identifying and implementing healthier coping strategies.  Group member was encouraged to attend community support group meetings, make positive daily choices, and maintain healthy boundaries.  Group member was encouraged to invite family members to family educational group on Wednesdays.      BASELINE SCORES 09/17/19  DASES: 82  URICA (Alcohol): 11.42 (Preparation/Action)  URICA (Drugs): 2.57 (Pre-contemplation)  PHQ-9: 10  HALI-7: 12     UPDATED SCORES:  10/29/19  PHQ-9: 2  HALI-7: 2    PLAN:  Continue [facility] IOP Phase I   Aftercare:  [facility] Phase II  Program Assignments:  Personal recovery plan, relapse prevention plan, attendance of recovery support group meetings, exploration of sponsorship, drug/alcohol screens.      Please note that portions of this note were completed with a voice recognition program. Efforts were made to edit dictation, but occasionally words are mistranscribed.      This document signed by Jolynn Bolaños [facility], November 5, 2019, 1:56 PM

## 2019-11-06 ENCOUNTER — OFFICE VISIT (OUTPATIENT)
Dept: PSYCHIATRY | Facility: HOSPITAL | Age: 54
End: 2019-11-06

## 2019-11-06 ENCOUNTER — LAB (OUTPATIENT)
Dept: LAB | Facility: HOSPITAL | Age: 54
End: 2019-11-06

## 2019-11-06 DIAGNOSIS — F10.20 UNCOMPLICATED ALCOHOL DEPENDENCE (HCC): Primary | ICD-10-CM

## 2019-11-06 DIAGNOSIS — F33.2 MAJOR DEPRESSIVE DISORDER, RECURRENT, SEVERE WITHOUT PSYCHOTIC FEATURES (HCC): ICD-10-CM

## 2019-11-06 DIAGNOSIS — F10.20 UNCOMPLICATED ALCOHOL DEPENDENCE (HCC): ICD-10-CM

## 2019-11-06 DIAGNOSIS — F41.9 ANXIETY DISORDER, UNSPECIFIED TYPE: ICD-10-CM

## 2019-11-06 PROCEDURE — 80307 DRUG TEST PRSMV CHEM ANLYZR: CPT

## 2019-11-06 NOTE — PROGRESS NOTES
"NAME: Yousuf Barrera  DATE: 11/06/2019    Phase I  9:00AM - 12:00PM    IOP GROUP NOTE    DATA:     3 hour IOP group therapy session (Check-ins, Coping Skills, Relapse Prevention)     Check Ins:  Therapist introduced self and welcomed new group member.  Therapist reviewed IOP group rules and norms.  Therapist continued facilitation of rapport building strategies between group members. Therapist asked that each patient check in with home life and recovery efforts and identify triggers, cravings, and high risk situations that arise between group sessions.  Group members discussed barriers and benefits of attending recovery meetings.  Therapist provided empathy and support during group session.       Session Content/Coping Skills:  Therapist facilitated an icebreaker activity to promote group interaction, team building, and self-disclosure.  Therapist continued facilitation of exploring boundaries and utilizing effective communication, including clarification, compromise, and I statements.    Response:  Yousuf was present for IOP group and was a positive participant.  He introduced himself to the new group member and shared about what brought him to treatment.  As he shared his story, he reflected again on not knowing what it was about that Sunday that prompted him to say \"I need help.\"  We explored this some, and Yousuf was able to identify how that day in particular, he missed several phone calls from his wife prompting his daughter to have to go check on him.  He explored how this was different because he did not typically miss phone calls especially when he was expecting them from his wife as they had planned to go grocery shopping that day.  Looking back on it now, he was able to identify that alcohol had been causing him to miss out on certain responsibilities, like being dependable for his family.  He discussed his ongoing fear that he is not ready moving on to the next phase of IOP.  As we explored this further, he " "acknowledged he did not know what it would look like or how he would know if he were ready or not.      ASSESSMENT:      Lab Review  Negative on 9/24/19  Negative on 10/8/19  Negative on 10/16/19  Negative on 10/23/19  Negative on 10/31/19      Mental Status Exam  Hygiene:   good  Cooperation:  Cooperative  Eye Contact:  Fair  Psychomotor Behavior:  Appropriate  Affect:  Restricted  Speech:  Normal  Thought Progress:  Linear  Thought Content:  Mood congruent  Suicidal:  None  Homicidal:  None  Hallucinations:  None  Delusion:  None  Memory:  Intact  Orientation:  Person, Place, Time and Situation  Reliability:  fair  Insight:  Fair  Judgement:  fair  Impulse Control:  fair  Attitude: Calm     Patient's Support Network Includes:  The patient receives support from his wife, daughter, and employer.  His wife is starting to communicate with him more and show interest in his recovery.     Progress toward goal: Not at goal     Level of Functioning/Impairment (ASAM):  I.    Intoxication/Withdrawal:  No problem = 0  II.   Medical Conditions/Complications: No problem = 0  III.  Behavioral/Emotional/Cognitive: Mild problem = 1 (increased anxiety and fear of daughter's autonomy)  IV.  Readiness to Change: Moderate problem = 2 (external and internal motivation to change; ambivalence about family involvement and assertiveness; fear of \"rocking the boat\")  V.   Relapse Risk: Moderate problem = 2 (poor coping skills, limited recovery experience, relying on others to hold him accountable)  VI:  Recovery Environment: Mild problem = 1 (family supportive of his sobriety but not actively involved in family recovery, limited knowledge on the nature of addiction as a family disease, strained relationships, unrealistic expectations of others)  Total ASAM Score = 6     Prognosis: Fair with Ongoing Treatment      Family issues related to recovery:  poor communication, broken trust     Recovery/spiritual support group attendance: Attended 3 " over the weekend     Sponsor:  yes  Have you made contact with him/her this past week?  yes      Identification of environmental and personal barriers to recovery: coping with limited and/or negative emotions; feelings of remorse and guilt; work stress; strained family dynamics; poor communication skills; and unrealistic expectations of others     Motivation for treatment: Improved mental and physical health; improving overall relationships with emphasis on being a  and father; long-term sobriety; and closer spiritual connection     Self-reported number of days sober:  7    Impression/Formulation:     Diagnosis Plan   1. Uncomplicated alcohol dependence (CMS/HCC)     2. Anxiety disorder, unspecified type         CLINICAL MANUVERING/INTERVENTIONS:  Therapist utilized a person-centered approach to build rapport with group member.  Therapist implemented motivational interviewing techniques to assist client with exploring and resolving ambivalence associated with commitment to change behaviors related to substance use and addiction.  Therapist applied cognitive behavioral strategies to facilitate identification of maladaptive patterns of thinking and behavior that contribute to cleint's risk for continued substance use and relapse.  Therapist employed group interaction activities to build rapport among group members, promote sobriety, and emphasize relapse prevention.  Therapist promoted safe nonjudgmental environment by providing group members with unconditional positive regard and encouraging group members to comply with group rules and guidelines.  Therapist utilized dialectical behavior techniques to teach and model emotional regulation and relaxation.  Therapist assisted group member with identifying and implementing healthier coping strategies.  Group member was encouraged to attend community support group meetings, make positive daily choices, and maintain healthy boundaries.  Group member was encouraged  to invite family members to family educational group on Wednesdays.      BASELINE SCORES 09/17/19  DASES: 82  URICA (Alcohol): 11.42 (Preparation/Action)  URICA (Drugs): 2.57 (Pre-contemplation)  PHQ-9: 10  HALI-7: 12     UPDATED SCORES:  10/29/19  PHQ-9: 2  HALI-7: 2    PLAN:  Continue [facility] IOP Phase I   Aftercare:  [facility] Phase II  Program Assignments:  Personal recovery plan, relapse prevention plan, attendance of recovery support group meetings, exploration of sponsorship, drug/alcohol screens.      Please note that portions of this note were completed with a voice recognition program. Efforts were made to edit dictation, but occasionally words are mistranscribed.      This document signed by Jolynn Bolaños [facility], November 6, 2019, 3:47 PM

## 2019-11-07 ENCOUNTER — OFFICE VISIT (OUTPATIENT)
Dept: PSYCHIATRY | Facility: HOSPITAL | Age: 54
End: 2019-11-07

## 2019-11-07 DIAGNOSIS — F10.20 UNCOMPLICATED ALCOHOL DEPENDENCE (HCC): Primary | ICD-10-CM

## 2019-11-07 DIAGNOSIS — F41.9 ANXIETY DISORDER, UNSPECIFIED TYPE: ICD-10-CM

## 2019-11-07 LAB — ETHANOL UR-MCNC: NEGATIVE %

## 2019-11-07 NOTE — PROGRESS NOTES
"NAME: Yousuf Barrera  DATE: 11/07/2019    Phase I  9:00AM - 12:00PM    IOP GROUP NOTE    DATA:     3 hour IOP group therapy session (Check-ins, Coping Skills, Relapse Prevention)     Check Ins:  Therapist continued facilitation of rapport building strategies between group members. Therapist asked that each patient check in with home life and recovery efforts and identify triggers, cravings, and high risk situations that arise between group sessions.  Group members discussed barriers and benefits of attending recovery meetings.  Therapist provided empathy and support during group session.      Session Content/Coping Skills:  Therapist facilitated discussion on the topic of healthy boundaries and healthy communication skills. Therapist assisted group members with exploring their own boundaries or lack of boundaries.  Discussed communication styles and guided participants in identifying areas of improvement concerning their communication styles, so that they could more productively communicate their boundaries.  Therapist facilitated interactive activity to practice communication.    Response:  Yousuf was present for IOP group and was a positive participant.  He took a more passive role in the icebreaker activity.  He provided positive and encouraging feedback to another group member about how the group member's sharing has helped him with his own difficult situations.  He continues to have a lot of fear about phasing up next week, preferring to stay in phase I group.  He acknowledged that he didn't think he would ever be \"ready\" to phase up because he is comfortable in phase I and considers it his safety net.  He was encouraged to brainstorm ways to widen his safety net as well as brainstorm the pros of phasing up.  During the communication activity,  Yousuf continually stated he was not very good at these types of interactive games and labelled himself throughout.       ASSESSMENT:      Lab Review  Negative on " "9/24/19  Negative on 10/8/19  Negative on 10/16/19  Negative on 10/23/19  Negative on 10/31/19      Mental Status Exam  Hygiene:   good  Cooperation:  Cooperative  Eye Contact:  Fair  Psychomotor Behavior:  Appropriate  Affect:  Restricted  Speech:  Normal  Thought Progress:  Linear  Thought Content:  Mood congruent  Suicidal:  None  Homicidal:  None  Hallucinations:  None  Delusion:  None  Memory:  Intact  Orientation:  Person, Place, Time and Situation  Reliability:  fair  Insight:  Fair  Judgement:  fair  Impulse Control:  fair  Attitude: Calm     Patient's Support Network Includes:  The patient receives support from his wife, daughter, and employer.  His wife is starting to communicate with him more and show interest in his recovery.     Progress toward goal: Not at goal     Level of Functioning/Impairment (ASAM):  I.    Intoxication/Withdrawal:  No problem = 0  II.   Medical Conditions/Complications: No problem = 0  III.  Behavioral/Emotional/Cognitive: Mild problem = 1 (increased anxiety and fear of daughter's autonomy)  IV.  Readiness to Change: Moderate problem = 2 (external and internal motivation to change; ambivalence about family involvement and assertiveness; fear of \"rocking the boat\")  V.   Relapse Risk: Moderate problem = 2 (poor coping skills, limited recovery experience, relying on others to hold him accountable)  VI:  Recovery Environment: Mild problem = 1 (family supportive of his sobriety but not actively involved in family recovery, limited knowledge on the nature of addiction as a family disease, strained relationships, unrealistic expectations of others)  Total ASAM Score = 6     Prognosis: Fair with Ongoing Treatment      Family issues related to recovery:  poor communication, broken trust     Recovery/spiritual support group attendance: Attended 3 over the weekend     Sponsor:  yes  Have you made contact with him/her this past week?  yes      Identification of environmental and personal " barriers to recovery: coping with limited and/or negative emotions; feelings of remorse and guilt; work stress; strained family dynamics; poor communication skills; and unrealistic expectations of others     Motivation for treatment: Improved mental and physical health; improving overall relationships with emphasis on being a  and father; long-term sobriety; and closer spiritual connection     Self-reported number of days sober:  7    Impression/Formulation:     Diagnosis Plan   1. Uncomplicated alcohol dependence (CMS/HCC)     2. Anxiety disorder, unspecified type         CLINICAL MANUVERING/INTERVENTIONS:  Therapist utilized a person-centered approach to build rapport with group member.  Therapist implemented motivational interviewing techniques to assist client with exploring and resolving ambivalence associated with commitment to change behaviors related to substance use and addiction.  Therapist applied cognitive behavioral strategies to facilitate identification of maladaptive patterns of thinking and behavior that contribute to cleint's risk for continued substance use and relapse.  Therapist employed group interaction activities to build rapport among group members, promote sobriety, and emphasize relapse prevention.  Therapist promoted safe nonjudgmental environment by providing group members with unconditional positive regard and encouraging group members to comply with group rules and guidelines.  Therapist utilized dialectical behavior techniques to teach and model emotional regulation and relaxation.  Therapist assisted group member with identifying and implementing healthier coping strategies.  Group member was encouraged to attend community support group meetings, make positive daily choices, and maintain healthy boundaries.  Group member was encouraged to invite family members to family educational group on Wednesdays.      BASELINE SCORES 09/17/19  DASES: 82  URICA (Alcohol): 11.42  (Preparation/Action)  URICA (Drugs): 2.57 (Pre-contemplation)  PHQ-9: 10  HALI-7: 12     UPDATED SCORES:  10/29/19  PHQ-9: 2  HALI-7: 2    PLAN:  Continue [facility] IOP Phase I   Aftercare:  [facility] Phase II  Program Assignments:  Personal recovery plan, relapse prevention plan, attendance of recovery support group meetings, exploration of sponsorship, drug/alcohol screens.      Please note that portions of this note were completed with a voice recognition program. Efforts were made to edit dictation, but occasionally words are mistranscribed.      This document signed by Jolynn Bolaños [facility], November 7, 2019, 1:21 PM

## 2019-11-11 ENCOUNTER — OFFICE VISIT (OUTPATIENT)
Dept: PSYCHIATRY | Facility: HOSPITAL | Age: 54
End: 2019-11-11

## 2019-11-11 DIAGNOSIS — F41.9 ANXIETY DISORDER, UNSPECIFIED TYPE: ICD-10-CM

## 2019-11-11 DIAGNOSIS — F10.20 UNCOMPLICATED ALCOHOL DEPENDENCE (HCC): Primary | ICD-10-CM

## 2019-11-11 PROCEDURE — H0015 ALCOHOL AND/OR DRUG SERVICES: HCPCS | Performed by: COUNSELOR

## 2019-11-11 NOTE — PROGRESS NOTES
"NAME: Yousuf Barrera  DATE: 11/11/2019    Phase I  9:00AM - 12:00PM    IOP GROUP NOTE    DATA:     3 hour IOP group therapy session (Check-ins, Coping Skills, Relapse Prevention)     Check Ins:  Therapist introduced self and welcomed new group member.  Therapist reviewed IOP group rules and norms.  Therapist continued facilitation of rapport building strategies between group members. Therapist asked that each patient check in with home life and recovery efforts and identify triggers, cravings, and high risk situations that arise between group sessions.  Group members discussed barriers and benefits of attending recovery meetings.  Therapist provided empathy and support during group session.  Therapist facilitated reading the daily motivation passages from Daily Reflections ([employer] 1991) and Just for Today (The 12 Steps and 12 Traditions, 1991) and invited group members to reflect and discuss.      Session Content/Coping Skills:  Therapist facilitated discussion on self-care practice to maintain good health, improve well-being, and promote sobriety.  Group members assessed self-care activities in 5 categories, including physical, emotional, social, spiritual, and professional.  Therapist assisted group members with reflecting on current self-care practices, recognizing areas for improvements, and generating ideas for new self-care activities.  Group members brainstormed and discussed self-care activities for promoting sobriety.       Response:  Yousuf was in attendance and present for IOP group.  He discussed attending his home group meeting yesterday and stated \"Boy, I needed that.\"  He stated they \"lit a fire under me, too, which is what I needed to hear.\"  He reported being confronted on talking around his issues as he told his story of \"hiding behind a bottle.\"  He was able to simplify his story to the new group member by restating it as \"I used alcohol to cope with family problems.\"  He discussed lying in order " to hide his drinking from his family.  He identified wanting to improve his social self-care, specifically in regards to his relationship with his wife.  He discussed one of the ways he has been working on rebuilding trust with her has been by being completely open and honest with her.  He stated if she asks him anything, he will answer it truthfully.  He gave the example of his wife asking him how much money he has in his pocket and to see his receipts.  He discussed how he would often say he was going to fill the car up with gas when he would really be going to buy alcohol.  He reflected on how he is more than willing to do these things and is not upset about it because he still has his family and if this is what it takes to rebuild the trust then he is willing to do it.  He  Did acknowledge he has difficulty expressing and asking for his own needs.  As he and his wife have been communicating more, he was able to tell her how important it is for him to keep his schedule open for attending his home group each week.    ASSESSMENT:     Lab Review  Negative on 9/24/19  Negative on 10/8/19  Negative on 10/16/19  Negative on 10/23/19  Negative on 10/31/19   Negative on 11/7/19    Mental Status Exam  Hygiene:  good  Dress: casual  Attitude: cooperative and agreeable   Motor Activity: appropriate  Eye Contact:  fair  Speech: regular rate and rhythm   Mood:  calm and conversant  Affect:  Appropriate  Thought Processes:  Goal directed and Linear  Thought Content:  Normal  Suicidal Thoughts:  denies  Homicidal Thoughts:  denies  Crisis Safety Plan: yes, to come to the emergency room.  Hallucinations:  denies  Reliability: fair  Insight: fair  Judgement: fair  Impulse Control: fair    Patient's Support Network Includes:  The patient receives support from his wife, daughter, and employer.  His wife is starting to communicate with him more and show interest in his recovery.     Progress toward goal: Not at goal     Level of  "Functioning/Impairment (ASAM):  I.    Intoxication/Withdrawal:  No problem = 0  II.   Medical Conditions/Complications: No problem = 0  III.  Behavioral/Emotional/Cognitive: Mild problem = 1 (increased anxiety and fear of daughter's autonomy)  IV.  Readiness to Change: Moderate problem = 2 (external and internal motivation to change; ambivalence about family involvement and assertiveness; fear of \"rocking the boat\")  V.   Relapse Risk: Moderate problem = 2 (poor coping skills, limited recovery experience, relying on others to hold him accountable)  VI:  Recovery Environment: Mild problem = 1 (family supportive of his sobriety but not actively involved in family recovery, limited knowledge on the nature of addiction as a family disease, strained relationships, unrealistic expectations of others)  Total ASAM Score = 6     Prognosis: Fair with Ongoing Treatment      Family issues related to recovery:  poor communication, broken trust     Recovery/spiritual support group attendance: Attended 3 over the weekend     Sponsor:  yes  Have you made contact with him/her this past week?  yes      Identification of environmental and personal barriers to recovery: coping with limited and/or negative emotions; feelings of remorse and guilt; work stress; strained family dynamics; poor communication skills; and unrealistic expectations of others     Motivation for treatment: Improved mental and physical health; improving overall relationships with emphasis on being a  and father; long-term sobriety; and closer spiritual connection     Self-reported number of days sober:  12    Impression/Formulation:     Diagnosis Plan   1. Uncomplicated alcohol dependence (CMS/HCC)     2. Anxiety disorder, unspecified type         CLINICAL MANUVERING/INTERVENTIONS:  Therapist utilized a person-centered approach to build rapport with group member.  Therapist implemented motivational interviewing techniques to assist client with exploring and " resolving ambivalence associated with commitment to change behaviors related to substance use and addiction.  Therapist applied cognitive behavioral strategies to facilitate identification of maladaptive patterns of thinking and behavior that contribute to cleint's risk for continued substance use and relapse.  Therapist employed group interaction activities to build rapport among group members, promote sobriety, and emphasize relapse prevention.  Therapist promoted safe nonjudgmental environment by providing group members with unconditional positive regard and encouraging group members to comply with group rules and guidelines.  Therapist utilized dialectical behavior techniques to teach and model emotional regulation and relaxation.  Therapist assisted group member with identifying and implementing healthier coping strategies.  Group member was encouraged to attend community support group meetings, make positive daily choices, and maintain healthy boundaries.  Group member was encouraged to invite family members to family educational group on Wednesdays.      BASELINE SCORES 09/17/19  DASES: 82  URICA (Alcohol): 11.42 (Preparation/Action)  URICA (Drugs): 2.57 (Pre-contemplation)  PHQ-9: 10  HALI-7: 12     UPDATED SCORES:  10/29/19  PHQ-9: 2  HALI-7: 2    PLAN:  Continue [facility] IOP Phase I   Aftercare:  [facility] Phase II  Program Assignments:  Personal recovery plan, relapse prevention plan, attendance of recovery support group meetings, exploration of sponsorship, drug/alcohol screens.      Please note that portions of this note were completed with a voice recognition program. Efforts were made to edit dictation, but occasionally words are mistranscribed.      This document signed by Jolynn Bolaños [facility], November 11, 2019, 2:49 PM

## 2019-11-12 ENCOUNTER — OFFICE VISIT (OUTPATIENT)
Dept: PSYCHIATRY | Facility: HOSPITAL | Age: 54
End: 2019-11-12

## 2019-11-12 DIAGNOSIS — F41.9 ANXIETY DISORDER, UNSPECIFIED TYPE: ICD-10-CM

## 2019-11-12 DIAGNOSIS — F10.20 UNCOMPLICATED ALCOHOL DEPENDENCE (HCC): Primary | ICD-10-CM

## 2019-11-12 PROCEDURE — H0015 ALCOHOL AND/OR DRUG SERVICES: HCPCS | Performed by: COUNSELOR

## 2019-11-12 NOTE — PROGRESS NOTES
NAME: Yousuf Barrera  DATE: 11/12/2019    Phase I  9:00AM - 12:00PM    IOP GROUP NOTE    DATA:     3 hour IOP group therapy session (Check-ins, Coping Skills, Relapse Prevention)     Check Ins:  Therapist continued facilitation of rapport building strategies between group members. Therapist asked that each patient check in with home life and recovery efforts and identify triggers, cravings, and high risk situations that arise between group sessions.  Group members discussed barriers and benefits of attending recovery meetings.  Therapist provided empathy and support during group session.  Therapist facilitated reading the daily motivation passages from Daily Reflections ([employer] 1991) and Just for Today (The 12 Steps and 12 Traditions, 1991) and invited group members to reflect and discuss.      Session Content/Coping Skills:  Therapist facilitated Recovery Jeopardy activity to challenge group member’s recovery knowledge and promote group interaction and cohesion.  Therapist facilitated phase up ceremony for graduating group members.  Group members openly shared affirmations about the graduating group members.  Graduating group members shared their personal recovery plans and what they have taken away from Phase I.    Response:  Yousuf was in attendance and present for IOP group.  He reported feeling sad and afraid that today would be his last day in Phase I.  He also reported feeling proud of himself for the progress he's made thus far.  He reported having a craving Sunday morning but was able to distract himself until going to a meeting.  He discussed how his cravings have been less frequent are more manageable.  He was tearful as he shared with the group how much their support meant to him and how he will miss seeing them every morning.  He shared from his personal recovery plan and set a goal of attending at least 3 meetings a week and talking to either his sponsor or someone else in recovery every day.  He  "discussed his fears not having the \"safety net\" of Phase I group and was given encouraging and positive feedback about the progress he has made and goals he has achieved thus far.       ASSESSMENT:      Lab Review  Negative on 9/24/19  Negative on 10/8/19  Negative on 10/16/19  Negative on 10/23/19  Negative on 10/31/19   Negative on 11/7/19     Mental Status Exam  Hygiene:  good  Dress: casual  Attitude: cooperative and agreeable   Motor Activity: appropriate  Eye Contact:  fair  Speech: regular rate and rhythm   Mood:  calm and conversant  Affect:  Appropriate  Thought Processes:  Goal directed and Linear  Thought Content:  Normal  Suicidal Thoughts:  denies  Homicidal Thoughts:  denies  Crisis Safety Plan: yes, to come to the emergency room.  Hallucinations:  denies  Reliability: fair  Insight: fair  Judgement: fair  Impulse Control: fair     Patient's Support Network Includes:  The patient receives support from his wife, daughter, and employer.  His wife is starting to communicate with him more and show interest in his recovery.     Progress toward goal: Not at goal     Level of Functioning/Impairment (ASAM):  I.    Intoxication/Withdrawal:  No problem = 0  II.   Medical Conditions/Complications: No problem = 0  III.  Behavioral/Emotional/Cognitive: Mild problem = 1 (increased anxiety and fear of daughter's autonomy)  IV.  Readiness to Change: Moderate problem = 2 (Contemplation))  V.   Relapse Risk: Moderate problem = 2 (poor coping skills, limited recovery experience, relying on others to hold him accountable)  VI:  Recovery Environment: Mild problem = 1 (family supportive of his sobriety but not actively involved in family recovery, limited knowledge on the nature of addiction as a family disease, strained relationships, unrealistic expectations of others)  Total ASAM Score = 6     Prognosis: Fair with Ongoing Treatment      Family issues related to recovery:  poor communication, broken " [facility]     Recovery/spiritual support group attendance: Attended 3 over the weekend     Sponsor:  yes  Have you made contact with him/her this past week?  yes      Identification of environmental and personal barriers to recovery: coping with limited and/or negative emotions; feelings of remorse and guilt; work stress; strained family dynamics; poor communication skills; and unrealistic expectations of others     Motivation for treatment: Improved mental and physical health; improving overall relationships with emphasis on being a  and father; long-term sobriety; and closer spiritual connection     Self-reported number of days sober:  12    Impression/Formulation:     Diagnosis Plan   1. Uncomplicated alcohol dependence (CMS/HCC)     2. Anxiety disorder, unspecified type         CLINICAL MANUVERING/INTERVENTIONS:  Therapist utilized a person-centered approach to build rapport with group member.  Therapist implemented motivational interviewing techniques to assist client with exploring and resolving ambivalence associated with commitment to change behaviors related to substance use and addiction.  Therapist applied cognitive behavioral strategies to facilitate identification of maladaptive patterns of thinking and behavior that contribute to cleint's risk for continued substance use and relapse.  Therapist employed group interaction activities to build rapport among group members, promote sobriety, and emphasize relapse prevention.  Therapist promoted safe nonjudgmental environment by providing group members with unconditional positive regard and encouraging group members to comply with group rules and guidelines.  Therapist utilized dialectical behavior techniques to teach and model emotional regulation and relaxation.  Therapist assisted group member with identifying and implementing healthier coping strategies.  Group member was encouraged to attend community support group meetings, make positive daily  choices, and maintain healthy boundaries.  Group member was encouraged to invite family members to family educational group on Wednesdays.      BASELINE SCORES 09/17/19  DASES: 82  URICA (Alcohol): 11.42 (Preparation/Action)  URICA (Drugs): 2.57 (Pre-contemplation)  PHQ-9: 10  HALI-7: 12     UPDATED SCORES:  11/12/19  DASES: 87  URICA (Alcohol): 10.71 (Contemplation)  URICA (Drugs): 3.99 (Pre-contemplation)  PHQ-9: 2  HALI-7: 4    PLAN:  Continue [facility] IOP Phase I   Aftercare:  [facility] Phase II  Program Assignments:  Personal recovery plan, relapse prevention plan, attendance of recovery support group meetings, exploration of sponsorship, drug/alcohol screens.      Please note that portions of this note were completed with a voice recognition program. Efforts were made to edit dictation, but occasionally words are mistranscribed.      This document signed by Jolynn Bolaños [facility], November 13, 2019, 1:09 PM

## 2019-11-13 ENCOUNTER — APPOINTMENT (OUTPATIENT)
Dept: PSYCHIATRY | Facility: HOSPITAL | Age: 54
End: 2019-11-13

## 2019-11-13 ENCOUNTER — OFFICE VISIT (OUTPATIENT)
Dept: PSYCHIATRY | Facility: CLINIC | Age: 54
End: 2019-11-13

## 2019-11-13 DIAGNOSIS — G47.00 INSOMNIA, UNSPECIFIED TYPE: ICD-10-CM

## 2019-11-13 DIAGNOSIS — F10.20 UNCOMPLICATED ALCOHOL DEPENDENCE (HCC): Primary | ICD-10-CM

## 2019-11-13 DIAGNOSIS — F33.1 MAJOR DEPRESSIVE DISORDER, RECURRENT EPISODE, MODERATE (HCC): ICD-10-CM

## 2019-11-13 DIAGNOSIS — F41.9 ANXIETY DISORDER, UNSPECIFIED TYPE: ICD-10-CM

## 2019-11-13 PROCEDURE — 90853 GROUP PSYCHOTHERAPY: CPT | Performed by: SOCIAL WORKER

## 2019-11-13 NOTE — PROGRESS NOTES
[facility]  [address], [address]  [place] [address]  [phone]    INTENSIVE OUTPATIENT PROGRAM  PHASE I  DISCHARGE SUMMARY        ATTENDING: MELVIN Eaton  THERAPIST: JES Kim    DATE OF ADMISSION: 9/17/19  DATE OF DISCHARGE: 11/12/19        REASON FOR ADMISSION: Yousuf was referred by [facility] and admitted to IOP Phase I for uncomplicated alcohol dependence.     SUMMARY OF CARE, TREATMENT, and SERVICES PROVIDED:  Yousuf was assessed and determined to meet [facility] level of care on 9/16/19.  He began IOP on 9/17/19 and attended 31 group therapy sessions.  He had 1 absence.  He exhibited an increased score on the Drug Avoidance Self-Efficacy Scale (DASES) from a beginning score of 82 to an ending score of 87 upon discharge indicating increased confidence in his/her ability to stay sober.  He displayed negative results on urine drug and alcohol screenings.  Patient completed the following treatment goals:  identified high-risk people, places, and situations to avoid in order to prevent relapse; learned and utilized at least 5 health coping skills to use when faced with high-risk situations; increased DASES score; decreased HALI-7 score; decreased PHQ-9 score; attendance of group therapy sessions as scheduled; participation during group by providing and receiving feedback; attendance of recovery/spiritual support group meetings weekly; obtaining a sponsor or sober supprt; creating a relapse prevention plan; and creating a personal recovery plan.  Patient identified the following recovery priorities: maintain long-term sobriety, improve relationships with immediate family, and improve emotional and physical health. Patient identified healthy coping strategies during group, such as talking with a sponsor and attending support group meetings to assist in managing difficult emotions without using drugs or alcohol.    AFTERCARE PLAN:  Yousuf completed  [facility] IOP Phase I on 11/12/19.  He will be admitted to [facility] IOP Phase II and will attend Phase II on the next scheduled date.  He will participate in individual therapy as needed on a weekly basis.  He was encouraged to continue attending support group meetings and daily communication with his sponsor [facility] support system.      Current Outpatient Medications:   •  acamprosate (CAMPRAL) 333 MG EC tablet, Take 2 tablets by mouth 3 (Three) Times a Day., Disp: 180 tablet, Rfl: 0  •  hydrOXYzine (ATARAX) 25 MG tablet, Take 1 tablet by mouth Every 8 (Eight) Hours As Needed for Anxiety., Disp: 60 tablet, Rfl: 2  •  losartan (COZAAR) 50 MG tablet, Take 50 mg by mouth Daily., Disp: , Rfl:   •  traZODone (DESYREL) 50 MG tablet, Take 1 tablet by mouth At Night As Needed for Sleep., Disp: 30 tablet, Rfl: 2      Diagnosis Plan   1. Uncomplicated alcohol dependence (CMS/HCC)     2. Anxiety disorder, unspecified type           PROGNOSIS: good, with continued care

## 2019-11-14 ENCOUNTER — APPOINTMENT (OUTPATIENT)
Dept: PSYCHIATRY | Facility: HOSPITAL | Age: 54
End: 2019-11-14

## 2019-11-18 ENCOUNTER — OFFICE VISIT (OUTPATIENT)
Dept: PSYCHIATRY | Facility: CLINIC | Age: 54
End: 2019-11-18

## 2019-11-18 DIAGNOSIS — G47.00 INSOMNIA, UNSPECIFIED TYPE: ICD-10-CM

## 2019-11-18 DIAGNOSIS — F41.9 ANXIETY DISORDER, UNSPECIFIED TYPE: ICD-10-CM

## 2019-11-18 DIAGNOSIS — F33.1 MAJOR DEPRESSIVE DISORDER, RECURRENT EPISODE, MODERATE (HCC): ICD-10-CM

## 2019-11-18 DIAGNOSIS — F10.20 UNCOMPLICATED ALCOHOL DEPENDENCE (HCC): Primary | ICD-10-CM

## 2019-11-18 PROCEDURE — 90853 GROUP PSYCHOTHERAPY: CPT | Performed by: SOCIAL WORKER

## 2019-11-18 NOTE — PROGRESS NOTES
Phase 2    330-430    GROUP: Group member was allowed to address any worries or concerns between our group sessions.  Group was asked about acceptance of recovery. Asked group member to discuss if they accept themselves and their recovery.     RESPONSE:  Encourage group members discussed their feelings of where they are in the sobriety, their struggles and their successes.  Group members discussed acceptance of self. One group members discussed their long addiction and recovery process and how shame and guilt comes first then they grow to acceptance. Another group member new to recovery discussed he is dealing with family being resentful and he has shame and guilt. He explained he is embarrassed by being in recovery giving examples of his friends, [employer] and coworkers doesn't know that he struggles with addiction and in recovery program.     CLINICAL MANEUVERING/INTERVENTIONS: Assisted member in processing above session content; acknowledged and normalized patient's thoughts, feelings and concerns. assisted patient in processing her thoughts and feelings of struggling with addiction, depression and anxiety..  Educated on the importance of completing a 12 step program and having a close relationship with her sponsor.  Patient denies SI, HI and self-harm.  Educated on balance and allow each member to discuss what they are working on balancing in their life to prevent relapse and to be able to focus on their sobriety. Educated on the importance of coming to group now twice a week Mondays and Wednesdays from 330-430.    Educated on relapse prevention, stress management and the importance of decreasing life stressors and preventing exposure to triggers during their early recovery until they gain needed coping skills.  Assisted each group member and relapse prevention.  Educated on Al-Anon. Educated on the journey to acceptance of self and recovery during this group. Educated on mandatory 3 AA or NA classes a week and  need written log of it.     Allowed patient to freely discuss issues without interruption or judgment. Provided safe, confidential environment to facilitate the development of positive therapeutic relationship and encourage open, honest communication. Assisted patient in identifying risk factors which would indicate the need for higher level of care including thoughts to harm self or others and/or self-harming behavior and encouraged patient to contact this office, call 911, or present to the nearest emergency room should any of these events occur. Discussed crisis intervention services and means to access.  Patient adamantly and convincingly denies current suicidal or homicidal ideation or perceptual disturbance.    ASSESSMENT:  Engaged in activity/Process and self-disclosed: Yes or No  Affect ([place] all that apply) appropriate, blunted, flat, sad, angry, tearful, anxious, bright  Applies topic to self: Yes or No  Able to give and receive feedback: Yes or No  Degree of insightful thinking: Least 1  2  3  4  5  6  7 8  9  10  Most    Urinalysis:   No visits with results within 1 Day(s) from this visit.   Latest known visit with results is:   Lab on 11/06/2019   Component Date Value Ref Range Status   • Ethanol, Urine 11/06/2019 Negative  Cutoff=0.020 % Final   • THC, Screen, Urine 11/06/2019 Negative  Negative Final   • Phencyclidine (PCP), Urine 11/06/2019 Negative  Negative Final   • Cocaine Screen, Urine 11/06/2019 Negative  Negative Final   • Methamphetamine, Ur 11/06/2019 Negative  Negative Final   • Opiate Screen 11/06/2019 Negative  Negative Final   • Amphetamine Screen, Urine 11/06/2019 Negative  Negative Final   • Benzodiazepine Screen, Urine 11/06/2019 Negative  Negative Final   • Tricyclic Antidepressants Screen 11/06/2019 Negative  Negative Final   • Methadone Screen, Urine 11/06/2019 Negative  Negative Final   • Barbiturates Screen, Urine 11/06/2019 Negative  Negative Final   • Oxycodone Screen, Urine  11/06/2019 Negative  Negative Final   • Propoxyphene Screen 11/06/2019 Negative  Negative Final   • Buprenorphine, Screen, Urine 11/06/2019 Negative  Negative Final       12-Step attendance: Frequency/Sponsor?  Yes AA    Identification of environmental and personal barriers to recovery: New to recovery    Motivation for treatment: Shows motivation but also insecurities wanting to rely on others support    Mental Status Exam  Hygiene:  good  Dress:  casual  Attitude:  Cooperative  Motor Activity:  Restless  Speech:  Normal  Mood:  anxious  Affect:  anxious  Thought Processes:  Flight of ieas  Thought Content:  normal  Suicidal Thoughts:  denies  Homicidal Thoughts:  denies  Crisis Safety Plan: yes, to come to the emergency room.  Hallucinations:  denies    Assessment     Diagnoses and all orders for this visit:    Uncomplicated alcohol dependence (CMS/HCC)    Anxiety disorder, unspecified type    Insomnia, unspecified type    Major depressive disorder, recurrent episode, moderate (CMS/HCC)               PLAN:   Patient will continue in bi-weekly group psychotherapy sessions and individual outpatient psychotherapy sessions every 3-4 weeks and will continue in self-help meetings and seek additional treatment if necessary following completion.    Malou Oleary LCSW

## 2019-11-18 NOTE — PROGRESS NOTES
Phase 2    330-430    GROUP: Group member was allowed to address any worries or concerns between our group sessions.  The new group member started today, allowed him to share his story and when he got out of phase 1.  Another group member gave him feedback of the difference between phase 1 and phase 2 of IOP.  Encourage group members to ask questions and concerns.    RESPONSE:  Encourage group members discussed their feelings of where they are in the sobriety, their struggles and their successes.  The new group member discussed his story, drinking that increased after his daughter attempted to run away and felt like they were not connecting with her and hiding it from his wife and daughter.  This new group member discussed his wife is angry and resentful he is having a hard time coping with this.  The other group member discussed and shared his experience with family members that are angry and resentful and the importance of allowing him to have their thoughts and feelings as they feel like they have been cheated on per group member.  Group members discussed what they are doing currently to improve healthy coping skills and to ensure sobriety/protective factors.    CLINICAL MANEUVERING/INTERVENTIONS: Assisted member in processing above session content; acknowledged and normalized patient's thoughts, feelings and concerns. assisted patient in processing her thoughts and feelings of struggling with addiction, depression and anxiety..  Educated on the importance of completing a 12 step program and having a close relationship with her sponsor.  Patient denies SI, HI and self-harm.  Educated on balance and allow each member to discuss what they are working on balancing in their life to prevent relapse and to be able to focus on their sobriety. Educated on the importance of coming to group now twice a week Mondays and Wednesdays from 330-430.    Educated on relapse prevention, stress management and the importance of  decreasing life stressors and preventing exposure to triggers during their early recovery until they gain needed coping skills.  Assisted each group member and relapse prevention.  Educated on Al-Anon.  Encourage each group member to decide on 3 healthy coping skills that work for them currently between now and next group to discuss/share    Allowed patient to freely discuss issues without interruption or judgment. Provided safe, confidential environment to facilitate the development of positive therapeutic relationship and encourage open, honest communication. Assisted patient in identifying risk factors which would indicate the need for higher level of care including thoughts to harm self or others and/or self-harming behavior and encouraged patient to contact this office, call 911, or present to the nearest emergency room should any of these events occur. Discussed crisis intervention services and means to access.  Patient adamantly and convincingly denies current suicidal or homicidal ideation or perceptual disturbance.    ASSESSMENT:  Engaged in activity/Process and self-disclosed: Yes or No  Affect ([place] all that apply) appropriate, blunted, flat, sad, angry, tearful, anxious, bright  Applies topic to self: Yes or No  Able to give and receive feedback: Yes or No  Degree of insightful thinking: Least 1  2  3  4  5  6  7 8  9  10  Most    Urinalysis:   No visits with results within 1 Day(s) from this visit.   Latest known visit with results is:   Lab on 11/06/2019   Component Date Value Ref Range Status   • Ethanol, Urine 11/06/2019 Negative  Cutoff=0.020 % Final   • THC, Screen, Urine 11/06/2019 Negative  Negative Final   • Phencyclidine (PCP), Urine 11/06/2019 Negative  Negative Final   • Cocaine Screen, Urine 11/06/2019 Negative  Negative Final   • Methamphetamine, Ur 11/06/2019 Negative  Negative Final   • Opiate Screen 11/06/2019 Negative  Negative Final   • Amphetamine Screen, Urine 11/06/2019 Negative   Negative Final   • Benzodiazepine Screen, Urine 11/06/2019 Negative  Negative Final   • Tricyclic Antidepressants Screen 11/06/2019 Negative  Negative Final   • Methadone Screen, Urine 11/06/2019 Negative  Negative Final   • Barbiturates Screen, Urine 11/06/2019 Negative  Negative Final   • Oxycodone Screen, Urine 11/06/2019 Negative  Negative Final   • Propoxyphene Screen 11/06/2019 Negative  Negative Final   • Buprenorphine, Screen, Urine 11/06/2019 Negative  Negative Final       12-Step attendance: Frequency/Sponsor?  Yes AA    Identification of environmental and personal barriers to recovery: New to recovery    Motivation for treatment: Shows motivation but also insecurities wanting to rely on others support    Mental Status Exam  Hygiene:  good  Dress:  casual  Attitude:  Cooperative  Motor Activity:  Restless  Speech:  Normal  Mood:  anxious  Affect:  anxious  Thought Processes:  Flight of ieas  Thought Content:  normal  Suicidal Thoughts:  denies  Homicidal Thoughts:  denies  Crisis Safety Plan: yes, to come to the emergency room.  Hallucinations:  denies    Assessment     Diagnoses and all orders for this visit:    Uncomplicated alcohol dependence (CMS/HCC)    Anxiety disorder, unspecified type    Insomnia, unspecified type    Major depressive disorder, recurrent episode, moderate (CMS/HCC)               PLAN:   Patient will continue in bi-weekly group psychotherapy sessions and individual outpatient psychotherapy sessions every 3-4 weeks and will continue in self-help meetings and seek additional treatment if necessary following completion.    Malou Oleary LCSW

## 2019-11-25 ENCOUNTER — OFFICE VISIT (OUTPATIENT)
Dept: PSYCHIATRY | Facility: CLINIC | Age: 54
End: 2019-11-25

## 2019-11-25 DIAGNOSIS — F10.20 UNCOMPLICATED ALCOHOL DEPENDENCE (HCC): Primary | ICD-10-CM

## 2019-11-25 DIAGNOSIS — F41.9 ANXIETY DISORDER, UNSPECIFIED TYPE: ICD-10-CM

## 2019-11-25 DIAGNOSIS — F10.20 UNCOMPLICATED ALCOHOL DEPENDENCE (HCC): ICD-10-CM

## 2019-11-25 DIAGNOSIS — G47.00 INSOMNIA, UNSPECIFIED TYPE: ICD-10-CM

## 2019-11-25 DIAGNOSIS — F33.1 MAJOR DEPRESSIVE DISORDER, RECURRENT EPISODE, MODERATE (HCC): ICD-10-CM

## 2019-11-25 PROCEDURE — 90853 GROUP PSYCHOTHERAPY: CPT | Performed by: SOCIAL WORKER

## 2019-11-25 RX ORDER — ACAMPROSATE CALCIUM 333 MG/1
666 TABLET, DELAYED RELEASE ORAL 3 TIMES DAILY
Qty: 180 TABLET | Refills: 0 | Status: SHIPPED | OUTPATIENT
Start: 2019-11-25 | End: 2020-01-14 | Stop reason: SDDI

## 2019-11-26 NOTE — PROGRESS NOTES
Phase 2        GROUP: Group member was allowed to address any worries or concerns between our group sessions.  Group was asked to discuss their fears and what they have learned about themselves since being sober. Encouraged self reflection.     RESPONSE:  Encourage group members discussed their feelings of where they are in the sobriety, their struggles and their successes. One group member discussed growth of not getting comfortable and how recently was feeling comfortable and had a mental relapse another group member discussed not accepting that he has an addiction at times and struggling with acceptance    CLINICAL MANEUVERING/INTERVENTIONS: Assisted member in processing above session content; acknowledged and normalized patient's thoughts, feelings and concerns. assisted patient in processing her thoughts and feelings of struggling with addiction, depression and anxiety..  Educated on the importance of completing a 12 step program and having a close relationship with her sponsor.  Patient denies SI, HI and self-harm.  Educated on balance and allow each member to discuss what they are working on balancing in their life to prevent relapse and to be able to focus on their sobriety. Educated on the importance of coming to group now twice a week Mondays and Wednesdays from 330-430.  One group member is to graduate this Wednesday at our next group.  Another group member is working on telling his  current sponsor that is not working out as their schedules do not mesh well and learning to work on problem-solving using assertive communication instead of avoiding issues like he has in the past per patient.  Continue to relate to behaviors to addiction ways to create balance in daily life by using healthy self    Allowed patient to freely discuss issues without interruption or judgment. Provided safe, confidential environment to facilitate the development of positive therapeutic relationship and encourage open,  honest communication. Assisted patient in identifying risk factors which would indicate the need for higher level of care including thoughts to harm self or others and/or self-harming behavior and encouraged patient to contact this office, call 911, or present to the nearest emergency room should any of these events occur. Discussed crisis intervention services and means to access.  Patient adamantly and convincingly denies current suicidal or homicidal ideation or perceptual disturbance.    ASSESSMENT:  Engaged in activity/Process and self-disclosed: Yes or No  Affect ([place] all that apply) appropriate, blunted, flat, sad, angry, tearful, anxious, bright  Applies topic to self: Yes or No  Able to give and receive feedback: Yes or No  Degree of insightful thinking: Least 1  2  3  4  5  6  7 8  9  10  Most    Urinalysis:   No visits with results within 1 Day(s) from this visit.   Latest known visit with results is:   Lab on 11/06/2019   Component Date Value Ref Range Status   • Ethanol, Urine 11/06/2019 Negative  Cutoff=0.020 % Final   • THC, Screen, Urine 11/06/2019 Negative  Negative Final   • Phencyclidine (PCP), Urine 11/06/2019 Negative  Negative Final   • Cocaine Screen, Urine 11/06/2019 Negative  Negative Final   • Methamphetamine, Ur 11/06/2019 Negative  Negative Final   • Opiate Screen 11/06/2019 Negative  Negative Final   • Amphetamine Screen, Urine 11/06/2019 Negative  Negative Final   • Benzodiazepine Screen, Urine 11/06/2019 Negative  Negative Final   • Tricyclic Antidepressants Screen 11/06/2019 Negative  Negative Final   • Methadone Screen, Urine 11/06/2019 Negative  Negative Final   • Barbiturates Screen, Urine 11/06/2019 Negative  Negative Final   • Oxycodone Screen, Urine 11/06/2019 Negative  Negative Final   • Propoxyphene Screen 11/06/2019 Negative  Negative Final   • Buprenorphine, Screen, Urine 11/06/2019 Negative  Negative Final       12-Step attendance: Frequency/Sponsor?  Yes  AA    Identification of environmental and personal barriers to recovery: New to recovery    Motivation for treatment: Shows motivation but also insecurities wanting to rely on others support    Mental Status Exam  Hygiene:  good  Dress:  casual  Attitude:  Cooperative  Motor Activity:  Restless  Speech:  Normal  Mood:  anxious  Affect:  anxious  Thought Processes:  Flight of ieas  Thought Content:  normal  Suicidal Thoughts:  denies  Homicidal Thoughts:  denies  Crisis Safety Plan: yes, to come to the emergency room.  Hallucinations:  denies    Assessment     Diagnoses and all orders for this visit:    Uncomplicated alcohol dependence (CMS/HCC)    Anxiety disorder, unspecified type    Insomnia, unspecified type    Major depressive disorder, recurrent episode, moderate (CMS/HCC)               PLAN:   Patient will continue in bi-weekly group psychotherapy sessions and individual outpatient psychotherapy sessions every 3-4 weeks and will continue in self-help meetings and seek additional treatment if necessary following completion.    Malou Oleary LCSW

## 2019-11-27 ENCOUNTER — OFFICE VISIT (OUTPATIENT)
Dept: PSYCHIATRY | Facility: CLINIC | Age: 54
End: 2019-11-27

## 2019-11-27 DIAGNOSIS — F33.1 MAJOR DEPRESSIVE DISORDER, RECURRENT EPISODE, MODERATE (HCC): ICD-10-CM

## 2019-11-27 DIAGNOSIS — F41.9 ANXIETY DISORDER, UNSPECIFIED TYPE: ICD-10-CM

## 2019-11-27 DIAGNOSIS — G47.00 INSOMNIA, UNSPECIFIED TYPE: ICD-10-CM

## 2019-11-27 DIAGNOSIS — F10.20 UNCOMPLICATED ALCOHOL DEPENDENCE (HCC): Primary | ICD-10-CM

## 2019-11-27 PROCEDURE — 90853 GROUP PSYCHOTHERAPY: CPT | Performed by: SOCIAL WORKER

## 2019-12-02 NOTE — PROGRESS NOTES
Phase 2    330-430    GROUP: Group member was allowed to address any worries or concerns between our group sessions.  One group member is graduating today, had april party and the group member graduating shared his story to the other group members    RESPONSE:  Encourage group members discussed their feelings of where they are in the sobriety, their struggles and their successes.  Group members discussed the process/journey into recovery.  Encouraged group members to reflect where they have come from as a group member graduating today shared his story and each group member was able to reflect on this what they learned from his story.  Group members were appreciative of him sharing his story today.      CLINICAL MANEUVERING/INTERVENTIONS: Assisted member in processing above session content; acknowledged and normalized patient's thoughts, feelings and concerns. assisted patient in processing her thoughts and feelings of struggling with addiction, depression and anxiety..  Educated on the importance of completing a 12 step program and having a close relationship with her sponsor.  Patient denies SI, HI and self-harm.  Educated on balance and allow each member to discuss what they are working on balancing in their life to prevent relapse and to be able to focus on their sobriety. Educated on the importance of coming to group now twice a week Mondays and Wednesdays from 330-430.  Assisted group members and discussing that the journey to recovery, what they have learned from IOP and the importance of having a community/support system.  Provided the graduate with a certificate and a letter of completion.  Group offered support and well wishes    Allowed patient to freely discuss issues without interruption or judgment. Provided safe, confidential environment to facilitate the development of positive therapeutic relationship and encourage open, honest communication. Assisted patient in identifying risk factors which would  indicate the need for higher level of care including thoughts to harm self or others and/or self-harming behavior and encouraged patient to contact this office, call 911, or present to the nearest emergency room should any of these events occur. Discussed crisis intervention services and means to access.  Patient adamantly and convincingly denies current suicidal or homicidal ideation or perceptual disturbance.    ASSESSMENT:  Engaged in activity/Process and self-disclosed: Yes or No  Affect ([place] all that apply) appropriate, blunted, flat, sad, angry, tearful, anxious, bright  Applies topic to self: Yes or No  Able to give and receive feedback: Yes or No  Degree of insightful thinking: Least 1  2  3  4  5  6  7 8  9  10  Most    Urinalysis:   No visits with results within 1 Day(s) from this visit.   Latest known visit with results is:   Lab on 11/06/2019   Component Date Value Ref Range Status   • Ethanol, Urine 11/06/2019 Negative  Cutoff=0.020 % Final   • THC, Screen, Urine 11/06/2019 Negative  Negative Final   • Phencyclidine (PCP), Urine 11/06/2019 Negative  Negative Final   • Cocaine Screen, Urine 11/06/2019 Negative  Negative Final   • Methamphetamine, Ur 11/06/2019 Negative  Negative Final   • Opiate Screen 11/06/2019 Negative  Negative Final   • Amphetamine Screen, Urine 11/06/2019 Negative  Negative Final   • Benzodiazepine Screen, Urine 11/06/2019 Negative  Negative Final   • Tricyclic Antidepressants Screen 11/06/2019 Negative  Negative Final   • Methadone Screen, Urine 11/06/2019 Negative  Negative Final   • Barbiturates Screen, Urine 11/06/2019 Negative  Negative Final   • Oxycodone Screen, Urine 11/06/2019 Negative  Negative Final   • Propoxyphene Screen 11/06/2019 Negative  Negative Final   • Buprenorphine, Screen, Urine 11/06/2019 Negative  Negative Final       12-Step attendance: Frequency/Sponsor?  Yes AA    Identification of environmental and personal barriers to recovery: New to  recovery    Motivation for treatment: Shows motivation but also insecurities wanting to rely on others support    Mental Status Exam  Hygiene:  good  Dress:  casual  Attitude:  Cooperative  Motor Activity:  Restless  Speech:  Normal  Mood:  anxious  Affect:  anxious  Thought Processes:  Flight of ieas  Thought Content:  normal  Suicidal Thoughts:  denies  Homicidal Thoughts:  denies  Crisis Safety Plan: yes, to come to the emergency room.  Hallucinations:  denies    Assessment     Diagnoses and all orders for this visit:    Uncomplicated alcohol dependence (CMS/HCC)    Anxiety disorder, unspecified type    Insomnia, unspecified type    Major depressive disorder, recurrent episode, moderate (CMS/HCC)               PLAN:   Patient will continue in bi-weekly group psychotherapy sessions and individual outpatient psychotherapy sessions every 3-4 weeks and will continue in self-help meetings and seek additional treatment if necessary following completion.    Malou Oleary LCSW her

## 2019-12-04 ENCOUNTER — OFFICE VISIT (OUTPATIENT)
Dept: PSYCHIATRY | Facility: CLINIC | Age: 54
End: 2019-12-04

## 2019-12-04 DIAGNOSIS — F10.20 UNCOMPLICATED ALCOHOL DEPENDENCE (HCC): Primary | ICD-10-CM

## 2019-12-04 DIAGNOSIS — G47.00 INSOMNIA, UNSPECIFIED TYPE: ICD-10-CM

## 2019-12-04 DIAGNOSIS — F41.9 ANXIETY DISORDER, UNSPECIFIED TYPE: ICD-10-CM

## 2019-12-04 DIAGNOSIS — F33.1 MAJOR DEPRESSIVE DISORDER, RECURRENT EPISODE, MODERATE (HCC): ICD-10-CM

## 2019-12-04 PROCEDURE — 90853 GROUP PSYCHOTHERAPY: CPT | Performed by: SOCIAL WORKER

## 2019-12-09 ENCOUNTER — OFFICE VISIT (OUTPATIENT)
Dept: PSYCHIATRY | Facility: CLINIC | Age: 54
End: 2019-12-09

## 2019-12-09 DIAGNOSIS — G47.00 INSOMNIA, UNSPECIFIED TYPE: ICD-10-CM

## 2019-12-09 DIAGNOSIS — F10.20 UNCOMPLICATED ALCOHOL DEPENDENCE (HCC): Primary | ICD-10-CM

## 2019-12-09 DIAGNOSIS — F33.1 MAJOR DEPRESSIVE DISORDER, RECURRENT EPISODE, MODERATE (HCC): ICD-10-CM

## 2019-12-09 DIAGNOSIS — F41.9 ANXIETY DISORDER, UNSPECIFIED TYPE: ICD-10-CM

## 2019-12-09 PROCEDURE — 90853 GROUP PSYCHOTHERAPY: CPT | Performed by: SOCIAL WORKER

## 2019-12-09 NOTE — PROGRESS NOTES
Phase 2    330-430    GROUP: Group member was allowed to address any worries or concerns between our group sessions.  There are currently 3 group members and phase 2 of IOP.  We have 2 new today and 1 that was established.  Allow the 2 new individuals to tell their stories, and goals in the community they have for recovery    RESPONSE: To new group members discussed their addiction backgrounds, journey to recovery and they were significantly different.  One group member was new to recovery and the other had been in numerous programs/rehabs.  Both are involved in AA numerous times per week, 1 has a sponsor and the other does not.  He established group member discussed he talk to his sponsor and told him that it was not working out and that he would like to find a sponsor that better suited him which was his goal for this week.  Group attempted to build relationship and report with having new group members.       CLINICAL MANEUVERING/INTERVENTIONS: Assisted member in processing above session content; acknowledged and normalized patient's thoughts, feelings and concerns. assisted patient in processing her thoughts and feelings of struggling with addiction, depression and anxiety..  Educated on the importance of completing a 12 step program and having a close relationship with her sponsor.  Patient denies SI, HI and self-harm.  Educated on balance and allow each member to discuss what they are working on balancing in their life to prevent relapse and to be able to focus on their sobriety. Educated on the importance of coming to group now twice a week Mondays and Wednesdays from 330-430.  Assisted group members and discussing that the journey to recovery, what they have learned from IOP and the importance of having a community/support system.  The group was asked to process/assess what they would like to get out of phase 2 of IOP and is there any topics they would like to discuss in the weeks to come.  Encourage group  involvement.  Encouraged homework assignments.  Educated on the importance of having 3 meetings a week and contact with a sponsor or finding a sponsor.  Validated each group members thoughts and feelings related to the topics discussed in this group and educated on healthy communication in a group.      Allowed patient to freely discuss issues without interruption or judgment. Provided safe, confidential environment to facilitate the development of positive therapeutic relationship and encourage open, honest communication. Assisted patient in identifying risk factors which would indicate the need for higher level of care including thoughts to harm self or others and/or self-harming behavior and encouraged patient to contact this office, call 911, or present to the nearest emergency room should any of these events occur. Discussed crisis intervention services and means to access.  Patient adamantly and convincingly denies current suicidal or homicidal ideation or perceptual disturbance.    ASSESSMENT:  Engaged in activity/Process and self-disclosed: Yes or No  Affect ([place] all that apply) appropriate, blunted, flat, sad, angry, tearful, anxious, bright  Applies topic to self: Yes or No  Able to give and receive feedback: Yes or No  Degree of insightful thinking: Least 1  2  3  4  5  6  7 8  9  10  Most    Urinalysis:   No visits with results within 1 Day(s) from this visit.   Latest known visit with results is:   Lab on 11/06/2019   Component Date Value Ref Range Status   • Ethanol, Urine 11/06/2019 Negative  Cutoff=0.020 % Final   • THC, Screen, Urine 11/06/2019 Negative  Negative Final   • Phencyclidine (PCP), Urine 11/06/2019 Negative  Negative Final   • Cocaine Screen, Urine 11/06/2019 Negative  Negative Final   • Methamphetamine, Ur 11/06/2019 Negative  Negative Final   • Opiate Screen 11/06/2019 Negative  Negative Final   • Amphetamine Screen, Urine 11/06/2019 Negative  Negative Final   • Benzodiazepine  Screen, Urine 11/06/2019 Negative  Negative Final   • Tricyclic Antidepressants Screen 11/06/2019 Negative  Negative Final   • Methadone Screen, Urine 11/06/2019 Negative  Negative Final   • Barbiturates Screen, Urine 11/06/2019 Negative  Negative Final   • Oxycodone Screen, Urine 11/06/2019 Negative  Negative Final   • Propoxyphene Screen 11/06/2019 Negative  Negative Final   • Buprenorphine, Screen, Urine 11/06/2019 Negative  Negative Final       12-Step attendance: Frequency/Sponsor?  Yes AA    Identification of environmental and personal barriers to recovery: New to recovery    Motivation for treatment: Shows motivation but also insecurities wanting to rely on others support    Mental Status Exam  Hygiene:  good  Dress:  casual  Attitude:  Cooperative  Motor Activity:  Restless  Speech:  Normal  Mood:  anxious  Affect:  anxious  Thought Processes:  Flight of ieas  Thought Content:  normal  Suicidal Thoughts:  denies  Homicidal Thoughts:  denies  Crisis Safety Plan: yes, to come to the emergency room.  Hallucinations:  denies    Assessment     Diagnoses and all orders for this visit:    Uncomplicated alcohol dependence (CMS/HCC)    Anxiety disorder, unspecified type    Insomnia, unspecified type    Major depressive disorder, recurrent episode, moderate (CMS/HCC)               PLAN:   Patient will continue in bi-weekly group psychotherapy sessions and individual outpatient psychotherapy sessions every 3-4 weeks and will continue in self-help meetings and seek additional treatment if necessary following completion.    Malou Oleary LCSW her

## 2019-12-10 NOTE — PROGRESS NOTES
Phase 2    330-430    GROUP: Group member was allowed to address any worries or concerns between our group sessions.  There are currently 3 group members and phase 2 of IOP. Group members was asked what they want out of the group , what they learned and loved about phase 1 and the topics they want to learn or gain from phase 2.     RESPONSE: To new group members discussed they want to learn life skills, how to have balance and stress reduction/anxiety coping skills.     CLINICAL MANEUVERING/INTERVENTIONS: Assisted member in processing above session content; acknowledged and normalized patient's thoughts, feelings and concerns. assisted patient in processing her thoughts and feelings of struggling with addiction, depression and anxiety..  Educated on the importance of completing a 12 step program and having a close relationship with her sponsor.  Patient denies SI, HI and self-harm.  Educated on balance and allow each member to discuss what they are working on balancing in their life to prevent relapse and to be able to focus on their sobriety. Educated on the importance of coming to group now twice a week Mondays and Wednesdays from 330-430.  Assisted group members and discussing that the journey to recovery, what they have learned from IOP and the importance of having a community/support system.  The group was asked to process/assess what they would like to get out of phase 2 of IOP and is there any topics they would like to discuss in the weeks to come.  Encourage group involvement.  Encouraged homework assignments.  Educated on the importance of having 3 meetings a week and contact with a sponsor or finding a sponsor.  Educated on the importance of noticing triggers, where pressure comes from (family or self) and ways to use exposure therapy and healthy coping skills to overcome fears.       Allowed patient to freely discuss issues without interruption or judgment. Provided safe, confidential environment to  facilitate the development of positive therapeutic relationship and encourage open, honest communication. Assisted patient in identifying risk factors which would indicate the need for higher level of care including thoughts to harm self or others and/or self-harming behavior and encouraged patient to contact this office, call 911, or present to the nearest emergency room should any of these events occur. Discussed crisis intervention services and means to access.  Patient adamantly and convincingly denies current suicidal or homicidal ideation or perceptual disturbance.    ASSESSMENT:  Engaged in activity/Process and self-disclosed: Yes or No  Affect ([place] all that apply) appropriate, blunted, flat, sad, angry, tearful, anxious, bright  Applies topic to self: Yes or No  Able to give and receive feedback: Yes or No  Degree of insightful thinking: Least 1  2  3  4  5  6  7 8  9  10  Most    Urinalysis:   No visits with results within 1 Day(s) from this visit.   Latest known visit with results is:   Lab on 11/06/2019   Component Date Value Ref Range Status   • Ethanol, Urine 11/06/2019 Negative  Cutoff=0.020 % Final   • THC, Screen, Urine 11/06/2019 Negative  Negative Final   • Phencyclidine (PCP), Urine 11/06/2019 Negative  Negative Final   • Cocaine Screen, Urine 11/06/2019 Negative  Negative Final   • Methamphetamine, Ur 11/06/2019 Negative  Negative Final   • Opiate Screen 11/06/2019 Negative  Negative Final   • Amphetamine Screen, Urine 11/06/2019 Negative  Negative Final   • Benzodiazepine Screen, Urine 11/06/2019 Negative  Negative Final   • Tricyclic Antidepressants Screen 11/06/2019 Negative  Negative Final   • Methadone Screen, Urine 11/06/2019 Negative  Negative Final   • Barbiturates Screen, Urine 11/06/2019 Negative  Negative Final   • Oxycodone Screen, Urine 11/06/2019 Negative  Negative Final   • Propoxyphene Screen 11/06/2019 Negative  Negative Final   • Buprenorphine, Screen, Urine 11/06/2019  Negative  Negative Final       12-Step attendance: Frequency/Sponsor?  Yes AA    Identification of environmental and personal barriers to recovery: New to recovery    Motivation for treatment: Shows motivation but also insecurities wanting to rely on others support    Mental Status Exam  Hygiene:  good  Dress:  casual  Attitude:  Cooperative  Motor Activity:  Restless  Speech:  Normal  Mood:  anxious  Affect:  anxious  Thought Processes:  Flight of ieas  Thought Content:  normal  Suicidal Thoughts:  denies  Homicidal Thoughts:  denies  Crisis Safety Plan: yes, to come to the emergency room.  Hallucinations:  denies    Assessment     Diagnoses and all orders for this visit:    Uncomplicated alcohol dependence (CMS/HCC)    Anxiety disorder, unspecified type    Insomnia, unspecified type    Major depressive disorder, recurrent episode, moderate (CMS/HCC)               PLAN:   Patient will continue in bi-weekly group psychotherapy sessions and individual outpatient psychotherapy sessions every 3-4 weeks and will continue in self-help meetings and seek additional treatment if necessary following completion.    Malou Oleary LCSW her

## 2019-12-11 ENCOUNTER — LAB (OUTPATIENT)
Dept: LAB | Facility: HOSPITAL | Age: 54
End: 2019-12-11

## 2019-12-11 ENCOUNTER — OFFICE VISIT (OUTPATIENT)
Dept: PSYCHIATRY | Facility: CLINIC | Age: 54
End: 2019-12-11

## 2019-12-11 DIAGNOSIS — G47.00 INSOMNIA, UNSPECIFIED TYPE: ICD-10-CM

## 2019-12-11 DIAGNOSIS — F33.2 MAJOR DEPRESSIVE DISORDER, RECURRENT, SEVERE WITHOUT PSYCHOTIC FEATURES (HCC): ICD-10-CM

## 2019-12-11 DIAGNOSIS — F41.9 ANXIETY DISORDER, UNSPECIFIED TYPE: ICD-10-CM

## 2019-12-11 DIAGNOSIS — F10.20 UNCOMPLICATED ALCOHOL DEPENDENCE (HCC): ICD-10-CM

## 2019-12-11 DIAGNOSIS — F33.1 MAJOR DEPRESSIVE DISORDER, RECURRENT EPISODE, MODERATE (HCC): ICD-10-CM

## 2019-12-11 DIAGNOSIS — F10.20 UNCOMPLICATED ALCOHOL DEPENDENCE (HCC): Primary | ICD-10-CM

## 2019-12-11 PROCEDURE — 80307 DRUG TEST PRSMV CHEM ANLYZR: CPT

## 2019-12-11 PROCEDURE — 90853 GROUP PSYCHOTHERAPY: CPT | Performed by: SOCIAL WORKER

## 2019-12-16 ENCOUNTER — OFFICE VISIT (OUTPATIENT)
Dept: PSYCHIATRY | Facility: CLINIC | Age: 54
End: 2019-12-16

## 2019-12-16 DIAGNOSIS — F10.20 UNCOMPLICATED ALCOHOL DEPENDENCE (HCC): ICD-10-CM

## 2019-12-16 DIAGNOSIS — F41.9 ANXIETY DISORDER, UNSPECIFIED TYPE: ICD-10-CM

## 2019-12-16 DIAGNOSIS — G47.00 INSOMNIA, UNSPECIFIED TYPE: Primary | ICD-10-CM

## 2019-12-16 DIAGNOSIS — F33.1 MAJOR DEPRESSIVE DISORDER, RECURRENT EPISODE, MODERATE (HCC): ICD-10-CM

## 2019-12-16 PROCEDURE — 90837 PSYTX W PT 60 MINUTES: CPT | Performed by: SOCIAL WORKER

## 2019-12-16 NOTE — PROGRESS NOTES
Phase 2    [id number]    GROUP: Group member was allowed to address any worries or concerns between our group sessions.  There are currently 3 group members and phase 2 of IOP.  The group today was asked how to they get out of their comfort zone and what is her coping skill of choice.  Encourage each group member to discuss 2 coping skills that helps them deal with daily life stressors and getting out of her comfort zone    RESPONSE: Each group member discussed their coping skills of choice, one feels comfortable getting out of her comfort zone and uses exercise, recovery community and reframing thinking using cognitive behavior therapy as his coping skills.  Another group member discussed they do not feel comfortable getting out of her comfort zone but between last session they were able to walk into [employer] and passed the liquor department using breathing techniques and distractions.  Another group member discussed meditation and breathing techniques is her go to coping skills.  Each group member gave examples of how they have struggled with getting out of her comfort zone and how they have overcame that.  Group members discussed what areas they struggle in and 1 group member discussed [place] holidays and [place] coming up is something he is concerned about and wanted to discussed with the group on how to handle an upcoming party.    CLINICAL MANEUVERING/INTERVENTIONS: Assisted member in processing above session content; acknowledged and normalized patient's thoughts, feelings and concerns. assisted patient in processing her thoughts and feelings of struggling with addiction, depression and anxiety..  Educated on the importance of completing a 12 step program and having a close relationship with her sponsor.  Patient denies SI, HI and self-harm.  Educated on balance and allow each member to discuss what they are working on balancing in their life to prevent relapse and to be able to focus on their sobriety.  Educated on the importance of coming to group now twice a week Mondays and Wednesdays from [id number].  Assisted group members and discussing that the journey to recovery, what they have learned from IOP and the importance of having a community/support system.  Group work together in problem solving ways to get out other comfort zone and what coping skills work best for them.  Encouraged each of them to use relaxation and grounding techniques and reframing thoughts by asking themselves what can they control and remembering that they can control their perception of any situation by focusing on facts rather than feelings.  Each group member is to work on getting out of the comfort zone at least once between now and next  group.    Allowed patient to freely discuss issues without interruption or judgment. Provided safe, confidential environment to facilitate the development of positive therapeutic relationship and encourage open, honest communication. Assisted patient in identifying risk factors which would indicate the need for higher level of care including thoughts to harm self or others and/or self-harming behavior and encouraged patient to contact this office, call 911, or present to the nearest emergency room should any of these events occur. Discussed crisis intervention services and means to access.  Patient adamantly and convincingly denies current suicidal or homicidal ideation or perceptual disturbance.    ASSESSMENT:  Engaged in activity/Process and self-disclosed: Yes or No  Affect ([place] all that apply) appropriate, blunted, flat, sad, angry, tearful, anxious, bright  Applies topic to self: Yes or No  Able to give and receive feedback: Yes or No  Degree of insightful thinking: Least 1  2  3  4  5  6  7 8  9  10  Most    Urinalysis:   Lab on 12/11/2019   Component Date Value Ref Range Status   • THC, Screen, Urine 12/11/2019 Negative  Negative Final   • Phencyclidine (PCP), Urine 12/11/2019 Negative   Negative Final   • Cocaine Screen, Urine 12/11/2019 Negative  Negative Final   • Methamphetamine, Ur 12/11/2019 Negative  Negative Final   • Opiate Screen 12/11/2019 Negative  Negative Final   • Amphetamine Screen, Urine 12/11/2019 Negative  Negative Final   • Benzodiazepine Screen, Urine 12/11/2019 Negative  Negative Final   • Tricyclic Antidepressants Screen 12/11/2019 Negative  Negative Final   • Methadone Screen, Urine 12/11/2019 Negative  Negative Final   • Barbiturates Screen, Urine 12/11/2019 Negative  Negative Final   • Oxycodone Screen, Urine 12/11/2019 Negative  Negative Final   • Propoxyphene Screen 12/11/2019 Negative  Negative Final   • Buprenorphine, Screen, Urine 12/11/2019 Negative  Negative Final       12-Step attendance: Frequency/Sponsor?  Yes AA    Identification of environmental and personal barriers to recovery: New to recovery    Motivation for treatment: Shows motivation but also insecurities wanting to rely on others support    Mental Status Exam  Hygiene:  good  Dress:  casual  Attitude:  Cooperative  Motor Activity:  Restless  Speech:  Normal  Mood:  anxious  Affect:  anxious  Thought Processes:  Flight of ieas  Thought Content:  normal  Suicidal Thoughts:  denies  Homicidal Thoughts:  denies  Crisis Safety Plan: yes, to come to the emergency room.  Hallucinations:  denies    Assessment     Diagnoses and all orders for this visit:    Uncomplicated alcohol dependence (CMS/HCC)    Anxiety disorder, unspecified type    Insomnia, unspecified type    Major depressive disorder, recurrent episode, moderate (CMS/HCC)               PLAN:   Patient will continue in bi-weekly group psychotherapy sessions and individual outpatient psychotherapy sessions every 3-4 weeks and will continue in self-help meetings and seek additional treatment if necessary following completion.    Malou Oleary LCSW her

## 2019-12-17 NOTE — PROGRESS NOTES
Phase 2    [id number]    GROUP: Group member was allowed to address any worries or concerns between our group sessions.  There are currently 1 group members in phase 2 of IOP.  The other 2 group members are out today.  This group member discussed trying the method discharge meeting that he had been struggling to go to due to it is his home [employer] with his family and did not want to mix family life and recovery life at this time.  Patient was asked on how he broke barriers, how it felt and what coping skills he used to get through.    RESPONSE: Group member discussed going to a meeting at his [employer] that he attends with his family, feeling uncomfortable and driving around and monitoring the parking lot before attending for around 30 minutes prior.  Patient discussed getting anxious and driving around the building to see if there is anybody he knew and then would park and monitor before going in.  Patient discussed he saw no one that attends discharge at the meeting and felt comforted by this.  This group member discussed his relationship with his wife and her ups and downs relating to supporting his recovery journey.  This group member looking for a sponsor.  This from her were discussed working on balance and boundary setting with self and others.    CLINICAL MANEUVERING/INTERVENTIONS: Assisted member in processing above session content; acknowledged and normalized patient's thoughts, feelings and concerns. assisted patient in processing her thoughts and feelings of struggling with addiction, depression and anxiety..  Educated on the importance of completing a 12 step program and having a close relationship with her sponsor.  Patient denies SI, HI and self-harm.  Educated on balance and allow each member to discuss what they are working on balancing in their life to prevent relapse and to be able to focus on their sobriety. Educated on the importance of coming to group now twice a week Mondays and Wednesdays from  330-430.  Assisted group members and discussing that the journey to recovery, what they have learned from IOP and the importance of having a community/support system.  Educated on balance and boundary setting during this session.  Encourage group member to work on assertive communication and open and honest communication with his wife.  Group member continues to work on getting out of his comfort zone and using healthy coping skills.    Allowed patient to freely discuss issues without interruption or judgment. Provided safe, confidential environment to facilitate the development of positive therapeutic relationship and encourage open, honest communication. Assisted patient in identifying risk factors which would indicate the need for higher level of care including thoughts to harm self or others and/or self-harming behavior and encouraged patient to contact this office, call 911, or present to the nearest emergency room should any of these events occur. Discussed crisis intervention services and means to access.  Patient adamantly and convincingly denies current suicidal or homicidal ideation or perceptual disturbance.    ASSESSMENT:  Engaged in activity/Process and self-disclosed: Yes or No  Affect ([place] all that apply) appropriate, blunted, flat, sad, angry, tearful, anxious, bright  Applies topic to self: Yes or No  Able to give and receive feedback: Yes or No  Degree of insightful thinking: Least 1  2  3  4  5  6  7 8  9  10  Most    Urinalysis:   No visits with results within 1 Day(s) from this visit.   Latest known visit with results is:   Lab on 12/11/2019   Component Date Value Ref Range Status   • THC, Screen, Urine 12/11/2019 Negative  Negative Final   • Phencyclidine (PCP), Urine 12/11/2019 Negative  Negative Final   • Cocaine Screen, Urine 12/11/2019 Negative  Negative Final   • Methamphetamine, Ur 12/11/2019 Negative  Negative Final   • Opiate Screen 12/11/2019 Negative  Negative Final   • Amphetamine  Screen, Urine 12/11/2019 Negative  Negative Final   • Benzodiazepine Screen, Urine 12/11/2019 Negative  Negative Final   • Tricyclic Antidepressants Screen 12/11/2019 Negative  Negative Final   • Methadone Screen, Urine 12/11/2019 Negative  Negative Final   • Barbiturates Screen, Urine 12/11/2019 Negative  Negative Final   • Oxycodone Screen, Urine 12/11/2019 Negative  Negative Final   • Propoxyphene Screen 12/11/2019 Negative  Negative Final   • Buprenorphine, Screen, Urine 12/11/2019 Negative  Negative Final       12-Step attendance: Frequency/Sponsor?  Yes AA    Identification of environmental and personal barriers to recovery: New to recovery    Motivation for treatment: Shows motivation but also insecurities wanting to rely on others support    Mental Status Exam  Hygiene:  good  Dress:  casual  Attitude:  Cooperative  Motor Activity:  Restless  Speech:  Normal  Mood:  anxious  Affect:  anxious  Thought Processes:  Flight of ieas  Thought Content:  normal  Suicidal Thoughts:  denies  Homicidal Thoughts:  denies  Crisis Safety Plan: yes, to come to the emergency room.  Hallucinations:  denies    Assessment     Diagnoses and all orders for this visit:    Insomnia, unspecified type    Uncomplicated alcohol dependence (CMS/HCC)    Anxiety disorder, unspecified type    Major depressive disorder, recurrent episode, moderate (CMS/HCC)               PLAN:   Patient will continue in bi-weekly group psychotherapy sessions and individual outpatient psychotherapy sessions every 3-4 weeks and will continue in self-help meetings and seek additional treatment if necessary following completion.    Malou Oleary LCSW her

## 2019-12-18 ENCOUNTER — OFFICE VISIT (OUTPATIENT)
Dept: PSYCHIATRY | Facility: CLINIC | Age: 54
End: 2019-12-18

## 2019-12-18 DIAGNOSIS — G47.00 INSOMNIA, UNSPECIFIED TYPE: ICD-10-CM

## 2019-12-18 DIAGNOSIS — F33.1 MAJOR DEPRESSIVE DISORDER, RECURRENT EPISODE, MODERATE (HCC): ICD-10-CM

## 2019-12-18 DIAGNOSIS — F41.9 ANXIETY DISORDER, UNSPECIFIED TYPE: ICD-10-CM

## 2019-12-18 DIAGNOSIS — F10.20 UNCOMPLICATED ALCOHOL DEPENDENCE (HCC): Primary | ICD-10-CM

## 2019-12-18 PROCEDURE — 90853 GROUP PSYCHOTHERAPY: CPT | Performed by: SOCIAL WORKER

## 2019-12-18 NOTE — PROGRESS NOTES
Phase 2    330-430    GROUP: Group member was allowed to address any worries or concerns between our group sessions.  There are currently 3 group members and phase 2 of IOP.  The group was asked how they will cope with triggers around the holidays and what coping skills they will use.  Group was educated on boundaries and boundary setting and the importance of keeping their peace of being aware of triggers and knowing when they need to change their scenery or change their situation    RESPONSE: Each group member discussed their holiday plans.  1 group member discussed that his mother is in recovery and his father is still an active addiction and how him and his siblings handle their father.  Another group member discussed they keep their holiday peaceful and at home.  Another group member discussed she wants to go to [place] as she lost her daughter in court on Monday and relapsed by smoking marijuana.  Encouraged each group member to assess what their needs are this year to keep their peace and remain sober and enjoy their holidays with people they love and cherish    CLINICAL MANEUVERING/INTERVENTIONS: Assisted member in processing above session content; acknowledged and normalized patient's thoughts, feelings and concerns. assisted patient in processing her thoughts and feelings of struggling with addiction, depression and anxiety..  Educated on the importance of completing a 12 step program and having a close relationship with her sponsor.  Patient denies SI, HI and self-harm.  Educated on balance and allow each member to discuss what they are working on balancing in their life to prevent relapse and to be able to focus on their sobriety. Educated on the importance of coming to group now twice a week Mondays and Wednesdays from 330-430.  Assisted group members and discussing that the journey to recovery, what they have learned from IOP and the importance of having a community/support system.  Reviewed coping  skills, boundary setting and relapse prevention.  Encourage each of them to assess their peace and use healthy coping skills to remain calm and sober during the holiday season educated on the importance of knowing and being aware of what the holidays Mean to them   Allowed patient to freely discuss issues without interruption or judgment. Provided safe, confidential environment to facilitate the development of positive therapeutic relationship and encourage open, honest communication. Assisted patient in identifying risk factors which would indicate the need for higher level of care including thoughts to harm self or others and/or self-harming behavior and encouraged patient to contact this office, call 911, or present to the nearest emergency room should any of these events occur. Discussed crisis intervention services and means to access.  Patient adamantly and convincingly denies current suicidal or homicidal ideation or perceptual disturbance.    ASSESSMENT:  Engaged in activity/Process and self-disclosed: Yes or No  Affect ([place] all that apply) appropriate, blunted, flat, sad, angry, tearful, anxious, bright  Applies topic to self: Yes or No  Able to give and receive feedback: Yes or No  Degree of insightful thinking: Least 1  2  3  4  5  6  7 8  9  10  Most    Urinalysis:   No visits with results within 1 Day(s) from this visit.   Latest known visit with results is:   Lab on 12/11/2019   Component Date Value Ref Range Status   • THC, Screen, Urine 12/11/2019 Negative  Negative Final   • Phencyclidine (PCP), Urine 12/11/2019 Negative  Negative Final   • Cocaine Screen, Urine 12/11/2019 Negative  Negative Final   • Methamphetamine, Ur 12/11/2019 Negative  Negative Final   • Opiate Screen 12/11/2019 Negative  Negative Final   • Amphetamine Screen, Urine 12/11/2019 Negative  Negative Final   • Benzodiazepine Screen, Urine 12/11/2019 Negative  Negative Final   • Tricyclic Antidepressants Screen 12/11/2019  Negative  Negative Final   • Methadone Screen, Urine 12/11/2019 Negative  Negative Final   • Barbiturates Screen, Urine 12/11/2019 Negative  Negative Final   • Oxycodone Screen, Urine 12/11/2019 Negative  Negative Final   • Propoxyphene Screen 12/11/2019 Negative  Negative Final   • Buprenorphine, Screen, Urine 12/11/2019 Negative  Negative Final       12-Step attendance: Frequency/Sponsor?  Yes AA    Identification of environmental and personal barriers to recovery: New to recovery    Motivation for treatment: Shows motivation but also insecurities wanting to rely on others support    Mental Status Exam  Hygiene:  good  Dress:  casual  Attitude:  Cooperative  Motor Activity:  Restless  Speech:  Normal  Mood:  anxious  Affect:  anxious  Thought Processes:  Flight of ieas  Thought Content:  normal  Suicidal Thoughts:  denies  Homicidal Thoughts:  denies  Crisis Safety Plan: yes, to come to the emergency room.  Hallucinations:  denies    Assessment     Diagnoses and all orders for this visit:    Uncomplicated alcohol dependence (CMS/HCC)    Anxiety disorder, unspecified type    Major depressive disorder, recurrent episode, moderate (CMS/HCC)    Insomnia, unspecified type               PLAN:   Patient will continue in bi-weekly group psychotherapy sessions and individual outpatient psychotherapy sessions every 3-4 weeks and will continue in self-help meetings and seek additional treatment if necessary following completion.    Malou EASLEYW her

## 2019-12-23 ENCOUNTER — OFFICE VISIT (OUTPATIENT)
Dept: PSYCHIATRY | Facility: CLINIC | Age: 54
End: 2019-12-23

## 2019-12-23 DIAGNOSIS — F41.9 ANXIETY DISORDER, UNSPECIFIED TYPE: ICD-10-CM

## 2019-12-23 DIAGNOSIS — F10.20 UNCOMPLICATED ALCOHOL DEPENDENCE (HCC): Primary | ICD-10-CM

## 2019-12-23 DIAGNOSIS — F33.1 MAJOR DEPRESSIVE DISORDER, RECURRENT EPISODE, MODERATE (HCC): ICD-10-CM

## 2019-12-23 DIAGNOSIS — G47.00 INSOMNIA, UNSPECIFIED TYPE: ICD-10-CM

## 2019-12-23 PROCEDURE — 90853 GROUP PSYCHOTHERAPY: CPT | Performed by: SOCIAL WORKER

## 2019-12-24 NOTE — PROGRESS NOTES
Phase 2    330-430    GROUP: Group member was allowed to address any worries or concerns between our group sessions.  There are currently 3 group members present for phase 2 of IOP.  The group was asked one thing that inspires them and is their go to thought or coping skill that has been helping recently.     RESPONSE: The group discussed life events that has been happening in the past week, meetings accomplished and goals met.  1 group member is missing due to being out of town for the holidays.  There are 3 present.  Each group member discussed something that inspires them during the session.  This was received well by group members and created a healthy discussion of practicing gratitude.    CLINICAL MANEUVERING/INTERVENTIONS: Assisted member in processing above session content; acknowledged and normalized patient's thoughts, feelings and concerns. assisted patient in processing her thoughts and feelings of struggling with addiction, depression and anxiety..  Educated on the importance of completing a 12 step program and having a close relationship with her sponsor.  Patient denies SI, HI and self-harm.  Educated on balance and allow each member to discuss what they are working on balancing in their life to prevent relapse and to be able to focus on their sobriety. Educated on the importance of coming to group now twice a week Mondays and Wednesdays from 330-430.  Assisted group members and discussing that the journey to recovery, what they have learned from IOP and the importance of having a community/support system.  Reviewed coping skills, boundary setting and relapse prevention.  Encourage each of them to assess their peace and use healthy coping skills to remain calm and sober during the holiday season educated on the importance of knowing and being aware of what the holidays needs to them and their families and create plans based on it.  Educated on gratitude and the importance of having go to coping skills  and thought processes in people or events that have inspired them to help them pushed through on difficult times and days.  Validated each group member's thoughts and feelings of their inspiring stories of friends, family members and people they have lost due to addiction.      Allowed patient to freely discuss issues without interruption or judgment. Provided safe, confidential environment to facilitate the development of positive therapeutic relationship and encourage open, honest communication. Assisted patient in identifying risk factors which would indicate the need for higher level of care including thoughts to harm self or others and/or self-harming behavior and encouraged patient to contact this office, call 911, or present to the nearest emergency room should any of these events occur. Discussed crisis intervention services and means to access.  Patient adamantly and convincingly denies current suicidal or homicidal ideation or perceptual disturbance.    ASSESSMENT:  Engaged in activity/Process and self-disclosed: Yes or No  Affect ([place] all that apply) appropriate, blunted, flat, sad, angry, tearful, anxious, bright  Applies topic to self: Yes or No  Able to give and receive feedback: Yes or No  Degree of insightful thinking: Least 1  2  3  4  5  6  7 8  9  10  Most    Urinalysis:   No visits with results within 1 Day(s) from this visit.   Latest known visit with results is:   Lab on 12/11/2019   Component Date Value Ref Range Status   • THC, Screen, Urine 12/11/2019 Negative  Negative Final   • Phencyclidine (PCP), Urine 12/11/2019 Negative  Negative Final   • Cocaine Screen, Urine 12/11/2019 Negative  Negative Final   • Methamphetamine, Ur 12/11/2019 Negative  Negative Final   • Opiate Screen 12/11/2019 Negative  Negative Final   • Amphetamine Screen, Urine 12/11/2019 Negative  Negative Final   • Benzodiazepine Screen, Urine 12/11/2019 Negative  Negative Final   • Tricyclic Antidepressants Screen  12/11/2019 Negative  Negative Final   • Methadone Screen, Urine 12/11/2019 Negative  Negative Final   • Barbiturates Screen, Urine 12/11/2019 Negative  Negative Final   • Oxycodone Screen, Urine 12/11/2019 Negative  Negative Final   • Propoxyphene Screen 12/11/2019 Negative  Negative Final   • Buprenorphine, Screen, Urine 12/11/2019 Negative  Negative Final       12-Step attendance: Frequency/Sponsor?  Yes AA    Identification of environmental and personal barriers to recovery: New to recovery    Motivation for treatment: Shows motivation but also insecurities wanting to rely on others support    Mental Status Exam  Hygiene:  good  Dress:  casual  Attitude:  Cooperative  Motor Activity:  Restless  Speech:  Normal  Mood:  anxious  Affect:  anxious  Thought Processes:  Flight of ieas  Thought Content:  normal  Suicidal Thoughts:  denies  Homicidal Thoughts:  denies  Crisis Safety Plan: yes, to come to the emergency room.  Hallucinations:  denies    Assessment     Diagnoses and all orders for this visit:    Uncomplicated alcohol dependence (CMS/HCC)    Anxiety disorder, unspecified type    Major depressive disorder, recurrent episode, moderate (CMS/HCC)    Insomnia, unspecified type               PLAN:   Patient will continue in bi-weekly group psychotherapy sessions and individual outpatient psychotherapy sessions every 3-4 weeks and will continue in self-help meetings and seek additional treatment if necessary following completion.    Malou Oleary LCSW her

## 2020-01-06 ENCOUNTER — OFFICE VISIT (OUTPATIENT)
Dept: PSYCHIATRY | Facility: CLINIC | Age: 55
End: 2020-01-06

## 2020-01-06 DIAGNOSIS — F33.1 MAJOR DEPRESSIVE DISORDER, RECURRENT EPISODE, MODERATE (HCC): ICD-10-CM

## 2020-01-06 DIAGNOSIS — F10.20 UNCOMPLICATED ALCOHOL DEPENDENCE (HCC): Primary | ICD-10-CM

## 2020-01-06 DIAGNOSIS — G47.00 INSOMNIA, UNSPECIFIED TYPE: ICD-10-CM

## 2020-01-06 DIAGNOSIS — F41.9 ANXIETY DISORDER, UNSPECIFIED TYPE: ICD-10-CM

## 2020-01-06 PROCEDURE — 90853 GROUP PSYCHOTHERAPY: CPT | Performed by: SOCIAL WORKER

## 2020-01-08 ENCOUNTER — OFFICE VISIT (OUTPATIENT)
Dept: PSYCHIATRY | Facility: CLINIC | Age: 55
End: 2020-01-08

## 2020-01-08 DIAGNOSIS — F33.1 MAJOR DEPRESSIVE DISORDER, RECURRENT EPISODE, MODERATE (HCC): ICD-10-CM

## 2020-01-08 DIAGNOSIS — F10.20 UNCOMPLICATED ALCOHOL DEPENDENCE (HCC): Primary | ICD-10-CM

## 2020-01-08 DIAGNOSIS — G47.00 INSOMNIA, UNSPECIFIED TYPE: ICD-10-CM

## 2020-01-08 DIAGNOSIS — F41.9 ANXIETY DISORDER, UNSPECIFIED TYPE: ICD-10-CM

## 2020-01-08 PROCEDURE — 90853 GROUP PSYCHOTHERAPY: CPT | Performed by: SOCIAL WORKER

## 2020-01-08 NOTE — PROGRESS NOTES
Phase 2    330-430    GROUP: Group member was allowed to address any worries or concerns between our group sessions.  There are currently 4 group members present for phase 2 of IOP.  The group was asked to discuss the holidays, their struggles, triggers, and any relapses or mental relapse.    RESPONSE: The group discussed their holidays.  Each group member discussed how the holidays and being sober for the first time in many years affected them in both positive and negative ways.  Patient discussed they were proud of himself for staying sober through the holiday season but also had a difficult time with mood stabilization.  Each group member discussed depression and a hard time coping with certain aspects of the holidays.  Members discussed that they were grateful that the holidays were over and that they were successful staying sober.  1 group member discussed going to [place] and being around alcohol and was tempted was able to leave the home that she was at to deter a relapse.  Another group member discussed being out of his normal routine which helps him stay focused created mental distress.    CLINICAL MANEUVERING/INTERVENTIONS: Assisted member in processing above session content; acknowledged and normalized patient's thoughts, feelings and concerns. assisted patient in processing her thoughts and feelings of struggling with addiction, depression and anxiety..  Educated on the importance of completing a 12 step program and having a close relationship with her sponsor.  Patient denies SI, HI and self-harm.  Educated on balance and allow each member to discuss what they are working on balancing in their life to prevent relapse and to be able to focus on their sobriety. Educated on the importance of coming to group now twice a week Mondays and Wednesdays from 330-430.  Assisted group members and discussing that the journey to recovery, what they have learned from IOP and the importance of having a community/support  system.  Reviewed coping skills, boundary setting and relapse prevention.  Validated future group members thoughts, feelings and concerns.  Encouraged each group member to continue processing what keeps him sober, what is her go to coping skills and when did they feel like they are overwhelmed and having a possible mental relapse between now and next session.      Allowed patient to freely discuss issues without interruption or judgment. Provided safe, confidential environment to facilitate the development of positive therapeutic relationship and encourage open, honest communication. Assisted patient in identifying risk factors which would indicate the need for higher level of care including thoughts to harm self or others and/or self-harming behavior and encouraged patient to contact this office, call 911, or present to the nearest emergency room should any of these events occur. Discussed crisis intervention services and means to access.  Patient adamantly and convincingly denies current suicidal or homicidal ideation or perceptual disturbance.    ASSESSMENT:  Engaged in activity/Process and self-disclosed: Yes or No  Affect ([place] all that apply) appropriate, blunted, flat, sad, angry, tearful, anxious, bright  Applies topic to self: Yes or No  Able to give and receive feedback: Yes or No  Degree of insightful thinking: Least 1  2  3  4  5  6  7 8  9  10  Most    Urinalysis:   No visits with results within 1 Day(s) from this visit.   Latest known visit with results is:   Lab on 12/11/2019   Component Date Value Ref Range Status   • THC, Screen, Urine 12/11/2019 Negative  Negative Final   • Phencyclidine (PCP), Urine 12/11/2019 Negative  Negative Final   • Cocaine Screen, Urine 12/11/2019 Negative  Negative Final   • Methamphetamine, Ur 12/11/2019 Negative  Negative Final   • Opiate Screen 12/11/2019 Negative  Negative Final   • Amphetamine Screen, Urine 12/11/2019 Negative  Negative Final   • Benzodiazepine  Screen, Urine 12/11/2019 Negative  Negative Final   • Tricyclic Antidepressants Screen 12/11/2019 Negative  Negative Final   • Methadone Screen, Urine 12/11/2019 Negative  Negative Final   • Barbiturates Screen, Urine 12/11/2019 Negative  Negative Final   • Oxycodone Screen, Urine 12/11/2019 Negative  Negative Final   • Propoxyphene Screen 12/11/2019 Negative  Negative Final   • Buprenorphine, Screen, Urine 12/11/2019 Negative  Negative Final       12-Step attendance: Frequency/Sponsor?  Yes AA    Identification of environmental and personal barriers to recovery: New to recovery    Motivation for treatment: Shows motivation but also insecurities wanting to rely on others support    Mental Status Exam  Hygiene:  good  Dress:  casual  Attitude:  Cooperative  Motor Activity:  Restless  Speech:  Normal  Mood:  anxious  Affect:  anxious  Thought Processes:  Flight of ieas  Thought Content:  normal  Suicidal Thoughts:  denies  Homicidal Thoughts:  denies  Crisis Safety Plan: yes, to come to the emergency room.  Hallucinations:  denies    Assessment     Diagnoses and all orders for this visit:    Uncomplicated alcohol dependence (CMS/HCC)    Anxiety disorder, unspecified type    Major depressive disorder, recurrent episode, moderate (CMS/HCC)    Insomnia, unspecified type               PLAN:   Patient will continue in bi-weekly group psychotherapy sessions and individual outpatient psychotherapy sessions every 3-4 weeks and will continue in self-help meetings and seek additional treatment if necessary following completion.    Malou Oleary LCSW her

## 2020-01-13 ENCOUNTER — OFFICE VISIT (OUTPATIENT)
Dept: PSYCHIATRY | Facility: CLINIC | Age: 55
End: 2020-01-13

## 2020-01-13 DIAGNOSIS — F33.1 MAJOR DEPRESSIVE DISORDER, RECURRENT EPISODE, MODERATE (HCC): ICD-10-CM

## 2020-01-13 DIAGNOSIS — F10.20 UNCOMPLICATED ALCOHOL DEPENDENCE (HCC): Primary | ICD-10-CM

## 2020-01-13 DIAGNOSIS — F41.9 ANXIETY DISORDER, UNSPECIFIED TYPE: ICD-10-CM

## 2020-01-13 DIAGNOSIS — G47.00 INSOMNIA, UNSPECIFIED TYPE: ICD-10-CM

## 2020-01-13 PROCEDURE — 90853 GROUP PSYCHOTHERAPY: CPT | Performed by: SOCIAL WORKER

## 2020-01-13 NOTE — PROGRESS NOTES
Phase 2    330-430    GROUP: Group member was allowed to address any worries or concerns between our group sessions.  There are currently 3 of the group members present for phase 2 of IOP.  Reviewed the basics of IOP during this session/group.  Encourage each group member to have an open dialogue about what they needed from the group and if there is anything that we are not discussing that they need such as life skills, more relapse prevention and coping skills.    RESPONSE: The group discussed their holidays.  States current number was able to discuss openly during this group about what is lacking in what is going well in an intensive outpatient program based on her individual needs.  1 group member explained he looks forward to coming to group and 3 of the 4 group members that are enrolled currently on work full-time jobs and have to rearrange her schedule to come.  Another group member discussed that the group is supportive and like her family.  This group member refers to the other 3 group members as her brothers.  Another group member of feels that phase 2 is more getting comfortable with being in recovery and using the skills that they have learned in phase 1, accountability/check-in's and a safe space to have open communication about questions, concerns and problems they may be having in their daily life    CLINICAL MANEUVERING/INTERVENTIONS: Assisted member in processing above session content; acknowledged and normalized patient's thoughts, feelings and concerns. assisted patient in processing her thoughts and feelings of struggling with addiction, depression and anxiety..  Educated on the importance of completing a 12 step program and having a close relationship with her sponsor.  Patient denies SI, HI and self-harm.  Educated on balance and allow each member to discuss what they are working on balancing in their life to prevent relapse and to be able to focus on their sobriety. Educated on the importance  of coming to group now twice a week Mondays and Wednesdays from 330-430.  Assisted group members and discussing that the journey to recovery, what they have learned from IOP and the importance of having a community/support system.  Reviewed coping skills, boundary setting and relapse prevention.  Educated use group member to spend time over the weekend making goals for 2020 in the area of self-care and recovery.  Encouraged each of them of focusing on being in the present and encouraged him to focus on this for 2020 to decrease anxiety, worries and fears and to stay focused on one step at a time or 1 moment at a time.  Encourage each group member to get 3 meetings each week and those of them that do not have a sponsor to get a sponsor as soon as possible to continue growing and step work    Allowed patient to freely discuss issues without interruption or judgment. Provided safe, confidential environment to facilitate the development of positive therapeutic relationship and encourage open, honest communication. Assisted patient in identifying risk factors which would indicate the need for higher level of care including thoughts to harm self or others and/or self-harming behavior and encouraged patient to contact this office, call 911, or present to the nearest emergency room should any of these events occur. Discussed crisis intervention services and means to access.  Patient adamantly and convincingly denies current suicidal or homicidal ideation or perceptual disturbance.    ASSESSMENT:  Engaged in activity/Process and self-disclosed: Yes or No  Affect ([place] all that apply) appropriate, blunted, flat, sad, angry, tearful, anxious, bright  Applies topic to self: Yes or No  Able to give and receive feedback: Yes or No  Degree of insightful thinking: Least 1  2  3  4  5  6  7 8  9  10  Most    Urinalysis:   No visits with results within 1 Day(s) from this visit.   Latest known visit with results is:   Lab on  12/11/2019   Component Date Value Ref Range Status   • THC, Screen, Urine 12/11/2019 Negative  Negative Final   • Phencyclidine (PCP), Urine 12/11/2019 Negative  Negative Final   • Cocaine Screen, Urine 12/11/2019 Negative  Negative Final   • Methamphetamine, Ur 12/11/2019 Negative  Negative Final   • Opiate Screen 12/11/2019 Negative  Negative Final   • Amphetamine Screen, Urine 12/11/2019 Negative  Negative Final   • Benzodiazepine Screen, Urine 12/11/2019 Negative  Negative Final   • Tricyclic Antidepressants Screen 12/11/2019 Negative  Negative Final   • Methadone Screen, Urine 12/11/2019 Negative  Negative Final   • Barbiturates Screen, Urine 12/11/2019 Negative  Negative Final   • Oxycodone Screen, Urine 12/11/2019 Negative  Negative Final   • Propoxyphene Screen 12/11/2019 Negative  Negative Final   • Buprenorphine, Screen, Urine 12/11/2019 Negative  Negative Final       12-Step attendance: Frequency/Sponsor?  Yes AA    Identification of environmental and personal barriers to recovery: New to recovery    Motivation for treatment: Shows motivation but also insecurities wanting to rely on others support    Mental Status Exam  Hygiene:  good  Dress:  casual  Attitude:  Cooperative  Motor Activity:  Restless  Speech:  Normal  Mood:  anxious  Affect:  anxious  Thought Processes:  Flight of ieas  Thought Content:  normal  Suicidal Thoughts:  denies  Homicidal Thoughts:  denies  Crisis Safety Plan: yes, to come to the emergency room.  Hallucinations:  denies    Assessment     Diagnoses and all orders for this visit:    Uncomplicated alcohol dependence (CMS/HCC)    Anxiety disorder, unspecified type    Major depressive disorder, recurrent episode, moderate (CMS/HCC)    Insomnia, unspecified type               PLAN:   Patient will continue in bi-weekly group psychotherapy sessions and individual outpatient psychotherapy sessions every 3-4 weeks and will continue in self-help meetings and seek additional treatment if  necessary following completion.    Malou Oleary LCSW her

## 2020-01-14 ENCOUNTER — OFFICE VISIT (OUTPATIENT)
Dept: PSYCHIATRY | Facility: CLINIC | Age: 55
End: 2020-01-14

## 2020-01-14 VITALS
DIASTOLIC BLOOD PRESSURE: 90 MMHG | BODY MASS INDEX: 24.38 KG/M2 | WEIGHT: 180 LBS | HEIGHT: 72 IN | SYSTOLIC BLOOD PRESSURE: 134 MMHG | HEART RATE: 72 BPM

## 2020-01-14 DIAGNOSIS — F41.9 ANXIETY DISORDER, UNSPECIFIED TYPE: ICD-10-CM

## 2020-01-14 DIAGNOSIS — G47.00 INSOMNIA, UNSPECIFIED TYPE: ICD-10-CM

## 2020-01-14 DIAGNOSIS — F10.20 UNCOMPLICATED ALCOHOL DEPENDENCE (HCC): Primary | ICD-10-CM

## 2020-01-14 PROCEDURE — 99213 OFFICE O/P EST LOW 20 MIN: CPT | Performed by: NURSE PRACTITIONER

## 2020-01-14 RX ORDER — ACAMPROSATE CALCIUM 333 MG/1
666 TABLET, DELAYED RELEASE ORAL 2 TIMES DAILY
Qty: 120 TABLET | Refills: 3 | Status: SHIPPED | OUTPATIENT
Start: 2020-01-14 | End: 2020-05-14

## 2020-01-14 RX ORDER — HYDROXYZINE HYDROCHLORIDE 25 MG/1
25 TABLET, FILM COATED ORAL EVERY 8 HOURS PRN
Qty: 60 TABLET | Refills: 3 | Status: SHIPPED | OUTPATIENT
Start: 2020-01-14 | End: 2020-05-14 | Stop reason: SDUPTHER

## 2020-01-14 RX ORDER — TRAZODONE HYDROCHLORIDE 50 MG/1
50 TABLET ORAL NIGHTLY PRN
Qty: 30 TABLET | Refills: 3 | Status: SHIPPED | OUTPATIENT
Start: 2020-01-14 | End: 2020-05-14 | Stop reason: SDUPTHER

## 2020-01-14 RX ORDER — ACAMPROSATE CALCIUM 333 MG/1
666 TABLET, DELAYED RELEASE ORAL 3 TIMES DAILY
Qty: 180 TABLET | Refills: 3 | Status: SHIPPED | OUTPATIENT
Start: 2020-01-14 | End: 2020-01-14 | Stop reason: SDUPTHER

## 2020-01-14 RX ORDER — ACAMPROSATE CALCIUM 333 MG/1
666 TABLET, DELAYED RELEASE ORAL 3 TIMES DAILY
COMMUNITY
End: 2020-01-14 | Stop reason: SDUPTHER

## 2020-01-14 NOTE — PROGRESS NOTES
Phase 2    [id number]    GROUP: Group member was allowed to address any worries or concerns between our group sessions.  1 group member early on opened up about a slip/ relapse and all 4 group members are present.  Group members came together to offer support to the individual that relapsed.  2 group members have been in long-term rehab and had a lot to offer the individual that relapsed and each person went around the room showing loving care, nonjudgment and support.  Worked on relapse prevention and educated on the importance of working a program    RESPONSE: The group discussed their history of relapses, and the effects on her family and themselves, marital distress and active addiction and the importance of individual therapy and marriage therapy as needed the group member that relapsed discussed he had hard time coping after an argument with his wife that is angry about him hiding his alcoholism.  Each group member offered support and used examples from AA in the big book such as the importance of having a sponsor, working the steps, close contact with other people in recovery/creating community and working on healthy coping skills which group member offered their support and by giving phone numbers and contact information to reach out.  This group was solely focused on relapse prevention and supporting their fellow group member that recently relapsed by drinking 2 shots of alcohol after a fight with his wife      CLINICAL MANEUVERING/INTERVENTIONS: Assisted member in processing above session content; acknowledged and normalized patient's thoughts, feelings and concerns. assisted patient in processing her thoughts and feelings of struggling with addiction, depression and anxiety..  Educated on the importance of completing a 12 step program and having a close relationship with her sponsor.  Patient denies SI, HI and self-harm.  Educated on balance and allow each member to discuss what they are working on balancing  in their life to prevent relapse and to be able to focus on their sobriety. Educated on the importance of coming to group now twice a week Mondays and Wednesdays from 330-430.  Educated on relapse prevention.  Educated on the importance of working the steps and finding support with an AA or NA or celebrate recovery.  Encouraged each group member to have a sponsor, get as many meetings as possible per week and to work on things if signed in this group.  Encouraged each group member to reach out to 1 another and go to meetings together to offer support especially with a recent relapse.  Each group member is to come twice a week.    Allowed patient to freely discuss issues without interruption or judgment. Provided safe, confidential environment to facilitate the development of positive therapeutic relationship and encourage open, honest communication. Assisted patient in identifying risk factors which would indicate the need for higher level of care including thoughts to harm self or others and/or self-harming behavior and encouraged patient to contact this office, call 911, or present to the nearest emergency room should any of these events occur. Discussed crisis intervention services and means to access.  Patient adamantly and convincingly denies current suicidal or homicidal ideation or perceptual disturbance.    ASSESSMENT:  Engaged in activity/Process and self-disclosed: Yes or No  Affect ([place] all that apply) appropriate, blunted, flat, sad, angry, tearful, anxious, bright  Applies topic to self: Yes or No  Able to give and receive feedback: Yes or No  Degree of insightful thinking: Least 1  2  3  4  5  6  7 8  9  10  Most    Urinalysis:   No visits with results within 1 Day(s) from this visit.   Latest known visit with results is:   Lab on 12/11/2019   Component Date Value Ref Range Status   • THC, Screen, Urine 12/11/2019 Negative  Negative Final   • Phencyclidine (PCP), Urine 12/11/2019 Negative  Negative  Final   • Cocaine Screen, Urine 12/11/2019 Negative  Negative Final   • Methamphetamine, Ur 12/11/2019 Negative  Negative Final   • Opiate Screen 12/11/2019 Negative  Negative Final   • Amphetamine Screen, Urine 12/11/2019 Negative  Negative Final   • Benzodiazepine Screen, Urine 12/11/2019 Negative  Negative Final   • Tricyclic Antidepressants Screen 12/11/2019 Negative  Negative Final   • Methadone Screen, Urine 12/11/2019 Negative  Negative Final   • Barbiturates Screen, Urine 12/11/2019 Negative  Negative Final   • Oxycodone Screen, Urine 12/11/2019 Negative  Negative Final   • Propoxyphene Screen 12/11/2019 Negative  Negative Final   • Buprenorphine, Screen, Urine 12/11/2019 Negative  Negative Final       12-Step attendance: Frequency/Sponsor?  Yes AA    Identification of environmental and personal barriers to recovery: New to recovery    Motivation for treatment: Shows motivation but also insecurities wanting to rely on others support    Mental Status Exam  Hygiene:  good  Dress:  casual  Attitude:  Cooperative  Motor Activity:  Restless  Speech:  Normal  Mood:  anxious  Affect:  anxious  Thought Processes:  Flight of ieas  Thought Content:  normal  Suicidal Thoughts:  denies  Homicidal Thoughts:  denies  Crisis Safety Plan: yes, to come to the emergency room.  Hallucinations:  denies    Assessment     Diagnoses and all orders for this visit:    Uncomplicated alcohol dependence (CMS/HCC)    Anxiety disorder, unspecified type    Major depressive disorder, recurrent episode, moderate (CMS/HCC)    Insomnia, unspecified type               PLAN:   Patient will continue in bi-weekly group psychotherapy sessions and individual outpatient psychotherapy sessions every 3-4 weeks and will continue in self-help meetings and seek additional treatment if necessary following completion.    Malou Oleary LCSW her

## 2020-01-15 ENCOUNTER — OFFICE VISIT (OUTPATIENT)
Dept: PSYCHIATRY | Facility: CLINIC | Age: 55
End: 2020-01-15

## 2020-01-15 DIAGNOSIS — G47.00 INSOMNIA, UNSPECIFIED TYPE: ICD-10-CM

## 2020-01-15 DIAGNOSIS — F41.9 ANXIETY DISORDER, UNSPECIFIED TYPE: ICD-10-CM

## 2020-01-15 DIAGNOSIS — F33.1 MAJOR DEPRESSIVE DISORDER, RECURRENT EPISODE, MODERATE (HCC): ICD-10-CM

## 2020-01-15 DIAGNOSIS — F10.20 UNCOMPLICATED ALCOHOL DEPENDENCE (HCC): Primary | ICD-10-CM

## 2020-01-15 PROCEDURE — 90853 GROUP PSYCHOTHERAPY: CPT | Performed by: SOCIAL WORKER

## 2020-01-15 NOTE — PROGRESS NOTES
"Yousuf Barrera is a 54 y.o. male is here today for medication management follow-up from [facility].    Chief Complaint:      ICD-10-CM ICD-9-CM   1. Uncomplicated alcohol dependence (CMS/[facility]) F10.20 303.90   2. Anxiety disorder, unspecified type F41.9 300.00   3. Insomnia, unspecified type G47.00 780.52       History of Present Illness:  Pt presents for follow up visit and medication management of alcohol dependency. Pt is current in Phase II IOP. Pt reports that Acamprosate has helped control his alcohol cravings and has reduced the dose to BID. Pt admits that he did have \"two shooters\" over the weekend in response to an argument with his spouse. Pt denies any current mood symptoms. States the IOP group has been extremely supportive.       Pt reports the presence/absence of the following anxiety symptoms: (-) excessive worry, (-) excessive fear, (-) difficulty relaxing, (-) restlessness, (-) insomnia, (-) easily fatigued, (-) irritability, (-) poor concentration, (-) racing thoughts, and (-) panic episodes.     The following portions of the patient's history were reviewed and updated as appropriate: allergies, current medications, past family history, past medical history, past social history, past surgical history and problem list.    Review of Systems;;  Review of Systems   Constitutional: Negative.  Negative for activity change, appetite change, unexpected weight gain and unexpected weight loss.   Respiratory: Negative.    Cardiovascular: Negative.  Negative for chest pain.   Gastrointestinal: Negative.  Negative for diarrhea, nausea and vomiting.   Genitourinary: Negative.    Musculoskeletal: Negative.    Skin: Negative for rash and bruise.   Neurological: Negative.  Negative for dizziness, seizures and speech difficulty.   Psychiatric/Behavioral: Negative.  Negative for agitation, behavioral problems, decreased concentration, dysphoric mood, hallucinations, self-injury, sleep disturbance, suicidal ideas, negative for " "hyperactivity, depressed mood and stress. The patient is not nervous/anxious.        Physical Exam;;  Physical Exam  Blood pressure 134/90, pulse 72, height 182.9 cm (72\"), weight 81.6 kg (180 lb).    Current Medications;;    Current Outpatient Medications:   •  acamprosate (CAMPRAL) 333 MG EC tablet, Take 2 tablets by mouth 2 (Two) Times a Day., Disp: 120 tablet, Rfl: 3  •  hydrOXYzine (ATARAX) 25 MG tablet, Take 1 tablet by mouth Every 8 (Eight) Hours As Needed for Anxiety. Indications: Feeling Anxious, Disp: 60 tablet, Rfl: 3  •  losartan (COZAAR) 50 MG tablet, Take 50 mg by mouth Daily., Disp: , Rfl:   •  traZODone (DESYREL) 50 MG tablet, Take 1 tablet by mouth At Night As Needed for Sleep. Indications: Trouble Sleeping, Disp: 30 tablet, Rfl: 3    Lab Results:       Mental Status Exam:   Hygiene:   good  Cooperation:  Cooperative  Eye Contact:  Good  Psychomotor Behavior:  Appropriate  Mood:euthymic  Affect:  Appropriate  Hopelessness: Denies  Speech:  Normal  Thought Process:  Goal directed  Thought Content:  Normal  Suicidal:  None  Homicidal:  None  Hallucinations:  None  Delusion:  None  Memory:  Intact  Orientation:  Person, Place, Time and Situation  Reliability:  good  Insight:  Good  Judgement:  Good  Impulse Control:  Fair  Physical/Medical Issues:  No       Assessment Plan;;  Diagnoses and all orders for this visit:    Uncomplicated alcohol dependence (CMS/HCC)  -     acamprosate (CAMPRAL) 333 MG EC tablet; Take 2 tablets by mouth 2 (Two) Times a Day.    Anxiety disorder, unspecified type  -     hydrOXYzine (ATARAX) 25 MG tablet; Take 1 tablet by mouth Every 8 (Eight) Hours As Needed for Anxiety. Indications: Feeling Anxious    Insomnia, unspecified type  -     traZODone (DESYREL) 50 MG tablet; Take 1 tablet by mouth At Night As Needed for Sleep. Indications: Trouble Sleeping    Other orders  -     Discontinue: acamprosate (CAMPRAL) 333 MG EC tablet; Take 2 tablets by mouth 3 (Three) Times a " Day.      Treatment Plan        Problem: Alcohol Dependency/ANxiety       Intervention: The prescription and adjustment of medications and monitoring of side effects. Continue current doses of Acamprosate, Trazodone, and Insomnia prn. Continue with phase II IOP.      Long term Goal: Overall improvement in mood and functioning per patient self -report      Short term Goal: Medication adherence. Improvement in symptoms per patient self-report.       A psychological evaluation was conducted in order to assess past and current level of functioning. Areas assessed included, but were not limited to: perception of social support, perception of ability to face and deal with challenges in life (positive functioning), anxiety symptoms, depressive symptoms, perspective on beliefs/belief system, coping skills for stress, intelligence level,  Therapeutic rapport was established. Interventions conducted today were geared towards incorporating medication management along with support for continued therapy. Education was also provided as to the med management with this provider and what to expect in subsequent sessions.    We discussed risks, benefits,goals and side effects of the above medication and the patient was agreeable with the plan.Patient was educated on the importance of compliance with treatment and follow-up appointments. Patient is aware to contact the [facility] with any worsening of symptoms. To call for questions or concerns and return early if necessary. Patent is agreeable to go to the Emergency Department or call 911 should they begin SI/HI.     Return in about 4 months (around 5/14/2020) for Follow Up.    Rosanne John, DNP, APRN, PMHNP-BC, FNP-C

## 2020-01-16 ENCOUNTER — TELEPHONE (OUTPATIENT)
Dept: PSYCHIATRY | Facility: CLINIC | Age: 55
End: 2020-01-16

## 2020-01-16 NOTE — TELEPHONE ENCOUNTER
Called pharmacy and they said co-pay for quantity of 60 for a 10 day supply would be $52.49. Pharmacy said patient would have to meet deductible and should start going down. Called patient and told him this. Pt is okay with this. Complete

## 2020-01-20 ENCOUNTER — OFFICE VISIT (OUTPATIENT)
Dept: PSYCHIATRY | Facility: CLINIC | Age: 55
End: 2020-01-20

## 2020-01-20 DIAGNOSIS — G47.00 INSOMNIA, UNSPECIFIED TYPE: ICD-10-CM

## 2020-01-20 DIAGNOSIS — F10.20 UNCOMPLICATED ALCOHOL DEPENDENCE (HCC): Primary | ICD-10-CM

## 2020-01-20 DIAGNOSIS — F33.1 MAJOR DEPRESSIVE DISORDER, RECURRENT EPISODE, MODERATE (HCC): ICD-10-CM

## 2020-01-20 DIAGNOSIS — F41.9 ANXIETY DISORDER, UNSPECIFIED TYPE: ICD-10-CM

## 2020-01-20 PROCEDURE — 90853 GROUP PSYCHOTHERAPY: CPT | Performed by: SOCIAL WORKER

## 2020-01-20 NOTE — PROGRESS NOTES
Phase 2    330-430    GROUP: Group member was allowed to address any worries or concerns between our group sessions.  Each group member was asked to discuss where they are and in their sobriety and what they are currently working on whether it step work or increasing their meetings.  Present during this 3 of the 4 current group members are present for the group.  One was away on business.  Asked what he remembered to do recovery reading and encourage each group member to discuss what it means for them    RESPONSE: The group discussed what the reading means to them.  The reading was about finding a higher power and leaning on the higher power to their own understanding.  3 of the 4 and very strong ideas of what they are higher power is and feels confident in believing in a higher power.  Another group member discussed working on the fourth step resentments.  1 group member that had a recent relapse discussed fears are a trigger for relapse.  Another group member discussed realizing that he is powerless and accepting that helped when working the program of recovery    CLINICAL MANEUVERING/INTERVENTIONS: Assisted member in processing above session content; acknowledged and normalized patient's thoughts, feelings and concerns. assisted patient in processing her thoughts and feelings of struggling with addiction, depression and anxiety..  Educated on the importance of completing a 12 step program and having a close relationship with her sponsor.  Patient denies SI, HI and self-harm.  Educated on balance and allow each member to discuss what they are working on balancing in their life to prevent relapse and to be able to focus on their sobriety. Educated on the importance of coming to group now twice a week Mondays and Wednesdays from 330-430.  Educated on relapse prevention.  Educated on the importance of working the steps and finding support with an AA or NA or celebrate recovery.  Encouraged each group member to have a  sponsor and to work on the steps and the importance of at least 3 meetings a week and more as needed.  Educated on relapse prevention.  Provided a handout on healthy distractions and healthy coping skills to prevent relapse and encourage group members to take this home and to work on between now and our next group.    Allowed patient to freely discuss issues without interruption or judgment. Provided safe, confidential environment to facilitate the development of positive therapeutic relationship and encourage open, honest communication. Assisted patient in identifying risk factors which would indicate the need for higher level of care including thoughts to harm self or others and/or self-harming behavior and encouraged patient to contact this office, call 911, or present to the nearest emergency room should any of these events occur. Discussed crisis intervention services and means to access.  Patient adamantly and convincingly denies current suicidal or homicidal ideation or perceptual disturbance.    ASSESSMENT:  Engaged in activity/Process and self-disclosed: Yes or No  Affect ([place] all that apply) appropriate, blunted, flat, sad, angry, tearful, anxious, bright  Applies topic to self: Yes or No  Able to give and receive feedback: Yes or No  Degree of insightful thinking: Least 1  2  3  4  5  6  7 8  9  10  Most    Urinalysis:   No visits with results within 1 Day(s) from this visit.   Latest known visit with results is:   Lab on 12/11/2019   Component Date Value Ref Range Status   • THC, Screen, Urine 12/11/2019 Negative  Negative Final   • Phencyclidine (PCP), Urine 12/11/2019 Negative  Negative Final   • Cocaine Screen, Urine 12/11/2019 Negative  Negative Final   • Methamphetamine, Ur 12/11/2019 Negative  Negative Final   • Opiate Screen 12/11/2019 Negative  Negative Final   • Amphetamine Screen, Urine 12/11/2019 Negative  Negative Final   • Benzodiazepine Screen, Urine 12/11/2019 Negative  Negative Final    • Tricyclic Antidepressants Screen 12/11/2019 Negative  Negative Final   • Methadone Screen, Urine 12/11/2019 Negative  Negative Final   • Barbiturates Screen, Urine 12/11/2019 Negative  Negative Final   • Oxycodone Screen, Urine 12/11/2019 Negative  Negative Final   • Propoxyphene Screen 12/11/2019 Negative  Negative Final   • Buprenorphine, Screen, Urine 12/11/2019 Negative  Negative Final       12-Step attendance: Frequency/Sponsor?  Yes AA    Identification of environmental and personal barriers to recovery: New to recovery    Motivation for treatment: Shows motivation but also insecurities wanting to rely on others support    Mental Status Exam  Hygiene:  good  Dress:  casual  Attitude:  Cooperative  Motor Activity:  Restless  Speech:  Normal  Mood:  anxious  Affect:  anxious  Thought Processes:  Flight of ieas  Thought Content:  normal  Suicidal Thoughts:  denies  Homicidal Thoughts:  denies  Crisis Safety Plan: yes, to come to the emergency room.  Hallucinations:  denies    Assessment     Diagnoses and all orders for this visit:    Uncomplicated alcohol dependence (CMS/HCC)    Anxiety disorder, unspecified type    Insomnia, unspecified type    Major depressive disorder, recurrent episode, moderate (CMS/HCC)               PLAN:   Patient will continue in bi-weekly group psychotherapy sessions and individual outpatient psychotherapy sessions every 3-4 weeks and will continue in self-help meetings and seek additional treatment if necessary following completion.    Malou Oleary LCSW her

## 2020-01-22 ENCOUNTER — OFFICE VISIT (OUTPATIENT)
Dept: PSYCHIATRY | Facility: CLINIC | Age: 55
End: 2020-01-22

## 2020-01-22 DIAGNOSIS — G47.00 INSOMNIA, UNSPECIFIED TYPE: ICD-10-CM

## 2020-01-22 DIAGNOSIS — F41.9 ANXIETY DISORDER, UNSPECIFIED TYPE: ICD-10-CM

## 2020-01-22 DIAGNOSIS — F33.1 MAJOR DEPRESSIVE DISORDER, RECURRENT EPISODE, MODERATE (HCC): ICD-10-CM

## 2020-01-22 DIAGNOSIS — F10.20 UNCOMPLICATED ALCOHOL DEPENDENCE (HCC): Primary | ICD-10-CM

## 2020-01-22 PROCEDURE — 90853 GROUP PSYCHOTHERAPY: CPT | Performed by: SOCIAL WORKER

## 2020-01-22 NOTE — PROGRESS NOTES
Phase 2    330-430    GROUP: Group member was allowed to address any worries or concerns between our group sessions.  There are 5 group members as we have 1 new group member.  Encourage  group to discuss healthy coping skills and had a long discussion about music and music can be a trigger also therapeutic encouraged each group member to discuss how they cope with triggers and relapse prevention and how they can use music to help them during difficult times    RESPONSE: The group discussed what the reading means to them.  Each group member discussed a few songs that are triggers as well as a few songs that are therapeutic to help them to stay calmer to stay focused on the recovery and getting through difficult times each group member was asked to give examples of how a song or a few songs helped him during the time of conflict and what that conflict was.    CLINICAL MANEUVERING/INTERVENTIONS: Assisted member in processing above session content; acknowledged and normalized patient's thoughts, feelings and concerns. assisted patient in processing her thoughts and feelings of struggling with addiction, depression and anxiety..  Educated on the importance of completing a 12 step program and having a close relationship with her sponsor.  Patient denies SI, HI and self-harm.  Educated on balance and allow each member to discuss what they are working on balancing in their life to prevent relapse and to be able to focus on their sobriety. Educated on the importance of coming to group now twice a week Mondays and Wednesdays from 330-430.  Educated on relapse prevention.  Educated on the importance of working the steps and finding support with an AA or NA or celebrate recovery.  Encouraged 3 times a week AA or NA or celebrate recovery.  Encouraged and educated on using healthy coping skills that may be outside of the box of what they would normally use to help them focus on their recovery or get through difficult time.  Role  played with each group member on how to cope with healthy distractions during this group    Allowed patient to freely discuss issues without interruption or judgment. Provided safe, confidential environment to facilitate the development of positive therapeutic relationship and encourage open, honest communication. Assisted patient in identifying risk factors which would indicate the need for higher level of care including thoughts to harm self or others and/or self-harming behavior and encouraged patient to contact this office, call 911, or present to the nearest emergency room should any of these events occur. Discussed crisis intervention services and means to access.  Patient adamantly and convincingly denies current suicidal or homicidal ideation or perceptual disturbance.    ASSESSMENT:  Engaged in activity/Process and self-disclosed: Yes or No  Affect ([place] all that apply) appropriate, blunted, flat, sad, angry, tearful, anxious, bright  Applies topic to self: Yes or No  Able to give and receive feedback: Yes or No  Degree of insightful thinking: Least 1  2  3  4  5  6  7 8  9  10  Most    Urinalysis:   No visits with results within 1 Day(s) from this visit.   Latest known visit with results is:   Lab on 12/11/2019   Component Date Value Ref Range Status   • THC, Screen, Urine 12/11/2019 Negative  Negative Final   • Phencyclidine (PCP), Urine 12/11/2019 Negative  Negative Final   • Cocaine Screen, Urine 12/11/2019 Negative  Negative Final   • Methamphetamine, Ur 12/11/2019 Negative  Negative Final   • Opiate Screen 12/11/2019 Negative  Negative Final   • Amphetamine Screen, Urine 12/11/2019 Negative  Negative Final   • Benzodiazepine Screen, Urine 12/11/2019 Negative  Negative Final   • Tricyclic Antidepressants Screen 12/11/2019 Negative  Negative Final   • Methadone Screen, Urine 12/11/2019 Negative  Negative Final   • Barbiturates Screen, Urine 12/11/2019 Negative  Negative Final   • Oxycodone Screen,  Urine 12/11/2019 Negative  Negative Final   • Propoxyphene Screen 12/11/2019 Negative  Negative Final   • Buprenorphine, Screen, Urine 12/11/2019 Negative  Negative Final       12-Step attendance: Frequency/Sponsor?  Yes AA    Identification of environmental and personal barriers to recovery: New to recovery    Motivation for treatment: Shows motivation but also insecurities wanting to rely on others support    Mental Status Exam  Hygiene:  good  Dress:  casual  Attitude:  Cooperative  Motor Activity:  Restless  Speech:  Normal  Mood:  anxious  Affect:  anxious  Thought Processes:  Flight of ieas  Thought Content:  normal  Suicidal Thoughts:  denies  Homicidal Thoughts:  denies  Crisis Safety Plan: yes, to come to the emergency room.  Hallucinations:  denies    Assessment     Diagnoses and all orders for this visit:    Uncomplicated alcohol dependence (CMS/HCC)    Anxiety disorder, unspecified type    Insomnia, unspecified type    Major depressive disorder, recurrent episode, moderate (CMS/HCC)               PLAN:   Patient will continue in bi-weekly group psychotherapy sessions and individual outpatient psychotherapy sessions every 3-4 weeks and will continue in self-help meetings and seek additional treatment if necessary following completion.    Malou Oleary LCSW her

## 2020-01-27 ENCOUNTER — OFFICE VISIT (OUTPATIENT)
Dept: PSYCHIATRY | Facility: CLINIC | Age: 55
End: 2020-01-27

## 2020-01-27 DIAGNOSIS — F33.1 MAJOR DEPRESSIVE DISORDER, RECURRENT EPISODE, MODERATE (HCC): ICD-10-CM

## 2020-01-27 DIAGNOSIS — G47.00 INSOMNIA, UNSPECIFIED TYPE: ICD-10-CM

## 2020-01-27 DIAGNOSIS — F41.9 ANXIETY DISORDER, UNSPECIFIED TYPE: ICD-10-CM

## 2020-01-27 DIAGNOSIS — F10.20 UNCOMPLICATED ALCOHOL DEPENDENCE (HCC): Primary | ICD-10-CM

## 2020-01-27 PROCEDURE — 90853 GROUP PSYCHOTHERAPY: CPT | Performed by: SOCIAL WORKER

## 2020-01-27 NOTE — PROGRESS NOTES
Phase 2    330-430    GROUP: Group member was allowed to address any worries or concerns between our group sessions.  There are 4 group members today.  Continue to discuss coping skills and encourage each group member to discuss their wand coping throughout life    RESPONSE: The group discussed their go to coping skills with her to healthier unhealthy and where they feel it started when there was learned.  Each person in the group went around the room to discuss their go to coping skills.    CLINICAL MANEUVERING/INTERVENTIONS: Assisted member in processing above session content; acknowledged and normalized patient's thoughts, feelings and concerns. assisted patient in processing her thoughts and feelings of struggling with addiction, depression and anxiety..  Educated on the importance of completing a 12 step program and having a close relationship with her sponsor.  Patient denies SI, HI and self-harm.  Educated on balance and allow each member to discuss what they are working on balancing in their life to prevent relapse and to be able to focus on their sobriety. Educated on the importance of coming to group now twice a week Mondays and Wednesdays from 330-430.  Educated on relapse prevention.  Educated on the importance of working the steps and finding support with an AA or NA or celebrate recovery.  Encouraged 3 times a week AA or NA or celebrate recovery.  Educated on healthy coping skills and the importance of using a balanced lifestyle, grounding techniques and healthy coping skills/healthy distractions.  Encourage each of them to try to work on noticing her coping skills of choice.    Allowed patient to freely discuss issues without interruption or judgment. Provided safe, confidential environment to facilitate the development of positive therapeutic relationship and encourage open, honest communication. Assisted patient in identifying risk factors which would indicate the need for higher level of care  including thoughts to harm self or others and/or self-harming behavior and encouraged patient to contact this office, call 911, or present to the nearest emergency room should any of these events occur. Discussed crisis intervention services and means to access.  Patient adamantly and convincingly denies current suicidal or homicidal ideation or perceptual disturbance.      ASSESSMENT:  Engaged in activity/Process and self-disclosed: Yes or No  Affect ([place] all that apply) appropriate, blunted, flat, sad, angry, tearful, anxious, bright  Applies topic to self: Yes or No  Able to give and receive feedback: Yes or No  Degree of insightful thinking: Least 1  2  3  4  5  6  7 8  9  10  Most    Urinalysis:   No visits with results within 1 Day(s) from this visit.   Latest known visit with results is:   Lab on 12/11/2019   Component Date Value Ref Range Status   • THC, Screen, Urine 12/11/2019 Negative  Negative Final   • Phencyclidine (PCP), Urine 12/11/2019 Negative  Negative Final   • Cocaine Screen, Urine 12/11/2019 Negative  Negative Final   • Methamphetamine, Ur 12/11/2019 Negative  Negative Final   • Opiate Screen 12/11/2019 Negative  Negative Final   • Amphetamine Screen, Urine 12/11/2019 Negative  Negative Final   • Benzodiazepine Screen, Urine 12/11/2019 Negative  Negative Final   • Tricyclic Antidepressants Screen 12/11/2019 Negative  Negative Final   • Methadone Screen, Urine 12/11/2019 Negative  Negative Final   • Barbiturates Screen, Urine 12/11/2019 Negative  Negative Final   • Oxycodone Screen, Urine 12/11/2019 Negative  Negative Final   • Propoxyphene Screen 12/11/2019 Negative  Negative Final   • Buprenorphine, Screen, Urine 12/11/2019 Negative  Negative Final       12-Step attendance: Frequency/Sponsor?  Yes AA    Identification of environmental and personal barriers to recovery: New to recovery    Motivation for treatment: Shows motivation but also insecurities wanting to rely on others  support    Mental Status Exam  Hygiene:  good  Dress:  casual  Attitude:  Cooperative  Motor Activity:  Restless  Speech:  Normal  Mood:  anxious  Affect:  anxious  Thought Processes:  Flight of ieas  Thought Content:  normal  Suicidal Thoughts:  denies  Homicidal Thoughts:  denies  Crisis Safety Plan: yes, to come to the emergency room.  Hallucinations:  denies    Assessment     Diagnoses and all orders for this visit:    Uncomplicated alcohol dependence (CMS/HCC)    Anxiety disorder, unspecified type    Insomnia, unspecified type    Major depressive disorder, recurrent episode, moderate (CMS/HCC)               PLAN:   Patient will continue in bi-weekly group psychotherapy sessions and individual outpatient psychotherapy sessions every 3-4 weeks and will continue in self-help meetings and seek additional treatment if necessary following completion.    Malou Oleary LCSW her

## 2020-01-27 NOTE — PROGRESS NOTES
Phase 2    330-430    GROUP: Group member was allowed to address any worries or concerns between our group sessions.  There are 3 group members today.  Number as well as to discuss what kind of barriers to they have to AA/NA/celebrate recovery and 2 of the 3 in the group today.  Discussed the importance of a sponsor and allowed him to group members that have a sponsor to discuss the importance of that relationship to encourage the 2 of the 3 today to work on finding their sponsor that they will have adequate support after phase 2 of IOP    RESPONSE: 1 group member read from the big book on the importance of their higher power and to sponsorship.  1 group member explained she does not like the cold weather and the other group member that has been in AA/NA for many years explained the importance of having a sponsor and making meetings at least 3 times per week.  Another group member discussed letting family dynamics and priorities with his family come before his sobriety to get the needed meetings.    CLINICAL MANEUVERING/INTERVENTIONS: Assisted member in processing above session content; acknowledged and normalized patient's thoughts, feelings and concerns. assisted patient in processing her thoughts and feelings of struggling with addiction, depression and anxiety..  Educated on the importance of completing a 12 step program and having a close relationship with her sponsor.  Patient denies SI, HI and self-harm.  Educated on balance and allow each member to discuss what they are working on balancing in their life to prevent relapse and to be able to focus on their sobriety. Educated on the importance of coming to group now twice a week Mondays and Wednesdays from 330-430.  Educated on relapse prevention.  Educated on the importance of working the steps and finding support with an AA or NA or celebrate recovery.  Encouraged 3 times a week AA or NA or celebrate recovery.  Educated on the importance of working a 12-step  program along with a sponsor and at least 3 meetings per week.  Encouraged each of them to discuss what excuses they tell themselves to get out of going to meetings and what they put in front of their sobriety.  Each of them discussed this during this group and made a goal to work towards getting 3 meetings a week even if it is on the Internet.    Allowed patient to freely discuss issues without interruption or judgment. Provided safe, confidential environment to facilitate the development of positive therapeutic relationship and encourage open, honest communication. Assisted patient in identifying risk factors which would indicate the need for higher level of care including thoughts to harm self or others and/or self-harming behavior and encouraged patient to contact this office, call 911, or present to the nearest emergency room should any of these events occur. Discussed crisis intervention services and means to access.  Patient adamantly and convincingly denies current suicidal or homicidal ideation or perceptual disturbance.      ASSESSMENT:  Engaged in activity/Process and self-disclosed: Yes or No  Affect ([place] all that apply) appropriate, blunted, flat, sad, angry, tearful, anxious, bright  Applies topic to self: Yes or No  Able to give and receive feedback: Yes or No  Degree of insightful thinking: Least 1  2  3  4  5  6  7 8  9  10  Most    Urinalysis:   No visits with results within 1 Day(s) from this visit.   Latest known visit with results is:   Lab on 12/11/2019   Component Date Value Ref Range Status   • THC, Screen, Urine 12/11/2019 Negative  Negative Final   • Phencyclidine (PCP), Urine 12/11/2019 Negative  Negative Final   • Cocaine Screen, Urine 12/11/2019 Negative  Negative Final   • Methamphetamine, Ur 12/11/2019 Negative  Negative Final   • Opiate Screen 12/11/2019 Negative  Negative Final   • Amphetamine Screen, Urine 12/11/2019 Negative  Negative Final   • Benzodiazepine Screen, Urine  12/11/2019 Negative  Negative Final   • Tricyclic Antidepressants Screen 12/11/2019 Negative  Negative Final   • Methadone Screen, Urine 12/11/2019 Negative  Negative Final   • Barbiturates Screen, Urine 12/11/2019 Negative  Negative Final   • Oxycodone Screen, Urine 12/11/2019 Negative  Negative Final   • Propoxyphene Screen 12/11/2019 Negative  Negative Final   • Buprenorphine, Screen, Urine 12/11/2019 Negative  Negative Final       12-Step attendance: Frequency/Sponsor?  Yes AA    Identification of environmental and personal barriers to recovery: New to recovery    Motivation for treatment: Shows motivation but also insecurities wanting to rely on others support    Mental Status Exam  Hygiene:  good  Dress:  casual  Attitude:  Cooperative  Motor Activity:  Restless  Speech:  Normal  Mood:  anxious  Affect:  anxious  Thought Processes:  Flight of ieas  Thought Content:  normal  Suicidal Thoughts:  denies  Homicidal Thoughts:  denies  Crisis Safety Plan: yes, to come to the emergency room.  Hallucinations:  denies    Assessment     Diagnoses and all orders for this visit:    Uncomplicated alcohol dependence (CMS/HCC)    Anxiety disorder, unspecified type    Insomnia, unspecified type    Major depressive disorder, recurrent episode, moderate (CMS/HCC)               PLAN:   Patient will continue in bi-weekly group psychotherapy sessions and individual outpatient psychotherapy sessions every 3-4 weeks and will continue in self-help meetings and seek additional treatment if necessary following completion.    Malou Oleary LCSW her

## 2020-01-29 ENCOUNTER — OFFICE VISIT (OUTPATIENT)
Dept: PSYCHIATRY | Facility: CLINIC | Age: 55
End: 2020-01-29

## 2020-01-29 DIAGNOSIS — F33.1 MAJOR DEPRESSIVE DISORDER, RECURRENT EPISODE, MODERATE (HCC): ICD-10-CM

## 2020-01-29 DIAGNOSIS — F10.20 UNCOMPLICATED ALCOHOL DEPENDENCE (HCC): Primary | ICD-10-CM

## 2020-01-29 DIAGNOSIS — F41.9 ANXIETY DISORDER, UNSPECIFIED TYPE: ICD-10-CM

## 2020-01-29 DIAGNOSIS — G47.00 INSOMNIA, UNSPECIFIED TYPE: ICD-10-CM

## 2020-01-29 PROCEDURE — 90853 GROUP PSYCHOTHERAPY: CPT | Performed by: SOCIAL WORKER

## 2020-02-03 ENCOUNTER — OFFICE VISIT (OUTPATIENT)
Dept: PSYCHIATRY | Facility: CLINIC | Age: 55
End: 2020-02-03

## 2020-02-03 ENCOUNTER — LAB (OUTPATIENT)
Dept: LAB | Facility: HOSPITAL | Age: 55
End: 2020-02-03

## 2020-02-03 DIAGNOSIS — F10.20 UNCOMPLICATED ALCOHOL DEPENDENCE (HCC): ICD-10-CM

## 2020-02-03 DIAGNOSIS — F41.9 ANXIETY DISORDER, UNSPECIFIED TYPE: ICD-10-CM

## 2020-02-03 DIAGNOSIS — F33.2 MAJOR DEPRESSIVE DISORDER, RECURRENT, SEVERE WITHOUT PSYCHOTIC FEATURES (HCC): ICD-10-CM

## 2020-02-03 DIAGNOSIS — G47.00 INSOMNIA, UNSPECIFIED TYPE: ICD-10-CM

## 2020-02-03 DIAGNOSIS — F10.20 UNCOMPLICATED ALCOHOL DEPENDENCE (HCC): Primary | ICD-10-CM

## 2020-02-03 DIAGNOSIS — F33.1 MAJOR DEPRESSIVE DISORDER, RECURRENT EPISODE, MODERATE (HCC): ICD-10-CM

## 2020-02-03 PROCEDURE — 90853 GROUP PSYCHOTHERAPY: CPT | Performed by: SOCIAL WORKER

## 2020-02-03 PROCEDURE — 80307 DRUG TEST PRSMV CHEM ANLYZR: CPT

## 2020-02-03 PROCEDURE — 80306 DRUG TEST PRSMV INSTRMNT: CPT
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
## 2020-02-04 NOTE — PROGRESS NOTES
Phase 2    330-430    GROUP: Group member was allowed to address any worries or concerns between our group sessions.  There are 5 group members today.  Members were to discuss conflict resolution and grounding techniques due to increased anxiety for 1 group member    RESPONSE: 1 group member read from the big book.  The group discussed issues with conflict resolution and how to decrease stress using stress management, grounding techniques and relaxation techniques.  1 group member demonstrated breathing techniques that work for her.  Another group member discussed the 5 senses and demonstrated it during group.    CLINICAL MANEUVERING/INTERVENTIONS: Assisted member in processing above session content; acknowledged and normalized patient's thoughts, feelings and concerns. assisted patient in processing her thoughts and feelings of struggling with addiction, depression and anxiety..  Educated on the importance of completing a 12 step program and having a close relationship with her sponsor.  Patient denies SI, HI and self-harm.  Educated on balance and allow each member to discuss what they are working on balancing in their life to prevent relapse and to be able to focus on their sobriety. Educated on the importance of coming to group now twice a week Mondays and Wednesdays from 330-430.  Educated on relapse prevention.  Educated on the importance of working the steps and finding support with an AA or NA or celebrate recovery.  Encouraged 3 times a week AA or NA or celebrate recovery.  Educated on the importance of working a 12-step program along with a sponsor and at least 3 meetings per week.  Educated on relaxation techniques.  Educated on grounding techniques and encouraged each group member to practice these between now and our next group.    Allowed patient to freely discuss issues without interruption or judgment. Provided safe, confidential environment to facilitate the development of positive therapeutic  relationship and encourage open, honest communication. Assisted patient in identifying risk factors which would indicate the need for higher level of care including thoughts to harm self or others and/or self-harming behavior and encouraged patient to contact this office, call 911, or present to the nearest emergency room should any of these events occur. Discussed crisis intervention services and means to access.  Patient adamantly and convincingly denies current suicidal or homicidal ideation or perceptual disturbance.        ASSESSMENT:  Engaged in activity/Process and self-disclosed: Yes or No  Affect ([place] all that apply) appropriate, blunted, flat, sad, angry, tearful, anxious, bright  Applies topic to self: Yes or No  Able to give and receive feedback: Yes or No  Degree of insightful thinking: Least 1  2  3  4  5  6  7 8  9  10  Most    Urinalysis:   Lab on 02/03/2020   Component Date Value Ref Range Status   • THC, Screen, Urine 02/03/2020 Negative  Negative Final   • Phencyclidine (PCP), Urine 02/03/2020 Negative  Negative Final   • Cocaine Screen, Urine 02/03/2020 Negative  Negative Final   • Methamphetamine, Ur 02/03/2020 Negative  Negative Final   • Opiate Screen 02/03/2020 Negative  Negative Final   • Amphetamine Screen, Urine 02/03/2020 Negative  Negative Final   • Benzodiazepine Screen, Urine 02/03/2020 Negative  Negative Final   • Tricyclic Antidepressants Screen 02/03/2020 Negative  Negative Final   • Methadone Screen, Urine 02/03/2020 Negative  Negative Final   • Barbiturates Screen, Urine 02/03/2020 Negative  Negative Final   • Oxycodone Screen, Urine 02/03/2020 Negative  Negative Final   • Propoxyphene Screen 02/03/2020 Negative  Negative Final   • Buprenorphine, Screen, Urine 02/03/2020 Negative  Negative Final       12-Step attendance: Frequency/Sponsor?  Yes AA    Identification of environmental and personal barriers to recovery: New to recovery    Motivation for treatment: Shows motivation  but also insecurities wanting to rely on others support    Mental Status Exam  Hygiene:  good  Dress:  casual  Attitude:  Cooperative  Motor Activity:  Restless  Speech:  Normal  Mood:  anxious  Affect:  anxious  Thought Processes:  Flight of ieas  Thought Content:  normal  Suicidal Thoughts:  denies  Homicidal Thoughts:  denies  Crisis Safety Plan: yes, to come to the emergency room.  Hallucinations:  denies    Assessment     Diagnoses and all orders for this visit:    Uncomplicated alcohol dependence (CMS/HCC)    Anxiety disorder, unspecified type    Insomnia, unspecified type    Major depressive disorder, recurrent episode, moderate (CMS/HCC)               PLAN:   Patient will continue in bi-weekly group psychotherapy sessions and individual outpatient psychotherapy sessions every 3-4 weeks and will continue in self-help meetings and seek additional treatment if necessary following completion.    Malou Oleary LCSW

## 2020-02-05 ENCOUNTER — OFFICE VISIT (OUTPATIENT)
Dept: PSYCHIATRY | Facility: CLINIC | Age: 55
End: 2020-02-05

## 2020-02-05 DIAGNOSIS — F41.9 ANXIETY DISORDER, UNSPECIFIED TYPE: ICD-10-CM

## 2020-02-05 DIAGNOSIS — F33.1 MAJOR DEPRESSIVE DISORDER, RECURRENT EPISODE, MODERATE (HCC): ICD-10-CM

## 2020-02-05 DIAGNOSIS — G47.00 INSOMNIA, UNSPECIFIED TYPE: ICD-10-CM

## 2020-02-05 DIAGNOSIS — F10.20 UNCOMPLICATED ALCOHOL DEPENDENCE (HCC): Primary | ICD-10-CM

## 2020-02-05 LAB — ETHANOL UR-MCNC: NEGATIVE %

## 2020-02-05 PROCEDURE — 90853 GROUP PSYCHOTHERAPY: CPT | Performed by: SOCIAL WORKER

## 2020-02-10 ENCOUNTER — OFFICE VISIT (OUTPATIENT)
Dept: PSYCHIATRY | Facility: CLINIC | Age: 55
End: 2020-02-10

## 2020-02-10 DIAGNOSIS — F33.1 MAJOR DEPRESSIVE DISORDER, RECURRENT EPISODE, MODERATE (HCC): ICD-10-CM

## 2020-02-10 DIAGNOSIS — F10.20 UNCOMPLICATED ALCOHOL DEPENDENCE (HCC): Primary | ICD-10-CM

## 2020-02-10 DIAGNOSIS — G47.00 INSOMNIA, UNSPECIFIED TYPE: ICD-10-CM

## 2020-02-10 DIAGNOSIS — F41.9 ANXIETY DISORDER, UNSPECIFIED TYPE: ICD-10-CM

## 2020-02-10 PROCEDURE — 90853 GROUP PSYCHOTHERAPY: CPT | Performed by: SOCIAL WORKER

## 2020-02-10 NOTE — PROGRESS NOTES
Phase 2    330-430    GROUP: Group member was allowed to address any worries or concerns between our group sessions.  There are 5 group members today.  Group members were asked to discuss with they went to get out of this group.  The group had been seeing and asking when they will be finished and asked each group member to process if they are trusting the process of their recovery, asking her the motivated in their recovery and what the group means to them as well as reported to them how many sessions I have left in the group    RESPONSE: 1 group member read from the big book.  The group discussed that some members of the group are ordered by [employer] or the court and others, on their self-will.  1 group member discussed that he gets something from the group but started a new job so he is not sure he will be able to finish.  Another group member discussed having a lot of work to do and is hard to leave work to come here in the middle of the day but also feels that the group has been beneficial.  2 of the group members discussed that they are not working currently and they look forward to coming.  One group member asked if he could stay extra as he feels that the group is a supportive/protective factor for him.    CLINICAL MANEUVERING/INTERVENTIONS: Assisted member in processing above session content; acknowledged and normalized patient's thoughts, feelings and concerns. assisted patient in processing her thoughts and feelings of struggling with addiction, depression and anxiety..  Educated on the importance of completing a 12 step program and having a close relationship with her sponsor.  Patient denies SI, HI and self-harm.  Educated on balance and allow each member to discuss what they are working on balancing in their life to prevent relapse and to be able to focus on their sobriety. Educated on the importance of coming to group now twice a week Mondays and Wednesdays from 330-430.  Educated on relapse prevention.   Educated on the importance of working the steps and finding support with an AA or NA or celebrate recovery.  Encouraged 3 times a week AA or NA or celebrate recovery.  Educated on the importance of working a 12-step program along with a sponsor and at least 3 meetings per week.  Validated each group members thoughts and feelings about this group needs to them And why they are here.  Educated on the importance of having protective factors, trusting her process and not future thinking as it can and motivate all of us.  Encouraged them to think about enjoying the journey rather than waiting to get to the end of the group.  Educated each group member on what it will take for them to finish the group successfully and completed    Allowed patient to freely discuss issues without interruption or judgment. Provided safe, confidential environment to facilitate the development of positive therapeutic relationship and encourage open, honest communication. Assisted patient in identifying risk factors which would indicate the need for higher level of care including thoughts to harm self or others and/or self-harming behavior and encouraged patient to contact this office, call 911, or present to the nearest emergency room should any of these events occur. Discussed crisis intervention services and means to access.  Patient adamantly and convincingly denies current suicidal or homicidal ideation or perceptual disturbance.        ASSESSMENT:  Engaged in activity/Process and self-disclosed: Yes or No  Affect ([place] all that apply) appropriate, blunted, flat, sad, angry, tearful, anxious, bright  Applies topic to self: Yes or No  Able to give and receive feedback: Yes or No  Degree of insightful thinking: Least 1  2  3  4  5  6  7 8  9  10  Most    Urinalysis:   No visits with results within 1 Day(s) from this visit.   Latest known visit with results is:   Lab on 02/03/2020   Component Date Value Ref Range Status   • THC, Screen,  Urine 02/03/2020 Negative  Negative Final   • Phencyclidine (PCP), Urine 02/03/2020 Negative  Negative Final   • Cocaine Screen, Urine 02/03/2020 Negative  Negative Final   • Methamphetamine, Ur 02/03/2020 Negative  Negative Final   • Opiate Screen 02/03/2020 Negative  Negative Final   • Amphetamine Screen, Urine 02/03/2020 Negative  Negative Final   • Benzodiazepine Screen, Urine 02/03/2020 Negative  Negative Final   • Tricyclic Antidepressants Screen 02/03/2020 Negative  Negative Final   • Methadone Screen, Urine 02/03/2020 Negative  Negative Final   • Barbiturates Screen, Urine 02/03/2020 Negative  Negative Final   • Oxycodone Screen, Urine 02/03/2020 Negative  Negative Final   • Propoxyphene Screen 02/03/2020 Negative  Negative Final   • Buprenorphine, Screen, Urine 02/03/2020 Negative  Negative Final   • Ethanol, Urine 02/03/2020 Negative  Cutoff=0.020 % Final       12-Step attendance: Frequency/Sponsor?  Yes AA    Identification of environmental and personal barriers to recovery: New to recovery    Motivation for treatment: Shows motivation but also insecurities wanting to rely on others support    Mental Status Exam  Hygiene:  good  Dress:  casual  Attitude:  Cooperative  Motor Activity:  Restless  Speech:  Normal  Mood:  anxious  Affect:  anxious  Thought Processes:  Flight of ieas  Thought Content:  normal  Suicidal Thoughts:  denies  Homicidal Thoughts:  denies  Crisis Safety Plan: yes, to come to the emergency room.  Hallucinations:  denies    Assessment     Diagnoses and all orders for this visit:    Uncomplicated alcohol dependence (CMS/HCC)    Anxiety disorder, unspecified type    Insomnia, unspecified type    Major depressive disorder, recurrent episode, moderate (CMS/HCC)               PLAN:   Patient will continue in bi-weekly group psychotherapy sessions and individual outpatient psychotherapy sessions every 3-4 weeks and will continue in self-help meetings and seek additional treatment if  necessary following completion.    Malou Oleary LCSW

## 2020-02-11 NOTE — PROGRESS NOTES
phase 2    330-430    GROUP: Group member was allowed to address any worries or concerns between our group sessions.  There are 3 group members today.  Asked each group member today about their journey to sobriety, what is worked and what his not allowed each person to discuss it openly in a safe and confidential environment.  Reviewed relapse prevention.    RESPONSE: 1 group member discussed going to a 14-month rehabilitation program that was [place]-based, and the pros and the cons but explained at the end of the 14 months he had work experience and learned a lot about recovery and addiction.  Another group member discussed this is his first program related to addiction and recovery and feel that it is been beneficial but would like to learn more in group member discussed importance of having a sponsor, regular AA or NA meetings and therapy.  Another group member discussed the different rehabs he has been to, his sponsor and feeling that his close relationship with a sponsor and his higher power has been the most influential    CLINICAL MANEUVERING/INTERVENTIONS: Assisted member in processing above session content; acknowledged and normalized patient's thoughts, feelings and concerns. assisted patient in processing her thoughts and feelings of struggling with addiction, depression and anxiety..  Educated on the importance of completing a 12 step program and having a close relationship with her sponsor.  Patient denies SI, HI and self-harm.  Educated on balance and allow each member to discuss what they are working on balancing in their life to prevent relapse and to be able to focus on their sobriety. Educated on the importance of coming to group now twice a week Mondays and Wednesdays from 330-430.  Educated on relapse prevention.  Educated on the importance of working the steps and finding support with an AA or NA or celebrate recovery.  Encouraged 3 times a week AA or NA or celebrate recovery.  Educated on the  importance of working a 12-step program along with a sponsor and at least 3 meetings per week.  Validated each group members thoughts and feelings about this group needs to them And why they are here.  Educated each group member on the importance of their journey may be different but at the end of the day staying sober is the goal for everyone in the room/group.  Encouraged the 1 group member that does not have a sponsor to get a sponsor and to obtain psychotherapy as he has not had either.  Group members are to find something that inspires them daily and discuss at next session    Allowed patient to freely discuss issues without interruption or judgment. Provided safe, confidential environment to facilitate the development of positive therapeutic relationship and encourage open, honest communication. Assisted patient in identifying risk factors which would indicate the need for higher level of care including thoughts to harm self or others and/or self-harming behavior and encouraged patient to contact this office, call 911, or present to the nearest emergency room should any of these events occur. Discussed crisis intervention services and means to access.  Patient adamantly and convincingly denies current suicidal or homicidal ideation or perceptual disturbance.        ASSESSMENT:  Engaged in activity/Process and self-disclosed: Yes or No  Affect ([place] all that apply) appropriate, blunted, flat, sad, angry, tearful, anxious, bright  Applies topic to self: Yes or No  Able to give and receive feedback: Yes or No  Degree of insightful thinking: Least 1  2  3  4  5  6  7 8  9  10  Most    Urinalysis:   No visits with results within 1 Day(s) from this visit.   Latest known visit with results is:   Lab on 02/03/2020   Component Date Value Ref Range Status   • THC, Screen, Urine 02/03/2020 Negative  Negative Final   • Phencyclidine (PCP), Urine 02/03/2020 Negative  Negative Final   • Cocaine Screen, Urine 02/03/2020  Negative  Negative Final   • Methamphetamine, Ur 02/03/2020 Negative  Negative Final   • Opiate Screen 02/03/2020 Negative  Negative Final   • Amphetamine Screen, Urine 02/03/2020 Negative  Negative Final   • Benzodiazepine Screen, Urine 02/03/2020 Negative  Negative Final   • Tricyclic Antidepressants Screen 02/03/2020 Negative  Negative Final   • Methadone Screen, Urine 02/03/2020 Negative  Negative Final   • Barbiturates Screen, Urine 02/03/2020 Negative  Negative Final   • Oxycodone Screen, Urine 02/03/2020 Negative  Negative Final   • Propoxyphene Screen 02/03/2020 Negative  Negative Final   • Buprenorphine, Screen, Urine 02/03/2020 Negative  Negative Final   • Ethanol, Urine 02/03/2020 Negative  Cutoff=0.020 % Final       12-Step attendance: Frequency/Sponsor?  Yes AA    Identification of environmental and personal barriers to recovery: New to recovery    Motivation for treatment: Shows motivation but also insecurities wanting to rely on others support    Mental Status Exam  Hygiene:  good  Dress:  casual  Attitude:  Cooperative  Motor Activity:  Restless  Speech:  Normal  Mood:  anxious  Affect:  anxious  Thought Processes:  Flight of ieas  Thought Content:  normal  Suicidal Thoughts:  denies  Homicidal Thoughts:  denies  Crisis Safety Plan: yes, to come to the emergency room.  Hallucinations:  denies    Assessment     Diagnoses and all orders for this visit:    Uncomplicated alcohol dependence (CMS/HCC)    Anxiety disorder, unspecified type    Insomnia, unspecified type    Major depressive disorder, recurrent episode, moderate (CMS/HCC)               PLAN:   Patient will continue in bi-weekly group psychotherapy sessions and individual outpatient psychotherapy sessions every 3-4 weeks and will continue in self-help meetings and seek additional treatment if necessary following completion.    Malou Oleary LCSW

## 2020-02-12 ENCOUNTER — OFFICE VISIT (OUTPATIENT)
Dept: PSYCHIATRY | Facility: CLINIC | Age: 55
End: 2020-02-12

## 2020-02-12 DIAGNOSIS — F33.1 MAJOR DEPRESSIVE DISORDER, RECURRENT EPISODE, MODERATE (HCC): ICD-10-CM

## 2020-02-12 DIAGNOSIS — G47.00 INSOMNIA, UNSPECIFIED TYPE: ICD-10-CM

## 2020-02-12 DIAGNOSIS — F10.20 UNCOMPLICATED ALCOHOL DEPENDENCE (HCC): Primary | ICD-10-CM

## 2020-02-12 DIAGNOSIS — F41.9 ANXIETY DISORDER, UNSPECIFIED TYPE: ICD-10-CM

## 2020-02-12 PROCEDURE — 90853 GROUP PSYCHOTHERAPY: CPT | Performed by: SOCIAL WORKER

## 2020-02-12 NOTE — PROGRESS NOTES
Phase 2    330-430    GROUP: Group member was allowed to address any worries or concerns between our group sessions.  There are 3 group members today.  Group members were asked what would they tell him there younger self.  Encourage each group member discussed words of wisdom that they would give themselves around the age of 12.      RESPONSE: 1 group member said being her self, another group member said you are enough and another group member session stick to her passions.  Each group member discussed their childhood, learned behaviors and environmental factors that were helpful and also harmful.  Each group member discussed coping skills that would have helped him if they would have learn them at a younger age.    CLINICAL MANEUVERING/INTERVENTIONS: Assisted member in processing above session content; acknowledged and normalized patient's thoughts, feelings and concerns. assisted patient in processing her thoughts and feelings of struggling with addiction, depression and anxiety..  Educated on the importance of completing a 12 step program and having a close relationship with her sponsor.  Patient denies SI, HI and self-harm.  Educated on balance and allow each member to discuss what they are working on balancing in their life to prevent relapse and to be able to focus on their sobriety. Educated on the importance of coming to group now twice a week Mondays and Wednesdays from 330-430.  Educated on relapse prevention.  Educated on the importance of working the steps and finding support with an AA or NA or celebrate recovery.  Encouraged 3 times a week AA or NA or celebrate recovery.  Educated on the importance of working a 12-step program along with a sponsor and at least 3 meetings per week.  Validated each group members thoughts and feelings about this group needs to them And why they are.  Educated each group members on the importance of reflection, noticing where they have come from and enjoying the journey  rather than focusing on finishing the race.  Educated on healthy coping skills and the importance of self-love through the journey of recovery      Allowed patient to freely discuss issues without interruption or judgment. Provided safe, confidential environment to facilitate the development of positive therapeutic relationship and encourage open, honest communication. Assisted patient in identifying risk factors which would indicate the need for higher level of care including thoughts to harm self or others and/or self-harming behavior and encouraged patient to contact this office, call 911, or present to the nearest emergency room should any of these events occur. Discussed crisis intervention services and means to access.  Patient adamantly and convincingly denies current suicidal or homicidal ideation or perceptual disturbance.        ASSESSMENT:  Engaged in activity/Process and self-disclosed: Yes or No  Affect ([place] all that apply) appropriate, blunted, flat, sad, angry, tearful, anxious, bright  Applies topic to self: Yes or No  Able to give and receive feedback: Yes or No  Degree of insightful thinking: Least 1  2  3  4  5  6  7 8  9  10  Most    Urinalysis:   No visits with results within 1 Day(s) from this visit.   Latest known visit with results is:   Lab on 02/03/2020   Component Date Value Ref Range Status   • THC, Screen, Urine 02/03/2020 Negative  Negative Final   • Phencyclidine (PCP), Urine 02/03/2020 Negative  Negative Final   • Cocaine Screen, Urine 02/03/2020 Negative  Negative Final   • Methamphetamine, Ur 02/03/2020 Negative  Negative Final   • Opiate Screen 02/03/2020 Negative  Negative Final   • Amphetamine Screen, Urine 02/03/2020 Negative  Negative Final   • Benzodiazepine Screen, Urine 02/03/2020 Negative  Negative Final   • Tricyclic Antidepressants Screen 02/03/2020 Negative  Negative Final   • Methadone Screen, Urine 02/03/2020 Negative  Negative Final   • Barbiturates Screen, Urine  02/03/2020 Negative  Negative Final   • Oxycodone Screen, Urine 02/03/2020 Negative  Negative Final   • Propoxyphene Screen 02/03/2020 Negative  Negative Final   • Buprenorphine, Screen, Urine 02/03/2020 Negative  Negative Final   • Ethanol, Urine 02/03/2020 Negative  Cutoff=0.020 % Final       12-Step attendance: Frequency/Sponsor?  Yes AA    Identification of environmental and personal barriers to recovery: New to recovery    Motivation for treatment: Shows motivation but also insecurities wanting to rely on others support    Mental Status Exam  Hygiene:  good  Dress:  casual  Attitude:  Cooperative  Motor Activity:  Restless  Speech:  Normal  Mood:  anxious  Affect:  anxious  Thought Processes:  Flight of ieas  Thought Content:  normal  Suicidal Thoughts:  denies  Homicidal Thoughts:  denies  Crisis Safety Plan: yes, to come to the emergency room.  Hallucinations:  denies    Assessment     Diagnoses and all orders for this visit:    Uncomplicated alcohol dependence (CMS/HCC)    Anxiety disorder, unspecified type    Insomnia, unspecified type    Major depressive disorder, recurrent episode, moderate (CMS/HCC)               PLAN:   Patient will continue in bi-weekly group psychotherapy sessions and individual outpatient psychotherapy sessions every 3-4 weeks and will continue in self-help meetings and seek additional treatment if necessary following completion.    Malou Oleary LCSW

## 2020-02-17 ENCOUNTER — OFFICE VISIT (OUTPATIENT)
Dept: PSYCHIATRY | Facility: CLINIC | Age: 55
End: 2020-02-17

## 2020-02-17 DIAGNOSIS — F41.9 ANXIETY DISORDER, UNSPECIFIED TYPE: ICD-10-CM

## 2020-02-17 DIAGNOSIS — G47.00 INSOMNIA, UNSPECIFIED TYPE: ICD-10-CM

## 2020-02-17 DIAGNOSIS — F33.1 MAJOR DEPRESSIVE DISORDER, RECURRENT EPISODE, MODERATE (HCC): ICD-10-CM

## 2020-02-17 DIAGNOSIS — F10.20 UNCOMPLICATED ALCOHOL DEPENDENCE (HCC): Primary | ICD-10-CM

## 2020-02-17 PROCEDURE — 90853 GROUP PSYCHOTHERAPY: CPT | Performed by: SOCIAL WORKER

## 2020-02-18 NOTE — PROGRESS NOTES
Phase 2    330-430    GROUP: Group member was allowed to address any worries or concerns between our group sessions.  There are 4 group members today.  Group members were asked what are bad habits, some bad habits they have and how we can change bad habits into positivity for a positive change for their life creating a new healthy habit    RESPONSE: 1 group member discussed making excuses, another group member discussed working too much, another group member discussed eating too much chocolate and cookies and replacing his previous drug of choice which was alcohol to chocolate and sweets and another person discussed finding too much time on screening such as television, phones and tablets    CLINICAL MANEUVERING/INTERVENTIONS: Assisted member in processing above session content; acknowledged and normalized patient's thoughts, feelings and concerns. assisted patient in processing her thoughts and feelings of struggling with addiction, depression and anxiety..  Educated on the importance of completing a 12 step program and having a close relationship with her sponsor.  Patient denies SI, HI and self-harm.  Educated on balance and allow each member to discuss what they are working on balancing in their life to prevent relapse and to be able to focus on their sobriety. Educated on the importance of coming to group now twice a week Mondays and Wednesdays from 330-430.  Educated on relapse prevention.  Educated on the importance of working the steps and finding support with an AA or NA or celebrate recovery.  Encouraged 3 times a week AA or NA or celebrate recovery.  Educated on the importance of working a 12-step program along with a sponsor and at least 3 meetings per week.  Validated each group members thoughts and feelings about this group needs to them And why they are.  Educated each group members on the importance of self assessment, and the ability to identify unhealthy patterns and making goals to choose a  healthy pattern such as working on feelings and emotions rather than using food to cope with and use mindfulness skills/mindful eating.  Educated the group on having a no screen time in his own for a few hours a day just to be present with self and encouraged each group member to work on self-awareness between now and next session of being able to notice when they are using something to help them cope with feelings and emotions and try to replace that with a healthy coping skill    Allowed patient to freely discuss issues without interruption or judgment. Provided safe, confidential environment to facilitate the development of positive therapeutic relationship and encourage open, honest communication. Assisted patient in identifying risk factors which would indicate the need for higher level of care including thoughts to harm self or others and/or self-harming behavior and encouraged patient to contact this office, call 911, or present to the nearest emergency room should any of these events occur. Discussed crisis intervention services and means to access.  Patient adamantly and convincingly denies current suicidal or homicidal ideation or perceptual disturbance.        ASSESSMENT:  Engaged in activity/Process and self-disclosed: Yes or No  Affect ([place] all that apply) appropriate, blunted, flat, sad, angry, tearful, anxious, bright  Applies topic to self: Yes or No  Able to give and receive feedback: Yes or No  Degree of insightful thinking: Least 1  2  3  4  5  6  7 8  9  10  Most    Urinalysis:   No visits with results within 1 Day(s) from this visit.   Latest known visit with results is:   Lab on 02/03/2020   Component Date Value Ref Range Status   • THC, Screen, Urine 02/03/2020 Negative  Negative Final   • Phencyclidine (PCP), Urine 02/03/2020 Negative  Negative Final   • Cocaine Screen, Urine 02/03/2020 Negative  Negative Final   • Methamphetamine, Ur 02/03/2020 Negative  Negative Final   • Opiate Screen  02/03/2020 Negative  Negative Final   • Amphetamine Screen, Urine 02/03/2020 Negative  Negative Final   • Benzodiazepine Screen, Urine 02/03/2020 Negative  Negative Final   • Tricyclic Antidepressants Screen 02/03/2020 Negative  Negative Final   • Methadone Screen, Urine 02/03/2020 Negative  Negative Final   • Barbiturates Screen, Urine 02/03/2020 Negative  Negative Final   • Oxycodone Screen, Urine 02/03/2020 Negative  Negative Final   • Propoxyphene Screen 02/03/2020 Negative  Negative Final   • Buprenorphine, Screen, Urine 02/03/2020 Negative  Negative Final   • Ethanol, Urine 02/03/2020 Negative  Cutoff=0.020 % Final       12-Step attendance: Frequency/Sponsor?  Yes AA    Identification of environmental and personal barriers to recovery: New to recovery    Motivation for treatment: Shows motivation but also insecurities wanting to rely on others support    Mental Status Exam  Hygiene:  good  Dress:  casual  Attitude:  Cooperative  Motor Activity:  Restless  Speech:  Normal  Mood:  anxious  Affect:  anxious  Thought Processes:  Flight of ieas  Thought Content:  normal  Suicidal Thoughts:  denies  Homicidal Thoughts:  denies  Crisis Safety Plan: yes, to come to the emergency room.  Hallucinations:  denies    Assessment     Diagnoses and all orders for this visit:    Uncomplicated alcohol dependence (CMS/HCC)    Anxiety disorder, unspecified type    Insomnia, unspecified type    Major depressive disorder, recurrent episode, moderate (CMS/HCC)               PLAN:   Patient will continue in bi-weekly group psychotherapy sessions and individual outpatient psychotherapy sessions every 3-4 weeks and will continue in self-help meetings and seek additional treatment if necessary following completion.    Malou Oleary LCSW

## 2020-02-24 ENCOUNTER — OFFICE VISIT (OUTPATIENT)
Dept: PSYCHIATRY | Facility: CLINIC | Age: 55
End: 2020-02-24

## 2020-02-24 DIAGNOSIS — F33.1 MAJOR DEPRESSIVE DISORDER, RECURRENT EPISODE, MODERATE (HCC): ICD-10-CM

## 2020-02-24 DIAGNOSIS — G47.00 INSOMNIA, UNSPECIFIED TYPE: ICD-10-CM

## 2020-02-24 DIAGNOSIS — F41.9 ANXIETY DISORDER, UNSPECIFIED TYPE: ICD-10-CM

## 2020-02-24 DIAGNOSIS — F10.20 UNCOMPLICATED ALCOHOL DEPENDENCE (HCC): Primary | ICD-10-CM

## 2020-02-24 PROCEDURE — 90853 GROUP PSYCHOTHERAPY: CPT | Performed by: SOCIAL WORKER

## 2020-02-25 NOTE — PROGRESS NOTES
Phase 2    330-430    GROUP: Group member was allowed to address any worries or concerns between our group sessions.  There are 3 group members today.  Group members asked to discuss the importance of a sponsor and recovery community at his 1 group member does not want to go to meetings or have a sponsor.    RESPONSE: 1 group member discussed the lack of motivation and energy and commitment to having a sponsor and working the 12 steps and does not feel that is needed to keep her sober.  Another group member discussed he is working towards having a sponsor and hopes to have one in the next week or 2 as he is working with a few gentleman in the AA community but has not officially picked 1.  Another group member discussed she has had one for many years and feels that it is a strong support system for her.  Each group member discussed their belief systems about what helps them with their sobriety.    CLINICAL MANEUVERING/INTERVENTIONS: Assisted member in processing above session content; acknowledged and normalized patient's thoughts, feelings and concerns. assisted patient in processing her thoughts and feelings of struggling with addiction, depression and anxiety..  Educated on the importance of completing a 12 step program and having a close relationship with her sponsor.  Patient denies SI, HI and self-harm.  Educated on balance and allow each member to discuss what they are working on balancing in their life to prevent relapse and to be able to focus on their sobriety. Educated on the importance of coming to group now twice a week Mondays and Wednesdays from 330-430.  Educated on relapse prevention.  Educated on the importance of working the steps and finding support with an AA or NA or celebrate recovery.  Encouraged 3 times a week AA or NA or celebrate recovery.  Educated group members on the importance of having a sponsor and working a 12-step program.  Encourage group members to have a sponsor before graduating  the program.  The 1 group member that is resistant to getting a sponsor was given an assignment before her graduation to write about how she will stay in contact with a recovery community and what she will do to continue her journey past intensive outpatient program.  Educated each group member on the importance of having a support system and people to depend on that know what they were going through and can guide them when needed.  Validated each group member's thoughts and concerns related to their perception of their recovery community.    Allowed patient to freely discuss issues without interruption or judgment. Provided safe, confidential environment to facilitate the development of positive therapeutic relationship and encourage open, honest communication. Assisted patient in identifying risk factors which would indicate the need for higher level of care including thoughts to harm self or others and/or self-harming behavior and encouraged patient to contact this office, call 911, or present to the nearest emergency room should any of these events occur. Discussed crisis intervention services and means to access.  Patient adamantly and convincingly denies current suicidal or homicidal ideation or perceptual disturbance.        ASSESSMENT:  Engaged in activity/Process and self-disclosed: Yes or No  Affect ([place] all that apply) appropriate, blunted, flat, sad, angry, tearful, anxious, bright  Applies topic to self: Yes or No  Able to give and receive feedback: Yes or No  Degree of insightful thinking: Least 1  2  3  4  5  6  7 8  9  10  Most    Urinalysis:   No visits with results within 1 Day(s) from this visit.   Latest known visit with results is:   Lab on 02/03/2020   Component Date Value Ref Range Status   • THC, Screen, Urine 02/03/2020 Negative  Negative Final   • Phencyclidine (PCP), Urine 02/03/2020 Negative  Negative Final   • Cocaine Screen, Urine 02/03/2020 Negative  Negative Final   •  Methamphetamine, Ur 02/03/2020 Negative  Negative Final   • Opiate Screen 02/03/2020 Negative  Negative Final   • Amphetamine Screen, Urine 02/03/2020 Negative  Negative Final   • Benzodiazepine Screen, Urine 02/03/2020 Negative  Negative Final   • Tricyclic Antidepressants Screen 02/03/2020 Negative  Negative Final   • Methadone Screen, Urine 02/03/2020 Negative  Negative Final   • Barbiturates Screen, Urine 02/03/2020 Negative  Negative Final   • Oxycodone Screen, Urine 02/03/2020 Negative  Negative Final   • Propoxyphene Screen 02/03/2020 Negative  Negative Final   • Buprenorphine, Screen, Urine 02/03/2020 Negative  Negative Final   • Ethanol, Urine 02/03/2020 Negative  Cutoff=0.020 % Final       12-Step attendance: Frequency/Sponsor?  Yes AA    Identification of environmental and personal barriers to recovery: New to recovery    Motivation for treatment: Shows motivation but also insecurities wanting to rely on others support    Mental Status Exam  Hygiene:  good  Dress:  casual  Attitude:  Cooperative  Motor Activity:  Restless  Speech:  Normal  Mood:  anxious  Affect:  anxious  Thought Processes:  Flight of ieas  Thought Content:  normal  Suicidal Thoughts:  denies  Homicidal Thoughts:  denies  Crisis Safety Plan: yes, to come to the emergency room.  Hallucinations:  denies    Assessment     Diagnoses and all orders for this visit:    Uncomplicated alcohol dependence (CMS/HCC)    Anxiety disorder, unspecified type    Insomnia, unspecified type    Major depressive disorder, recurrent episode, moderate (CMS/HCC)               PLAN:   Patient will continue in bi-weekly group psychotherapy sessions and individual outpatient psychotherapy sessions every 3-4 weeks and will continue in self-help meetings and seek additional treatment if necessary following completion.    Malou Oleary LCSW

## 2020-02-26 ENCOUNTER — OFFICE VISIT (OUTPATIENT)
Dept: PSYCHIATRY | Facility: CLINIC | Age: 55
End: 2020-02-26

## 2020-02-26 DIAGNOSIS — F41.9 ANXIETY DISORDER, UNSPECIFIED TYPE: ICD-10-CM

## 2020-02-26 DIAGNOSIS — F10.20 UNCOMPLICATED ALCOHOL DEPENDENCE (HCC): Primary | ICD-10-CM

## 2020-02-26 DIAGNOSIS — F33.1 MAJOR DEPRESSIVE DISORDER, RECURRENT EPISODE, MODERATE (HCC): ICD-10-CM

## 2020-02-26 DIAGNOSIS — G47.00 INSOMNIA, UNSPECIFIED TYPE: ICD-10-CM

## 2020-02-26 PROCEDURE — 90853 GROUP PSYCHOTHERAPY: CPT | Performed by: SOCIAL WORKER

## 2020-03-02 ENCOUNTER — OFFICE VISIT (OUTPATIENT)
Dept: PSYCHIATRY | Facility: CLINIC | Age: 55
End: 2020-03-02

## 2020-03-02 DIAGNOSIS — F33.1 MAJOR DEPRESSIVE DISORDER, RECURRENT EPISODE, MODERATE (HCC): ICD-10-CM

## 2020-03-02 DIAGNOSIS — F41.9 ANXIETY DISORDER, UNSPECIFIED TYPE: ICD-10-CM

## 2020-03-02 DIAGNOSIS — G47.00 INSOMNIA, UNSPECIFIED TYPE: ICD-10-CM

## 2020-03-02 DIAGNOSIS — F10.20 UNCOMPLICATED ALCOHOL DEPENDENCE (HCC): Primary | ICD-10-CM

## 2020-03-02 PROCEDURE — 90853 GROUP PSYCHOTHERAPY: CPT | Performed by: SOCIAL WORKER

## 2020-03-02 NOTE — PROGRESS NOTES
Phase 2    345-430    GROUP: Group member was allowed to address any worries or concerns between our group sessions.  There are 3 group members today.  Group members were asked reviewed healthy coping skills and their plan after IOP    RESPONSE: 1 group member discussed she has a sponsor and the other group member discussed he is close to picking his sponsor as he is working with numerous men.  1 group member discussed her long-term relationship with her sponsor and the importance of having a sponsor.  1 group member discussed staying in AA and [facility] and having a sponsor discussed she is his goal.  Another group member found a sponsor in her neighborhood that she looks up to.  The third group member discussed staying [place], relationship with higher power and the importance of working a 12-step program    CLINICAL MANEUVERING/INTERVENTIONS: Assisted member in processing above session content; acknowledged and normalized patient's thoughts, feelings and concerns. assisted patient in processing her thoughts and feelings of struggling with addiction, depression and anxiety..  Educated on the importance of completing a 12 step program and having a close relationship with her sponsor.  Patient denies SI, HI and self-harm.  Educated on balance and allow each member to discuss what they are working on balancing in their life to prevent relapse and to be able to focus on their sobriety. Educated on the importance of coming to group now twice a week Mondays and Wednesdays from 330-430.  Educated on relapse prevention.  Educated on the importance of working the steps and finding support with an AA or NA or celebrate recovery.  Encouraged 3 times a week AA or NA or celebrate recovery.  Educated group members on the importance of having a sponsor and working a 12-step program.  Educated on healthy boundaries and healthy coping skills during this session encouraged each group member to work a 12-step program and have a  sponsor.  2 group members graduate next week    Allowed patient to freely discuss issues without interruption or judgment. Provided safe, confidential environment to facilitate the development of positive therapeutic relationship and encourage open, honest communication. Assisted patient in identifying risk factors which would indicate the need for higher level of care including thoughts to harm self or others and/or self-harming behavior and encouraged patient to contact this office, call 911, or present to the nearest emergency room should any of these events occur. Discussed crisis intervention services and means to access.  Patient adamantly and convincingly denies current suicidal or homicidal ideation or perceptual disturbance.        ASSESSMENT:  Engaged in activity/Process and self-disclosed: Yes or No  Affect ([place] all that apply) appropriate, blunted, flat, sad, angry, tearful, anxious, bright  Applies topic to self: Yes or No  Able to give and receive feedback: Yes or No  Degree of insightful thinking: Least 1  2  3  4  5  6  7 8  9  10  Most    Urinalysis:   No visits with results within 1 Day(s) from this visit.   Latest known visit with results is:   Lab on 02/03/2020   Component Date Value Ref Range Status   • THC, Screen, Urine 02/03/2020 Negative  Negative Final   • Phencyclidine (PCP), Urine 02/03/2020 Negative  Negative Final   • Cocaine Screen, Urine 02/03/2020 Negative  Negative Final   • Methamphetamine, Ur 02/03/2020 Negative  Negative Final   • Opiate Screen 02/03/2020 Negative  Negative Final   • Amphetamine Screen, Urine 02/03/2020 Negative  Negative Final   • Benzodiazepine Screen, Urine 02/03/2020 Negative  Negative Final   • Tricyclic Antidepressants Screen 02/03/2020 Negative  Negative Final   • Methadone Screen, Urine 02/03/2020 Negative  Negative Final   • Barbiturates Screen, Urine 02/03/2020 Negative  Negative Final   • Oxycodone Screen, Urine 02/03/2020 Negative  Negative Final    • Propoxyphene Screen 02/03/2020 Negative  Negative Final   • Buprenorphine, Screen, Urine 02/03/2020 Negative  Negative Final   • Ethanol, Urine 02/03/2020 Negative  Cutoff=0.020 % Final       12-Step attendance: Frequency/Sponsor?  Yes AA    Identification of environmental and personal barriers to recovery: New to recovery    Motivation for treatment: Shows motivation but also insecurities wanting to rely on others support    Mental Status Exam  Hygiene:  good  Dress:  casual  Attitude:  Cooperative  Motor Activity:  Restless  Speech:  Normal  Mood:  anxious  Affect:  anxious  Thought Processes:  Flight of ieas  Thought Content:  normal  Suicidal Thoughts:  denies  Homicidal Thoughts:  denies  Crisis Safety Plan: yes, to come to the emergency room.  Hallucinations:  denies    Assessment     Diagnoses and all orders for this visit:    Uncomplicated alcohol dependence (CMS/HCC)    Anxiety disorder, unspecified type    Insomnia, unspecified type    Major depressive disorder, recurrent episode, moderate (CMS/HCC)               PLAN:   Patient will continue in bi-weekly group psychotherapy sessions and individual outpatient psychotherapy sessions every 3-4 weeks and will continue in self-help meetings and seek additional treatment if necessary following completion.    Malou Oleary LCSW

## 2020-03-03 NOTE — PROGRESS NOTES
Phase 2    330-430    GROUP: Group member was allowed to address any worries or concerns between our group sessions.  There are 2 group members today.  Group members were asked what they have learned from IOP and wanted to take away from their experience in phase 1 and phase 2.  Asked each group member and they considering phase 3 of IOP or early getting her individual therapy at another location.  Both group members graduated during this group today.      RESPONSE: Each group member discussed their relapses and successes during group, journey to sobriety and their goals for the future during this graduation group session.  1 group member discussed he is got a sponsor and will be doing phase 3 in this office.  Another group member discussed she is going to consider doing phase 3.  Both discussed hardships, triggers and healthy coping skills that have been learned over the past few months    CLINICAL MANEUVERING/INTERVENTIONS: Assisted member in processing above session content; acknowledged and normalized patient's thoughts, feelings and concerns. assisted patient in processing her thoughts and feelings of struggling with addiction, depression and anxiety..  Educated on the importance of completing a 12 step program and having a close relationship with her sponsor.  Patient denies SI, HI and self-harm.  Educated on balance and allow each member to discuss what they are working on balancing in their life to prevent relapse and to be able to focus on their sobriety. Educated on the importance of coming to group now twice a week Mondays and Wednesdays from 330-430.  Educated on relapse prevention.  Educated on the importance of working the steps and finding support with an AA or NA or celebrate recovery.  Encouraged 3 times a week AA or NA or celebrate recovery.  Presented graduation certificates during this group today and wished to each group member well.  Encouraged doing individual therapy/phase 3 with this office  or another.  Encouraged each of them to work a 12-step program and have a sponsor.  Educated on the importance of reaching out for help if ever needed in the future.      Allowed patient to freely discuss issues without interruption or judgment. Provided safe, confidential environment to facilitate the development of positive therapeutic relationship and encourage open, honest communication. Assisted patient in identifying risk factors which would indicate the need for higher level of care including thoughts to harm self or others and/or self-harming behavior and encouraged patient to contact this office, call 911, or present to the nearest emergency room should any of these events occur. Discussed crisis intervention services and means to access.  Patient adamantly and convincingly denies current suicidal or homicidal ideation or perceptual disturbance.        ASSESSMENT:  Engaged in activity/Process and self-disclosed: Yes or No  Affect ([place] all that apply) appropriate, blunted, flat, sad, angry, tearful, anxious, bright  Applies topic to self: Yes or No  Able to give and receive feedback: Yes or No  Degree of insightful thinking: Least 1  2  3  4  5  6  7 8  9  10  Most    Urinalysis:   No visits with results within 1 Day(s) from this visit.   Latest known visit with results is:   Lab on 02/03/2020   Component Date Value Ref Range Status   • THC, Screen, Urine 02/03/2020 Negative  Negative Final   • Phencyclidine (PCP), Urine 02/03/2020 Negative  Negative Final   • Cocaine Screen, Urine 02/03/2020 Negative  Negative Final   • Methamphetamine, Ur 02/03/2020 Negative  Negative Final   • Opiate Screen 02/03/2020 Negative  Negative Final   • Amphetamine Screen, Urine 02/03/2020 Negative  Negative Final   • Benzodiazepine Screen, Urine 02/03/2020 Negative  Negative Final   • Tricyclic Antidepressants Screen 02/03/2020 Negative  Negative Final   • Methadone Screen, Urine 02/03/2020 Negative  Negative Final   •  Barbiturates Screen, Urine 02/03/2020 Negative  Negative Final   • Oxycodone Screen, Urine 02/03/2020 Negative  Negative Final   • Propoxyphene Screen 02/03/2020 Negative  Negative Final   • Buprenorphine, Screen, Urine 02/03/2020 Negative  Negative Final   • Ethanol, Urine 02/03/2020 Negative  Cutoff=0.020 % Final       12-Step attendance: Frequency/Sponsor?  Yes AA    Identification of environmental and personal barriers to recovery: New to recovery    Motivation for treatment: Shows motivation but also insecurities wanting to rely on others support    Mental Status Exam  Hygiene:  good  Dress:  casual  Attitude:  Cooperative  Motor Activity:  Restless  Speech:  Normal  Mood:  anxious  Affect:  anxious  Thought Processes:  Flight of ieas  Thought Content:  normal  Suicidal Thoughts:  denies  Homicidal Thoughts:  denies  Crisis Safety Plan: yes, to come to the emergency room.  Hallucinations:  denies    Assessment     Diagnoses and all orders for this visit:    Uncomplicated alcohol dependence (CMS/HCC)    Anxiety disorder, unspecified type    Insomnia, unspecified type    Major depressive disorder, recurrent episode, moderate (CMS/HCC)               PLAN:   Patient graduated from phase 2 of intensive outpatient program and referring him for phase 3/individual psychotherapy    Malou Oleary LCSW

## 2020-05-14 ENCOUNTER — OFFICE VISIT (OUTPATIENT)
Dept: PSYCHIATRY | Facility: CLINIC | Age: 55
End: 2020-05-14

## 2020-05-14 VITALS — HEIGHT: 72 IN | WEIGHT: 177 LBS | BODY MASS INDEX: 23.98 KG/M2

## 2020-05-14 DIAGNOSIS — G47.00 INSOMNIA, UNSPECIFIED TYPE: ICD-10-CM

## 2020-05-14 DIAGNOSIS — F41.9 ANXIETY DISORDER, UNSPECIFIED TYPE: Primary | ICD-10-CM

## 2020-05-14 PROCEDURE — 99213 OFFICE O/P EST LOW 20 MIN: CPT | Performed by: NURSE PRACTITIONER

## 2020-05-14 RX ORDER — HYDROXYZINE HYDROCHLORIDE 25 MG/1
25 TABLET, FILM COATED ORAL EVERY 8 HOURS PRN
Qty: 60 TABLET | Refills: 2 | Status: SHIPPED | OUTPATIENT
Start: 2020-05-14 | End: 2020-10-05 | Stop reason: SDUPTHER

## 2020-05-14 RX ORDER — TRAZODONE HYDROCHLORIDE 50 MG/1
50 TABLET ORAL NIGHTLY PRN
Qty: 30 TABLET | Refills: 2 | Status: SHIPPED | OUTPATIENT
Start: 2020-05-14 | End: 2021-04-28

## 2020-05-14 NOTE — PROGRESS NOTES
"Yousuf Barrera is a 54 y.o. male is here today for medication management follow-up from [facility].    Chief Complaint:      ICD-10-CM ICD-9-CM   1. Anxiety disorder, unspecified type F41.9 300.00   2. Insomnia, unspecified type G47.00 780.52       History of Present Illness:  Pt presents for follow up visit and medication management of anxiety symptoms. Pt is no longer taking Acamprosate and is successfully maintaing his sobriety. Pt reports that he continues to have anxiety symptoms but not daily and states that Hyroxyzine has been helpful. Pt is continuing to work at [employer] during COVID.     Pt reports the presence/absence of the following anxiety symptoms: (-) excessive worry, (-) excessive fear, (-) difficulty relaxing, (-) restlessness, (-) insomnia, (-) easily fatigued, (-) irritability, (-) poor concentration, (-) racing thoughts, and (-) panic episodes.     The following portions of the patient's history were reviewed and updated as appropriate: allergies, current medications, past family history, past medical history, past social history, past surgical history and problem list.    Review of Systems;;  Review of Systems   Constitutional: Negative.  Negative for activity change, appetite change, unexpected weight gain and unexpected weight loss.   Respiratory: Negative.    Cardiovascular: Negative.  Negative for chest pain.   Gastrointestinal: Negative.  Negative for diarrhea, nausea and vomiting.   Genitourinary: Negative.    Musculoskeletal: Negative.    Skin: Negative for rash and bruise.   Neurological: Negative.  Negative for dizziness, seizures and speech difficulty.   Psychiatric/Behavioral: Negative.  Negative for agitation, behavioral problems, decreased concentration, dysphoric mood, hallucinations, self-injury, sleep disturbance, suicidal ideas, negative for hyperactivity, depressed mood and stress. The patient is not nervous/anxious.        Physical Exam;;  Physical Exam  Height 182.9 cm (72\"), weight " 80.3 kg (177 lb).    Current Medications;;    Current Outpatient Medications:   •  hydrOXYzine (ATARAX) 25 MG tablet, Take 1 tablet by mouth Every 8 (Eight) Hours As Needed for Anxiety. Indications: Feeling Anxious, Disp: 60 tablet, Rfl: 2  •  losartan (COZAAR) 50 MG tablet, Take 50 mg by mouth Daily., Disp: , Rfl:   •  traZODone (DESYREL) 50 MG tablet, Take 1 tablet by mouth At Night As Needed for Sleep. Indications: Trouble Sleeping, Disp: 30 tablet, Rfl: 2    Lab Results:       Mental Status Exam:   Hygiene:   good  Cooperation:  Cooperative  Eye Contact:  Good  Psychomotor Behavior:  Appropriate  Mood:euthymic  Affect:  Appropriate  Hopelessness: Denies  Speech:  Normal  Thought Process:  Goal directed  Thought Content:  Normal  Suicidal:  None  Homicidal:  None  Hallucinations:  None  Delusion:  None  Memory:  Intact  Orientation:  Person, Place, Time and Situation  Reliability:  good  Insight:  Good  Judgement:  Good  Impulse Control:  Fair  Physical/Medical Issues:  No       Assessment Plan;;  Diagnoses and all orders for this visit:    Anxiety disorder, unspecified type  -     hydrOXYzine (ATARAX) 25 MG tablet; Take 1 tablet by mouth Every 8 (Eight) Hours As Needed for Anxiety. Indications: Feeling Anxious    Insomnia, unspecified type  -     traZODone (DESYREL) 50 MG tablet; Take 1 tablet by mouth At Night As Needed for Sleep. Indications: Trouble Sleeping      Treatment Plan        Problem: Anxiety /Insomnia      Intervention: The prescription and adjustment of medications and monitoring of side effects. Continue current dose of Hydroxyzine prn and Trazodone.      Long term Goal: Overall improvement in mood and functioning per patient self -report      Short term Goal: Medication adherence. Improvement in symptoms per patient self-report.       A psychological evaluation was conducted in order to assess past and current level of functioning. Areas assessed included, but were not limited to: perception of  social support, perception of ability to face and deal with challenges in life (positive functioning), anxiety symptoms, depressive symptoms, perspective on beliefs/belief system, coping skills for stress, intelligence level,  Therapeutic rapport was established. Interventions conducted today were geared towards incorporating medication management along with support for continued therapy. Education was also provided as to the med management with this provider and what to expect in subsequent sessions.    We discussed risks, benefits,goals and side effects of the above medication and the patient was agreeable with the plan.Patient was educated on the importance of compliance with treatment and follow-up appointments. Patient is aware to contact the [facility] with any worsening of symptoms. To call for questions or concerns and return early if necessary. Patent is agreeable to go to the Emergency Department or call 911 should they begin SI/HI.     Return in about 3 months (around 8/14/2020) for Follow Up.    Rosanne John, JUAN, APRN, PMHNP-BC, FNP-C

## 2020-07-22 ENCOUNTER — TELEMEDICINE (OUTPATIENT)
Dept: PSYCHIATRY | Facility: CLINIC | Age: 55
End: 2020-07-22

## 2020-07-22 DIAGNOSIS — G47.00 INSOMNIA, UNSPECIFIED TYPE: ICD-10-CM

## 2020-07-22 DIAGNOSIS — F10.21 ALCOHOL DEPENDENCE IN REMISSION (HCC): ICD-10-CM

## 2020-07-22 DIAGNOSIS — F41.9 ANXIETY DISORDER, UNSPECIFIED TYPE: ICD-10-CM

## 2020-07-22 DIAGNOSIS — F33.1 MAJOR DEPRESSIVE DISORDER, RECURRENT EPISODE, MODERATE (HCC): Primary | ICD-10-CM

## 2020-07-22 PROCEDURE — 90834 PSYTX W PT 45 MINUTES: CPT | Performed by: SOCIAL WORKER

## 2020-07-27 NOTE — PROGRESS NOTES
Date of Service: July 22, 2020  Time In: 430  Time Out: 521      PROGRESS NOTE  Data:  Yousuf Barrera is a 55 y.o. male who met with the undersigned for a regularly scheduled individual outpatient therapy session at [facility]. ..This visit has been rescheduled as a phone visit to comply with patient safety concerns in accordance with [facility] recommendations. Total time of discussion was 51 minutes.  Patient discussed having difficulty and not a good signal for the Internet at work to do therapy.  Patient discussed many months of not seeing this therapist, doing well denies any relapses and him and his wife have been doing marriage therapy.  Patient discussed his sponsor will be moving and so he will be needing to find another sponsor.  Patient discussed he has plenty of time as he would not be moving to the next year.  Patient discussed medication regimen and the marriage therapist is recommending working on his anxiety coping skills.  Patient was supposed to be seen for individual therapy with this therapist after completing phase 2 of IOP but did not follow through with canceling appointments and reports it was work and family schedule conflicts.  Patient discussed taking his hydroxyzine when feeling anxious and that helps.  Patient discussed he does not have the needed coping skills to manage his anxiety on his own at this time and feels that he has been working more in his recovery and family dynamics and marital distress but knows that he needs to make himself a priority.  Patient rates anxiety at a 6 on a 1-10 scale with 10 being the worst currently.    HPI: Anxiety, addiction/recovery, marital distress and family dynamics stress.      Clinical Maneuvering/Intervention:  Assisted patient in processing above session content; acknowledged and normalized patient’s thoughts, feelings, and concerns by utilizing CBT/REBT, Exploration of Emotions, Grounding, Interactive Feedback, Person Centered,  Positive Coping Skills, Preventative Services, Relaxation/Deep Breathing, Review of Treatment Plan, Risk Assessment and Thought Stopping    Allowed patient to freely discuss issues without interruption or judgment. Provided safe, confidential environment to facilitate the development of positive therapeutic relationship and encourage open, honest communication. Assisted patient in identifying risk factors which would indicate the need for higher level of care including thoughts to harm self or others and/or self-harming behavior and encouraged patient to contact this office, call 911, or present to the nearest emergency room should any of these events occur. Discussed crisis intervention services and means to access.  Patient adamantly and convincingly denies current suicidal or homicidal ideation or perceptual disturbance.        Assessment     Diagnoses and all orders for this visit:    Major depressive disorder, recurrent episode, moderate (CMS/[facility])    Insomnia, unspecified type    Anxiety disorder, unspecified type    Alcohol dependence in remission (CMS/[facility])               REVIEW OF SYSTEMS:  Review of Systems       Mental Status Exam:   Appearance: Well groomed  Build: Average  Hygiene:   good  Cooperation:  Cooperative  Eye Contact:  Good  Psychomotor Behavior:  Appropriate  Affect:  Appropriate  Mood: normal  Hopelessness: 2  Speech:  Normal  Thought Process:  Goal directed  Thought Content:  Normal  Suicidal:  None  Homicidal:  None  Hallucinations:  None  Delusion:  None  Memory:  Intact  Orientation:  Person, Place, Time and Situation  Reliability:  good  Insight:  Good  Judgement:  Good  Impulse Control:  Good  Behavior: No behavior issues        Patient's Support Network Includes:  wife, daughter and extended family    Progress toward goal: Not at goal    Functional Status: Moderate impairment     Prognosis: Fair with Ongoing Treatment         Plan     Plan:   Patient will continue in individual  outpatient therapy session at [facility] Patient will continue seeing psychiatric nurse practitioner and adhere to medication regimen as prescribed and report any side effects. Patient will contact this office, call 911 or present to the nearest emergency room should suicidal or homicidal ideations occur. Provide Cognitive Behavioral Therapy and Integrative Therapy to improve functioning, maintain stability, and avoid decompensation and the need for higher level of care.              Return in about 2 weeks (around 8/5/2020), or if symptoms worsen or fail to improve, for 1 to 2 weeks and as needed.         This document has been electronically signed by Malou Oleary LCSW,  July 27, 2020 09:57

## 2020-10-05 ENCOUNTER — TELEMEDICINE (OUTPATIENT)
Dept: PSYCHIATRY | Facility: CLINIC | Age: 55
End: 2020-10-05

## 2020-10-05 ENCOUNTER — TELEPHONE (OUTPATIENT)
Dept: PSYCHIATRY | Facility: CLINIC | Age: 55
End: 2020-10-05

## 2020-10-05 DIAGNOSIS — G47.00 INSOMNIA, UNSPECIFIED TYPE: ICD-10-CM

## 2020-10-05 DIAGNOSIS — F41.9 ANXIETY DISORDER, UNSPECIFIED TYPE: ICD-10-CM

## 2020-10-05 DIAGNOSIS — F10.21 ALCOHOL DEPENDENCE IN REMISSION (HCC): ICD-10-CM

## 2020-10-05 DIAGNOSIS — F33.1 MAJOR DEPRESSIVE DISORDER, RECURRENT EPISODE, MODERATE (HCC): Primary | ICD-10-CM

## 2020-10-05 PROCEDURE — 90834 PSYTX W PT 45 MINUTES: CPT | Performed by: SOCIAL WORKER

## 2020-10-05 RX ORDER — HYDROXYZINE 50 MG/1
50 TABLET, FILM COATED ORAL EVERY 8 HOURS PRN
Qty: 90 TABLET | Refills: 1 | Status: SHIPPED | OUTPATIENT
Start: 2020-10-05 | End: 2020-12-08

## 2020-10-05 NOTE — TELEPHONE ENCOUNTER
PT WAS IN A VISIT WITH THERAPIST AND ASKED IF HE COULD HAVE A INCREASE OF HYDROXYZINE FROM 25 MG TO 50 MG. PLEASE ADVISE. PT HAS A CONFLICT OF INTEREST AND DOESN'T WANT TO SEE YO. WILL SCHEDULE WITH NEW PROVIDER ONCE SCHEDULE IS BUILT.

## 2020-10-07 NOTE — PROGRESS NOTES
Date of Service: October 5,, 2020  Time In: 1030  Time Out: 1121      PROGRESS NOTE  Data:  Yousuf Barrera is a 55 y.o. male who met with the undersigned for a regularly scheduled individual outpatient therapy session at [facility]..This was an audio and video enabled telemedicine encounter.  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  Patient engaged in long discussion of going to marriage therapy but feeling that it is not going as well as expected.  Patient discussed his wife is still very resentful of him hiding his alcoholism/addiction.  Patient discussed he does not want to see the nurse practitioner in his office that he was scheduled with as he has a conflict of interest with her.  Patient would like to see the male psychiatric nurse practitioner that will be coming to [facility] within a few weeks.  Called MA and that she left a message for Dr. Joseph related to patient's hydroxyzine and his request to increase that due to increased anxiety related to work and marriage therapy.  Patient discussed having mild depression and moderate anxiety when taking medication but without it is severe.  Patient discussed working on communication with his daughter currently.  Patient discussed going to in person meetings and feels that he is connected with his recovery community.  Patient discussed increased anxiety and feeling overwhelmed    HPI: Anxiety, addiction/recovery, marital distress and family dynamics stress.      Clinical Maneuvering/Intervention:  Assisted patient in processing above session content; acknowledged and normalized patient’s thoughts, feelings, and concerns by utilizing CBT/REBT, Exploration of Emotions, Grounding, Interactive Feedback, Person Centered, Positive Coping Skills, Preventative Services, Relaxation/Deep Breathing, Review of Treatment Plan, Risk Assessment and Thought Stopping educated on relaxation techniques.   Educated on self-care and the importance of a self-care routine.  Briefly educated on cognitive behavior therapy and the importance of reframing thinking and working on mindfulness to stay in the here and now.  Gave patient 1 assignment to work towards decreasing anxiety between now and next session.  Patient denies SI, HI and self-harm.  Patient has a safety plan and is agreeable to it.  We will see patient back in 2 weeks and as needed    Allowed patient to freely discuss issues without interruption or judgment. Provided safe, confidential environment to facilitate the development of positive therapeutic relationship and encourage open, honest communication. Assisted patient in identifying risk factors which would indicate the need for higher level of care including thoughts to harm self or others and/or self-harming behavior and encouraged patient to contact this office, call 911, or present to the nearest emergency room should any of these events occur. Discussed crisis intervention services and means to access.  Patient adamantly and convincingly denies current suicidal or homicidal ideation or perceptual disturbance.        Assessment     Diagnoses and all orders for this visit:    Major depressive disorder, recurrent episode, moderate (CMS/HCC)    Insomnia, unspecified type    Anxiety disorder, unspecified type    Alcohol dependence in remission (CMS/HCC)               REVIEW OF SYSTEMS:  Review of Systems       Mental Status Exam:   Appearance: Well groomed  Build: Average  Hygiene:   good  Cooperation:  Cooperative  Eye Contact:  Good  Psychomotor Behavior:  Appropriate  Affect:  Appropriate  Mood: normal  Hopelessness: 2  Speech:  Normal  Thought Process:  Goal directed  Thought Content:  Normal  Suicidal:  None  Homicidal:  None  Hallucinations:  None  Delusion:  None  Memory:  Intact  Orientation:  Person, Place, Time and Situation  Reliability:  good  Insight:  Good  Judgement:  Good  Impulse Control:   Good  Behavior: No behavior issues        Patient's Support Network Includes:  wife, daughter and extended family    Progress toward goal: Not at goal    Functional Status: Moderate impairment     Prognosis: Fair with Ongoing Treatment         Plan     Plan:   Patient will continue in individual outpatient therapy session at [facility] Patient will continue seeing psychiatric nurse practitioner and adhere to medication regimen as prescribed and report any side effects. Patient will contact this office, call 911 or present to the nearest emergency room should suicidal or homicidal ideations occur. Provide Cognitive Behavioral Therapy and Integrative Therapy to improve functioning, maintain stability, and avoid decompensation and the need for higher level of care.              Return in about 2 weeks (around 10/19/2020), or if symptoms worsen or fail to improve.         This document has been electronically signed by Malou Oleary LCSW,  October 7, 2020 09:16 EDT

## 2020-10-28 ENCOUNTER — TELEMEDICINE (OUTPATIENT)
Dept: PSYCHIATRY | Facility: CLINIC | Age: 55
End: 2020-10-28

## 2020-10-28 DIAGNOSIS — F41.9 ANXIETY DISORDER, UNSPECIFIED TYPE: ICD-10-CM

## 2020-10-28 DIAGNOSIS — F33.1 MAJOR DEPRESSIVE DISORDER, RECURRENT EPISODE, MODERATE (HCC): Primary | ICD-10-CM

## 2020-10-28 DIAGNOSIS — G47.00 INSOMNIA, UNSPECIFIED TYPE: ICD-10-CM

## 2020-10-28 PROCEDURE — 90832 PSYTX W PT 30 MINUTES: CPT | Performed by: SOCIAL WORKER

## 2020-11-04 ENCOUNTER — TELEMEDICINE (OUTPATIENT)
Dept: PSYCHIATRY | Facility: CLINIC | Age: 55
End: 2020-11-04

## 2020-11-04 DIAGNOSIS — G47.00 INSOMNIA, UNSPECIFIED TYPE: ICD-10-CM

## 2020-11-04 DIAGNOSIS — F41.9 ANXIETY DISORDER, UNSPECIFIED TYPE: ICD-10-CM

## 2020-11-04 DIAGNOSIS — F33.1 MAJOR DEPRESSIVE DISORDER, RECURRENT EPISODE, MODERATE (HCC): Primary | ICD-10-CM

## 2020-11-04 DIAGNOSIS — F10.21 ALCOHOL DEPENDENCE IN REMISSION (HCC): ICD-10-CM

## 2020-11-04 PROCEDURE — 90832 PSYTX W PT 30 MINUTES: CPT | Performed by: SOCIAL WORKER

## 2020-11-04 NOTE — PROGRESS NOTES
Date of Service: October 28,, 2020  Time In: 130  Time Out: 205      PROGRESS NOTE  Data:  Yousuf Barrera is a 55 y.o. male who met with the undersigned for a regularly scheduled individual outpatient therapy session at [facility]..This was an audio and video enabled telemedicine encounter.      Patient started therapy session by discussing his work schedule, his anxiety and medication regimen and his relationship/marriage. patient discussed his recovery, meetings and support. Patient discussed wanting to stay at work more to decrease stress at home in his marriage. Patient discussed anxiety has been more manageable with the increase in his medication. Patient discussed lacking balance. Patient discussed working long hours and trying to get in 2-3 meetings a week. Patient denies depression. Patient denies relapse events.     HPI: Anxiety, addiction/recovery, marital distress and family dynamics stress.      Clinical Maneuvering/Intervention:  Assisted patient in processing above session content; acknowledged and normalized patient’s thoughts, feelings, and concerns by utilizing CBT/REBT, Exploration of Emotions, Grounding, Interactive Feedback, Person Centered, Positive Coping Skills, Preventative Services, Relaxation/Deep Breathing, Review of Treatment Plan, Risk Assessment and Thought Stopping educated on relaxation techniques.  Educated on self-care and the importance of a self-care routine.  Briefly educated on cognitive behavior therapy and the importance of reframing thinking and working on mindfulness to stay in the here and now.  Patient to continue working on putting thoughts on trial and noticing cognitive distortions between now and next session. Patient denies SI, HI and self-harm.  Patient has a safety plan and is agreeable to it.  We will see patient back in 2 weeks and as needed    Allowed patient to freely discuss issues without interruption or judgment. Provided safe, confidential  environment to facilitate the development of positive therapeutic relationship and encourage open, honest communication. Assisted patient in identifying risk factors which would indicate the need for higher level of care including thoughts to harm self or others and/or self-harming behavior and encouraged patient to contact this office, call 911, or present to the nearest emergency room should any of these events occur. Discussed crisis intervention services and means to access.  Patient adamantly and convincingly denies current suicidal or homicidal ideation or perceptual disturbance.        Assessment     Diagnoses and all orders for this visit:    1. Major depressive disorder, recurrent episode, moderate (CMS/HCC) (Primary)    2. Anxiety disorder, unspecified type    3. Insomnia, unspecified type               REVIEW OF SYSTEMS:  Review of Systems       Mental Status Exam:   Appearance: Well groomed  Build: Average  Hygiene:   good  Cooperation:  Cooperative  Eye Contact:  Good  Psychomotor Behavior:  Appropriate  Affect:  Appropriate  Mood: normal  Hopelessness: 2  Speech:  Normal  Thought Process:  Goal directed  Thought Content:  Normal  Suicidal:  None  Homicidal:  None  Hallucinations:  None  Delusion:  None  Memory:  Intact  Orientation:  Person, Place, Time and Situation  Reliability:  good  Insight:  Good  Judgement:  Good  Impulse Control:  Good  Behavior: No behavior issues        Patient's Support Network Includes:  wife, daughter and extended family    Progress toward goal: Not at goal    Functional Status: Moderate impairment     Prognosis: Fair with Ongoing Treatment         Plan     Plan:   Patient will continue in individual outpatient therapy session at [facility] Patient will continue seeing psychiatric nurse practitioner and adhere to medication regimen as prescribed and report any side effects. Patient will contact this office, call 911 or present to the  nearest emergency room should suicidal or homicidal ideations occur. Provide Cognitive Behavioral Therapy and Integrative Therapy to improve functioning, maintain stability, and avoid decompensation and the need for higher level of care.              Return in about 2 weeks (around 11/11/2020), or if symptoms worsen or fail to improve, for 2-4 weeks and prn.         This document has been electronically signed by Malou Oleary LCSW,  November 4, 2020 10:04 EST
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
## 2020-12-08 DIAGNOSIS — F41.9 ANXIETY DISORDER, UNSPECIFIED TYPE: ICD-10-CM

## 2020-12-08 RX ORDER — HYDROXYZINE 50 MG/1
TABLET, FILM COATED ORAL
Qty: 90 TABLET | Refills: 0 | Status: SHIPPED | OUTPATIENT
Start: 2020-12-08 | End: 2021-01-04

## 2021-01-02 DIAGNOSIS — F41.9 ANXIETY DISORDER, UNSPECIFIED TYPE: ICD-10-CM

## 2021-01-04 RX ORDER — HYDROXYZINE 50 MG/1
TABLET, FILM COATED ORAL
Qty: 90 TABLET | Refills: 0 | Status: ON HOLD | OUTPATIENT
Start: 2021-01-04 | End: 2021-04-28

## 2021-01-13 ENCOUNTER — TELEMEDICINE (OUTPATIENT)
Dept: PSYCHIATRY | Facility: CLINIC | Age: 56
End: 2021-01-13

## 2021-01-13 DIAGNOSIS — F41.9 ANXIETY DISORDER, UNSPECIFIED TYPE: ICD-10-CM

## 2021-01-13 DIAGNOSIS — F10.21 ALCOHOL DEPENDENCE IN REMISSION (HCC): ICD-10-CM

## 2021-01-13 DIAGNOSIS — F33.1 MAJOR DEPRESSIVE DISORDER, RECURRENT EPISODE, MODERATE (HCC): Primary | ICD-10-CM

## 2021-01-13 DIAGNOSIS — G47.00 INSOMNIA, UNSPECIFIED TYPE: ICD-10-CM

## 2021-03-17 ENCOUNTER — TELEMEDICINE (OUTPATIENT)
Dept: PSYCHIATRY | Facility: CLINIC | Age: 56
End: 2021-03-17

## 2021-03-17 DIAGNOSIS — F33.1 MAJOR DEPRESSIVE DISORDER, RECURRENT EPISODE, MODERATE (HCC): Primary | ICD-10-CM

## 2021-03-17 DIAGNOSIS — F10.21 ALCOHOL DEPENDENCE IN REMISSION (HCC): ICD-10-CM

## 2021-03-17 DIAGNOSIS — F41.9 ANXIETY DISORDER, UNSPECIFIED TYPE: ICD-10-CM

## 2021-03-17 DIAGNOSIS — G47.00 INSOMNIA, UNSPECIFIED TYPE: ICD-10-CM

## 2021-03-17 PROCEDURE — 90837 PSYTX W PT 60 MINUTES: CPT | Performed by: SOCIAL WORKER

## 2021-03-17 NOTE — PROGRESS NOTES
Date of Service: 3/17/2021  Time In: 1030  Time Out: 1130      PROGRESS NOTE  Data:  Yousuf Barrera is a 55 y.o. male who met with the undersigned for a regularly scheduled individual outpatient therapy session at [facility]..This was an audio and video enabled telemedicine encounter.      Patient started therapy session by discussing he and his wife is made some decisions about their marriage and is planning to get a divorce.  Patient discussed they haven't told their her daughter yet.  Patient discussed that his wife has been talking to someone  Patient discussed trying to save money.  Patient engaged in long discussion about the discussions about parenting time and he decided he just needs a break and explained he only wants his daughter once a week for about 5 hours and no overnight.  Patient discussed his wife is okay with this and agreeable.  Patient discussed having a few bad days about a week and a half ago but has started to work on accepting that they are getting a divorce.  Patient discussed he has been looking on phone bills and she is talking to a male friend.  Patient discussed that he was able to get into her while she is not looking to see what kind of messages they are saying and patient explains it is harmless.      HPI: Anxiety, addiction/recovery, marital distress and family dynamics stress.      Clinical Maneuvering/Intervention:  Assisted patient in processing above session content; acknowledged and normalized patient’s thoughts, feelings, and concerns by utilizing CBT/REBT, Exploration of Emotions, Grounding, Interactive Feedback, Person Centered, Positive Coping Skills, Preventative Services, Relaxation/Deep Breathing, Review of Treatment Plan, Risk Assessment and Thought Stopping educated on relaxation techniques.  Educated on self-care and the importance of a self-care routine.  Briefly educated on cognitive behavior therapy and the importance of reframing thinking and  working on mindfulness to stay in the here and now.  Patient to continue working on putting thoughts on trial and noticing cognitive distortions between now and next session. Patient denies SI, HI and self-harm.  Patient has a safety plan and is agreeable to it.  We will see patient back in 2 weeks and as needed.  Educated on healthy boundaries.  Educated on coparenting briefly and the importance of checking in with daughter and wife to make sure we are assessing emotional needs.  Patient is to work on communication, boundary setting and recheck ends with his daughter and wife as they work towards the divorce to try to prevent resentment, bitterness and spitefulness    Allowed patient to freely discuss issues without interruption or judgment. Provided safe, confidential environment to facilitate the development of positive therapeutic relationship and encourage open, honest communication. Assisted patient in identifying risk factors which would indicate the need for higher level of care including thoughts to harm self or others and/or self-harming behavior and encouraged patient to contact this office, call 911, or present to the nearest emergency room should any of these events occur. Discussed crisis intervention services and means to access.  Patient adamantly and convincingly denies current suicidal or homicidal ideation or perceptual disturbance.        Assessment     Diagnoses and all orders for this visit:    1. Major depressive disorder, recurrent episode, moderate (CMS/HCC) (Primary)    2. Insomnia, unspecified type    3. Alcohol dependence in remission (CMS/HCC)    4. Anxiety disorder, unspecified type               REVIEW OF SYSTEMS:  Review of Systems       Mental Status Exam:   Appearance: Well groomed  Build: Average  Hygiene:   good  Cooperation:  Cooperative  Eye Contact:  Good  Psychomotor Behavior:  Appropriate  Affect:  Appropriate  Mood: normal  Hopelessness: 2  Speech:  Normal  Thought Process:   Goal directed  Thought Content:  Normal  Suicidal:  None  Homicidal:  None  Hallucinations:  None  Delusion:  None  Memory:  Intact  Orientation:  Person, Place, Time and Situation  Reliability:  good  Insight:  Good  Judgement:  Good  Impulse Control:  Good  Behavior: No behavior issues        Patient's Support Network Includes:  wife, daughter and extended family    Progress toward goal: Not at goal    Functional Status: Moderate impairment     Prognosis: Fair with Ongoing Treatment         Plan     Plan:   Patient will continue in individual outpatient therapy session at [facility] Patient will continue seeing psychiatric nurse practitioner and adhere to medication regimen as prescribed and report any side effects. Patient will contact this office, call 911 or present to the nearest emergency room should suicidal or homicidal ideations occur. Provide Cognitive Behavioral Therapy and Integrative Therapy to improve functioning, maintain stability, and avoid decompensation and the need for higher level of care.              Return in about 2 weeks (around 3/31/2021).         This document has been electronically signed by Malou Oleary LCSW,  March 17, 2021 12:58 EDT

## 2021-04-26 ENCOUNTER — TELEMEDICINE (OUTPATIENT)
Dept: PSYCHIATRY | Facility: CLINIC | Age: 56
End: 2021-04-26

## 2021-04-26 DIAGNOSIS — F41.1 GENERALIZED ANXIETY DISORDER: ICD-10-CM

## 2021-04-26 DIAGNOSIS — F33.1 MAJOR DEPRESSIVE DISORDER, RECURRENT EPISODE, MODERATE (HCC): Primary | ICD-10-CM

## 2021-04-26 DIAGNOSIS — G47.00 INSOMNIA, UNSPECIFIED TYPE: ICD-10-CM

## 2021-04-26 DIAGNOSIS — F10.21 ALCOHOL DEPENDENCE IN REMISSION (HCC): ICD-10-CM

## 2021-04-26 PROCEDURE — 90839 PSYTX CRISIS INITIAL 60 MIN: CPT | Performed by: SOCIAL WORKER

## 2021-04-26 NOTE — PROGRESS NOTES
Date of Service: 4/26/2021  Time In: 1015  Time Out: 1043      Crisis visit    PROGRESS NOTE  Data:  Yousuf Barrera is a 55 y.o. male .This was an audio and video enabled telemedicine encounter.      Patient started therapy session by discussing he is having a hard time today.  Patient discussed that he has been out of the home for 2 weeks, lonely and recently found out his wife is going to see a gentleman over the weekend soon and is having a hard time wrapping around his head and knowing that the divorce is actually taking place, he wanted it but had not thought about the fact that she may move on so quickly.  Patient admits they both have been unhappy for quite some time.  Patient denies relapse but has thought about it.  Patient is meeting his sponsor for breakfast or punch and going to a meeting today.  Patient denies SI, HI and self-harm but admits to having intrusive negative thinking.    HPI: Anxiety, addiction/recovery, marital distress and family dynamics stress.      Clinical Maneuvering/Intervention:  Assisted patient in processing above session content; acknowledged and normalized patient’s thoughts, feelings, and concerns by utilizing CBT/REBT, Exploration of Emotions, Grounding, Interactive Feedback, Person Centered, Positive Coping Skills, Preventative Services, Relaxation/Deep Breathing, Review of Treatment Plan, Risk Assessment and Thought Stopping educated on relaxation techniques.  Educated on self-care and the importance of a self-care routine.  Educated patient on relapse prevention and made today relapse prevention plan during the session.  Educated patient on the importance of his community and he will increase his meetings and staying close contact with his sponsor as he has done so far.  Educated patient as of crisis visits as needed and will like to see him back in 1 to 2 weeks and as needed.  Encourage patient to patient denies SI, HI and self-harm.  Patient has a safety plan and is  agreeable to it.      Allowed patient to freely discuss issues without interruption or judgment. Provided safe, confidential environment to facilitate the development of positive therapeutic relationship and encourage open, honest communication. Assisted patient in identifying risk factors which would indicate the need for higher level of care including thoughts to harm self or others and/or self-harming behavior and encouraged patient to contact this office, call 911, or present to the nearest emergency room should any of these events occur. Discussed crisis intervention services and means to access.  Patient adamantly and convincingly denies current suicidal or homicidal ideation or perceptual disturbance.        Assessment     Diagnoses and all orders for this visit:    1. Major depressive disorder, recurrent episode, moderate (CMS/HCC) (Primary)    2. Insomnia, unspecified type    3. Alcohol dependence in remission (CMS/HCC)    4. Generalized anxiety disorder               REVIEW OF SYSTEMS:  Review of Systems       Mental Status Exam:   Appearance: Well groomed  Build: Average  Hygiene:   good  Cooperation:  Cooperative  Eye Contact:  Good  Psychomotor Behavior:  Appropriate  Affect:  Appropriate  Mood: normal  Hopelessness: 2  Speech:  Normal  Thought Process:  Goal directed  Thought Content:  Normal  Suicidal:  None  Homicidal:  None  Hallucinations:  None  Delusion:  None  Memory:  Intact  Orientation:  Person, Place, Time and Situation  Reliability:  good  Insight:  Good  Judgement:  Good  Impulse Control:  Good  Behavior: No behavior issues        Patient's Support Network Includes:  wife, daughter and extended family    Progress toward goal: Not at goal    Functional Status: Moderate impairment     Prognosis: Fair with Ongoing Treatment         Plan     Plan:   Patient will continue in individual outpatient therapy session at [facility] Patient will continue seeing  psychiatric nurse practitioner and adhere to medication regimen as prescribed and report any side effects. Patient will contact this office, call 911 or present to the nearest emergency room should suicidal or homicidal ideations occur. Provide Cognitive Behavioral Therapy and Integrative Therapy to improve functioning, maintain stability, and avoid decompensation and the need for higher level of care.              Return in about 1 week (around 5/3/2021) for 1 to 2 weeks and as needed.         This document has been electronically signed by Malou Oleary LCSW,  April 26, 2021 12:55 EDT

## 2021-04-28 ENCOUNTER — HOSPITAL ENCOUNTER (INPATIENT)
Facility: HOSPITAL | Age: 56
LOS: 5 days | Discharge: HOME | End: 2021-05-03
Attending: PSYCHIATRY & NEUROLOGY | Admitting: PSYCHIATRY & NEUROLOGY

## 2021-04-28 ENCOUNTER — HOSPITAL ENCOUNTER (EMERGENCY)
Facility: HOSPITAL | Age: 56
Discharge: PSYCHIATRIC OR SUBSTANCE USE FACILITY | End: 2021-04-28
Attending: EMERGENCY MEDICINE | Admitting: EMERGENCY MEDICINE

## 2021-04-28 ENCOUNTER — OFFICE VISIT (OUTPATIENT)
Dept: PSYCHIATRY | Facility: CLINIC | Age: 56
End: 2021-04-28

## 2021-04-28 VITALS
TEMPERATURE: 98 F | BODY MASS INDEX: 23.57 KG/M2 | WEIGHT: 174 LBS | SYSTOLIC BLOOD PRESSURE: 144 MMHG | RESPIRATION RATE: 18 BRPM | HEART RATE: 86 BPM | DIASTOLIC BLOOD PRESSURE: 90 MMHG | HEIGHT: 72 IN

## 2021-04-28 VITALS
WEIGHT: 175.2 LBS | SYSTOLIC BLOOD PRESSURE: 146 MMHG | HEIGHT: 72 IN | OXYGEN SATURATION: 95 % | TEMPERATURE: 98.9 F | HEART RATE: 88 BPM | BODY MASS INDEX: 23.73 KG/M2 | DIASTOLIC BLOOD PRESSURE: 100 MMHG | RESPIRATION RATE: 16 BRPM

## 2021-04-28 DIAGNOSIS — R45.851 SUICIDAL IDEATIONS: Primary | ICD-10-CM

## 2021-04-28 DIAGNOSIS — F33.2 MAJOR DEPRESSIVE DISORDER, RECURRENT, SEVERE WITHOUT PSYCHOTIC FEATURES (HCC): Primary | Chronic | ICD-10-CM

## 2021-04-28 DIAGNOSIS — F10.21 ALCOHOL DEPENDENCE IN REMISSION (HCC): Chronic | ICD-10-CM

## 2021-04-28 DIAGNOSIS — F41.1 GENERALIZED ANXIETY DISORDER: Chronic | ICD-10-CM

## 2021-04-28 LAB
ALBUMIN SERPL-MCNC: 4.9 G/DL (ref 3.5–5.2)
ALBUMIN/GLOB SERPL: 2.1 G/DL
ALP SERPL-CCNC: 43 U/L (ref 39–117)
ALT SERPL W P-5'-P-CCNC: 22 U/L (ref 1–41)
AMPHET+METHAMPHET UR QL: NEGATIVE
AMPHETAMINES UR QL: NEGATIVE
ANION GAP SERPL CALCULATED.3IONS-SCNC: 10.6 MMOL/L (ref 5–15)
APAP SERPL-MCNC: <5 MCG/ML (ref 0–30)
AST SERPL-CCNC: 27 U/L (ref 1–40)
BARBITURATES UR QL SCN: NEGATIVE
BASOPHILS # BLD AUTO: 0.05 10*3/MM3 (ref 0–0.2)
BASOPHILS NFR BLD AUTO: 0.9 % (ref 0–1.5)
BENZODIAZ UR QL SCN: NEGATIVE
BILIRUB SERPL-MCNC: 0.6 MG/DL (ref 0–1.2)
BUN SERPL-MCNC: 5 MG/DL (ref 6–20)
BUN/CREAT SERPL: 5.6 (ref 7–25)
BUPRENORPHINE SERPL-MCNC: NEGATIVE NG/ML
CALCIUM SPEC-SCNC: 9.7 MG/DL (ref 8.6–10.5)
CANNABINOIDS SERPL QL: NEGATIVE
CHLORIDE SERPL-SCNC: 100 MMOL/L (ref 98–107)
CO2 SERPL-SCNC: 27.4 MMOL/L (ref 22–29)
COCAINE UR QL: NEGATIVE
CREAT SERPL-MCNC: 0.9 MG/DL (ref 0.76–1.27)
DEPRECATED RDW RBC AUTO: 38.9 FL (ref 37–54)
EOSINOPHIL # BLD AUTO: 0.15 10*3/MM3 (ref 0–0.4)
EOSINOPHIL NFR BLD AUTO: 2.6 % (ref 0.3–6.2)
ERYTHROCYTE [DISTWIDTH] IN BLOOD BY AUTOMATED COUNT: 12 % (ref 12.3–15.4)
ETHANOL BLD-MCNC: 54 MG/DL (ref 0–10)
ETHANOL UR QL: 0.05 %
FLUAV RNA RESP QL NAA+PROBE: NOT DETECTED
FLUBV RNA RESP QL NAA+PROBE: NOT DETECTED
GFR SERPL CREATININE-BSD FRML MDRD: 88 ML/MIN/1.73
GLOBULIN UR ELPH-MCNC: 2.3 GM/DL
GLUCOSE SERPL-MCNC: 94 MG/DL (ref 65–99)
HCT VFR BLD AUTO: 51.9 % (ref 37.5–51)
HGB BLD-MCNC: 18.3 G/DL (ref 13–17.7)
HOLD SPECIMEN: NORMAL
IMM GRANULOCYTES # BLD AUTO: 0.01 10*3/MM3 (ref 0–0.05)
IMM GRANULOCYTES NFR BLD AUTO: 0.2 % (ref 0–0.5)
LYMPHOCYTES # BLD AUTO: 1.49 10*3/MM3 (ref 0.7–3.1)
LYMPHOCYTES NFR BLD AUTO: 26 % (ref 19.6–45.3)
MAGNESIUM SERPL-MCNC: 2.1 MG/DL (ref 1.6–2.6)
MCH RBC QN AUTO: 31 PG (ref 26.6–33)
MCHC RBC AUTO-ENTMCNC: 35.3 G/DL (ref 31.5–35.7)
MCV RBC AUTO: 88 FL (ref 79–97)
METHADONE UR QL SCN: NEGATIVE
MONOCYTES # BLD AUTO: 0.65 10*3/MM3 (ref 0.1–0.9)
MONOCYTES NFR BLD AUTO: 11.3 % (ref 5–12)
NEUTROPHILS NFR BLD AUTO: 3.38 10*3/MM3 (ref 1.7–7)
NEUTROPHILS NFR BLD AUTO: 59 % (ref 42.7–76)
NRBC BLD AUTO-RTO: 0 /100 WBC (ref 0–0.2)
OPIATES UR QL: NEGATIVE
OXYCODONE UR QL SCN: NEGATIVE
PCP UR QL SCN: NEGATIVE
PLATELET # BLD AUTO: 202 10*3/MM3 (ref 140–450)
PMV BLD AUTO: 8.7 FL (ref 6–12)
POTASSIUM SERPL-SCNC: 3.9 MMOL/L (ref 3.5–5.2)
PROPOXYPH UR QL: NEGATIVE
PROT SERPL-MCNC: 7.2 G/DL (ref 6–8.5)
RBC # BLD AUTO: 5.9 10*6/MM3 (ref 4.14–5.8)
SALICYLATES SERPL-MCNC: 1.8 MG/DL
SARS-COV-2 RNA PNL SPEC NAA+PROBE: NOT DETECTED
SARS-COV-2 RNA RESP QL NAA+PROBE: NOT DETECTED
SODIUM SERPL-SCNC: 138 MMOL/L (ref 136–145)
TRICYCLICS UR QL SCN: NEGATIVE
WBC # BLD AUTO: 5.73 10*3/MM3 (ref 3.4–10.8)
WHOLE BLOOD HOLD SPECIMEN: NORMAL
WHOLE BLOOD HOLD SPECIMEN: NORMAL

## 2021-04-28 PROCEDURE — 80179 DRUG ASSAY SALICYLATE: CPT | Performed by: PHYSICIAN ASSISTANT

## 2021-04-28 PROCEDURE — 82077 ASSAY SPEC XCP UR&BREATH IA: CPT | Performed by: PHYSICIAN ASSISTANT

## 2021-04-28 PROCEDURE — 36415 COLL VENOUS BLD VENIPUNCTURE: CPT

## 2021-04-28 PROCEDURE — 85025 COMPLETE CBC W/AUTO DIFF WBC: CPT | Performed by: PHYSICIAN ASSISTANT

## 2021-04-28 PROCEDURE — 87635 SARS-COV-2 COVID-19 AMP PRB: CPT | Performed by: PHYSICIAN ASSISTANT

## 2021-04-28 PROCEDURE — 87636 SARSCOV2 & INF A&B AMP PRB: CPT | Performed by: PSYCHIATRY & NEUROLOGY

## 2021-04-28 PROCEDURE — 80306 DRUG TEST PRSMV INSTRMNT: CPT | Performed by: PHYSICIAN ASSISTANT

## 2021-04-28 PROCEDURE — 83735 ASSAY OF MAGNESIUM: CPT | Performed by: PHYSICIAN ASSISTANT

## 2021-04-28 PROCEDURE — 80053 COMPREHEN METABOLIC PANEL: CPT | Performed by: PHYSICIAN ASSISTANT

## 2021-04-28 PROCEDURE — 80143 DRUG ASSAY ACETAMINOPHEN: CPT | Performed by: PHYSICIAN ASSISTANT

## 2021-04-28 PROCEDURE — 99285 EMERGENCY DEPT VISIT HI MDM: CPT

## 2021-04-28 PROCEDURE — 93005 ELECTROCARDIOGRAM TRACING: CPT | Performed by: PHYSICIAN ASSISTANT

## 2021-04-28 PROCEDURE — C9803 HOPD COVID-19 SPEC COLLECT: HCPCS

## 2021-04-28 PROCEDURE — 99215 OFFICE O/P EST HI 40 MIN: CPT | Performed by: NURSE PRACTITIONER

## 2021-04-28 RX ORDER — TRAZODONE HYDROCHLORIDE 50 MG/1
50 TABLET ORAL NIGHTLY PRN
Status: DISCONTINUED | OUTPATIENT
Start: 2021-04-28 | End: 2021-05-03 | Stop reason: HOSPADM

## 2021-04-28 RX ORDER — NICOTINE 21 MG/24HR
1 PATCH, TRANSDERMAL 24 HOURS TRANSDERMAL
Status: DISCONTINUED | OUTPATIENT
Start: 2021-04-28 | End: 2021-04-28 | Stop reason: SDUPTHER

## 2021-04-28 RX ORDER — HYDROXYZINE 50 MG/1
50 TABLET, FILM COATED ORAL EVERY 6 HOURS PRN
Status: DISCONTINUED | OUTPATIENT
Start: 2021-04-28 | End: 2021-05-03 | Stop reason: HOSPADM

## 2021-04-28 RX ORDER — ECHINACEA PURPUREA EXTRACT 125 MG
2 TABLET ORAL AS NEEDED
Status: DISCONTINUED | OUTPATIENT
Start: 2021-04-28 | End: 2021-05-03 | Stop reason: HOSPADM

## 2021-04-28 RX ORDER — MELATONIN
1000 2 TIMES DAILY
COMMUNITY

## 2021-04-28 RX ORDER — FAMOTIDINE 20 MG/1
20 TABLET, FILM COATED ORAL 2 TIMES DAILY PRN
Status: DISCONTINUED | OUTPATIENT
Start: 2021-04-28 | End: 2021-05-03 | Stop reason: HOSPADM

## 2021-04-28 RX ORDER — BENZTROPINE MESYLATE 1 MG/ML
1 INJECTION INTRAMUSCULAR; INTRAVENOUS ONCE AS NEEDED
Status: DISCONTINUED | OUTPATIENT
Start: 2021-04-28 | End: 2021-05-03 | Stop reason: HOSPADM

## 2021-04-28 RX ORDER — LOPERAMIDE HYDROCHLORIDE 2 MG/1
2 CAPSULE ORAL
Status: DISCONTINUED | OUTPATIENT
Start: 2021-04-28 | End: 2021-05-03 | Stop reason: HOSPADM

## 2021-04-28 RX ORDER — HYDROXYZINE HYDROCHLORIDE 25 MG/1
25 TABLET, FILM COATED ORAL 3 TIMES DAILY PRN
COMMUNITY
End: 2021-06-04 | Stop reason: ALTCHOICE

## 2021-04-28 RX ORDER — ASPIRIN 81 MG/1
40.5 TABLET, CHEWABLE ORAL NIGHTLY
Status: CANCELLED | OUTPATIENT
Start: 2021-04-28

## 2021-04-28 RX ORDER — CLONIDINE HYDROCHLORIDE 0.1 MG/1
0.1 TABLET ORAL ONCE
Status: COMPLETED | OUTPATIENT
Start: 2021-04-28 | End: 2021-04-28

## 2021-04-28 RX ORDER — BENZONATATE 100 MG/1
100 CAPSULE ORAL 3 TIMES DAILY PRN
Status: DISCONTINUED | OUTPATIENT
Start: 2021-04-28 | End: 2021-05-03 | Stop reason: HOSPADM

## 2021-04-28 RX ORDER — LANOLIN ALCOHOL/MO/W.PET/CERES
1000 CREAM (GRAM) TOPICAL DAILY
Status: CANCELLED | OUTPATIENT
Start: 2021-04-29

## 2021-04-28 RX ORDER — ACETAMINOPHEN 325 MG/1
650 TABLET ORAL EVERY 6 HOURS PRN
Status: DISCONTINUED | OUTPATIENT
Start: 2021-04-28 | End: 2021-05-03 | Stop reason: HOSPADM

## 2021-04-28 RX ORDER — ONDANSETRON 4 MG/1
4 TABLET, FILM COATED ORAL EVERY 6 HOURS PRN
Status: DISCONTINUED | OUTPATIENT
Start: 2021-04-28 | End: 2021-05-03 | Stop reason: HOSPADM

## 2021-04-28 RX ORDER — NICOTINE 21 MG/24HR
1 PATCH, TRANSDERMAL 24 HOURS TRANSDERMAL
Status: DISCONTINUED | OUTPATIENT
Start: 2021-04-28 | End: 2021-05-03 | Stop reason: HOSPADM

## 2021-04-28 RX ORDER — ASPIRIN 81 MG/1
40.5 TABLET ORAL NIGHTLY
Status: ON HOLD | COMMUNITY
End: 2021-04-29

## 2021-04-28 RX ORDER — IBUPROFEN 400 MG/1
400 TABLET ORAL EVERY 6 HOURS PRN
Status: DISCONTINUED | OUTPATIENT
Start: 2021-04-28 | End: 2021-05-03 | Stop reason: HOSPADM

## 2021-04-28 RX ORDER — LEVOCETIRIZINE DIHYDROCHLORIDE 5 MG/1
5 TABLET, FILM COATED ORAL NIGHTLY
COMMUNITY

## 2021-04-28 RX ORDER — HYDRALAZINE HYDROCHLORIDE 10 MG/1
10 TABLET, FILM COATED ORAL ONCE
Status: COMPLETED | OUTPATIENT
Start: 2021-04-28 | End: 2021-04-28

## 2021-04-28 RX ORDER — LOSARTAN POTASSIUM 50 MG/1
50 TABLET ORAL DAILY
Status: CANCELLED | OUTPATIENT
Start: 2021-04-29

## 2021-04-28 RX ORDER — LANOLIN ALCOHOL/MO/W.PET/CERES
1000 CREAM (GRAM) TOPICAL DAILY
COMMUNITY

## 2021-04-28 RX ORDER — ASCORBIC ACID 500 MG
500 TABLET ORAL DAILY
COMMUNITY
End: 2021-05-04

## 2021-04-28 RX ORDER — HYDROXYZINE PAMOATE 50 MG/1
50 CAPSULE ORAL ONCE
Status: COMPLETED | OUTPATIENT
Start: 2021-04-28 | End: 2021-04-28

## 2021-04-28 RX ORDER — MELATONIN
1000 2 TIMES DAILY
Status: CANCELLED | OUTPATIENT
Start: 2021-04-28

## 2021-04-28 RX ORDER — ASCORBIC ACID 500 MG
500 TABLET ORAL DAILY
Status: CANCELLED | OUTPATIENT
Start: 2021-04-29

## 2021-04-28 RX ORDER — ALUMINA, MAGNESIA, AND SIMETHICONE 2400; 2400; 240 MG/30ML; MG/30ML; MG/30ML
15 SUSPENSION ORAL EVERY 6 HOURS PRN
Status: DISCONTINUED | OUTPATIENT
Start: 2021-04-28 | End: 2021-05-03 | Stop reason: HOSPADM

## 2021-04-28 RX ORDER — CETIRIZINE HYDROCHLORIDE 10 MG/1
10 TABLET ORAL DAILY
Status: CANCELLED | OUTPATIENT
Start: 2021-04-29

## 2021-04-28 RX ORDER — HYDROXYZINE HYDROCHLORIDE 25 MG/1
25 TABLET, FILM COATED ORAL 3 TIMES DAILY PRN
Status: CANCELLED | OUTPATIENT
Start: 2021-04-28

## 2021-04-28 RX ORDER — BENZTROPINE MESYLATE 1 MG/1
2 TABLET ORAL ONCE AS NEEDED
Status: DISCONTINUED | OUTPATIENT
Start: 2021-04-28 | End: 2021-05-03 | Stop reason: HOSPADM

## 2021-04-28 RX ADMIN — HYDROXYZINE PAMOATE 50 MG: 50 CAPSULE ORAL at 17:14

## 2021-04-28 RX ADMIN — Medication 1 PATCH: at 17:14

## 2021-04-28 RX ADMIN — HYDRALAZINE HYDROCHLORIDE 10 MG: 10 TABLET, FILM COATED ORAL at 15:18

## 2021-04-28 RX ADMIN — CLONIDINE HYDROCHLORIDE 0.1 MG: 0.1 TABLET ORAL at 14:20

## 2021-04-28 NOTE — PROGRESS NOTES
"Chief Complaint  Depression    Subjective          Yousuf Barrera presents to [facility] for evaluation of medication management of his depression.    History of Present Illness: Patient presents today after referral from therapist Malou Oleary LCSW.  Patient presents today and reports he has been going through a divorce.  He says that he started talking about the divorce in February 2021, and he officially left the house 2 weeks ago.  He reports his wife wanted him to stay until they sold her house, but lives separate while in the house.  \"I felt that would be too awkward, so I left\".  Patient reports he has been  for 20 years, and has 1 daughter who is 15 years old.  When asked how his daughter is handling things he says \"she is pretty damn strong\".  He also reports his daughter was not taken by surprise, and knew that there were issues.  Patient reports problems started approximately 7 to 8 years ago \"when I started down the road towards having an affair.  I never had it, but there is been a lot of issues since.  We never worked on it\".  Patient reports they did two marriage therapy sessions, but nothing after that.  He reports his daughter struggled with mood and anxiety around that time, and at one point ran away.  After returning she did an inpatient stay at [facility].  He reports they focused heavily on her, and put themselves \"on the back burner\".  Because of this there has been \"a lot of resentment towards me from her.  And I get that\".  Patient reports he did do a 1 week stay at [facility] secondary to EtOH in the past.  He has been sober for the last 13 months now and does AA.  He endorses he has thought about drinking over the last 2 weeks, and says \"it is really hard not to right now\".  He reports his wife did not know he was drinking and that he hit and lied.  He says this in turn caused more resentment towards him.  Throughout the interview, the patient is " "extremely tearful, and at times crying almost inconsolably.  Patient expresses deep sadness, and admits to thinking of harming himself on a daily basis.  When asked, patient admits today he does not feel he is safe, says he is a danger to himself, and confirms suicidal ideation.    Current Medications:   Current Outpatient Medications   Medication Sig Dispense Refill   • hydrOXYzine (ATARAX) 50 MG tablet TAKE 1 TABLET BY MOUTH EVERY EIGHT HOURS AS NEEDED FOR ANXIETY 90 tablet 0   • losartan (COZAAR) 50 MG tablet Take 50 mg by mouth Daily.       No current facility-administered medications for this visit.       Mental Status Exam:   Hygiene:   good  Cooperation:  Cooperative  Eye Contact:  Downcast  Psychomotor Behavior:  Slow  Affect:  Tearful  Mood: depressed  Speech:  Monotone  Thought Process:  Goal directed  Thought Content:  Mood congruent  Suicidal:  Suicidal Ideation  Homicidal:  None  Hallucinations:  None  Delusion:  None  Memory:  Intact  Orientation:  Person, Place, Time and Situation  Reliability:  good  Insight:  Good  Judgement:  Fair  Impulse Control:  Fair   Physical/Medical Issues:  Yes HTN       Objective   Vital Signs:   /90 (BP Location: Left arm)   Pulse 86   Temp 98 °F (36.7 °C) (Infrared)   Resp 18   Ht 182.9 cm (72\")   Wt 78.9 kg (174 lb)   BMI 23.60 kg/m²     Physical Exam  Neurological:      Mental Status: He is oriented to person, place, and time. Mental status is at baseline.      Coordination: Coordination is intact.      Gait: Gait is intact.   Psychiatric:         Behavior: Behavior is slowed and withdrawn. Behavior is cooperative.         Thought Content: Thought content includes suicidal ideation. Thought content does not include homicidal ideation. Thought content does not include homicidal plan.         Cognition and Memory: Cognition and memory normal.         Judgment: Judgment is impulsive.        Result Review :     The following data was reviewed by: Nicolas FLOREZ" LynosMELVIN on 04/28/2021:    Data reviewed: Therapy notes, medication history          Assessment and Plan    Problem List Items Addressed This Visit        Mental Health    Alcohol dependence (CMS/HCC)      Other Visit Diagnoses     Major depressive disorder, recurrent, severe without psychotic features (CMS/HCC)  (Chronic)   -  Primary    Generalized anxiety disorder  (Chronic)             PHQ-9 Score:   PHQ-9 Total Score: 20    Depression Screening:  Patient screened positive for depression based on a PHQ-9 score of 20 on 4/28/2021. Follow-up recommendations include: Suicide Risk Assessment performed.      Tobacco Cessation:  Yousuf Barrera  reports that he has never smoked. His smokeless tobacco use includes snuff.. I have educated him on the risk of diseases from using tobacco products such as cancer, COPD and heart disease.     I advised him to quit and he is not willing to quit.    I spent 3  minutes counseling the patient.      Impression/Plan:  -This my initial interaction with the patient.  Patient was referred for possible medication therapy for his depression.  Patient currently only takes Atarax 25 mg 3 times daily as needed.  During this initial interaction, the patient reported he did not feel safe, and believed he was a danger to himself, and confirmed suicidal ideation.  Because of this, I walked with the patient to the [facility] ED, where he self registered for psychiatric evaluation for possible inpatient treatment.  -No follow-up appointment scheduled for this time, will have patient return as needed, or a follow-up appointment will be established after he completes an inpatient stay, if that is determined to be needed.    MEDS ORDERED DURING VISIT:  No orders of the defined types were placed in this encounter.      I spent 45 minutes caring for Yousuf on this date of service. This time includes time spent by me in the following activities:preparing for the visit, obtaining and/or reviewing a separately  obtained history, performing a medically appropriate examination and/or evaluation , counseling and educating the patient/family/caregiver, referring and communicating with other health care professionals , documenting information in the medical record and care coordination  Follow Up   Return if symptoms worsen or fail to improve.  Patient was given instructions and counseling regarding his condition or for health maintenance advice. Please see specific information pulled into the AVS if appropriate.       TREATMENT PLAN/GOALS: Continue supportive psychotherapy efforts and medications as indicated. Treatment and medication options discussed during today's visit. Patient acknowledged and verbally consented to continue with current treatment plan and was educated on the importance of compliance with treatment and follow-up appointments.    MEDICATION ISSUES:  Discussed medication options and treatment plan of prescribed medication as well as the risks, benefits, and side effects including potential falls, possible impaired driving and metabolic adversities among others. Patient is agreeable to call the office with any worsening of symptoms or onset of side effects. Patient is agreeable to call 911 or go to the nearest ER should he/she begin having SI/HI.          This document has been electronically signed by MELVIN Montoya, PMHNP-BC  April 28, 2021 11:06 EDT      Part of this note may be an electronic transcription/translation of spoken language to printed text using the Dragon Dictation System.

## 2021-04-29 PROBLEM — F41.1 GAD (GENERALIZED ANXIETY DISORDER): Status: ACTIVE | Noted: 2021-04-29

## 2021-04-29 PROBLEM — F10.20 ALCOHOL USE DISORDER, SEVERE, DEPENDENCE (HCC): Status: ACTIVE | Noted: 2019-09-09

## 2021-04-29 PROCEDURE — 99223 1ST HOSP IP/OBS HIGH 75: CPT | Performed by: PSYCHIATRY & NEUROLOGY

## 2021-04-29 RX ORDER — ESCITALOPRAM OXALATE 10 MG/1
10 TABLET ORAL DAILY
Status: DISCONTINUED | OUTPATIENT
Start: 2021-04-29 | End: 2021-05-03 | Stop reason: HOSPADM

## 2021-04-29 RX ORDER — LOSARTAN POTASSIUM 50 MG/1
50 TABLET ORAL DAILY
Status: DISCONTINUED | OUTPATIENT
Start: 2021-04-29 | End: 2021-05-03 | Stop reason: HOSPADM

## 2021-04-29 RX ORDER — LANOLIN ALCOHOL/MO/W.PET/CERES
1000 CREAM (GRAM) TOPICAL DAILY
Status: DISCONTINUED | OUTPATIENT
Start: 2021-04-29 | End: 2021-05-03 | Stop reason: HOSPADM

## 2021-04-29 RX ORDER — ASPIRIN 81 MG/1
40.5 TABLET, CHEWABLE ORAL NIGHTLY
Status: DISCONTINUED | OUTPATIENT
Start: 2021-04-29 | End: 2021-05-03 | Stop reason: HOSPADM

## 2021-04-29 RX ORDER — ASCORBIC ACID 500 MG
500 TABLET ORAL DAILY
Status: DISCONTINUED | OUTPATIENT
Start: 2021-04-29 | End: 2021-05-03 | Stop reason: HOSPADM

## 2021-04-29 RX ORDER — CETIRIZINE HYDROCHLORIDE 10 MG/1
10 TABLET ORAL NIGHTLY
Status: DISCONTINUED | OUTPATIENT
Start: 2021-04-29 | End: 2021-05-03 | Stop reason: HOSPADM

## 2021-04-29 RX ORDER — ASPIRIN 81 MG/1
40.5 TABLET, CHEWABLE ORAL NIGHTLY
COMMUNITY
End: 2021-06-04

## 2021-04-29 RX ORDER — OMEGA-3S/DHA/EPA/FISH OIL/D3 300MG-1000
1000 CAPSULE ORAL 2 TIMES DAILY
Status: DISCONTINUED | OUTPATIENT
Start: 2021-04-29 | End: 2021-05-03 | Stop reason: HOSPADM

## 2021-04-29 RX ORDER — CLONIDINE HYDROCHLORIDE 0.1 MG/1
0.1 TABLET ORAL ONCE
Status: COMPLETED | OUTPATIENT
Start: 2021-04-29 | End: 2021-04-29

## 2021-04-29 RX ADMIN — CHOLECALCIFEROL TAB 10 MCG (400 UNIT) 1000 UNITS: 10 TAB at 10:23

## 2021-04-29 RX ADMIN — NICOTINE TRANSDERMAL SYSTEM 1 PATCH: 21 PATCH, EXTENDED RELEASE TRANSDERMAL at 08:33

## 2021-04-29 RX ADMIN — HYDROXYZINE HYDROCHLORIDE 50 MG: 50 TABLET ORAL at 14:48

## 2021-04-29 RX ADMIN — TRAZODONE HYDROCHLORIDE 50 MG: 50 TABLET ORAL at 20:19

## 2021-04-29 RX ADMIN — HYDROXYZINE HYDROCHLORIDE 50 MG: 50 TABLET ORAL at 20:19

## 2021-04-29 RX ADMIN — Medication 1000 MCG: at 10:23

## 2021-04-29 RX ADMIN — CLONIDINE HYDROCHLORIDE 0.1 MG: 0.1 TABLET ORAL at 21:40

## 2021-04-29 RX ADMIN — ASPIRIN 40.5 MG: 81 TABLET, CHEWABLE ORAL at 20:19

## 2021-04-29 RX ADMIN — OXYCODONE HYDROCHLORIDE AND ACETAMINOPHEN 500 MG: 500 TABLET ORAL at 10:23

## 2021-04-29 RX ADMIN — CHOLECALCIFEROL TAB 10 MCG (400 UNIT) 1000 UNITS: 10 TAB at 20:19

## 2021-04-29 RX ADMIN — CETIRIZINE HYDROCHLORIDE 10 MG: 10 TABLET, FILM COATED ORAL at 20:19

## 2021-04-29 RX ADMIN — LOSARTAN POTASSIUM 50 MG: 50 TABLET, FILM COATED ORAL at 10:23

## 2021-04-29 RX ADMIN — ESCITALOPRAM 10 MG: 10 TABLET, FILM COATED ORAL at 15:17

## 2021-04-30 PROCEDURE — 99232 SBSQ HOSP IP/OBS MODERATE 35: CPT | Performed by: PSYCHIATRY & NEUROLOGY

## 2021-04-30 RX ADMIN — TRAZODONE HYDROCHLORIDE 50 MG: 50 TABLET ORAL at 20:17

## 2021-04-30 RX ADMIN — LOSARTAN POTASSIUM 50 MG: 50 TABLET, FILM COATED ORAL at 08:19

## 2021-04-30 RX ADMIN — HYDROXYZINE HYDROCHLORIDE 50 MG: 50 TABLET ORAL at 20:17

## 2021-04-30 RX ADMIN — NICOTINE TRANSDERMAL SYSTEM 1 PATCH: 21 PATCH, EXTENDED RELEASE TRANSDERMAL at 08:18

## 2021-04-30 RX ADMIN — OXYCODONE HYDROCHLORIDE AND ACETAMINOPHEN 500 MG: 500 TABLET ORAL at 08:19

## 2021-04-30 RX ADMIN — CHOLECALCIFEROL TAB 10 MCG (400 UNIT) 1000 UNITS: 10 TAB at 20:17

## 2021-04-30 RX ADMIN — CETIRIZINE HYDROCHLORIDE 10 MG: 10 TABLET, FILM COATED ORAL at 20:17

## 2021-04-30 RX ADMIN — ASPIRIN 40.5 MG: 81 TABLET, CHEWABLE ORAL at 20:17

## 2021-04-30 RX ADMIN — ESCITALOPRAM 10 MG: 10 TABLET, FILM COATED ORAL at 08:19

## 2021-04-30 RX ADMIN — Medication 1000 MCG: at 08:19

## 2021-04-30 RX ADMIN — CHOLECALCIFEROL TAB 10 MCG (400 UNIT) 1000 UNITS: 10 TAB at 08:19

## 2021-05-01 PROCEDURE — 99232 SBSQ HOSP IP/OBS MODERATE 35: CPT | Performed by: PSYCHIATRY & NEUROLOGY

## 2021-05-01 RX ADMIN — ASPIRIN 40.5 MG: 81 TABLET, CHEWABLE ORAL at 20:15

## 2021-05-01 RX ADMIN — CETIRIZINE HYDROCHLORIDE 10 MG: 10 TABLET, FILM COATED ORAL at 20:15

## 2021-05-01 RX ADMIN — CHOLECALCIFEROL TAB 10 MCG (400 UNIT) 1000 UNITS: 10 TAB at 09:18

## 2021-05-01 RX ADMIN — HYDROXYZINE HYDROCHLORIDE 50 MG: 50 TABLET ORAL at 20:15

## 2021-05-01 RX ADMIN — LOSARTAN POTASSIUM 50 MG: 50 TABLET, FILM COATED ORAL at 09:18

## 2021-05-01 RX ADMIN — NICOTINE TRANSDERMAL SYSTEM 1 PATCH: 21 PATCH, EXTENDED RELEASE TRANSDERMAL at 09:19

## 2021-05-01 RX ADMIN — IBUPROFEN 400 MG: 400 TABLET, FILM COATED ORAL at 20:15

## 2021-05-01 RX ADMIN — ESCITALOPRAM 10 MG: 10 TABLET, FILM COATED ORAL at 09:18

## 2021-05-01 RX ADMIN — Medication 1000 MCG: at 09:18

## 2021-05-01 RX ADMIN — CHOLECALCIFEROL TAB 10 MCG (400 UNIT) 1000 UNITS: 10 TAB at 20:15

## 2021-05-01 RX ADMIN — OXYCODONE HYDROCHLORIDE AND ACETAMINOPHEN 500 MG: 500 TABLET ORAL at 09:18

## 2021-05-01 RX ADMIN — TRAZODONE HYDROCHLORIDE 50 MG: 50 TABLET ORAL at 20:15

## 2021-05-01 NOTE — NURSING NOTE
Patient observed tearful after talking with family on the phone and this writer talked with patient and offered support and patient requested to talk with therapist lidiaied Alyssia therapist covering for this weekend.

## 2021-05-01 NOTE — PROGRESS NOTES
"INPATIENT PSYCHIATRIC PROGRESS NOTE    Name:  Yousuf Barrera  :  1965  MRN:  [id number]  Visit Number:  [id number]  Length of stay:  3    SUBJECTIVE  CC/Focus of Exam: Depression and SI    INTERVAL HISTORY:  The patient reports he is feeling better and more hopefull. He reports he got a call form his wife and she was apologetic for her behaviors towards him.   Depression rating 5/10  Anxiety rating 5/10  Sleep: better.   Withdrawal sx: denies  Cravin/10    Review of Systems   Respiratory: Negative.    Cardiovascular: Negative.    Gastrointestinal: Negative.        OBJECTIVE    Temp:  [97.5 °F (36.4 °C)] 97.5 °F (36.4 °C)  Heart Rate:  [58-59] 59  Resp:  [18] 18  BP: (142-161)/(85-95) 142/85    MENTAL STATUS EXAM:  Appearance:Casually dressed, good hygeine.   Cooperation:Cooperative  Psychomotor: No psychomotor agitation/retardation, No EPS, No motor tics  Speech-normal rate, amount.  Mood \"depressed\"   Affect-congruent, appropriate, stable  Thought Content-goal directed, no delusional material present  Thought process-linear, organized.  Suicidality: No SI  Homicidality: No HI  Perception: No AH/VH  Insight-fair   Judgement-fair    Lab Results (last 24 hours)     ** No results found for the last 24 hours. **             Imaging Results (Last 24 Hours)     ** No results found for the last 24 hours. **             ECG/EMG Results (most recent)     None           ALLERGIES: Tetracyclines & related      Current Facility-Administered Medications:   •  acetaminophen (TYLENOL) tablet 650 mg, 650 mg, Oral, Q6H PRN, Juan Kasper MD  •  aluminum-magnesium hydroxide-simethicone (MAALOX MAX) 400-400-40 MG/5ML suspension 15 mL, 15 mL, Oral, Q6H PRN, Juan Kasper MD  •  ascorbic acid (VITAMIN C) tablet 500 mg, 500 mg, Oral, Daily, Elise Littlejohn MD, 500 mg at 21  •  aspirin chewable tablet 40.5 mg, 40.5 mg, Oral, Nightly, Elise Littlejohn MD, 40.5 mg at 21  •  benzonatate (TESSALON) capsule " 100 mg, 100 mg, Oral, TID PRN, Juan Kasper MD  •  benztropine (COGENTIN) tablet 2 mg, 2 mg, Oral, Once PRN **OR** benztropine (COGENTIN) injection 1 mg, 1 mg, Intramuscular, Once PRN, Juan Kasper MD  •  cetirizine (zyrTEC) tablet 10 mg, 10 mg, Oral, Nightly, Elise Littlejohn MD, 10 mg at 04/30/21 2017  •  cholecalciferol (VITAMIN D3) tablet 1,000 Units, 1,000 Units, Oral, BID, Elise Littlejohn MD, 1,000 Units at 05/01/21 0918  •  escitalopram (LEXAPRO) tablet 10 mg, 10 mg, Oral, Daily, Elise Littlejohn MD, 10 mg at 05/01/21 0918  •  famotidine (PEPCID) tablet 20 mg, 20 mg, Oral, BID PRN, Juan Kasper MD  •  hydrOXYzine (ATARAX) tablet 50 mg, 50 mg, Oral, Q6H PRN, Juan Kasper MD, 50 mg at 04/30/21 2017  •  ibuprofen (ADVIL,MOTRIN) tablet 400 mg, 400 mg, Oral, Q6H PRN, Juan Kasper MD  •  loperamide (IMODIUM) capsule 2 mg, 2 mg, Oral, Q2H PRN, Juan Kasper MD  •  losartan (COZAAR) tablet 50 mg, 50 mg, Oral, Daily, Elise Littlejohn MD, 50 mg at 05/01/21 0918  •  magnesium hydroxide (MILK OF MAGNESIA) suspension 2400 mg/10mL 10 mL, 10 mL, Oral, Daily PRN, Juan Kasper MD  •  nicotine (NICODERM CQ) 21 MG/24HR patch 1 patch, 1 patch, Transdermal, Q24H, Juan Kasper MD, 1 patch at 05/01/21 0919  •  ondansetron (ZOFRAN) tablet 4 mg, 4 mg, Oral, Q6H PRN, Juan Kasper MD  •  sodium chloride nasal spray 2 spray, 2 spray, Each Nare, PRN, Juan Kasper MD  •  traZODone (DESYREL) tablet 50 mg, 50 mg, Oral, Nightly PRN, Juan Kasper MD, 50 mg at 04/30/21 2017  •  vitamin B-12 (CYANOCOBALAMIN) tablet 1,000 mcg, 1,000 mcg, Oral, Daily, Elise Littlejohn MD, 1,000 mcg at 05/01/21 0918    ASSESSMENT & PLAN:      Severe episode of recurrent major depressive disorder, without psychotic features (CMS/HCC)  - Continue Lexapro 10 mg daily  - Individual and group psychotherapy       HALI (generalized anxiety disorder)  - Continue Lexapro       Alcohol use disorder, severe, dependence (CMS/HCC)  - Patient  reports recent relapse, monitor for withdrawals.       Suicidal ideation  - SP 3       Nicotine use disorder  - Nicoderm patch       HTN  - Cozaar    Special precautions: Special Precautions Level 3 (q15 min checks) .    Behavioral Health Treatment Plan and Problem List: I have reviewed and approved the Behavioral Health Treatment Plan and Problem list.  The patient has had a chance to review and agrees with the treatment plan.     Clinician:  Elise Littlejohn MD  05/01/21  16:36 EDT

## 2021-05-01 NOTE — PLAN OF CARE
Goal Outcome Evaluation:  Plan of Care Reviewed With: patient  Progress: improving   Patient rates anxiety 7 and depression 8, denies thoughts of harming self and others, and denies hallucinations.

## 2021-05-01 NOTE — PLAN OF CARE
Goal Outcome Evaluation:  Plan of Care Reviewed With: patient  Progress: no change  Outcome Summary: Rated anxiety and depression as 10. Denies S.I., paranoia, hallucinations. Has had no complaints.

## 2021-05-02 PROCEDURE — 99232 SBSQ HOSP IP/OBS MODERATE 35: CPT | Performed by: PSYCHIATRY & NEUROLOGY

## 2021-05-02 RX ADMIN — OXYCODONE HYDROCHLORIDE AND ACETAMINOPHEN 500 MG: 500 TABLET ORAL at 09:41

## 2021-05-02 RX ADMIN — ESCITALOPRAM 10 MG: 10 TABLET, FILM COATED ORAL at 09:41

## 2021-05-02 RX ADMIN — Medication 1000 MCG: at 09:41

## 2021-05-02 RX ADMIN — HYDROXYZINE HYDROCHLORIDE 50 MG: 50 TABLET ORAL at 09:41

## 2021-05-02 RX ADMIN — CHOLECALCIFEROL TAB 10 MCG (400 UNIT) 1000 UNITS: 10 TAB at 20:34

## 2021-05-02 RX ADMIN — ASPIRIN 40.5 MG: 81 TABLET, CHEWABLE ORAL at 20:34

## 2021-05-02 RX ADMIN — TRAZODONE HYDROCHLORIDE 50 MG: 50 TABLET ORAL at 20:34

## 2021-05-02 RX ADMIN — LOSARTAN POTASSIUM 50 MG: 50 TABLET, FILM COATED ORAL at 09:41

## 2021-05-02 RX ADMIN — CHOLECALCIFEROL TAB 10 MCG (400 UNIT) 1000 UNITS: 10 TAB at 10:15

## 2021-05-02 RX ADMIN — HYDROXYZINE HYDROCHLORIDE 50 MG: 50 TABLET ORAL at 20:34

## 2021-05-02 RX ADMIN — CETIRIZINE HYDROCHLORIDE 10 MG: 10 TABLET, FILM COATED ORAL at 20:34

## 2021-05-02 RX ADMIN — NICOTINE TRANSDERMAL SYSTEM 1 PATCH: 21 PATCH, EXTENDED RELEASE TRANSDERMAL at 09:41

## 2021-05-02 NOTE — PLAN OF CARE
Problem: Adult Behavioral Health Plan of Care  Goal: Plan of Care Review  Outcome: Ongoing, Progressing  Flowsheets  Taken 5/1/2021 2229  Progress: improving  Plan of Care Reviewed With: patient  Patient Agreement with Plan of Care: agrees  Outcome Summary:   Alert and oriented   reports poor appetite   improved sleep, approx. 5 hours last night per pt   rates anxiety and depression   worthlessness   denies SI/HI/AVH   calm and cooperative   medications reviewed   encouraged pt to keep mask on while around others, social distance at least 6 ft apart, and wash hands frequently   verbalized understanding   pt resting well throughout the night   will continue to monitor.  Taken 5/1/2021 1940  Plan of Care Reviewed With: patient  Patient Agreement with Plan of Care: agrees  Goal: Patient-Specific Goal (Individualization)  Outcome: Ongoing, Progressing  Goal: Adheres to Safety Considerations for Self and Others  Outcome: Ongoing, Progressing  Goal: Absence of New-Onset Illness or Injury  Outcome: Ongoing, Progressing  Goal: Optimized Coping Skills in Response to Life Stressors  Outcome: Ongoing, Progressing  Goal: Develops/Participates in Therapeutic [place] to Support Successful Transition  Outcome: Ongoing, Progressing   Goal Outcome Evaluation:  Plan of Care Reviewed With: patient  Progress: improving  Outcome Summary: Alert and oriented; reports poor appetite; improved sleep, approx. 5 hours last night per pt; rates anxiety and depression; worthlessness; denies SI/HI/AVH; calm and cooperative; medications reviewed; encouraged pt to keep mask on while around others, social distance at least 6 ft apart, and wash hands frequently; verbalized understanding; pt resting well throughout the night; will continue to monitor.

## 2021-05-02 NOTE — PROGRESS NOTES
"INPATIENT PSYCHIATRIC PROGRESS NOTE    Name:  Yousuf Barrera  :  1965  MRN:  [id number]  Visit Number:  [id number]  Length of stay:  4    SUBJECTIVE  CC/Focus of Exam: Depression and SI    INTERVAL HISTORY:  The patient states he is feeling better and mood has been stable. He is looking forward to being discharged tomorrow and would like to shave.  Depression rating 3/10  Anxiety rating 3/10  Sleep: better.   Withdrawal sx: denies  Cravin/10    Review of Systems   Respiratory: Negative.    Cardiovascular: Negative.    Gastrointestinal: Negative.        OBJECTIVE    Temp:  [97.6 °F (36.4 °C)-97.8 °F (36.6 °C)] 97.6 °F (36.4 °C)  Heart Rate:  [61-64] 61  Resp:  [18] 18  BP: (140-163)/(85-99) 141/92    MENTAL STATUS EXAM:  Appearance:Casually dressed, good hygeine.   Cooperation:Cooperative  Psychomotor: No psychomotor agitation/retardation, No EPS, No motor tics  Speech-normal rate, amount.  Mood \"depressed\"   Affect-congruent, appropriate, stable  Thought Content-goal directed, no delusional material present  Thought process-linear, organized.  Suicidality: No SI  Homicidality: No HI  Perception: No AH/VH  Insight-fair   Judgement-fair    Lab Results (last 24 hours)     ** No results found for the last 24 hours. **             Imaging Results (Last 24 Hours)     ** No results found for the last 24 hours. **             ECG/EMG Results (most recent)     None           ALLERGIES: Tetracyclines & related      Current Facility-Administered Medications:   •  acetaminophen (TYLENOL) tablet 650 mg, 650 mg, Oral, Q6H PRN, Juan Kasper MD  •  aluminum-magnesium hydroxide-simethicone (MAALOX MAX) 400-400-40 MG/5ML suspension 15 mL, 15 mL, Oral, Q6H PRN, Juan Kasper MD  •  ascorbic acid (VITAMIN C) tablet 500 mg, 500 mg, Oral, Daily, Elise Littlejohn MD, 500 mg at 21 0941  •  aspirin chewable tablet 40.5 mg, 40.5 mg, Oral, Nightly, Elise Littlejohn MD, 40.5 mg at 21  •  benzonatate (TESSALON) " capsule 100 mg, 100 mg, Oral, TID PRN, Juan Kasper MD  •  benztropine (COGENTIN) tablet 2 mg, 2 mg, Oral, Once PRN **OR** benztropine (COGENTIN) injection 1 mg, 1 mg, Intramuscular, Once PRN, Juan Kasper MD  •  cetirizine (zyrTEC) tablet 10 mg, 10 mg, Oral, Nightly, Elise Littlejohn MD, 10 mg at 05/01/21 2015  •  cholecalciferol (VITAMIN D3) tablet 1,000 Units, 1,000 Units, Oral, BID, Elise Littlejohn MD, 1,000 Units at 05/02/21 1015  •  escitalopram (LEXAPRO) tablet 10 mg, 10 mg, Oral, Daily, Elise Littlejohn MD, 10 mg at 05/02/21 0941  •  famotidine (PEPCID) tablet 20 mg, 20 mg, Oral, BID PRN, Juan Kasper MD  •  hydrOXYzine (ATARAX) tablet 50 mg, 50 mg, Oral, Q6H PRN, Juan Kasper MD, 50 mg at 05/02/21 0941  •  ibuprofen (ADVIL,MOTRIN) tablet 400 mg, 400 mg, Oral, Q6H PRN, Juan Kasper MD, 400 mg at 05/01/21 2015  •  loperamide (IMODIUM) capsule 2 mg, 2 mg, Oral, Q2H PRN, Juan Kasper MD  •  losartan (COZAAR) tablet 50 mg, 50 mg, Oral, Daily, Elise Littlejohn MD, 50 mg at 05/02/21 0941  •  magnesium hydroxide (MILK OF MAGNESIA) suspension 2400 mg/10mL 10 mL, 10 mL, Oral, Daily PRN, Juan Kasper MD  •  nicotine (NICODERM CQ) 21 MG/24HR patch 1 patch, 1 patch, Transdermal, Q24H, Juan Kasper MD, 1 patch at 05/02/21 0941  •  ondansetron (ZOFRAN) tablet 4 mg, 4 mg, Oral, Q6H PRN, Juan Kasper MD  •  sodium chloride nasal spray 2 spray, 2 spray, Each Nare, PRN, Juan Kasper MD  •  traZODone (DESYREL) tablet 50 mg, 50 mg, Oral, Nightly PRN, Juan Kasper MD, 50 mg at 05/01/21 2015  •  vitamin B-12 (CYANOCOBALAMIN) tablet 1,000 mcg, 1,000 mcg, Oral, Daily, Elise Littlejohn MD, 1,000 mcg at 05/02/21 0933    ASSESSMENT & PLAN:      Severe episode of recurrent major depressive disorder, without psychotic features (CMS/HCC)  - Continue Lexapro 10 mg daily  - Individual and group psychotherapy       HALI (generalized anxiety disorder)  - Continue Lexapro       Alcohol use disorder, severe,  dependence (CMS/HCC)  - Patient reports recent relapse, monitor for withdrawals.       Suicidal ideation  - SP 3       Nicotine use disorder  - Nicoderm patch       HTN  - Cozaar    Special precautions: Special Precautions Level 3 (q15 min checks) .    Behavioral Health Treatment Plan and Problem List: I have reviewed and approved the Behavioral Health Treatment Plan and Problem list.  The patient has had a chance to review and agrees with the treatment plan.     Clinician:  Elise Littlejohn MD  05/02/21  13:53 EDT

## 2021-05-02 NOTE — PLAN OF CARE
Goal Outcome Evaluation:  Plan of Care Reviewed With: patient  Progress: improving  Outcome Summary: Pt stated that he was doing better and hoping to go home tomorrow. Pt was requesting to shave and received an order from the doctor. Pt has watched tv and interacted well with staff and other pt's. Pt has had no issues or concerns this shift.

## 2021-05-03 VITALS
RESPIRATION RATE: 18 BRPM | HEART RATE: 64 BPM | DIASTOLIC BLOOD PRESSURE: 92 MMHG | OXYGEN SATURATION: 98 % | SYSTOLIC BLOOD PRESSURE: 145 MMHG | TEMPERATURE: 97.7 F

## 2021-05-03 PROCEDURE — 99238 HOSP IP/OBS DSCHRG MGMT 30/<: CPT | Performed by: PSYCHIATRY & NEUROLOGY

## 2021-05-03 RX ORDER — ESCITALOPRAM OXALATE 10 MG/1
10 TABLET ORAL DAILY
Qty: 30 TABLET | Refills: 0 | Status: SHIPPED | OUTPATIENT
Start: 2021-05-03 | End: 2021-06-04 | Stop reason: SDUPTHER

## 2021-05-03 RX ADMIN — ESCITALOPRAM 10 MG: 10 TABLET, FILM COATED ORAL at 08:51

## 2021-05-03 RX ADMIN — IBUPROFEN 400 MG: 400 TABLET, FILM COATED ORAL at 08:55

## 2021-05-03 RX ADMIN — NICOTINE TRANSDERMAL SYSTEM 1 PATCH: 21 PATCH, EXTENDED RELEASE TRANSDERMAL at 08:52

## 2021-05-03 RX ADMIN — OXYCODONE HYDROCHLORIDE AND ACETAMINOPHEN 500 MG: 500 TABLET ORAL at 08:52

## 2021-05-03 RX ADMIN — HYDROXYZINE HYDROCHLORIDE 50 MG: 50 TABLET ORAL at 08:55

## 2021-05-03 RX ADMIN — LOSARTAN POTASSIUM 50 MG: 50 TABLET, FILM COATED ORAL at 08:52

## 2021-05-03 RX ADMIN — Medication 1000 MCG: at 08:51

## 2021-05-03 NOTE — PROGRESS NOTES
[facility]: Patient is being discharged on this day.     Patient to pay $87 cash. Hospital to cover approx $10.50. Approved by Girma GARCIA

## 2021-05-03 NOTE — PLAN OF CARE
Goal Outcome Evaluation:  Plan of Care Reviewed With: patient  Progress: improving  Outcome Summary: Rated anxiety as 5 and depression sa 3. Denies S.I.  Has had no complaints. Anticipates being discharged on Monday.

## 2021-05-03 NOTE — DISCHARGE SUMMARY
":  1965  MRN:  [id number]  Visit Number:  [id number]      Date of Admission:2021   Date of Discharge:  5/3/2021    Discharge Diagnosis:  Principal Problem:    Severe episode of recurrent major depressive disorder, without psychotic features (CMS/HCC)  Active Problems:    Alcohol use disorder, severe, dependence (CMS/HCC)    Suicidal ideation    HALI (generalized anxiety disorder)        Admission Diagnosis:  Suicidal ideation [R45.851]     HPI  Yousuf Barrera is a 55 y.o. male who was admitted on 2021 with complaints of worsening depression and suicidal ideations. For details please see H&P dated 21.      Hospital Course  Patient is a 55 y.o. male presented with severe depression and suicidal ideation. The patient was admitted to the [facility] [place] AE1 unit for safety, further evaluation and treatment.  The patient was started on Lexapro and he was able to take it without any adverse effects.   The patient was struggling with his relationship with his wife who had decided to leave him and he admitted that he wanted her not to leave. He was encouraged to accept and respect his wife's decisions as he couldn't force her to do anything she didn't want to do, and his emotional breakdowns would only alienate her more.   The patient was also able to take part in individual and group counseling sessions and work on appropriate coping skills.  The patient made steady improvement in his mood and expressed feeling more positive and hopeful about future. Sleep and appetite were improved.  The day of discharge the patient was calm, cooperative and pleasant. Mood was reported to be good, and denied SI/HI/AVH. Also reported no medication side effects.        Mental Status Exam upon discharge:   Mood \"good\"   Affect-congruent, appropriate, stable  Thought Content-goal directed, no delusional material present  Thought process-linear, organized.  Suicidality: No SI  Homicidality: No HI  Perception: No " /      Consults:   Consults     No orders found from 3/30/2021 to 4/29/2021.          Pertinent Test Results:   Admission on 04/28/2021   Component Date Value Ref Range Status   • COVID19 04/28/2021 Not Detected  Not Detected - Ref. Range Final   • Influenza A PCR 04/28/2021 Not Detected  Not Detected Final   • Influenza B PCR 04/28/2021 Not Detected  Not Detected Final   Admission on 04/28/2021, Discharged on 04/28/2021   Component Date Value Ref Range Status   • Glucose 04/28/2021 94  65 - 99 mg/dL Final   • BUN 04/28/2021 5* 6 - 20 mg/dL Final   • Creatinine 04/28/2021 0.90  0.76 - 1.27 mg/dL Final   • Sodium 04/28/2021 138  136 - 145 mmol/L Final   • Potassium 04/28/2021 3.9  3.5 - 5.2 mmol/L Final   • Chloride 04/28/2021 100  98 - 107 mmol/L Final   • CO2 04/28/2021 27.4  22.0 - 29.0 mmol/L Final   • Calcium 04/28/2021 9.7  8.6 - 10.5 mg/dL Final   • Total Protein 04/28/2021 7.2  6.0 - 8.5 g/dL Final   • Albumin 04/28/2021 4.90  3.50 - 5.20 g/dL Final   • ALT (SGPT) 04/28/2021 22  1 - 41 U/L Final   • AST (SGOT) 04/28/2021 27  1 - 40 U/L Final   • Alkaline Phosphatase 04/28/2021 43  39 - 117 U/L Final   • Total Bilirubin 04/28/2021 0.6  0.0 - 1.2 mg/dL Final   • eGFR Non  Amer 04/28/2021 88  >60 mL/min/1.73 Final   • Globulin 04/28/2021 2.3  gm/dL Final   • A/G Ratio 04/28/2021 2.1  g/dL Final   • BUN/Creatinine Ratio 04/28/2021 5.6* 7.0 - 25.0 Final   • Anion Gap 04/28/2021 10.6  5.0 - 15.0 mmol/L Final   • Ethanol 04/28/2021 54* 0 - 10 mg/dL Final   • Ethanol % 04/28/2021 0.054  % Final   • Acetaminophen 04/28/2021 <5.0  0.0 - 30.0 mcg/mL Final   • THC, Screen, Urine 04/28/2021 Negative  Negative Final   • Phencyclidine (PCP), Urine 04/28/2021 Negative  Negative Final   • Cocaine Screen, Urine 04/28/2021 Negative  Negative Final   • Methamphetamine, Ur 04/28/2021 Negative  Negative Final   • Opiate Screen 04/28/2021 Negative  Negative Final   • Amphetamine Screen, Urine 04/28/2021 Negative   Negative Final   • Benzodiazepine Screen, Urine 04/28/2021 Negative  Negative Final   • Tricyclic Antidepressants Screen 04/28/2021 Negative  Negative Final   • Methadone Screen, Urine 04/28/2021 Negative  Negative Final   • Barbiturates Screen, Urine 04/28/2021 Negative  Negative Final   • Oxycodone Screen, Urine 04/28/2021 Negative  Negative Final   • Propoxyphene Screen 04/28/2021 Negative  Negative Final   • Buprenorphine, Screen, Urine 04/28/2021 Negative  Negative Final   • Salicylate 04/28/2021 1.8  <=30.0 mg/dL Final   • Magnesium 04/28/2021 2.1  1.6 - 2.6 mg/dL Final   • WBC 04/28/2021 5.73  3.40 - 10.80 10*3/mm3 Final   • RBC 04/28/2021 5.90* 4.14 - 5.80 10*6/mm3 Final   • Hemoglobin 04/28/2021 18.3* 13.0 - 17.7 g/dL Final   • Hematocrit 04/28/2021 51.9* 37.5 - 51.0 % Final   • MCV 04/28/2021 88.0  79.0 - 97.0 fL Final   • MCH 04/28/2021 31.0  26.6 - 33.0 pg Final   • MCHC 04/28/2021 35.3  31.5 - 35.7 g/dL Final   • RDW 04/28/2021 12.0* 12.3 - 15.4 % Final   • RDW-SD 04/28/2021 38.9  37.0 - 54.0 fl Final   • MPV 04/28/2021 8.7  6.0 - 12.0 fL Final   • Platelets 04/28/2021 202  140 - 450 10*3/mm3 Final   • Neutrophil % 04/28/2021 59.0  42.7 - 76.0 % Final   • Lymphocyte % 04/28/2021 26.0  19.6 - 45.3 % Final   • Monocyte % 04/28/2021 11.3  5.0 - 12.0 % Final   • Eosinophil % 04/28/2021 2.6  0.3 - 6.2 % Final   • Basophil % 04/28/2021 0.9  0.0 - 1.5 % Final   • Immature Grans % 04/28/2021 0.2  0.0 - 0.5 % Final   • Neutrophils, Absolute 04/28/2021 3.38  1.70 - 7.00 10*3/mm3 Final   • Lymphocytes, Absolute 04/28/2021 1.49  0.70 - 3.10 10*3/mm3 Final   • Monocytes, Absolute 04/28/2021 0.65  0.10 - 0.90 10*3/mm3 Final   • Eosinophils, Absolute 04/28/2021 0.15  0.00 - 0.40 10*3/mm3 Final   • Basophils, Absolute 04/28/2021 0.05  0.00 - 0.20 10*3/mm3 Final   • Immature Grans, Absolute 04/28/2021 0.01  0.00 - 0.05 10*3/mm3 Final   • nRBC 04/28/2021 0.0  0.0 - 0.2 /100 WBC Final   • Extra Tube 04/28/2021 hold for  add-on   Final    Auto resulted   • Extra Tube 04/28/2021 Hold for add-ons.   Final    Auto resulted.   • Extra Tube 04/28/2021 hold for add-on   Final    Auto resulted   • Extra Tube 04/28/2021 Hold for add-ons.   Final    Auto resulted.   • Extra Tube 04/28/2021 Hold for add-ons.   Final    Auto resulted.   • COVID19 04/28/2021 Not Detected  Not Detected - Ref. Range Final        Condition on Discharge:  improved    Vital Signs  Temp:  [97.4 °F (36.3 °C)-97.7 °F (36.5 °C)] 97.7 °F (36.5 °C)  Heart Rate:  [62-64] 64  Resp:  [18] 18  BP: (145-156)/(92) 145/92      Discharge Disposition:  Home or Self Care    Discharge Medications:     Discharge Medications      New Medications      Instructions Start Date   escitalopram 10 MG tablet  Commonly known as: LEXAPRO   10 mg, Oral, Daily         Continue These Medications      Instructions Start Date   ascorbic acid 500 MG tablet  Commonly known as: VITAMIN C   500 mg, Oral, Daily      aspirin 81 MG chewable tablet   40.5 mg, Oral, Nightly      cholecalciferol 25 MCG (1000 UT) tablet  Commonly known as: VITAMIN D3   1,000 Units, Oral, 2 Times Daily      hydrOXYzine 25 MG tablet  Commonly known as: ATARAX   25 mg, Oral, 3 Times Daily PRN      levocetirizine 5 MG tablet  Commonly known as: XYZAL   5 mg, Oral, Nightly      losartan 50 MG tablet  Commonly known as: COZAAR   50 mg, Oral, Daily      vitamin B-12 1000 MCG tablet  Commonly known as: CYANOCOBALAMIN   1,000 mcg, Oral, Daily             Discharge Diet: Regular     Activity at Discharge: As tolerated     Follow-up Appointments  Future Appointments   Date Time Provider Department Center   5/10/2021 11:30 AM Nicolas Lyons APRN [facility]   6/7/2021 10:30 AM Malou Oleary LCSW [facility]         Test Results Pending at Discharge      Time spent in discharge: < 30 min    Clinician:   Elise Littlejohn MD  05/03/21  08:13 EDT

## 2021-05-03 NOTE — PROGRESS NOTES
0953:    Patient is being discharged home on this date. Aftercare is scheduled with [facility].     [employer] to provide transportation back to [facility] where Patient's car is located.

## 2021-05-04 ENCOUNTER — OFFICE VISIT (OUTPATIENT)
Dept: PSYCHIATRY | Facility: CLINIC | Age: 56
End: 2021-05-04

## 2021-05-04 VITALS
RESPIRATION RATE: 18 BRPM | TEMPERATURE: 98.4 F | SYSTOLIC BLOOD PRESSURE: 140 MMHG | BODY MASS INDEX: 23.57 KG/M2 | HEIGHT: 72 IN | HEART RATE: 95 BPM | DIASTOLIC BLOOD PRESSURE: 90 MMHG | WEIGHT: 174 LBS

## 2021-05-04 DIAGNOSIS — F10.20 UNCOMPLICATED ALCOHOL DEPENDENCE (HCC): ICD-10-CM

## 2021-05-04 DIAGNOSIS — G47.00 INSOMNIA, UNSPECIFIED TYPE: ICD-10-CM

## 2021-05-04 DIAGNOSIS — F41.1 GENERALIZED ANXIETY DISORDER: Chronic | ICD-10-CM

## 2021-05-04 DIAGNOSIS — F33.2 MAJOR DEPRESSIVE DISORDER, RECURRENT, SEVERE WITHOUT PSYCHOTIC FEATURES (HCC): Primary | Chronic | ICD-10-CM

## 2021-05-04 PROCEDURE — 99215 OFFICE O/P EST HI 40 MIN: CPT | Performed by: NURSE PRACTITIONER

## 2021-05-04 RX ORDER — MIRTAZAPINE 15 MG/1
7.5-15 TABLET, FILM COATED ORAL NIGHTLY
Qty: 30 TABLET | Refills: 2 | Status: SHIPPED | OUTPATIENT
Start: 2021-05-04 | End: 2021-09-08 | Stop reason: SDDI

## 2021-05-04 RX ORDER — MULTIPLE VITAMINS W/ MINERALS TAB 9MG-400MCG
1 TAB ORAL DAILY
COMMUNITY

## 2021-05-04 RX ORDER — PROPRANOLOL HYDROCHLORIDE 20 MG/1
20 TABLET ORAL 3 TIMES DAILY PRN
Qty: 90 TABLET | Refills: 2 | Status: SHIPPED | OUTPATIENT
Start: 2021-05-04 | End: 2022-06-02 | Stop reason: SDUPTHER

## 2021-05-04 NOTE — PROGRESS NOTES
"Chief Complaint  Anxiety, Depression, and Alcohol Problem    Subjective          Yousuf Barrera presents to [facility] for medication management of his anxiety and depression.    History of Present Illness: Patient presents today for follow-up appointment after last being seen on 04/28/2021.  At that appointment, the patient expressed severe depression and anxiety, and endorsed feelings of SI.  Patient reported he felt as though he was a danger to himself, and so the patient presented to [facility] Emergency Department.  Patient reports today he feels significantly better than he did last week.  Patient says \"I am honestly not even sure what all I told you last week, I was so depressed\".  Patient reports his inpatient stay at the [facility] [place] was very helpful.  He was grateful to be started on medications, and believes even though he started only 6 days ago, he is feeling some positive effects from it.  Patient reports his sleep is \"so-so\" while he was inpatient, and he endorses having some trouble getting consistently good sleep, even prior to his inpatient stay.  Patient also endorses his anxiety was very high when he was discharged, and while it has improved some, he is still feeling very anxious about facing his current situations with his personal life.  Patient denies any SI/HI, A/V hallucinations.    Current Medications:   Current Outpatient Medications   Medication Sig Dispense Refill   • aspirin 81 MG chewable tablet Chew 40.5 mg Every Night.     • cholecalciferol (VITAMIN D3) 25 MCG (1000 UT) tablet Take 1,000 Units by mouth 2 (Two) Times a Day.     • escitalopram (LEXAPRO) 10 MG tablet Take 1 tablet by mouth Daily. Indications: Major Depressive Disorder 30 tablet 0   • hydrOXYzine (ATARAX) 25 MG tablet Take 25 mg by mouth 3 (Three) Times a Day As Needed for Itching or Anxiety.     • levocetirizine (XYZAL) 5 MG tablet Take 5 mg by mouth Every Night.     • " "losartan (COZAAR) 50 MG tablet Take 50 mg by mouth Daily.     • multivitamin with minerals tablet tablet Take 1 tablet by mouth Daily.     • vitamin B-12 (CYANOCOBALAMIN) 1000 MCG tablet Take 1,000 mcg by mouth Daily.     • mirtazapine (Remeron) 15 MG tablet Take 0.5-1 tablets by mouth Every Night. 30 tablet 2   • propranolol (INDERAL) 20 MG tablet Take 1 tablet by mouth 3 (Three) Times a Day As Needed (Anxiety). 90 tablet 2     No current facility-administered medications for this visit.       Mental Status Exam:   Hygiene:   good  Cooperation:  Cooperative  Eye Contact:  Good  Psychomotor Behavior:  Appropriate  Affect:  Appropriate  Mood: anxious  Speech:  Normal  Thought Process:  Goal directed  Thought Content:  Mood congruent  Suicidal:  None  Homicidal:  None  Hallucinations:  None  Delusion:  None  Memory:  Intact  Orientation:  Person, Place, Time and Situation  Reliability:  fair  Insight:  Fair  Judgement:  Fair  Impulse Control:  Fair  Physical/Medical Issues:  No        Objective   Vital Signs:   /90 (BP Location: Left arm)   Pulse 95   Temp 98.4 °F (36.9 °C) (Infrared)   Resp 18   Ht 182.9 cm (72\")   Wt 78.9 kg (174 lb)   BMI 23.60 kg/m²     Physical Exam  Neurological:      Mental Status: He is oriented to person, place, and time. Mental status is at baseline.      Coordination: Coordination is intact.      Gait: Gait is intact.   Psychiatric:         Behavior: Behavior is cooperative.         Thought Content: Thought content normal.         Cognition and Memory: Cognition and memory normal.         Judgment: Judgment normal.        Result Review :     The following data was reviewed by: MELVIN Wadsworth on 05/04/2021:    Data reviewed: Recent hospitalization notes Previous notes and medication history.          Assessment and Plan    Problem List Items Addressed This Visit        Mental Health    Alcohol dependence (CMS/HCC)    Relevant Medications    mirtazapine (Remeron) 15 MG " tablet      Other Visit Diagnoses     Major depressive disorder, recurrent, severe without psychotic features (CMS/HCC)  (Chronic)   -  Primary    Relevant Medications    mirtazapine (Remeron) 15 MG tablet    Generalized anxiety disorder  (Chronic)       Relevant Medications    propranolol (INDERAL) 20 MG tablet    mirtazapine (Remeron) 15 MG tablet    Insomnia, unspecified type        Relevant Medications    mirtazapine (Remeron) 15 MG tablet          PHQ-9 Score:   PHQ-9 Total Score: 1    Depression Screening:  Patient screened positive for depression based on a PHQ-9 score of 1 on 5/4/2021. Follow-up recommendations include: Prescribed antidepressant medication treatment and Suicide Risk Assessment performed.      Tobacco Cessation:  Yousuf Barrera  reports that he has never smoked. His smokeless tobacco use includes snuff.. I have educated him on the risk of diseases from using tobacco products such as cancer, COPD and heart disease.     I advised him to quit and he is not willing to quit.    I spent 3  minutes counseling the patient.      Impression/Plan:  -This is a follow-up appointment.  Patient presents today for follow-up after recent inpatient stay at [facility] from 04/28/2021 to 05/03/2021.  Patient was started on Lexapro 10 mg daily while inpatient, and has continued the medication since being discharged yesterday.  In addition to anxiety, patient endorses difficulty with sleep.  While inpatient at [facility] he was taking trazodone, and reports it was not very helpful for his sleep.  This is the only thing the patient has ever taken for sleep.  He denies any adverse effects associated with the Lexapro, and says he is glad he was started on something that can benefit his mood and anxiety.  Patient has been taking Atarax 12.5 mg 3 times daily as needed since his first day at the [facility] in 2019.  Patient reports he is not sure how helpful it still is, but has continued to take it.  He is  actually prescribed 25 mg, but says he is only been taking half for several months.  -Maintain Lexapro 10 mg daily.  Advised patient this medication will continue to build and assist him for the next 8 to 12 weeks, and we will reevaluate him for its effectiveness at his next appointment.  -Start propranolol 20 mg 3 times daily as needed.  Advised the patient this medication may affect his heart rate or blood pressure.  The most common side effects of a decreased heart rate and/or blood pressure is lightheadedness or dizziness.  Advised the patient if he experiences these to notify me immediately, and I will decrease the dose.  -Start Remeron 7.5 to 15 mg nightly.  -We discussed risks versus benefits, as well as potential adverse effects associated with adding this medication to patient's daily regimen. Patient is in agreement with this plan and was educated on the importance of compliance with all aspects of treatment and follow-up appointments. Patient is agreeable to call the office with any worsening of symptoms or onset of side effects.  -Schedule follow-up for 1 month or as needed.        MEDS ORDERED DURING VISIT:  New Medications Ordered This Visit   Medications   • propranolol (INDERAL) 20 MG tablet     Sig: Take 1 tablet by mouth 3 (Three) Times a Day As Needed (Anxiety).     Dispense:  90 tablet     Refill:  2   • mirtazapine (Remeron) 15 MG tablet     Sig: Take 0.5-1 tablets by mouth Every Night.     Dispense:  30 tablet     Refill:  2       I spent 45 minutes caring for Yousuf on this date of service. This time includes time spent by me in the following activities:preparing for the visit, obtaining and/or reviewing a separately obtained history, performing a medically appropriate examination and/or evaluation , counseling and educating the patient/family/caregiver, ordering medications, tests, or procedures and documenting information in the medical record  Follow Up   Return in about 1 month (around  6/4/2021), or if symptoms worsen or fail to improve, for Next scheduled follow up.  Patient was given instructions and counseling regarding his condition or for health maintenance advice. Please see specific information pulled into the AVS if appropriate.       TREATMENT PLAN/GOALS: Continue supportive psychotherapy efforts and medications as indicated. Treatment and medication options discussed during today's visit. Patient acknowledged and verbally consented to continue with current treatment plan and was educated on the importance of compliance with treatment and follow-up appointments.    MEDICATION ISSUES:  Discussed medication options and treatment plan of prescribed medication as well as the risks, benefits, and side effects including potential falls, possible impaired driving and metabolic adversities among others. Patient is agreeable to call the office with any worsening of symptoms or onset of side effects. Patient is agreeable to call 911 or go to the nearest ER should he/she begin having SI/HI.          This document has been electronically signed by MELVIN Montoya, PMHNP-BC  May 4, 2021 11:58 EDT      Part of this note may be an electronic transcription/translation of spoken language to printed text using the Dragon Dictation System.

## 2021-05-06 ENCOUNTER — OFFICE VISIT (OUTPATIENT)
Dept: PSYCHIATRY | Facility: CLINIC | Age: 56
End: 2021-05-06

## 2021-05-06 VITALS
SYSTOLIC BLOOD PRESSURE: 140 MMHG | DIASTOLIC BLOOD PRESSURE: 90 MMHG | WEIGHT: 174 LBS | HEIGHT: 72 IN | BODY MASS INDEX: 23.57 KG/M2

## 2021-05-06 DIAGNOSIS — F10.20 ALCOHOL USE DISORDER, SEVERE, DEPENDENCE (HCC): ICD-10-CM

## 2021-05-06 DIAGNOSIS — F33.2 SEVERE EPISODE OF RECURRENT MAJOR DEPRESSIVE DISORDER, WITHOUT PSYCHOTIC FEATURES (HCC): Primary | ICD-10-CM

## 2021-05-06 DIAGNOSIS — F41.1 GAD (GENERALIZED ANXIETY DISORDER): ICD-10-CM

## 2021-05-06 PROCEDURE — 90837 PSYTX W PT 60 MINUTES: CPT | Performed by: SOCIAL WORKER

## 2021-05-06 NOTE — PROGRESS NOTES
"Date: 05/06/2021   Time In: 1330  Time Out: 1425    PROGRESS NOTE  Data:  Yousuf Barrera is a 55 y.o. male who presents today for individual therapy session at [facility].     ICD-10-CM ICD-9-CM   1. Severe episode of recurrent major depressive disorder, without psychotic features (CMS/HCC)  F33.2 296.33   2. Alcohol use disorder, severe, dependence (CMS/HCC)  F10.20 303.90   3. HALI (generalized anxiety disorder)  F41.1 300.02     Patient reports his sleep has improved and he is \"feeling better\". Patient reports he is also in recovery from alcohol use and is now 7 days sober from his last slip. Patient reports feeling upset that his wife has been talking to another man even thought they are  at this time. Patient admits that he has been looking through the phone bills for confirmation. Patient reports he is anticipating his wife asking him to stay with their daughter so she can go visit the other man. Patient is conflicted whether he would agree to do that. Patient reports he is currently working towards acceptance of a divorce, removing negative things from his life and is continuing his AA meetings. Patient denies suicidal thoughts.       Clinical Maneuvering/Intervention:    (Scales based on 0 - 10 with 10 being the worst)  Depression:   2 Anxiety:  10       Assisted patient in processing above session content; acknowledged and normalized patient’s thoughts, feelings, and concerns. Discussed triggers associated with patient's anxiety.  Also discussed coping skills for patient to implement such as breathing exercises, progressive muscle relaxation, guided imagery and challenging negative thoughts.    Allowed patient to freely discuss issues without interruption or judgment. Provided safe, confidential environment to facilitate the development of positive therapeutic relationship and encourage open, honest communication. Assisted patient in identifying risk factors which would indicate the " need for higher level of care including thoughts to harm self or others and/or self-harming behavior and encouraged patient to contact this office, call 911, or present to the nearest emergency room should any of these events occur. Discussed crisis intervention services and means to access. Patient adamantly and convincingly denies current suicidal or homicidal ideation or perceptual disturbance.    Assessment   Patient appears to maintain relative stability as compared to their baseline.  However, patient continues to struggle with depression and axiety which continues to cause impairment in important areas of functioning.  As a result, they can be reasonably expected to continue to benefit from treatment and would likely be at increased risk for decompensation otherwise.    Mental Status Exam:   Hygiene:   good  Cooperation:  Cooperative  Eye Contact:  Good  Psychomotor Behavior:  Appropriate  Affect:  Appropriate  Mood: anxious  Speech:  Normal  Thought Process:  Linear  Thought Content:  Normal  Suicidal:  None  Homicidal:  None  Hallucinations:  None  Delusion:  None  Memory:  Intact  Orientation:  Person, Place, Time and Situation  Reliability:  good  Insight:  Fair  Judgement:  Poor  Impulse Control:  Poor  Physical/Medical Issues:  No      Patient's Support Network Includes:  friends, sponsor    Functional Status: Severe impairment    Progress toward goal: Not at goal    Prognosis: Good with Ongoing Treatment          Plan     Patient will continue in individual outpatient therapy with focus on improved functioning and coping skills, maintaining stability, and avoiding decompensation and the need for higher level of care.    Patient will adhere to medication regimen as prescribed and report any side effects. Patient will contact this office, call 911 or present to the nearest emergency room should suicidal or homicidal ideations occur. Provide Cognitive Behavioral Therapy and Solution Focused Therapy to  improve functioning, maintain stability, and avoid decompensation and the need for higher level of care.     Return in about Return in about 2 weeks (around 5/20/2021) for Next scheduled follow up. or earlier if symptoms worsen or fail to improve.            This document has been electronically signed by Carolina Lynn LCSW  May 6, 2021 13:32 EDT      Part of this note may be an electronic transcription/translation of spoken language to printed text using the Dragon Dictation System.

## 2021-05-11 ENCOUNTER — OFFICE VISIT (OUTPATIENT)
Dept: PSYCHIATRY | Facility: CLINIC | Age: 56
End: 2021-05-11

## 2021-05-11 VITALS
DIASTOLIC BLOOD PRESSURE: 90 MMHG | BODY MASS INDEX: 23.57 KG/M2 | WEIGHT: 174 LBS | HEIGHT: 72 IN | SYSTOLIC BLOOD PRESSURE: 140 MMHG

## 2021-05-11 DIAGNOSIS — F33.2 SEVERE EPISODE OF RECURRENT MAJOR DEPRESSIVE DISORDER, WITHOUT PSYCHOTIC FEATURES (HCC): Primary | ICD-10-CM

## 2021-05-11 DIAGNOSIS — F41.1 GAD (GENERALIZED ANXIETY DISORDER): ICD-10-CM

## 2021-05-11 DIAGNOSIS — F10.20 ALCOHOL USE DISORDER, SEVERE, DEPENDENCE (HCC): ICD-10-CM

## 2021-05-11 PROCEDURE — 90837 PSYTX W PT 60 MINUTES: CPT | Performed by: SOCIAL WORKER

## 2021-05-11 NOTE — PROGRESS NOTES
"Date: 05/11/2021   Time In: 1330  Time Out: 1435    PROGRESS NOTE  Data:  Yousuf Barrera is a 55 y.o. male who presents today for individual therapy session at [facility].     ICD-10-CM ICD-9-CM   1. Severe episode of recurrent major depressive disorder, without psychotic features (CMS/HCC)  F33.2 296.33   2. Alcohol use disorder, severe, dependence (CMS/HCC)  F10.20 303.90   3. HALI (generalized anxiety disorder)  F41.1 300.02     Patient reports he is \"doing good\". Patient reports he is continuing AA meetings and has returned to work. Patient reports he is feeling anxious today because of a conversation he had with his wife over the weekend about her taking an overnight trip alone. Patient continues to check phone logs on wife's cell phone and searching in her \"Blue book\". When confronted about those behaviors patient states \"I think honesty will help me with acceptance\". Patient acknowledges that those behaviors are not helpful. Patient denies suicidal ideations.       Clinical Maneuvering/Intervention:    (Scales based on 0 - 10 with 10 being the worst)  Depression:   2 Anxiety:  10       Assisted patient in processing above session content; acknowledged and normalized patient’s thoughts, feelings, and concerns. Discussed triggers associated with patient's anxiety.  Also discussed coping skills for patient to implement such as self soothing and radical acceptance.    Allowed patient to freely discuss issues without interruption or judgment. Provided safe, confidential environment to facilitate the development of positive therapeutic relationship and encourage open, honest communication. Assisted patient in identifying risk factors which would indicate the need for higher level of care including thoughts to harm self or others and/or self-harming behavior and encouraged patient to contact this office, call 911, or present to the nearest emergency room should any of these events occur. Discussed " "crisis intervention services and means to access. Patient adamantly and convincingly denies current suicidal or homicidal ideation or perceptual disturbance.    Assessment   Patient appears to maintain relative stability as compared to their baseline.  However, patient continues to struggle with depression and anxiety which continues to cause impairment in important areas of functioning.  As a result, they can be reasonably expected to continue to benefit from treatment and would likely be at increased risk for decompensation otherwise.    Mental Status Exam:   Hygiene:   good  Cooperation:  Cooperative  Eye Contact:  Good  Psychomotor Behavior:  Appropriate  Affect:  Appropriate  Mood: anxious  Speech:  Normal  Thought Process:  Linear  Thought Content:  Normal  Suicidal:  None  Homicidal:  None  Hallucinations:  None  Delusion:  None  Memory:  Intact  Orientation:  Person, Place, Time and Situation  Reliability:  fair  Insight:  Poor  Judgement:  Poor  Impulse Control:  Fair  Physical/Medical Issues:  No      Patient's Support Network Includes:  wife and sponsor    Functional Status: Severe impairment    Progress toward goal: Not at goal    Prognosis: Good with Ongoing Treatment          Plan     Patient will continue in individual outpatient therapy with focus on improved functioning and coping skills, maintaining stability, and avoiding decompensation and the need for higher level of care. Patient reports goal this week \"slow down on the logs\" and continue working towards acceptance of the divorce.    Patient will adhere to medication regimen as prescribed and report any side effects. Patient will contact this office, call 911 or present to the nearest emergency room should suicidal or homicidal ideations occur. Provide Cognitive Behavioral Therapy and Solution Focused Therapy to improve functioning, maintain stability, and avoid decompensation and the need for higher level of care.     Return in about Return in " about 2 weeks (around 5/25/2021) for Next scheduled follow up. or earlier if symptoms worsen or fail to improve.            This document has been electronically signed by Carolina Lynn LCSW  May 11, 2021 14:37 EDT      Part of this note may be an electronic transcription/translation of spoken language to printed text using the Dragon Dictation System.

## 2021-05-17 ENCOUNTER — TELEPHONE (OUTPATIENT)
Dept: PSYCHIATRY | Facility: CLINIC | Age: 56
End: 2021-05-17

## 2021-05-17 ENCOUNTER — OFFICE VISIT (OUTPATIENT)
Dept: PSYCHIATRY | Facility: CLINIC | Age: 56
End: 2021-05-17

## 2021-05-17 VITALS
HEIGHT: 72 IN | DIASTOLIC BLOOD PRESSURE: 90 MMHG | SYSTOLIC BLOOD PRESSURE: 140 MMHG | WEIGHT: 174 LBS | BODY MASS INDEX: 23.57 KG/M2

## 2021-05-17 DIAGNOSIS — F10.20 ALCOHOL USE DISORDER, SEVERE, DEPENDENCE (HCC): ICD-10-CM

## 2021-05-17 DIAGNOSIS — F41.1 GAD (GENERALIZED ANXIETY DISORDER): ICD-10-CM

## 2021-05-17 DIAGNOSIS — F33.2 SEVERE EPISODE OF RECURRENT MAJOR DEPRESSIVE DISORDER, WITHOUT PSYCHOTIC FEATURES (HCC): Primary | ICD-10-CM

## 2021-05-17 PROCEDURE — 90837 PSYTX W PT 60 MINUTES: CPT | Performed by: SOCIAL WORKER

## 2021-05-17 NOTE — TELEPHONE ENCOUNTER
He has been on the Lexapro for less than a month. He will need to wait until his follow up with me for an evaluation in that. I explained to him that medication will continue to build in his system for 8-12 weeks, and will need to be reevaluated after 1 month of use. Thanks.

## 2021-05-17 NOTE — TELEPHONE ENCOUNTER
Left pt a message advising him of the below message from Nicolas. Advised pt if he had any questions or concerns to feel free to call us back at [phone].

## 2021-05-17 NOTE — TELEPHONE ENCOUNTER
PT wants a increase in Lexapro. Pt states medication is working, but thinks it needs increased to help him a little better. After his appointment with  Carolina his therapist today he thought he may need a increase.Please Advise.

## 2021-05-17 NOTE — PROGRESS NOTES
"Date: 05/17/2021   Time In: 800  Time Out: 910    PROGRESS NOTE  Data:  Yousuf Barrera is a 56 y.o. male who presents today for individual therapy session at [facility].     ICD-10-CM ICD-9-CM   1. Severe episode of recurrent major depressive disorder, without psychotic features (CMS/HCC)  F33.2 296.33   2. Alcohol use disorder, severe, dependence (CMS/HCC)  F10.20 303.90   3. HALI (generalized anxiety disorder)  F41.1 300.02     Patient reports feeling anxious today and states he has his depression \"in check\". Patient reports difficulty accepting that his wife no longer wants to repair the marriage. Patient was tearful discussing this. Patient reports increased anxiety due to his wife's upcoming trip alone. Patient reports he is conflicted on whether he will respond to messages from his wife or daughter at this point. Patient continues to check phone logs but reports he has decreased the amount of time spent checking them. Patient reports he is attending AA meetings to work towards his sobriety and has been using his sponsor and peers as support. Patient is anticipating a higher need for care at his next session when his wife returns from her weekend trip. Patient continues to work towards acceptance. Patient denies suicidal ideations at this time.      Clinical Maneuvering/Intervention:    (Scales based on 0 - 10 with 10 being the worst)  Depression:   na Anxiety:  10       Assisted patient in processing above session content; acknowledged and normalized patient’s thoughts, feelings, and concerns. Discussed the importance of establishing boundaries in the relationship with his wife. Discussed triggers associated with patient's anxiety.  Also discussed coping skills for patient to implement such as contacting supports and participating in enjoyable activites.    Allowed patient to freely discuss issues without interruption or judgment. Provided safe, confidential environment to facilitate the " development of positive therapeutic relationship and encourage open, honest communication. Assisted patient in identifying risk factors which would indicate the need for higher level of care including thoughts to harm self or others and/or self-harming behavior and encouraged patient to contact this office, call 911, or present to the nearest emergency room should any of these events occur. Discussed crisis intervention services and means to access. Patient adamantly and convincingly denies current suicidal or homicidal ideation or perceptual disturbance.    Assessment   Patient appears to maintain relative stability as compared to their baseline.  However, patient continues to struggle with anxiety and depression which continues to cause impairment in important areas of functioning.  As a result, they can be reasonably expected to continue to benefit from treatment and would likely be at increased risk for decompensation otherwise.    Mental Status Exam:   Hygiene:   good  Cooperation:  Cooperative  Eye Contact:  Good  Psychomotor Behavior:  Appropriate  Affect:  Appropriate  Mood: sad  Speech:  Normal  Thought Process:  Goal directed and Linear  Thought Content:  Normal  Suicidal:  None  Homicidal:  None  Hallucinations:  None  Delusion:  None  Memory:  Intact  Orientation:  Person, Place, Time and Situation  Reliability:  good  Insight:  Fair  Judgement:  Fair  Impulse Control:  Fair  Physical/Medical Issues:  No      Patient's Support Network Includes:  wife, mother and sponsor, friends    Functional Status: Severe impairment    Progress toward goal: Not at goal    Prognosis: Good with Ongoing Treatment          Plan     Patient will continue in individual outpatient therapy with focus on improved functioning and coping skills, maintaining stability, and avoiding decompensation and the need for higher level of care.    Patient will adhere to medication regimen as prescribed and report any side effects. Patient  will contact this office, call 911 or present to the nearest emergency room should suicidal or homicidal ideations occur. Provide Cognitive Behavioral Therapy and Solution Focused Therapy to improve functioning, maintain stability, and avoid decompensation and the need for higher level of care.     Return in about Return in about 1 week (around 5/24/2021) for Next scheduled follow up. or earlier if symptoms worsen or fail to improve.            This document has been electronically signed by Carolina Lynn LCSW  May 17, 2021 07:57 EDT      Part of this note may be an electronic transcription/translation of spoken language to printed text using the Dragon Dictation System.

## 2021-05-24 ENCOUNTER — OFFICE VISIT (OUTPATIENT)
Dept: PSYCHIATRY | Facility: CLINIC | Age: 56
End: 2021-05-24

## 2021-05-24 DIAGNOSIS — F41.1 GENERALIZED ANXIETY DISORDER: ICD-10-CM

## 2021-05-24 DIAGNOSIS — F33.2 SEVERE EPISODE OF RECURRENT MAJOR DEPRESSIVE DISORDER, WITHOUT PSYCHOTIC FEATURES (HCC): Primary | ICD-10-CM

## 2021-05-24 DIAGNOSIS — F10.20 ALCOHOL USE DISORDER, SEVERE, DEPENDENCE (HCC): ICD-10-CM

## 2021-05-24 PROCEDURE — 90837 PSYTX W PT 60 MINUTES: CPT | Performed by: SOCIAL WORKER

## 2021-05-24 NOTE — PROGRESS NOTES
"Date: 05/24/2021   Time In: 900  Time Out: 1000    PROGRESS NOTE  Data:  Yousuf Barrera is a 56 y.o. male who presents today for individual therapy session at [facility].     ICD-10-CM ICD-9-CM   1. Severe episode of recurrent major depressive disorder, without psychotic features (CMS/HCC)  F33.2 296.33   2. Alcohol use disorder, severe, dependence (CMS/HCC)  F10.20 303.90   3. Generalized anxiety disorder  F41.1 300.02     Patient reports feeling \"pretty good\" today. Patient endorses decrease in feelings of resentment and anger towards his wife. Patient reports he did stay the weekend with his daughter and the visit went well. Patient reports he did engage in some previous behaviors such as checking the phone log and reading his wife's journal while staying at the house. Patient reports it was helpful for him to know the truth and reports he is coping well at this time. Patient reports he continues his AA meetings and has recently starting exercising again. Patient reports he threw out some old items that reminded him of his wife as part of his acceptance. Patient reports he is looking forward to working his 5th step. Patient disclosed he has informed his parents and siblings about his recent relapse and possible divorce with his wife. Patient reports he believes he is able to get through this difficult situation and states he does not need to go back to [place]. Patient denies suicidal thoughts at this time.      Clinical Maneuvering/Intervention:    (Scales based on 0 - 10 with 10 being the worst)  Depression:   2 Anxiety:  10       Assisted patient in processing above session content; acknowledged and normalized patient’s thoughts, feelings, and concerns. Discussed triggers associated with patient's anxiety.  Also discussed coping skills for patient to implement such as contacting his supports and mindfulness. Continue working towards acceptance.    Allowed patient to freely discuss issues " without interruption or judgment. Provided safe, confidential environment to facilitate the development of positive therapeutic relationship and encourage open, honest communication. Assisted patient in identifying risk factors which would indicate the need for higher level of care including thoughts to harm self or others and/or self-harming behavior and encouraged patient to contact this office, call 911, or present to the nearest emergency room should any of these events occur. Discussed crisis intervention services and means to access. Patient adamantly and convincingly denies current suicidal or homicidal ideation or perceptual disturbance.    Assessment   Patient appears to maintain relative stability as compared to their baseline.  However, patient continues to struggle with anxiety and depression which continues to cause impairment in important areas of functioning.  As a result, they can be reasonably expected to continue to benefit from treatment and would likely be at increased risk for decompensation otherwise.    Mental Status Exam:   Hygiene:   good  Cooperation:  Cooperative  Eye Contact:  Good  Psychomotor Behavior:  Appropriate  Affect:  Appropriate  Mood: normal  Speech:  Normal  Thought Process:  Goal directed and Linear  Thought Content:  Normal  Suicidal:  None  Homicidal:  None  Hallucinations:  None  Delusion:  None  Memory:  Intact  Orientation:  Person, Place, Time and Situation  Reliability:  good  Insight:  Fair  Judgement:  Fair  Impulse Control:  Fair  Physical/Medical Issues:  No      Patient's Support Network Includes:  wife, daughter and sponsor, AA peers    Functional Status: Moderate impairment     Progress toward goal: Not at goal    Prognosis: Good with Ongoing Treatment          Plan     Patient will continue in individual outpatient therapy with focus on improved functioning and coping skills, maintaining stability, and avoiding decompensation and the need for higher level of  care.    Patient will adhere to medication regimen as prescribed and report any side effects. Patient will contact this office, call 911 or present to the nearest emergency room should suicidal or homicidal ideations occur. Provide Cognitive Behavioral Therapy and Solution Focused Therapy to improve functioning, maintain stability, and avoid decompensation and the need for higher level of care.     Return in about Return in about 1 week (around 5/31/2021) for Next scheduled follow up. or earlier if symptoms worsen or fail to improve.            This document has been electronically signed by Carolina Lynn LCSW  May 24, 2021 10:02 EDT      Part of this note may be an electronic transcription/translation of spoken language to printed text using the Dragon Dictation System.

## 2021-06-01 ENCOUNTER — OFFICE VISIT (OUTPATIENT)
Dept: PSYCHIATRY | Facility: CLINIC | Age: 56
End: 2021-06-01

## 2021-06-01 DIAGNOSIS — F33.2 SEVERE EPISODE OF RECURRENT MAJOR DEPRESSIVE DISORDER, WITHOUT PSYCHOTIC FEATURES (HCC): Primary | ICD-10-CM

## 2021-06-01 DIAGNOSIS — F10.20 ALCOHOL USE DISORDER, SEVERE, DEPENDENCE (HCC): ICD-10-CM

## 2021-06-01 DIAGNOSIS — F41.1 GAD (GENERALIZED ANXIETY DISORDER): ICD-10-CM

## 2021-06-01 PROCEDURE — 90837 PSYTX W PT 60 MINUTES: CPT | Performed by: SOCIAL WORKER

## 2021-06-04 ENCOUNTER — OFFICE VISIT (OUTPATIENT)
Dept: PSYCHIATRY | Facility: CLINIC | Age: 56
End: 2021-06-04

## 2021-06-04 VITALS
SYSTOLIC BLOOD PRESSURE: 160 MMHG | BODY MASS INDEX: 22.48 KG/M2 | DIASTOLIC BLOOD PRESSURE: 80 MMHG | HEIGHT: 72 IN | HEART RATE: 76 BPM | WEIGHT: 166 LBS | TEMPERATURE: 97.7 F | RESPIRATION RATE: 18 BRPM

## 2021-06-04 DIAGNOSIS — F41.1 GENERALIZED ANXIETY DISORDER: Chronic | ICD-10-CM

## 2021-06-04 DIAGNOSIS — F10.20 ALCOHOL USE DISORDER, SEVERE, DEPENDENCE (HCC): ICD-10-CM

## 2021-06-04 DIAGNOSIS — F51.05 INSOMNIA DUE TO MENTAL DISORDER: Chronic | ICD-10-CM

## 2021-06-04 DIAGNOSIS — F33.2 SEVERE EPISODE OF RECURRENT MAJOR DEPRESSIVE DISORDER, WITHOUT PSYCHOTIC FEATURES (HCC): Primary | Chronic | ICD-10-CM

## 2021-06-04 PROCEDURE — 99214 OFFICE O/P EST MOD 30 MIN: CPT | Performed by: NURSE PRACTITIONER

## 2021-06-04 RX ORDER — ASPIRIN 325 MG
325 TABLET ORAL DAILY
COMMUNITY

## 2021-06-04 RX ORDER — ESCITALOPRAM OXALATE 10 MG/1
10 TABLET ORAL DAILY
Qty: 30 TABLET | Refills: 2 | Status: SHIPPED | OUTPATIENT
Start: 2021-06-04 | End: 2021-09-01

## 2021-06-04 NOTE — PROGRESS NOTES
"  Chief Complaint  Anxiety, Depression, and Sleeping Problem    Subjective          Yousuf Barrera presents to [facility] for medication management of his anxiety, depression, and sleeping difficulties.    History of Present Illness: Patient presents today for follow-up appointment reports he has been doing well.  Patient endorses taking his medications as prescribed.  He says he is using the propranolol on a twice daily basis, and has not had to take it 3 times daily, but says he does carry it with him in case he would need it.  He also reports he has been using the Remeron on a as needed basis, and says his last use was approximately 1 week ago.  Patient reports therapy is going very well and he says \"I am working daily on honesty, selflessness, and has been going to 5 AA meetings a week\".  He reports he is in regular contact with his sponsor.  Patient reports he recently ran out of medicine and missed 1 day of his Lexapro.  He also reports he has started golfing again, and is starting to enjoy things.  Patient denies any issues with sleep or appetite.  Patient denies any SI/HI, A/V hallucinations.    Current Medications:   Current Outpatient Medications   Medication Sig Dispense Refill   • aspirin 325 MG tablet Take 325 mg by mouth Daily.     • cholecalciferol (VITAMIN D3) 25 MCG (1000 UT) tablet Take 1,000 Units by mouth 2 (Two) Times a Day.     • escitalopram (LEXAPRO) 10 MG tablet Take 1 tablet by mouth Daily. Indications: Major Depressive Disorder 30 tablet 2   • levocetirizine (XYZAL) 5 MG tablet Take 5 mg by mouth Every Night.     • losartan (COZAAR) 50 MG tablet Take 50 mg by mouth Daily.     • multivitamin with minerals tablet tablet Take 1 tablet by mouth Daily.     • propranolol (INDERAL) 20 MG tablet Take 1 tablet by mouth 3 (Three) Times a Day As Needed (Anxiety). 90 tablet 2   • vitamin B-12 (CYANOCOBALAMIN) 1000 MCG tablet Take 1,000 mcg by mouth Daily.     • " "mirtazapine (Remeron) 15 MG tablet Take 0.5-1 tablets by mouth Every Night. 30 tablet 2     No current facility-administered medications for this visit.       Mental Status Exam:   Hygiene:   good  Cooperation:  Cooperative  Eye Contact:  Good  Psychomotor Behavior:  Appropriate  Affect:  Appropriate  Mood: euthymic  Speech:  Normal  Thought Process:  Goal directed  Thought Content:  Mood congruent  Suicidal:  None  Homicidal:  None  Hallucinations:  None  Delusion:  None  Memory:  Intact  Orientation:  Person, Place, Time and Situation  Reliability:  good  Insight:  Good  Judgement:  Good  Impulse Control:  Fair  Physical/Medical Issues:  No        Objective   Vital Signs:   /80 (BP Location: Left arm)   Pulse 76   Temp 97.7 °F (36.5 °C) (Infrared)   Resp 18   Ht 182.9 cm (72\")   Wt 75.3 kg (166 lb)   BMI 22.51 kg/m²     Physical Exam  Neurological:      Mental Status: He is oriented to person, place, and time. Mental status is at baseline.      Coordination: Coordination is intact.      Gait: Gait is intact.   Psychiatric:         Behavior: Behavior is cooperative.         Thought Content: Thought content normal.         Cognition and Memory: Cognition and memory normal.         Judgment: Judgment normal.        Result Review :     The following data was reviewed by: MELVIN Wadsworth on 06/04/2021:    Data reviewed: Previous notes, and medication history.          Assessment and Plan    Problem List Items Addressed This Visit        Mental Health    Alcohol use disorder, severe, dependence (CMS/HCC)    Relevant Medications    escitalopram (LEXAPRO) 10 MG tablet    Severe episode of recurrent major depressive disorder, without psychotic features (CMS/HCC) - Primary    Relevant Medications    escitalopram (LEXAPRO) 10 MG tablet      Other Visit Diagnoses     Generalized anxiety disorder  (Chronic)       Relevant Medications    escitalopram (LEXAPRO) 10 MG tablet    Insomnia due to mental disorder  " (Chronic)       Relevant Medications    escitalopram (LEXAPRO) 10 MG tablet          PHQ-9 Score:   PHQ-9 Total Score: 1    Depression Screening:  Patient screened positive for depression based on a PHQ-9 score of 1 on 6/4/2021. Follow-up recommendations include: Prescribed antidepressant medication treatment and Suicide Risk Assessment performed.      Tobacco Cessation:  Yousuf Barrera  reports that he has never smoked. His smokeless tobacco use includes snuff.. I have educated him on the risk of diseases from using tobacco products such as cancer, COPD and heart disease.     I advised him to quit and he is not willing to quit.    I spent 3  minutes counseling the patient.      Impression/Plan:  -This is a follow-up appointment.  Patient presents today's appointment reports he is doing significantly better than at his previous appointment.  Patient endorses an improvement to both mood and anxiety after initiation of his medication.  Patient reports he believes they are both working well.  He denies any adverse effects associated with his medications, endorses taking them as prescribed.  Patient reports therapy has been going very well, and he likes his new therapist.  -Maintain Lexapro 10 mg daily.  Refill sent.  -Maintain propranolol 20 mg 3 times daily as needed.  Patient has refills.  -Maintain Remeron 7.5 to 15 mg nightly.  Patient has refills.  -Schedule follow-up for 1 month or as needed.    MEDS ORDERED DURING VISIT:  New Medications Ordered This Visit   Medications   • escitalopram (LEXAPRO) 10 MG tablet     Sig: Take 1 tablet by mouth Daily. Indications: Major Depressive Disorder     Dispense:  30 tablet     Refill:  2         Follow Up   Return in about 1 month (around 7/4/2021), or if symptoms worsen or fail to improve, for Next scheduled follow up.  Patient was given instructions and counseling regarding his condition or for health maintenance advice. Please see specific information pulled into the AVS if  appropriate.       TREATMENT PLAN/GOALS: Continue supportive psychotherapy efforts and medications as indicated. Treatment and medication options discussed during today's visit. Patient acknowledged and verbally consented to continue with current treatment plan and was educated on the importance of compliance with treatment and follow-up appointments.    MEDICATION ISSUES:  Discussed medication options and treatment plan of prescribed medication as well as the risks, benefits, and side effects including potential falls, possible impaired driving and metabolic adversities among others. Patient is agreeable to call the office with any worsening of symptoms or onset of side effects. Patient is agreeable to call 911 or go to the nearest ER should he/she begin having SI/HI.          This document has been electronically signed by MELVIN Montoya, PMHNP-BC  June 4, 2021 12:40 EDT      Part of this note may be an electronic transcription/translation of spoken language to printed text using the Dragon Dictation System.

## 2021-06-08 ENCOUNTER — OFFICE VISIT (OUTPATIENT)
Dept: PSYCHIATRY | Facility: CLINIC | Age: 56
End: 2021-06-08

## 2021-06-08 DIAGNOSIS — F41.1 GENERALIZED ANXIETY DISORDER: ICD-10-CM

## 2021-06-08 DIAGNOSIS — F10.20 ALCOHOL USE DISORDER, SEVERE, DEPENDENCE (HCC): ICD-10-CM

## 2021-06-08 DIAGNOSIS — F33.2 SEVERE EPISODE OF RECURRENT MAJOR DEPRESSIVE DISORDER, WITHOUT PSYCHOTIC FEATURES (HCC): Primary | ICD-10-CM

## 2021-06-08 PROCEDURE — 90837 PSYTX W PT 60 MINUTES: CPT | Performed by: SOCIAL WORKER

## 2021-06-08 NOTE — PROGRESS NOTES
"Date: 06/08/2021   Time In: 900  Time Out: 1005  PROGRESS NOTE  Data:  Yousuf Barrera is a 56 y.o. male who presents today for individual therapy session at [facility].     ICD-10-CM ICD-9-CM   1. Severe episode of recurrent major depressive disorder, without psychotic features (CMS/HCC)  F33.2 296.33   2. Generalized anxiety disorder  F41.1 300.02   3. Alcohol use disorder, severe, dependence (CMS/HCC)  F10.20 303.90     Patient reports he has \"stepped backwards\". Patient reports feeling more depressed and anxious than his last visit due to the weekend. Patient discussed an incident that occurred on Friday that triggered an \"episode\" of intense emotions. Patient reports he worries that his wife will start going out with another man and reports he \"will never recover from that\". Patient reports he was able to de-escalate himself over the weekend and continued to attend AA meetings with sponsor and friends and visit with his daughter. Patient discussed feeling conflicted due to his interest in talking to a woman but was advised by his sponsor not to at this point in his recovery. Patient reports he was not able to meet his previous goal of checking the phone logs and will continue working towards acceptance. Patient denies suicidal ideations.      Clinical Maneuvering/Intervention:    (Scales based on 0 - 10 with 10 being the worst)  Depression:   4 Anxiety:  5       Assisted patient in processing above session content; acknowledged and normalized patient’s thoughts, feelings, and concerns.  Rationalized patient thought process regarding his emotions. Discussed with patient the importance of setting boundaries with wife. Discussed triggers associated with patient's anxiety  Also discussed coping skills for patient to implement such as mindfulness and challenging negative thoughts.    Allowed patient to freely discuss issues without interruption or judgment. Provided safe, confidential environment to " facilitate the development of positive therapeutic relationship and encourage open, honest communication. Assisted patient in identifying risk factors which would indicate the need for higher level of care including thoughts to harm self or others and/or self-harming behavior and encouraged patient to contact this office, call 911, or present to the nearest emergency room should any of these events occur. Discussed crisis intervention services and means to access. Patient adamantly and convincingly denies current suicidal or homicidal ideation or perceptual disturbance.    Assessment   Patient appears to maintain relative stability as compared to their baseline.  However, patient continues to struggle with depression which continues to cause impairment in important areas of functioning.  As a result, they can be reasonably expected to continue to benefit from treatment and would likely be at increased risk for decompensation otherwise.    Mental Status Exam:   Hygiene:   good  Cooperation:  Cooperative  Eye Contact:  Good  Psychomotor Behavior:  Appropriate  Affect:  Appropriate  Mood: normal  Speech:  Normal  Thought Process:  Goal directed and Linear  Thought Content:  Normal  Suicidal:  None  Homicidal:  None  Hallucinations:  None  Delusion:  None  Memory:  Intact  Orientation:  Person, Place, Time and Situation  Reliability:  good  Insight:  Fair  Judgement:  Poor  Impulse Control:  Poor  Physical/Medical Issues:  No      Patient's Support Network Includes:  daughter, parents, extended family and sponsor    Functional Status: Severe impairment    Progress toward goal: Not at goal    Prognosis: Good with Ongoing Treatment          Plan     Patient will continue in individual outpatient therapy with focus on improved functioning and coping skills, maintaining stability, and avoiding decompensation and the need for higher level of care.    Patient will adhere to medication regimen as prescribed and report any side  effects. Patient will contact this office, call 911 or present to the nearest emergency room should suicidal or homicidal ideations occur. Provide Cognitive Behavioral Therapy and Solution Focused Therapy to improve functioning, maintain stability, and avoid decompensation and the need for higher level of care.     Return in about Return in about 2 weeks (around 6/22/2021) for Next scheduled follow up. or earlier if symptoms worsen or fail to improve.            This document has been electronically signed by Carolina Lynn LCSW  June 8, 2021 08:58 EDT      Part of this note may be an electronic transcription/translation of spoken language to printed text using the Dragon Dictation System.

## 2021-06-14 ENCOUNTER — OFFICE VISIT (OUTPATIENT)
Dept: PSYCHIATRY | Facility: CLINIC | Age: 56
End: 2021-06-14

## 2021-06-14 DIAGNOSIS — F41.1 GAD (GENERALIZED ANXIETY DISORDER): ICD-10-CM

## 2021-06-14 DIAGNOSIS — F10.20 ALCOHOL USE DISORDER, SEVERE, DEPENDENCE (HCC): ICD-10-CM

## 2021-06-14 DIAGNOSIS — F33.2 SEVERE EPISODE OF RECURRENT MAJOR DEPRESSIVE DISORDER, WITHOUT PSYCHOTIC FEATURES (HCC): Primary | ICD-10-CM

## 2021-06-14 PROCEDURE — 90837 PSYTX W PT 60 MINUTES: CPT | Performed by: SOCIAL WORKER

## 2021-06-14 NOTE — PROGRESS NOTES
"Date: 06/14/2021   Time In: 900  Time Out: 1000    PROGRESS NOTE  Data:  Yousuf Barrera is a 56 y.o. male who presents today for individual therapy session at [facility].     ICD-10-CM ICD-9-CM   1. Severe episode of recurrent major depressive disorder, without psychotic features (CMS/HCC)  F33.2 296.33   2. HALI (generalized anxiety disorder)  F41.1 300.02   3. Alcohol use disorder, severe, dependence (CMS/HCC)  F10.20 303.90     Patient reports recent increase in anxiety. Patient reports he continues to have hope that him and his wife will continue the marriage. Patient reports he does not feel that he is able to file for divorce due to having hope for reconciliation. Patient also expresses fear of completing his 9th step in AA which is making amends. Patient reports he will either get back with his wife or it will provide him with closure. Patient reports he continues to check phone logs but reports it is getting \"easier\". Patient is continuing AA meetings and managing cravings. Patient denies suicidal thoughts.       Clinical Maneuvering/Intervention:    (Scales based on 0 - 10 with 10 being the worst)  Depression:   0 Anxiety:  10       Assisted patient in processing above session content; acknowledged and normalized patient’s thoughts, feelings, and concerns.   Discussed triggers associated with patient's anxiety  Also discussed coping skills for patient to implement such as establishing boundaries and contacting supports.    Allowed patient to freely discuss issues without interruption or judgment. Provided safe, confidential environment to facilitate the development of positive therapeutic relationship and encourage open, honest communication. Assisted patient in identifying risk factors which would indicate the need for higher level of care including thoughts to harm self or others and/or self-harming behavior and encouraged patient to contact this office, call 911, or present to the nearest " emergency room should any of these events occur. Discussed crisis intervention services and means to access. Patient adamantly and convincingly denies current suicidal or homicidal ideation or perceptual disturbance.    Assessment   Patient appears to maintain relative stability as compared to their baseline.  However, patient continues to struggle with depression and anxiety which continues to cause impairment in important areas of functioning.  As a result, they can be reasonably expected to continue to benefit from treatment and would likely be at increased risk for decompensation otherwise.    Mental Status Exam:   Hygiene:   good  Cooperation:  Cooperative  Eye Contact:  Good  Psychomotor Behavior:  Appropriate  Affect:  Appropriate  Mood: normal  Speech:  Normal  Thought Process:  Goal directed and Linear  Thought Content:  Normal  Suicidal:  None  Homicidal:  None  Hallucinations:  None  Delusion:  None  Memory:  Intact  Orientation:  Person, Place, Time and Situation  Reliability:  good  Insight:  Poor  Judgement:  Poor  Impulse Control:  Poor  Physical/Medical Issues:  No      Patient's Support Network Includes:  parents and extended family    Functional Status: Moderate impairment     Progress toward goal: Not at goal    Prognosis: Good with Ongoing Treatment          Plan     Patient will continue in individual outpatient therapy with focus on improved functioning and coping skills, maintaining stability, and avoiding decompensation and the need for higher level of care.    Patient will adhere to medication regimen as prescribed and report any side effects. Patient will contact this office, call 911 or present to the nearest emergency room should suicidal or homicidal ideations occur. Provide Cognitive Behavioral Therapy and Solution Focused Therapy to improve functioning, maintain stability, and avoid decompensation and the need for higher level of care.     Return in about Return in about 2 weeks  (around 6/28/2021) for Next scheduled follow up. or earlier if symptoms worsen or fail to improve.            This document has been electronically signed by Carolina Lynn LCSW  June 14, 2021 09:00 EDT      Part of this note may be an electronic transcription/translation of spoken language to printed text using the Dragon Dictation System.

## 2021-06-21 ENCOUNTER — OFFICE VISIT (OUTPATIENT)
Dept: PSYCHIATRY | Facility: CLINIC | Age: 56
End: 2021-06-21

## 2021-06-21 DIAGNOSIS — F33.2 SEVERE EPISODE OF RECURRENT MAJOR DEPRESSIVE DISORDER, WITHOUT PSYCHOTIC FEATURES (HCC): Primary | ICD-10-CM

## 2021-06-21 DIAGNOSIS — F41.1 GENERALIZED ANXIETY DISORDER: ICD-10-CM

## 2021-06-21 DIAGNOSIS — F10.20 ALCOHOL USE DISORDER, SEVERE, DEPENDENCE (HCC): ICD-10-CM

## 2021-06-21 PROCEDURE — 90837 PSYTX W PT 60 MINUTES: CPT | Performed by: SOCIAL WORKER

## 2021-06-21 NOTE — PROGRESS NOTES
"Date: 06/21/2021   Time In: 900  Time Out: 1000    PROGRESS NOTE  Data:  Yousuf Barrera is a 56 y.o. male who presents today for individual therapy session at [facility].     ICD-10-CM ICD-9-CM   1. Severe episode of recurrent major depressive disorder, without psychotic features (CMS/HCC)  F33.2 296.33   2. Alcohol use disorder, severe, dependence (CMS/HCC)  F10.20 303.90   3. Generalized anxiety disorder  F41.1 300.02     Patient reports he is feeling \"good\" today. Patient reports continued involvement with his recovery and still working on his making amends step. Patient reports he plans on doing that soon. Patient discussed that he was not able to meet his goal with checking the phone logs but is considering  the phone bills to aid in his goal. Patient endorses less anxiety while checking the phone logs. Patient reports he is fearful that his wife will ask him to watch their daughter for another weekend trip. Patient discussed his concerns regarding this and reports he is only agreeable to keep her for 24 hours at this time and will help take her to work. Patient reports he is interested in meeting new people and building his support network.     Clinical Maneuvering/Intervention:    (Scales based on 0 - 10 with 10 being the worst)  Depression:   0 Anxiety:  4       Assisted patient in processing above session content; acknowledged and normalized patient’s thoughts, feelings, and concerns.  Rationalized patient thought process regarding concerns about watching his daughter. Prompted patient to identify behaviors that will support him in his goal towards acceptance. Discussed triggers associated with patient's anxiety.  Also discussed coping skills for patient to implement such as acceptance and challenging negative thoughts.    Allowed patient to freely discuss issues without interruption or judgment. Provided safe, confidential environment to facilitate the development of positive " therapeutic relationship and encourage open, honest communication. Assisted patient in identifying risk factors which would indicate the need for higher level of care including thoughts to harm self or others and/or self-harming behavior and encouraged patient to contact this office, call 911, or present to the nearest emergency room should any of these events occur. Discussed crisis intervention services and means to access. Patient adamantly and convincingly denies current suicidal or homicidal ideation or perceptual disturbance.    Assessment   Patient appears to maintain relative stability as compared to their baseline.  However, patient continues to struggle with anxiety which continues to cause impairment in important areas of functioning.  As a result, they can be reasonably expected to continue to benefit from treatment and would likely be at increased risk for decompensation otherwise.    Mental Status Exam:   Hygiene:   good  Cooperation:  Cooperative  Eye Contact:  Good  Psychomotor Behavior:  Appropriate  Affect:  Appropriate  Mood: normal  Speech:  Normal  Thought Process:  Goal directed and Linear  Thought Content:  Normal  Suicidal:  None  Homicidal:  None  Hallucinations:  None  Delusion:  None  Memory:  Intact  Orientation:  Person, Place, Time and Situation  Reliability:  good  Insight:  Fair  Judgement:  Fair  Impulse Control:  Fair  Physical/Medical Issues:  No      Patient's Support Network Includes:  daughter, parents and recovery supports    Functional Status: Mild impairment     Progress toward goal: Not at goal    Prognosis: Good with Ongoing Treatment          Plan     Patient will continue in individual outpatient therapy with focus on improved functioning and coping skills, maintaining stability, and avoiding decompensation and the need for higher level of care.    Patient will adhere to medication regimen as prescribed and report any side effects. Patient will contact this office, call  911 or present to the nearest emergency room should suicidal or homicidal ideations occur. Provide Cognitive Behavioral Therapy and Solution Focused Therapy to improve functioning, maintain stability, and avoid decompensation and the need for higher level of care.     Return in about Return in about 2 weeks (around 7/5/2021) for Next scheduled follow up. or earlier if symptoms worsen or fail to improve.            This document has been electronically signed by Carolina Lynn LCSW  June 21, 2021 09:01 EDT      Part of this note may be an electronic transcription/translation of spoken language to printed text using the Dragon Dictation System.

## 2021-06-28 ENCOUNTER — OFFICE VISIT (OUTPATIENT)
Dept: PSYCHIATRY | Facility: CLINIC | Age: 56
End: 2021-06-28

## 2021-06-28 DIAGNOSIS — F33.0 MILD EPISODE OF RECURRENT MAJOR DEPRESSIVE DISORDER (HCC): Primary | ICD-10-CM

## 2021-06-28 DIAGNOSIS — F41.1 GENERALIZED ANXIETY DISORDER: ICD-10-CM

## 2021-06-28 PROCEDURE — 90834 PSYTX W PT 45 MINUTES: CPT | Performed by: SOCIAL WORKER

## 2021-06-28 NOTE — PROGRESS NOTES
"Date: 06/28/2021   Time In: 900  Time Out: 945    PROGRESS NOTE  Data:  Yousuf Barrera is a 56 y.o. male who presents today for individual therapy session at [facility].     ICD-10-CM ICD-9-CM   1. Mild episode of recurrent major depressive disorder (CMS/HCC)  F33.0 296.31   2. Generalized anxiety disorder  F41.1 300.02     Patient reports feeling \"good\". Patient continues to attend AA meetings and is active in his recovery. Patient reports feeling \"good about myself\". Patient did discuss checking phone logs and reports decreased anxiety checking them. Patient acknowledged that it is not healthy behavior and it is not helping him with his goal of acceptance. Patient reports he is avoiding filing for divorce and having conversations with wife. Patient discussed having less anxiety related to picking up and dropping daughter off at work.       Clinical Maneuvering/Intervention:    (Scales based on 0 - 10 with 10 being the worst)  Depression:   1 Anxiety:  3       Assisted patient in processing above session content; acknowledged and normalized patient’s thoughts, feelings, and concerns.  Rationalized patient thought process regarding his need to practice avoidance.  Discussed triggers associated with patient's anxiety.  Also discussed coping skills for patient to implement such as challenging negative thoughts.    Allowed patient to freely discuss issues without interruption or judgment. Provided safe, confidential environment to facilitate the development of positive therapeutic relationship and encourage open, honest communication. Assisted patient in identifying risk factors which would indicate the need for higher level of care including thoughts to harm self or others and/or self-harming behavior and encouraged patient to contact this office, call 911, or present to the nearest emergency room should any of these events occur. Discussed crisis intervention services and means to access. Patient " adamantly and convincingly denies current suicidal or homicidal ideation or perceptual disturbance.    Assessment   Patient appears to maintain relative stability as compared to their baseline.  However, patient continues to struggle with depression and anxiety which continues to cause impairment in important areas of functioning.  As a result, they can be reasonably expected to continue to benefit from treatment and would likely be at increased risk for decompensation otherwise.    Mental Status Exam:   Hygiene:   good  Cooperation:  Cooperative  Eye Contact:  Good  Psychomotor Behavior:  Appropriate  Affect:  Appropriate  Mood: normal  Speech:  Normal  Thought Process:  Goal directed and Linear  Thought Content:  Normal  Suicidal:  None  Homicidal:  None  Hallucinations:  None  Delusion:  None  Memory:  Intact  Orientation:  Grossly intact  Reliability:  good  Insight:  Fair  Judgement:  Fair  Impulse Control:  Good  Physical/Medical Issues:  No      Patient's Support Network Includes:  parents and sober community    Functional Status: Mild impairment     Progress toward goal: Not at goal    Prognosis: Good with Ongoing Treatment          Plan     Patient will continue in individual outpatient therapy with focus on improved functioning and coping skills, maintaining stability, and avoiding decompensation and the need for higher level of care.    Patient will adhere to medication regimen as prescribed and report any side effects. Patient will contact this office, call 911 or present to the nearest emergency room should suicidal or homicidal ideations occur. Provide Cognitive Behavioral Therapy and Solution Focused Therapy to improve functioning, maintain stability, and avoid decompensation and the need for higher level of care.     Return in about Return in about 2 weeks (around 7/12/2021) for Next scheduled follow up. or earlier if symptoms worsen or fail to improve.            This document has been  electronically signed by Carolina Lynn LCSW  June 28, 2021 09:01 EDT      Part of this note may be an electronic transcription/translation of spoken language to printed text using the Dragon Dictation System.

## 2021-07-06 ENCOUNTER — OFFICE VISIT (OUTPATIENT)
Dept: PSYCHIATRY | Facility: CLINIC | Age: 56
End: 2021-07-06

## 2021-07-06 DIAGNOSIS — F10.20 ALCOHOL USE DISORDER, SEVERE, DEPENDENCE (HCC): ICD-10-CM

## 2021-07-06 DIAGNOSIS — F41.1 GAD (GENERALIZED ANXIETY DISORDER): ICD-10-CM

## 2021-07-06 DIAGNOSIS — F33.2 SEVERE EPISODE OF RECURRENT MAJOR DEPRESSIVE DISORDER, WITHOUT PSYCHOTIC FEATURES (HCC): Primary | ICD-10-CM

## 2021-07-06 PROCEDURE — 90837 PSYTX W PT 60 MINUTES: CPT | Performed by: SOCIAL WORKER

## 2021-07-06 NOTE — PROGRESS NOTES
"Date: 07/06/2021   Time In:1230  Time Out: 1330    PROGRESS NOTE  Data:  Yousuf Barrera is a 56 y.o. male who presents today for individual therapy session at [facility].     ICD-10-CM ICD-9-CM   1. Severe episode of recurrent major depressive disorder, without psychotic features (CMS/HCC)  F33.2 296.33   2. Alcohol use disorder, severe, dependence (CMS/HCC)  F10.20 303.90   3. HALI (generalized anxiety disorder)  F41.1 300.02     Patient reports relapsing from alcohol this past Saturday. Patient reports experiencing loneliness and reminiscing about past memories with his daughter and wife. Patient discussed feeling shame regarding his relapse because he had 67 days sober. Patient reports he was able to get to a meeting and speak with his sponsor regarding his relapse. Patient discussed coping skills to help manage cravings and reports he is still attending meetings.   Patient reports he is more accepting of the divorce process and acknowledged he will have to initiate it. Patient discussed an agreement he has with his wife to watch their daughter for an upcoming weekend. Patient discussed checking phone logs over the weekend during his relapse.  Patient reports his anxiety has been \"higher\" because of his relapse but denies feeling depressed at this time.       Clinical Maneuvering/Intervention:    (Scales based on 0 - 10 with 10 being the worst)  Depression:   na Anxiety:  na       Assisted patient in processing above session content; acknowledged and normalized patient’s thoughts, feelings, and concerns. Discussed triggers associated with patient's relapse.  Also discussed coping skills for patient to implement such as challenging negative thoughts, using supports and gratitude.    Allowed patient to freely discuss issues without interruption or judgment. Provided safe, confidential environment to facilitate the development of positive therapeutic relationship and encourage open, honest " communication. Assisted patient in identifying risk factors which would indicate the need for higher level of care including thoughts to harm self or others and/or self-harming behavior and encouraged patient to contact this office, call 911, or present to the nearest emergency room should any of these events occur. Discussed crisis intervention services and means to access. Patient adamantly and convincingly denies current suicidal or homicidal ideation or perceptual disturbance.    Assessment   Patient appears to maintain relative stability as compared to their baseline.  However, patient continues to struggle with anxiety and alcohol use which continues to cause impairment in important areas of functioning.  As a result, they can be reasonably expected to continue to benefit from treatment and would likely be at increased risk for decompensation otherwise.    Mental Status Exam:   Hygiene:   good  Cooperation:  Cooperative  Eye Contact:  Good  Psychomotor Behavior:  Appropriate  Affect:  Appropriate  Mood: anxious  Speech:  Normal  Thought Process:  Goal directed  Thought Content:  Normal  Suicidal:  None  Homicidal:  None  Hallucinations:  None  Delusion:  None  Memory:  Intact  Orientation:  Grossly intact  Reliability:  good  Insight:  Fair  Judgement:  Poor  Impulse Control:  Poor  Physical/Medical Issues:  No      Patient's Support Network Includes:  daughter and parents    Functional Status: Severe impairment    Progress toward goal: Not at goal    Prognosis: Good with Ongoing Treatment          Plan     Patient will continue in individual outpatient therapy with focus on improved functioning and coping skills, maintaining stability, and avoiding decompensation and the need for higher level of care.    Patient will adhere to medication regimen as prescribed and report any side effects. Patient will contact this office, call 911 or present to the nearest emergency room should suicidal or homicidal ideations  occur. Provide Cognitive Behavioral Therapy and Solution Focused Therapy to improve functioning, maintain stability, and avoid decompensation and the need for higher level of care.     Return in about Return in about 2 weeks (around 7/20/2021) for Next scheduled follow up. or earlier if symptoms worsen or fail to improve.            This document has been electronically signed by Carolina Lynn LCSW  July 6, 2021 12:26 EDT      Part of this note may be an electronic transcription/translation of spoken language to printed text using the Dragon Dictation System.

## 2021-07-07 ENCOUNTER — OFFICE VISIT (OUTPATIENT)
Dept: PSYCHIATRY | Facility: CLINIC | Age: 56
End: 2021-07-07

## 2021-07-07 VITALS — BODY MASS INDEX: 22.48 KG/M2 | WEIGHT: 166 LBS | HEIGHT: 72 IN

## 2021-07-07 DIAGNOSIS — G47.00 INSOMNIA, UNSPECIFIED TYPE: ICD-10-CM

## 2021-07-07 DIAGNOSIS — F33.0 MILD EPISODE OF RECURRENT MAJOR DEPRESSIVE DISORDER (HCC): Chronic | ICD-10-CM

## 2021-07-07 DIAGNOSIS — F10.20 ALCOHOL USE DISORDER, SEVERE, DEPENDENCE (HCC): Primary | ICD-10-CM

## 2021-07-07 DIAGNOSIS — F41.1 GAD (GENERALIZED ANXIETY DISORDER): Chronic | ICD-10-CM

## 2021-07-07 PROCEDURE — 99214 OFFICE O/P EST MOD 30 MIN: CPT | Performed by: NURSE PRACTITIONER

## 2021-07-07 NOTE — PROGRESS NOTES
"Chief Complaint  Alcohol Problem, Anxiety, Depression, and Sleeping Problem    Subjective          Yousuf Barrera presents to [facility] for medication management of his anxiety, depression, sleeping difficulties.    History of Present Illness: Patient presents today for follow-up appointment after last being seen on 06/04/2021.  Patient reports he had an alcohol relapse event over the July 4 holiday.  Patient reports he is now 3 days sober.  Patient endorses frustration with himself over this incident, however says he has processed through it with his sponsor and understands this is oftentimes part of the sobriety process.  Patient reports he spent the majority of the day Monday with his sponsor and has still been going to .  Patient endorses speaking with his therapist about this already.  Patient reports he was down some over the holiday because it was \"the first major holiday where I did not do anything with my family\".  Patient reports he has been trying to stay busy, as he realizes idle times are more difficult for him to manage.  Patient reports he is now focusing on getting back into his routine.  Patient reports he was sleeping well prior to the last 2 nights, \"but is getting back on track again now\".  Patient endorses taking his medication daily as prescribed, and says he is generally using the propranolol 1-2 times a day.  Patient denies any issues with appetite.  Patient denies any SI/HI, A/V hallucinations.    Current Medications:   Current Outpatient Medications   Medication Sig Dispense Refill   • escitalopram (LEXAPRO) 10 MG tablet Take 1 tablet by mouth Daily. Indications: Major Depressive Disorder 30 tablet 2   • mirtazapine (Remeron) 15 MG tablet Take 0.5-1 tablets by mouth Every Night. 30 tablet 2   • propranolol (INDERAL) 20 MG tablet Take 1 tablet by mouth 3 (Three) Times a Day As Needed (Anxiety). 90 tablet 2   • aspirin 325 MG tablet Take 325 mg by mouth " "Daily.     • cholecalciferol (VITAMIN D3) 25 MCG (1000 UT) tablet Take 1,000 Units by mouth 2 (Two) Times a Day.     • levocetirizine (XYZAL) 5 MG tablet Take 5 mg by mouth Every Night.     • losartan (COZAAR) 50 MG tablet Take 50 mg by mouth Daily.     • multivitamin with minerals tablet tablet Take 1 tablet by mouth Daily.     • vitamin B-12 (CYANOCOBALAMIN) 1000 MCG tablet Take 1,000 mcg by mouth Daily.       No current facility-administered medications for this visit.       Mental Status Exam:   Hygiene:   good  Cooperation:  Cooperative  Eye Contact:  Good  Psychomotor Behavior:  Appropriate  Affect:  Appropriate  Mood: normal and euthymic  Speech:  Normal  Thought Process:  Goal directed  Thought Content:  Mood congruent  Suicidal:  None  Homicidal:  None  Hallucinations:  None  Delusion:  None  Memory:  Intact  Orientation:  Person, Place, Time and Situation  Reliability:  fair  Insight:  Good  Judgement:  Fair  Impulse Control:  Good  Physical/Medical Issues:  HTN       Objective   Vital Signs:   Ht 182.9 cm (72\")   Wt 75.3 kg (166 lb)   BMI 22.51 kg/m²     Physical Exam  Neurological:      Mental Status: He is oriented to person, place, and time. Mental status is at baseline.      Coordination: Coordination is intact.      Gait: Gait is intact.   Psychiatric:         Behavior: Behavior is cooperative.         Thought Content: Thought content normal.         Cognition and Memory: Cognition and memory normal.         Judgment: Judgment is impulsive.        Result Review :     The following data was reviewed by: MELVIN Wadsworth on 07/07/2021:    Data reviewed: Previous note, medication history          Assessment and Plan    Problem List Items Addressed This Visit        Mental Health    Alcohol use disorder, severe, dependence (CMS/HCC) - Primary    HALI (generalized anxiety disorder)      Other Visit Diagnoses     Mild episode of recurrent major depressive disorder (CMS/HCC)  (Chronic)       Insomnia, " unspecified type              PHQ-9 Score:   PHQ-9 Total Score: 1    Depression Screening:  Patient screened positive for depression based on a PHQ-9 score of 1 on 7/7/2021. Follow-up recommendations include: Prescribed antidepressant medication treatment and Suicide Risk Assessment performed.      Tobacco Cessation:  Yousuf Barrera  reports that he has never smoked. His smokeless tobacco use includes snuff.. I have educated him on the risk of diseases from using tobacco products such as cancer, COPD and heart disease.     I advised him to quit and he is not willing to quit.    I spent 3  minutes counseling the patient.      Impression/Plan:  -This follow-up appointment.  Patient presents today and reports he believes he has done fairly well since last appointment, however endorses having and a relapse event over the weekend.  Patient reports that lasted 1 day, and he has been sober since that event.  Patient reports he is trying to get back into his routine that he knows is mostly responsible for his sobriety.  Patient Dors is continuing to go to AA, and lean on those around him for support.  Patient endorses taking his medications as prescribed, and denies any adverse effects associated with them.  I brought up the potential use of Vivitrol for the patient if he is interested in the future.  -Maintain Lexapro 10 mg daily.  -Maintain Remeron 7.5 to 15 mg nightly.  -Maintain propranolol 20 mg 3 times daily as needed.  -Encourage patient to maintain therapy and keep all upcoming appointments.  -Schedule follow-up for 2 months or as needed.    MEDS ORDERED DURING VISIT:  No orders of the defined types were placed in this encounter.        Follow Up   Return in about 2 months (around 9/7/2021), or if symptoms worsen or fail to improve, for Next scheduled follow up.  Patient was given instructions and counseling regarding his condition or for health maintenance advice. Please see specific information pulled into the AVS if  appropriate.       TREATMENT PLAN/GOALS: Continue supportive psychotherapy efforts and medications as indicated. Treatment and medication options discussed during today's visit. Patient acknowledged and verbally consented to continue with current treatment plan and was educated on the importance of compliance with treatment and follow-up appointments.    MEDICATION ISSUES:  Discussed medication options and treatment plan of prescribed medication as well as the risks, benefits, and side effects including potential falls, possible impaired driving and metabolic adversities among others. Patient is agreeable to call the office with any worsening of symptoms or onset of side effects. Patient is agreeable to call 911 or go to the nearest ER should he/she begin having SI/HI.          This document has been electronically signed by MELVIN Montoya, PMHNP-BC  July 7, 2021 13:41 EDT      Part of this note may be an electronic transcription/translation of spoken language to printed text using the Dragon Dictation System.

## 2021-07-12 ENCOUNTER — OFFICE VISIT (OUTPATIENT)
Dept: PSYCHIATRY | Facility: CLINIC | Age: 56
End: 2021-07-12

## 2021-07-12 DIAGNOSIS — F41.1 GENERALIZED ANXIETY DISORDER: ICD-10-CM

## 2021-07-12 DIAGNOSIS — F10.20 ALCOHOL USE DISORDER, SEVERE, DEPENDENCE (HCC): Primary | ICD-10-CM

## 2021-07-12 DIAGNOSIS — F33.0 MILD EPISODE OF RECURRENT MAJOR DEPRESSIVE DISORDER (HCC): ICD-10-CM

## 2021-07-12 PROCEDURE — 90837 PSYTX W PT 60 MINUTES: CPT | Performed by: SOCIAL WORKER

## 2021-07-12 NOTE — PROGRESS NOTES
"Date: 07/12/2021   Time In: 900  Time Out: 1000    PROGRESS NOTE  Data:  Yousuf Barrera is a 56 y.o. male who presents today for individual therapy session at [facility].     ICD-10-CM ICD-9-CM   1. Alcohol use disorder, severe, dependence (CMS/HCC)  F10.20 303.90   2. Mild episode of recurrent major depressive disorder (CMS/HCC)  F33.0 296.31   3. Generalized anxiety disorder  F41.1 300.02     Patient reports feeling \"pretty good\" today. Patient wanted to contact the [employer] regarding his FMLA during the session. Patient reports having difficulty having his time off approved. Patient discussed anxiety regarding recent conversations with his wife. Patient reports her comment of \"I have to do what's best for me\" has caused anger regarding their relationship. Patient continues to want the \"truth\" from his wife regarding text messages she was sending during their relationship. Patient is considering filing for divorce now because of their conversation and her comment. Patient discussed meeting with his  to start the process. Patient reports he is prepared for \"mixed feelings\" regarding the divorce process and has spoke with his sponsor regarding these concerns. Patient reports he is interested in information for vivitrol.       Clinical Maneuvering/Intervention:    (Scales based on 0 - 10 with 10 being the worst)  Depression:   0 Anxiety:  10       Assisted patient in processing above session content; acknowledged and normalized patient’s thoughts, feelings, and concerns.  Discussed triggers associated with patient's anxiety.  Also discussed coping skills for patient to implement such as challenging negative thoughts.    Allowed patient to freely discuss issues without interruption or judgment. Provided safe, confidential environment to facilitate the development of positive therapeutic relationship and encourage open, honest communication. Assisted patient in identifying risk factors " which would indicate the need for higher level of care including thoughts to harm self or others and/or self-harming behavior and encouraged patient to contact this office, call 911, or present to the nearest emergency room should any of these events occur. Discussed crisis intervention services and means to access. Patient adamantly and convincingly denies current suicidal or homicidal ideation or perceptual disturbance.    Assessment   Patient appears to maintain relative stability as compared to their baseline.  However, patient continues to struggle with depression and anxiety which continues to cause impairment in important areas of functioning.  As a result, they can be reasonably expected to continue to benefit from treatment and would likely be at increased risk for decompensation otherwise.    Mental Status Exam:   Hygiene:   good  Cooperation:  Cooperative  Eye Contact:  Good  Psychomotor Behavior:  Appropriate  Affect:  Appropriate  Mood: irritable  Speech:  Normal  Thought Process:  Goal directed  Thought Content:  Normal  Suicidal:  None  Homicidal:  None  Hallucinations:  None  Delusion:  None  Memory:  Intact  Orientation:  Grossly intact  Reliability:  good  Insight:  Fair  Judgement:  Poor  Impulse Control:  Poor  Physical/Medical Issues:  No      Patient's Support Network Includes:  sponsor    Functional Status: Mild impairment     Progress toward goal: Not at goal    Prognosis: Good with Ongoing Treatment          Plan     Patient will continue in individual outpatient therapy with focus on improved functioning and coping skills, maintaining stability, and avoiding decompensation and the need for higher level of care.    Patient will adhere to medication regimen as prescribed and report any side effects. Patient will contact this office, call 911 or present to the nearest emergency room should suicidal or homicidal ideations occur. Provide Cognitive Behavioral Therapy and Solution Focused Therapy  to improve functioning, maintain stability, and avoid decompensation and the need for higher level of care.     Return in about Return in about 2 weeks (around 7/26/2021) for Next scheduled follow up. or earlier if symptoms worsen or fail to improve.            This document has been electronically signed by Carolina Lynn LCSW  July 12, 2021 09:01 EDT      Part of this note may be an electronic transcription/translation of spoken language to printed text using the Dragon Dictation System.

## 2021-07-19 ENCOUNTER — OFFICE VISIT (OUTPATIENT)
Dept: PSYCHIATRY | Facility: CLINIC | Age: 56
End: 2021-07-19

## 2021-07-19 DIAGNOSIS — F10.20 ALCOHOL USE DISORDER, SEVERE, DEPENDENCE (HCC): Primary | ICD-10-CM

## 2021-07-19 DIAGNOSIS — F33.0 MILD EPISODE OF RECURRENT MAJOR DEPRESSIVE DISORDER (HCC): ICD-10-CM

## 2021-07-19 DIAGNOSIS — F41.1 GENERALIZED ANXIETY DISORDER: ICD-10-CM

## 2021-07-19 PROCEDURE — 90834 PSYTX W PT 45 MINUTES: CPT | Performed by: SOCIAL WORKER

## 2021-07-19 NOTE — PROGRESS NOTES
"Date: 07/19/2021   Time In: 905  Time Out: 955    PROGRESS NOTE  Data:  Yousuf Barrera is a 56 y.o. male who presents today for individual therapy session at [facility].     ICD-10-CM ICD-9-CM   1. Alcohol use disorder, severe, dependence (CMS/HCC)  F10.20 303.90   2. Mild episode of recurrent major depressive disorder (CMS/HCC)  F33.0 296.31   3. Generalized anxiety disorder  F41.1 300.02     Patient reports he is feeling \"really good\". Patient reports he has made a \"stride in acceptance\". Patient discussed how a text message to his wife stating \"I need to do what's best for me\" has helped him with the process towards acceptance. Patient discussed working with his wife and daughter over the weekend to replace the isidra in the basement. Patient reports the interactions went well and they \"laughed and joked\". Patient denies experiencing any anxiety during this time. Patient reports he has continued to stay involved with AA and working the steps. Patient discussed he will be meeting with his sponsor regarding his interest in vivitrol and if he is able to continue AA while on it. Patient reports he met his goal of checking phone logs only twice. Patient reports his current goal is working on \"humility\".       Clinical Maneuvering/Intervention:    (Scales based on 0 - 10 with 10 being the worst)  Depression:   0 Anxiety:  2       Assisted patient in processing above session content; acknowledged and normalized patient’s thoughts, feelings, and concerns.  Discussed triggers associated with patient's anxiety.  Also discussed coping skills for patient to implement such as mindfulness and challenging negative thoughts.    Allowed patient to freely discuss issues without interruption or judgment. Provided safe, confidential environment to facilitate the development of positive therapeutic relationship and encourage open, honest communication. Assisted patient in identifying risk factors which would " indicate the need for higher level of care including thoughts to harm self or others and/or self-harming behavior and encouraged patient to contact this office, call 911, or present to the nearest emergency room should any of these events occur. Discussed crisis intervention services and means to access. Patient adamantly and convincingly denies current suicidal or homicidal ideation or perceptual disturbance.    Assessment   Patient appears to maintain relative stability as compared to their baseline.  However, patient continues to struggle with anxiety and depression which continues to cause impairment in important areas of functioning.  As a result, they can be reasonably expected to continue to benefit from treatment and would likely be at increased risk for decompensation otherwise.    Mental Status Exam:   Hygiene:   good  Cooperation:  Cooperative  Eye Contact:  Good  Psychomotor Behavior:  Appropriate  Affect:  Appropriate  Mood: normal  Speech:  Normal  Thought Process:  Goal directed  Thought Content:  Normal  Suicidal:  None  Homicidal:  None  Hallucinations:  None  Delusion:  None  Memory:  Intact  Orientation:  Grossly intact  Reliability:  good  Insight:  Fair  Judgement:  Fair  Impulse Control:  Poor  Physical/Medical Issues:  No      Patient's Support Network Includes:  sponsor    Functional Status: Mild impairment     Progress toward goal: Not at goal    Prognosis: Good with Ongoing Treatment          Plan     Patient will continue in individual outpatient therapy with focus on improved functioning and coping skills, maintaining stability, and avoiding decompensation and the need for higher level of care.    Patient will adhere to medication regimen as prescribed and report any side effects. Patient will contact this office, call 911 or present to the nearest emergency room should suicidal or homicidal ideations occur. Provide Cognitive Behavioral Therapy and Solution Focused Therapy to improve  functioning, maintain stability, and avoid decompensation and the need for higher level of care.     Return in about Return in about 2 weeks (around 8/2/2021) for Next scheduled follow up. or earlier if symptoms worsen or fail to improve.            This document has been electronically signed by Carolina Lynn LCSW  July 19, 2021 09:06 EDT      Part of this note may be an electronic transcription/translation of spoken language to printed text using the Dragon Dictation System.

## 2021-07-26 ENCOUNTER — OFFICE VISIT (OUTPATIENT)
Dept: PSYCHIATRY | Facility: CLINIC | Age: 56
End: 2021-07-26

## 2021-07-26 DIAGNOSIS — F41.1 GENERALIZED ANXIETY DISORDER: ICD-10-CM

## 2021-07-26 DIAGNOSIS — F10.20 ALCOHOL USE DISORDER, SEVERE, DEPENDENCE (HCC): Primary | ICD-10-CM

## 2021-07-26 DIAGNOSIS — F33.0 MILD EPISODE OF RECURRENT MAJOR DEPRESSIVE DISORDER (HCC): ICD-10-CM

## 2021-07-26 PROCEDURE — 90834 PSYTX W PT 45 MINUTES: CPT | Performed by: SOCIAL WORKER

## 2021-07-26 NOTE — PROGRESS NOTES
"Date: 07/26/2021   Time In: 908  Time Out: 956    PROGRESS NOTE  Data:  Yousuf Barrera is a 56 y.o. male who presents today for individual therapy session at [facility].     ICD-10-CM ICD-9-CM   1. Alcohol use disorder, severe, dependence (CMS/HCC)  F10.20 303.90   2. Mild episode of recurrent major depressive disorder (CMS/HCC)  F33.0 296.31   3. Generalized anxiety disorder  F41.1 300.02     Patient reports he is \"doing well\". Patient discussed his weekend and working with his wife on putting in new isidra. Patient reports the interactions went well between him and his wife. Patient reports he checked the phone logs twice and reports no anxiety when checking them.Patient discussed making a plan for coping with their upcoming anniversary to prevent relapse. Patient is considering sending a text to his wife on the day of the anniversary but is unsure at this time. Patient expressed the possibility his wife may ask him to come over on their anniversary and he is not sure how he will respond. Patient reports he met someone in the program and they have been communicating casually via texting. Patient continues to work towards humility and acceptance.        Clinical Maneuvering/Intervention:    (Scales based on 0 - 10 with 10 being the worst)  Depression:   0 Anxiety:  0       Assisted patient in processing above session content; acknowledged and normalized patient’s thoughts, feelings, and concerns. Explored patient's behavior with checking the phone logs and prompted patient to identify if that behavior is helping him move towards his goals. Discussed triggers associated with patient's risk of relapse.  Also discussed coping skills for patient to implement such as using social supports.    Allowed patient to freely discuss issues without interruption or judgment. Provided safe, confidential environment to facilitate the development of positive therapeutic relationship and encourage open, honest " communication. Assisted patient in identifying risk factors which would indicate the need for higher level of care including thoughts to harm self or others and/or self-harming behavior and encouraged patient to contact this office, call 911, or present to the nearest emergency room should any of these events occur. Discussed crisis intervention services and means to access. Patient adamantly and convincingly denies current suicidal or homicidal ideation or perceptual disturbance.    Assessment   Patient appears to maintain relative stability as compared to their baseline.  However, patient continues to struggle with depression and anxiety which continues to cause impairment in important areas of functioning.  As a result, they can be reasonably expected to continue to benefit from treatment and would likely be at increased risk for decompensation otherwise.    Mental Status Exam:   Hygiene:   good  Cooperation:  Cooperative  Eye Contact:  Good  Psychomotor Behavior:  Appropriate  Affect:  Appropriate  Mood: normal  Speech:  Normal  Thought Process:  Goal directed  Thought Content:  Normal  Suicidal:  None  Homicidal:  None  Hallucinations:  None  Delusion:  None  Memory:  Intact  Orientation:  Grossly intact  Reliability:  fair  Insight:  Poor  Judgement:  Poor  Impulse Control:  Poor  Physical/Medical Issues:  No      Patient's Support Network Includes:  daughter    Functional Status: Moderate impairment     Progress toward goal: Not at goal    Prognosis: Good with Ongoing Treatment          Plan     Patient will continue in individual outpatient therapy with focus on improved functioning and coping skills, maintaining stability, and avoiding decompensation and the need for higher level of care.    Patient will adhere to medication regimen as prescribed and report any side effects. Patient will contact this office, call 911 or present to the nearest emergency room should suicidal or homicidal ideations occur.  Provide Cognitive Behavioral Therapy and Solution Focused Therapy to improve functioning, maintain stability, and avoid decompensation and the need for higher level of care.     Return in about Return in about 2 weeks (around 8/9/2021) for Next scheduled follow up. or earlier if symptoms worsen or fail to improve.            This document has been electronically signed by Carolina Lynn LCSW  July 26, 2021 09:08 EDT      Part of this note may be an electronic transcription/translation of spoken language to printed text using the Dragon Dictation System.

## 2021-08-04 ENCOUNTER — OFFICE VISIT (OUTPATIENT)
Dept: PSYCHIATRY | Facility: CLINIC | Age: 56
End: 2021-08-04

## 2021-08-04 DIAGNOSIS — F10.20 ALCOHOL USE DISORDER, SEVERE, DEPENDENCE (HCC): Primary | ICD-10-CM

## 2021-08-04 DIAGNOSIS — F33.0 MILD EPISODE OF RECURRENT MAJOR DEPRESSIVE DISORDER (HCC): ICD-10-CM

## 2021-08-04 DIAGNOSIS — F41.1 GENERALIZED ANXIETY DISORDER: ICD-10-CM

## 2021-08-04 PROCEDURE — 90834 PSYTX W PT 45 MINUTES: CPT | Performed by: SOCIAL WORKER

## 2021-08-04 NOTE — PROGRESS NOTES
"Date: 08/04/2021   Time In: 1105  Time Out: 1150    PROGRESS NOTE  Data:  Yousuf Barrera is a 56 y.o. male who presents today for individual therapy session at [facility].     ICD-10-CM ICD-9-CM   1. Alcohol use disorder, severe, dependence (CMS/HCC)  F10.20 303.90   2. Mild episode of recurrent major depressive disorder (CMS/HCC)  F33.0 296.31   3. Generalized anxiety disorder  F41.1 300.02     Patient reports being 30 days sober from alcohol. Patient reports continued involvement with completing stepwork and attending AA meetings. Patient reports looking forward to the amends step in his recovery. Patient reports feeling that he is ready to file for divorce from his wife. Patient reports experiencing \"mixed signals\" from her. Patient discussed thinking that his wife's therapist is helping her \"move on\".  Patient reports he continues to check the phone logs twice. Patient goal is to continue stepwork and work on boundaries.       Clinical Maneuvering/Intervention:    (Scales based on 0 - 10 with 10 being the worst)  Depression:   0 Anxiety:  2       Assisted patient in processing above session content; acknowledged and normalized patient’s thoughts, feelings, and concerns.  Discussed the importance of establishing boundaries. Provided education of the cognitive error of making assumptions and how to challenge those thoughts.  Discussed triggers associated with patient's anxiety.  Also discussed coping skills for patient to implement such as challenging negative thoughts.    Allowed patient to freely discuss issues without interruption or judgment. Provided safe, confidential environment to facilitate the development of positive therapeutic relationship and encourage open, honest communication. Assisted patient in identifying risk factors which would indicate the need for higher level of care including thoughts to harm self or others and/or self-harming behavior and encouraged patient to contact " this office, call 911, or present to the nearest emergency room should any of these events occur. Discussed crisis intervention services and means to access. Patient adamantly and convincingly denies current suicidal or homicidal ideation or perceptual disturbance.    Assessment   Patient appears to maintain relative stability as compared to their baseline.  However, patient continues to struggle with depression and anxiety which continues to cause impairment in important areas of functioning.  As a result, they can be reasonably expected to continue to benefit from treatment and would likely be at increased risk for decompensation otherwise.    Mental Status Exam:   Hygiene:   good  Cooperation:  Cooperative  Eye Contact:  Good  Psychomotor Behavior:  Appropriate  Affect:  Appropriate  Mood: normal  Speech:  Normal  Thought Process:  Goal directed  Thought Content:  Normal  Suicidal:  None  Homicidal:  None  Hallucinations:  None  Delusion:  None  Memory:  Intact  Orientation:  Grossly intact  Reliability:  good  Insight:  Fair  Judgement:  Poor  Impulse Control:  Poor  Physical/Medical Issues:  No      Patient's Support Network Includes:  sponsor    Functional Status: Moderate impairment     Progress toward goal: Not at goal    Prognosis: Good with Ongoing Treatment          Plan     Patient will continue in individual outpatient therapy with focus on improved functioning and coping skills, maintaining stability, and avoiding decompensation and the need for higher level of care.    Patient will adhere to medication regimen as prescribed and report any side effects. Patient will contact this office, call 911 or present to the nearest emergency room should suicidal or homicidal ideations occur. Provide Cognitive Behavioral Therapy and Solution Focused Therapy to improve functioning, maintain stability, and avoid decompensation and the need for higher level of care.     Return in about Return in about 4 weeks (around  9/1/2021) for Next scheduled follow up. or earlier if symptoms worsen or fail to improve.            This document has been electronically signed by Carolina Lynn LCSW  August 4, 2021 11:05 EDT      Part of this note may be an electronic transcription/translation of spoken language to printed text using the Dragon Dictation System.

## 2021-08-20 ENCOUNTER — OFFICE VISIT (OUTPATIENT)
Dept: PSYCHIATRY | Facility: CLINIC | Age: 56
End: 2021-08-20

## 2021-08-20 DIAGNOSIS — F41.1 GENERALIZED ANXIETY DISORDER: ICD-10-CM

## 2021-08-20 DIAGNOSIS — F33.0 MILD EPISODE OF RECURRENT MAJOR DEPRESSIVE DISORDER (HCC): ICD-10-CM

## 2021-08-20 DIAGNOSIS — F10.20 ALCOHOL USE DISORDER, SEVERE, DEPENDENCE (HCC): Primary | ICD-10-CM

## 2021-08-20 PROCEDURE — 90837 PSYTX W PT 60 MINUTES: CPT | Performed by: SOCIAL WORKER

## 2021-08-20 NOTE — PROGRESS NOTES
"Date: 08/20/2021   Time In: 1230  Time Out: 1325    PROGRESS NOTE  Data:  Yousuf Barrera is a 56 y.o. male who presents today for individual therapy session at [facility].     ICD-10-CM ICD-9-CM   1. Alcohol use disorder, severe, dependence (CMS/HCC)  F10.20 303.90   2. Mild episode of recurrent major depressive disorder (CMS/HCC)  F33.0 296.31   3. Generalized anxiety disorder  F41.1 300.02     Patient reports \"everything is good\". Patient reports he is currently on step 6 of his AA recovery. Patient reports increased socializing and playing golf. Patient also reports improvements with self confidence. Patient reports he has decided to file for divorce on 9/8/21. Patient discussed feeling ready to move forward with the process. Patient reports he has a meeting with his wife today that she requested. Patient reports feeling anxious about the meeting because he is unsure of the topic. Patient discussed feeling that he would be ok moving towards divorce and also reports he would be open to reconciliation. Patient reports he will not initiate reconciliation with her but will be receptive if she mentions it. Patient reports he checked the phone logs twice since last session. Patient goal is \"coping with triggers and using coping card if needed\".       Clinical Maneuvering/Intervention:    (Scales based on 0 - 10 with 10 being the worst)  Depression:   0 Anxiety:  5       Assisted patient in processing above session content; acknowledged and normalized patient’s thoughts, feelings, and concerns.  Rationalized patient thought process regarding moving towards divorce or reconciliation.  Discussed triggers associated with patient's anxiety.  Also discussed coping skills for patient to implement such as breathing and challenging anxious thoughts.    Allowed patient to freely discuss issues without interruption or judgment. Provided safe, confidential environment to facilitate the development of positive " therapeutic relationship and encourage open, honest communication. Assisted patient in identifying risk factors which would indicate the need for higher level of care including thoughts to harm self or others and/or self-harming behavior and encouraged patient to contact this office, call 911, or present to the nearest emergency room should any of these events occur. Discussed crisis intervention services and means to access. Patient adamantly and convincingly denies current suicidal or homicidal ideation or perceptual disturbance.    Assessment   Patient appears to maintain relative stability as compared to their baseline.  However, patient continues to struggle with depression and anxiety which continues to cause impairment in important areas of functioning.  As a result, they can be reasonably expected to continue to benefit from treatment and would likely be at increased risk for decompensation otherwise.    Mental Status Exam:   Hygiene:   good  Cooperation:  Cooperative  Eye Contact:  Good  Psychomotor Behavior:  Appropriate  Affect:  Appropriate  Mood: anxious  Speech:  Normal  Thought Process:  Goal directed  Thought Content:  Normal  Suicidal:  None  Homicidal:  None  Hallucinations:  None  Delusion:  None  Memory:  Intact  Orientation:  Grossly intact  Reliability:  good  Insight:  Fair  Judgement:  Fair  Impulse Control:  Poor  Physical/Medical Issues:  No      Patient's Support Network Includes:  sponsor    Functional Status: Mild impairment     Progress toward goal: Not at goal    Prognosis: Good with Ongoing Treatment          Plan     Patient will continue in individual outpatient therapy with focus on improved functioning and coping skills, maintaining stability, and avoiding decompensation and the need for higher level of care.    Patient will adhere to medication regimen as prescribed and report any side effects. Patient will contact this office, call 911 or present to the nearest emergency room  should suicidal or homicidal ideations occur. Provide Cognitive Behavioral Therapy and Solution Focused Therapy to improve functioning, maintain stability, and avoid decompensation and the need for higher level of care.     Return in about Return in about 2 weeks (around 9/3/2021) for Next scheduled follow up. or earlier if symptoms worsen or fail to improve.            This document has been electronically signed by Carolina Lynn LCSW  August 20, 2021 12:30 EDT      Part of this note may be an electronic transcription/translation of spoken language to printed text using the Dragon Dictation System.

## 2021-08-27 ENCOUNTER — OFFICE VISIT (OUTPATIENT)
Dept: PSYCHIATRY | Facility: CLINIC | Age: 56
End: 2021-08-27

## 2021-08-27 DIAGNOSIS — F41.1 GENERALIZED ANXIETY DISORDER: ICD-10-CM

## 2021-08-27 DIAGNOSIS — F33.0 MILD EPISODE OF RECURRENT MAJOR DEPRESSIVE DISORDER (HCC): Primary | ICD-10-CM

## 2021-08-27 PROCEDURE — 90837 PSYTX W PT 60 MINUTES: CPT | Performed by: SOCIAL WORKER

## 2021-08-27 NOTE — PROGRESS NOTES
"Date: 08/27/2021   Time In: 900  Time Out: 956    PROGRESS NOTE  Data:  Yousuf Barrera is a 56 y.o. male who presents today for individual therapy session at [facility].     ICD-10-CM ICD-9-CM   1. Mild episode of recurrent major depressive disorder (CMS/HCC)  F33.0 296.31   2. Generalized anxiety disorder  F41.1 300.02     Patient reports meeting his wife last Friday for a meeting. Patient reports he has decided to \"fight for the marriage\". Patient discussed feeling he could go \"either way\" and that he left the decision up to his wife. Patient reports he has not received a response from his wife regarding reconciliation and that has increased his anxiety. Patient discussed his expectations if they decide to stay together such as \"dating, taking things slow and making compromises\". Patient reports he has been maintaining his self confidence and reports \"I like the way I am\". Patient continues to check wife's phone logs. Patient reports he wants to be prepared for \"worst case scenario\". Patient reports goal is to maintain changes.       Clinical Maneuvering/Intervention:    (Scales based on 0 - 10 with 10 being the worst)  Depression:   0 Anxiety:  3       Assisted patient in processing above session content; acknowledged and normalized patient’s thoughts, feelings, and concerns.  Discussed triggers associated with patient's depression  Also discussed coping skills for patient to implement such as utilizing supports and participating in activities.    Allowed patient to freely discuss issues without interruption or judgment. Provided safe, confidential environment to facilitate the development of positive therapeutic relationship and encourage open, honest communication. Assisted patient in identifying risk factors which would indicate the need for higher level of care including thoughts to harm self or others and/or self-harming behavior and encouraged patient to contact this office, call 911, or " present to the nearest emergency room should any of these events occur. Discussed crisis intervention services and means to access. Patient adamantly and convincingly denies current suicidal or homicidal ideation or perceptual disturbance.    Assessment   Patient appears to maintain relative stability as compared to their baseline.  However, patient continues to struggle with depression and anxiety which continues to cause impairment in important areas of functioning.  As a result, they can be reasonably expected to continue to benefit from treatment and would likely be at increased risk for decompensation otherwise.    Mental Status Exam:   Hygiene:   good  Cooperation:  Cooperative  Eye Contact:  Good  Psychomotor Behavior:  Appropriate  Affect:  Appropriate  Mood: normal  Speech:  Normal  Thought Process:  Goal directed  Thought Content:  Normal  Suicidal:  None  Homicidal:  None  Hallucinations:  None  Delusion:  None  Memory:  Intact  Orientation:  Grossly intact  Reliability:  good  Insight:  Fair  Judgement:  Poor  Impulse Control:  Poor  Physical/Medical Issues:  No      Patient's Support Network Includes:  wife    Functional Status: Moderate impairment     Progress toward goal: Not at goal    Prognosis: Good with Ongoing Treatment          Plan     Patient will continue in individual outpatient therapy with focus on improved functioning and coping skills, maintaining stability, and avoiding decompensation and the need for higher level of care.    Patient will adhere to medication regimen as prescribed and report any side effects. Patient will contact this office, call 911 or present to the nearest emergency room should suicidal or homicidal ideations occur. Provide Cognitive Behavioral Therapy and Solution Focused Therapy to improve functioning, maintain stability, and avoid decompensation and the need for higher level of care.     Return in about Return in about 2 weeks (around 9/10/2021) for Next  scheduled follow up. or earlier if symptoms worsen or fail to improve.            This document has been electronically signed by Carolina Lynn LCSW  August 27, 2021 09:04 EDT      Part of this note may be an electronic transcription/translation of spoken language to printed text using the Dragon Dictation System.

## 2021-09-01 DIAGNOSIS — F33.2 SEVERE EPISODE OF RECURRENT MAJOR DEPRESSIVE DISORDER, WITHOUT PSYCHOTIC FEATURES (HCC): Chronic | ICD-10-CM

## 2021-09-01 DIAGNOSIS — F41.1 GENERALIZED ANXIETY DISORDER: Chronic | ICD-10-CM

## 2021-09-01 RX ORDER — ESCITALOPRAM OXALATE 10 MG/1
TABLET ORAL
Qty: 30 TABLET | Refills: 2 | Status: SHIPPED | OUTPATIENT
Start: 2021-09-01 | End: 2021-11-10 | Stop reason: SDUPTHER

## 2021-09-03 ENCOUNTER — OFFICE VISIT (OUTPATIENT)
Dept: PSYCHIATRY | Facility: CLINIC | Age: 56
End: 2021-09-03

## 2021-09-03 DIAGNOSIS — F33.0 MILD EPISODE OF RECURRENT MAJOR DEPRESSIVE DISORDER (HCC): Primary | ICD-10-CM

## 2021-09-03 DIAGNOSIS — F41.1 GENERALIZED ANXIETY DISORDER: ICD-10-CM

## 2021-09-03 PROCEDURE — 90837 PSYTX W PT 60 MINUTES: CPT | Performed by: SOCIAL WORKER

## 2021-09-03 NOTE — PROGRESS NOTES
"Date: 09/03/2021   Time In: 904    Time Out: 1000    PROGRESS NOTE  Data:  Yousuf Barrera is a 56 y.o. male who presents today for individual therapy session at [facility].     ICD-10-CM ICD-9-CM   1. Mild episode of recurrent major depressive disorder (CMS/HCC)  F33.0 296.31   2. Generalized anxiety disorder  F41.1 300.02     Patient reports he has been working and staying busy. Patient discussed having a date with his wife on Saturday. Patient reports he \"let his wall down\". Patient discussed that they decided they are . Patient reports feeling ready to complete his amends step in AA. Patient discussed frustrations with wife not preparing the house for it to be sold. Patient is considering filing for divorce if his wife does not initiate it.     Clinical Maneuvering/Intervention:    (Scales based on 0 - 10 with 10 being the worst)  Depression:   0 Anxiety:  1       Assisted patient in processing above session content; acknowledged and normalized patient’s thoughts, feelings, and concerns.  Discussed assertive communication techniques with patient. Provided education on establishing healthy boundaries.  Discussed triggers associated with patient's anxiety.  Also discussed coping skills for patient to implement such as challenging anxious thoughts.    Allowed patient to freely discuss issues without interruption or judgment. Provided safe, confidential environment to facilitate the development of positive therapeutic relationship and encourage open, honest communication. Assisted patient in identifying risk factors which would indicate the need for higher level of care including thoughts to harm self or others and/or self-harming behavior and encouraged patient to contact this office, call 911, or present to the nearest emergency room should any of these events occur. Discussed crisis intervention services and means to access. Patient adamantly and convincingly denies current suicidal or " homicidal ideation or perceptual disturbance.    Assessment   Patient appears to maintain relative stability as compared to their baseline.  However, patient continues to struggle with depression and anxiety which continues to cause impairment in important areas of functioning.  As a result, they can be reasonably expected to continue to benefit from treatment and would likely be at increased risk for decompensation otherwise.    Mental Status Exam:   Hygiene:   good  Cooperation:  Cooperative  Eye Contact:  Good  Psychomotor Behavior:  Appropriate  Affect:  Appropriate  Mood: sad  Speech:  Normal  Thought Process:  Goal directed  Thought Content:  Normal  Suicidal:  None  Homicidal:  None  Hallucinations:  None  Delusion:  None  Memory:  Intact  Orientation:  Grossly intact  Reliability:  good  Insight:  Fair  Judgement:  Fair  Impulse Control:  Fair  Physical/Medical Issues:  No      Patient's Support Network Includes:  [facility]    Functional Status: Mild impairment     Progress toward goal: Not at goal    Prognosis: Good with Ongoing Treatment          Plan     Patient will continue in individual outpatient therapy with focus on improved functioning and coping skills, maintaining stability, and avoiding decompensation and the need for higher level of care.    Patient will adhere to medication regimen as prescribed and report any side effects. Patient will contact this office, call 911 or present to the nearest emergency room should suicidal or homicidal ideations occur. Provide Cognitive Behavioral Therapy and Solution Focused Therapy to improve functioning, maintain stability, and avoid decompensation and the need for higher level of care.     Return in about Return in about 2 weeks (around 9/17/2021) for Next scheduled follow up. or earlier if symptoms worsen or fail to improve.            This document has been electronically signed by Carolina Lynn LCSW  September 3, 2021 09:05 EDT      Part of this note  may be an electronic transcription/translation of spoken language to printed text using the Dragon Dictation System.

## 2021-09-08 ENCOUNTER — OFFICE VISIT (OUTPATIENT)
Dept: PSYCHIATRY | Facility: CLINIC | Age: 56
End: 2021-09-08

## 2021-09-08 VITALS
HEART RATE: 72 BPM | WEIGHT: 166 LBS | HEIGHT: 72 IN | DIASTOLIC BLOOD PRESSURE: 90 MMHG | BODY MASS INDEX: 22.48 KG/M2 | SYSTOLIC BLOOD PRESSURE: 150 MMHG

## 2021-09-08 DIAGNOSIS — G47.00 INSOMNIA, UNSPECIFIED TYPE: ICD-10-CM

## 2021-09-08 DIAGNOSIS — F33.2 SEVERE EPISODE OF RECURRENT MAJOR DEPRESSIVE DISORDER, WITHOUT PSYCHOTIC FEATURES (HCC): Primary | ICD-10-CM

## 2021-09-08 DIAGNOSIS — F41.1 GENERALIZED ANXIETY DISORDER: ICD-10-CM

## 2021-09-08 PROCEDURE — 99214 OFFICE O/P EST MOD 30 MIN: CPT | Performed by: NURSE PRACTITIONER

## 2021-09-08 NOTE — PROGRESS NOTES
"Chief Complaint  Anxiety, Depression, and Sleeping Problem    Subjective          Yousuf Barrera presents to [facility] for medication management of his anxiety, depression, sleeping difficulties.    History of Present Illness: Patient presents today for follow-up appointment after last being seen on 07/07/2021.  Patient reports \"I am in good shape\".  He reports he recently missed approximately 5 days of his medication, \"and I use that as a good excuse to see if I still need the medicine.  But by day 4 I realized I actually did\".  Patient reports he feels as though he has done very well, with his current medication regimen.  He is no longer using the Remeron, but says he is sleeping well almost every night.  He does report there is possibly 1 night a week where he sometimes has some difficulty, but other than that has no problems with his sleep.  Patient reports he recently got his 60-day sobriety chip at , and says getting this was a very emotional time for him.  Patient is very proud of himself.  He reports he has been doing  5-6 meetings per week.  Patient denies any new issues with appetite.  Patient denies any SI/HI, A/V hallucinations.    Current Medications:   Current Outpatient Medications   Medication Sig Dispense Refill   • aspirin 325 MG tablet Take 325 mg by mouth Daily.     • cholecalciferol (VITAMIN D3) 25 MCG (1000 UT) tablet Take 1,000 Units by mouth 2 (Two) Times a Day.     • escitalopram (LEXAPRO) 10 MG tablet TAKE ONE TABLET BY MOUTH DAILY 30 tablet 2   • levocetirizine (XYZAL) 5 MG tablet Take 5 mg by mouth Every Night.     • losartan (COZAAR) 50 MG tablet Take 50 mg by mouth Daily.     • multivitamin with minerals tablet tablet Take 1 tablet by mouth Daily.     • propranolol (INDERAL) 20 MG tablet Take 1 tablet by mouth 3 (Three) Times a Day As Needed (Anxiety). 90 tablet 2   • vitamin B-12 (CYANOCOBALAMIN) 1000 MCG tablet Take 1,000 mcg by mouth Daily.   " "    No current facility-administered medications for this visit.       Mental Status Exam:   Hygiene:   good  Cooperation:  Cooperative  Eye Contact:  Good  Psychomotor Behavior:  Appropriate  Affect:  Appropriate  Mood: normal and euthymic  Speech:  Normal  Thought Process:  Goal directed  Thought Content:  Mood congruent  Suicidal:  None  Homicidal:  None  Hallucinations:  None  Delusion:  None  Memory:  Intact  Orientation:  Person, Place, Time and Situation  Reliability:  good  Insight:  Good  Judgement:  Good  Impulse Control:  Good  Physical/Medical Issues:  Yes HTN       Objective   Vital Signs:   /90   Pulse 72   Ht 182.9 cm (72\")   Wt 75.3 kg (166 lb)   BMI 22.51 kg/m²     Physical Exam  Neurological:      Mental Status: He is oriented to person, place, and time. Mental status is at baseline.      Coordination: Coordination is intact.      Gait: Gait is intact.   Psychiatric:         Behavior: Behavior is cooperative.         Thought Content: Thought content normal.         Cognition and Memory: Cognition and memory normal.         Judgment: Judgment normal.        Result Review :     The following data was reviewed by: MELVIN Wadsworth on 09/08/2021:    Data reviewed: Previous note, medication history          Assessment and Plan    Problem List Items Addressed This Visit        Mental Health    Severe episode of recurrent major depressive disorder, without psychotic features (CMS/HCC) - Primary      Other Visit Diagnoses     Generalized anxiety disorder        Insomnia, unspecified type              PHQ-9 Score:   PHQ-9 Total Score: 0    Depression Screening:  Patient screened positive for depression based on a PHQ-9 score of 0 on 9/8/2021. Follow-up recommendations include: Prescribed antidepressant medication treatment and Suicide Risk Assessment performed.      Tobacco Cessation:  Yousuf Barrera  reports that he has never smoked. His smokeless tobacco use includes snuff.. I have educated him " on the risk of diseases from using tobacco products such as cancer, COPD and heart disease.     I advised him to quit and he is not willing to quit.    I spent 3  minutes counseling the patient.       Impression/Plan:  -This follow-up appointment.  Patient presents today reports he feels that his been doing very well since his last appointment.  He endorses taking his medications on a daily basis as prescribed, and denies any adverse effects associated with them.  Patient reports he believes his medications have continued to work well, and he likes his current medication regimen.  -Maintain Lexapro 10 mg daily.  -Maintain propranolol 20 mg 3 times daily as needed.  -Discontinue Remeron 7.5 to 15 mg nightly as needed.  Patient reports he does not use his medication in a long time.  -Encourage him to maintain all upcoming therapy appointments.  -Schedule follow-up for 2 months or as needed.    MEDS ORDERED DURING VISIT:  No orders of the defined types were placed in this encounter.        Follow Up   Return in about 2 months (around 11/8/2021), or if symptoms worsen or fail to improve, for Next scheduled follow up.  Patient was given instructions and counseling regarding his condition or for health maintenance advice. Please see specific information pulled into the AVS if appropriate.       TREATMENT PLAN/GOALS: Continue supportive psychotherapy efforts and medications as indicated. Treatment and medication options discussed during today's visit. Patient acknowledged and verbally consented to continue with current treatment plan and was educated on the importance of compliance with treatment and follow-up appointments.    MEDICATION ISSUES:  Discussed medication options and treatment plan of prescribed medication as well as the risks, benefits, and side effects including potential falls, possible impaired driving and metabolic adversities among others. Patient is agreeable to call the office with any worsening of  symptoms or onset of side effects. Patient is agreeable to call 911 or go to the nearest ER should he/she begin having SI/HI.          This document has been electronically signed by MELVIN Montoya, PMHNP-BC  September 8, 2021 10:40 EDT      Part of this note may be an electronic transcription/translation of spoken language to printed text using the Dragon Dictation System.

## 2021-09-13 ENCOUNTER — OFFICE VISIT (OUTPATIENT)
Dept: PSYCHIATRY | Facility: CLINIC | Age: 56
End: 2021-09-13

## 2021-09-13 DIAGNOSIS — F10.20 ALCOHOL USE DISORDER, SEVERE, DEPENDENCE (HCC): ICD-10-CM

## 2021-09-13 DIAGNOSIS — F41.1 GENERALIZED ANXIETY DISORDER: ICD-10-CM

## 2021-09-13 DIAGNOSIS — F33.0 MILD EPISODE OF RECURRENT MAJOR DEPRESSIVE DISORDER (HCC): Primary | ICD-10-CM

## 2021-09-13 PROCEDURE — 90834 PSYTX W PT 45 MINUTES: CPT | Performed by: SOCIAL WORKER

## 2021-09-13 NOTE — PROGRESS NOTES
"Date: 09/13/2021   Time In: 905  Time Out: 950    PROGRESS NOTE  Data:  Yousuf Barrera is a 56 y.o. male who presents today for individual therapy session at [facility].     ICD-10-CM ICD-9-CM   1. Mild episode of recurrent major depressive disorder (CMS/HCC)  F33.0 296.31   2. Generalized anxiety disorder  F41.1 300.02   3. Alcohol use disorder, severe, dependence (CMS/HCC)  F10.20 303.90     Patient reports feeling \"pretty good\". Patient reports he is compliant with his medications and reports he attempted to quit taking them. Patient discussed increased anxiety and is unable to identify a trigger for this increase. Patient reports he is now 70 days sober and continues to contact his sponsor, attend AA meetings and attended an AA event this past weekend. Patient reports he is still working on his amends. Patient reports he checked his wife's phone log one time this week. Patient discussed having a new perspective on the concept of gratitude and is practicing daily. Patient reports goal is to maintain changes, and helping with transportation for his daughter. Patient denies suicidal ideations.       Clinical Maneuvering/Intervention:    (Scales based on 0 - 10 with 10 being the worst)  Depression:   0 Anxiety:  3       Assisted patient in processing above session content; acknowledged and normalized patient’s thoughts, feelings, and concerns.  Discussed triggers associated with patient's anxiety.  Also discussed coping skills for patient to implement such as challenging anxious thoughts and mindfulness.    Allowed patient to freely discuss issues without interruption or judgment. Provided safe, confidential environment to facilitate the development of positive therapeutic relationship and encourage open, honest communication. Assisted patient in identifying risk factors which would indicate the need for higher level of care including thoughts to harm self or others and/or self-harming behavior and " encouraged patient to contact this office, call 911, or present to the nearest emergency room should any of these events occur. Discussed crisis intervention services and means to access. Patient adamantly and convincingly denies current suicidal or homicidal ideation or perceptual disturbance.    Assessment   Patient appears to maintain relative stability as compared to their baseline.  However, patient continues to struggle with depression and anxiety which continues to cause impairment in important areas of functioning.  As a result, they can be reasonably expected to continue to benefit from treatment and would likely be at increased risk for decompensation otherwise.    Mental Status Exam:   Hygiene:   good  Cooperation:  Cooperative  Eye Contact:  Good  Psychomotor Behavior:  Appropriate  Affect:  Appropriate  Mood: anxious  Speech:  Normal  Thought Process:  Goal directed  Thought Content:  Normal  Suicidal:  None  Homicidal:  None  Hallucinations:  None  Delusion:  None  Memory:  Intact  Orientation:  Grossly intact  Reliability:  good  Insight:  Fair  Judgement:  Poor  Impulse Control:  Poor  Physical/Medical Issues:  No      Patient's Support Network Includes:  sponsor    Functional Status: Mild impairment     Progress toward goal: Not at goal    Prognosis: Good with Ongoing Treatment          Plan     Patient will continue in individual outpatient therapy with focus on improved functioning and coping skills, maintaining stability, and avoiding decompensation and the need for higher level of care.    Patient will adhere to medication regimen as prescribed and report any side effects. Patient will contact this office, call 911 or present to the nearest emergency room should suicidal or homicidal ideations occur. Provide Cognitive Behavioral Therapy and Solution Focused Therapy to improve functioning, maintain stability, and avoid decompensation and the need for higher level of care.     Return in about  Return in about 4 weeks (around 10/11/2021) for Next scheduled follow up. or earlier if symptoms worsen or fail to improve.            This document has been electronically signed by Carolina Lynn LCSW  September 13, 2021 09:03 EDT      Part of this note may be an electronic transcription/translation of spoken language to printed text using the Dragon Dictation System.

## 2021-10-07 ENCOUNTER — OFFICE VISIT (OUTPATIENT)
Dept: PSYCHIATRY | Facility: CLINIC | Age: 56
End: 2021-10-07

## 2021-10-07 DIAGNOSIS — F41.1 GENERALIZED ANXIETY DISORDER: ICD-10-CM

## 2021-10-07 DIAGNOSIS — F33.0 MILD EPISODE OF RECURRENT MAJOR DEPRESSIVE DISORDER (HCC): Primary | ICD-10-CM

## 2021-10-07 DIAGNOSIS — F10.20 ALCOHOL USE DISORDER, SEVERE, DEPENDENCE (HCC): ICD-10-CM

## 2021-10-07 PROCEDURE — 90837 PSYTX W PT 60 MINUTES: CPT | Performed by: SOCIAL WORKER

## 2021-10-07 NOTE — PROGRESS NOTES
"Date: 10/07/2021   Time In: 1105  Time Out: 1200    PROGRESS NOTE  Data:  Yousuf Barrera is a 56 y.o. male who presents today for individual therapy session at [facility].     ICD-10-CM ICD-9-CM   1. Mild episode of recurrent major depressive disorder (HCC)  F33.0 296.31   2. Generalized anxiety disorder  F41.1 300.02   3. Alcohol use disorder, severe, dependence (HCC)  F10.20 303.90     Patient reports he is now 94 days sober. Patient discussed making amends last weekend with his wife and reports it went well. Patient discussed having a new co sponsor and reports he likes how she is \"direct\" with him. Patient reports consulting with his  regarding filing for divorce. Patient reports he decided not to file but reports he will do that after the family vacation. Patient reports he continues to check wife's phone logs 1 time per week. Patient reports he is unsure about goals at this time.       Clinical Maneuvering/Intervention:    (Scales based on 0 - 10 with 10 being the worst)  Depression:   na Anxiety:  na       Assisted patient in processing above session content; acknowledged and normalized patient’s thoughts, feelings, and concerns.  Discussed the concept of radical acceptance when situations are outside of his control. Discussed the importance of having appropriate boundaries with wife and respecting privacy.    Allowed patient to freely discuss issues without interruption or judgment. Provided safe, confidential environment to facilitate the development of positive therapeutic relationship and encourage open, honest communication. Assisted patient in identifying risk factors which would indicate the need for higher level of care including thoughts to harm self or others and/or self-harming behavior and encouraged patient to contact this office, call 911, or present to the nearest emergency room should any of these events occur. Discussed crisis intervention services and means to access. " Patient adamantly and convincingly denies current suicidal or homicidal ideation or perceptual disturbance.    Assessment   Patient appears to maintain relative stability as compared to their baseline.  However, patient continues to struggle with anxiety which continues to cause impairment in important areas of functioning.  As a result, they can be reasonably expected to continue to benefit from treatment and would likely be at increased risk for decompensation otherwise.    Mental Status Exam:   Hygiene:   good  Cooperation:  Cooperative  Eye Contact:  Good  Psychomotor Behavior:  Appropriate  Affect:  Appropriate  Mood: normal  Speech:  Normal  Thought Process:  Goal directed  Thought Content:  Normal  Suicidal:  None  Homicidal:  None  Hallucinations:  None  Delusion:  None  Memory:  Intact  Orientation:  Grossly intact  Reliability:  good  Insight:  Poor  Judgement:  Poor  Impulse Control:  Poor  Physical/Medical Issues:  No      Patient's Support Network Includes:  daughter    Functional Status: Mild impairment     Progress toward goal: Not at goal    Prognosis: Good with Ongoing Treatment          Plan     Patient will continue in individual outpatient therapy with focus on improved functioning and coping skills, maintaining stability, and avoiding decompensation and the need for higher level of care.    Patient will adhere to medication regimen as prescribed and report any side effects. Patient will contact this office, call 911 or present to the nearest emergency room should suicidal or homicidal ideations occur. Provide Cognitive Behavioral Therapy and Solution Focused Therapy to improve functioning, maintain stability, and avoid decompensation and the need for higher level of care.     Return in about Return in about 4 weeks (around 11/4/2021) for Next scheduled follow up. or earlier if symptoms worsen or fail to improve.            This document has been electronically signed by Carolina Lynn  LCSW  October 7, 2021 11:08 EDT      Part of this note may be an electronic transcription/translation of spoken language to printed text using the Dragon Dictation System.

## 2021-10-22 ENCOUNTER — OFFICE VISIT (OUTPATIENT)
Dept: PSYCHIATRY | Facility: CLINIC | Age: 56
End: 2021-10-22

## 2021-10-22 DIAGNOSIS — F41.1 GENERALIZED ANXIETY DISORDER: ICD-10-CM

## 2021-10-22 DIAGNOSIS — F33.0 MILD EPISODE OF RECURRENT MAJOR DEPRESSIVE DISORDER (HCC): Primary | ICD-10-CM

## 2021-10-22 DIAGNOSIS — F10.20 ALCOHOL USE DISORDER, SEVERE, DEPENDENCE (HCC): ICD-10-CM

## 2021-10-22 PROCEDURE — 90837 PSYTX W PT 60 MINUTES: CPT | Performed by: SOCIAL WORKER
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
## 2021-11-10 ENCOUNTER — OFFICE VISIT (OUTPATIENT)
Dept: PSYCHIATRY | Facility: CLINIC | Age: 56
End: 2021-11-10

## 2021-11-10 VITALS
HEIGHT: 72 IN | DIASTOLIC BLOOD PRESSURE: 90 MMHG | BODY MASS INDEX: 22.48 KG/M2 | SYSTOLIC BLOOD PRESSURE: 144 MMHG | WEIGHT: 166 LBS | HEART RATE: 72 BPM

## 2021-11-10 DIAGNOSIS — F33.2 SEVERE EPISODE OF RECURRENT MAJOR DEPRESSIVE DISORDER, WITHOUT PSYCHOTIC FEATURES (HCC): Chronic | ICD-10-CM

## 2021-11-10 DIAGNOSIS — F41.1 GENERALIZED ANXIETY DISORDER: Chronic | ICD-10-CM

## 2021-11-10 PROCEDURE — 99214 OFFICE O/P EST MOD 30 MIN: CPT | Performed by: NURSE PRACTITIONER

## 2021-11-10 RX ORDER — ESCITALOPRAM OXALATE 10 MG/1
10 TABLET ORAL DAILY
Qty: 90 TABLET | Refills: 1 | Status: SHIPPED | OUTPATIENT
Start: 2021-11-10 | End: 2022-06-17 | Stop reason: SDUPTHER

## 2021-11-10 NOTE — PROGRESS NOTES
"Chief Complaint  Anxiety and Depression    Subjective          Yousuf Barrera presents to [facility] for medication management of his anxiety depression.    History of Present Illness: Patient presents today for follow-up appointment last being seen on 09/08/2021.  Patient reports today \"I am doing very well\".  Patient reports he is taking his medications regularly, and denies any issues associated with them.  He reports he is sleeping well and eating well, and denies any new or significant issues with either.  Patient reports he takes his Lexapro daily, and is using his propranolol as needed.  He reports it works well when he takes it.  Patient is 128 days sober today.  He reports he had some recent thoughts of drinking at his daughters 16th birthday party, but was able to resist.  He also reports he recently went on a trip to the [place] with his wife and daughter.  He reports that a very good trip, but he was glad to get back home.  He reports he and his wife are still in the divorce process, and it has been several.  He is still in therapy on a biweekly basis and says is going well.  Patient denies any SI/HI, A/V hallucinations.    Current Medications:   Current Outpatient Medications   Medication Sig Dispense Refill   • aspirin 325 MG tablet Take 325 mg by mouth Daily.     • cholecalciferol (VITAMIN D3) 25 MCG (1000 UT) tablet Take 1,000 Units by mouth 2 (Two) Times a Day.     • escitalopram (LEXAPRO) 10 MG tablet Take 1 tablet by mouth Daily. 90 tablet 1   • levocetirizine (XYZAL) 5 MG tablet Take 5 mg by mouth Every Night.     • losartan (COZAAR) 50 MG tablet Take 50 mg by mouth Daily.     • multivitamin with minerals tablet tablet Take 1 tablet by mouth Daily.     • propranolol (INDERAL) 20 MG tablet Take 1 tablet by mouth 3 (Three) Times a Day As Needed (Anxiety). 90 tablet 2   • vitamin B-12 (CYANOCOBALAMIN) 1000 MCG tablet Take 1,000 mcg by mouth Daily.       No " "current facility-administered medications for this visit.       Mental Status Exam:   Hygiene:   good  Cooperation:  Cooperative  Eye Contact:  Good  Psychomotor Behavior:  Appropriate  Affect:  Full range and Appropriate  Mood: normal  Speech:  Normal  Thought Process:  Goal directed  Thought Content:  Mood congruent  Suicidal:  None  Homicidal:  None  Hallucinations:  None  Delusion:  None  Memory:  Intact  Orientation:  Person, Place, Time and Situation  Reliability:  good  Insight:  Good  Judgement:  Good  Impulse Control:  Good  Physical/Medical Issues:  No        Objective   Vital Signs:   /90   Pulse 72   Ht 182.9 cm (72\")   Wt 75.3 kg (166 lb)   BMI 22.51 kg/m²     Physical Exam  Neurological:      Mental Status: He is oriented to person, place, and time. Mental status is at baseline.      Coordination: Coordination is intact.      Gait: Gait is intact.   Psychiatric:         Behavior: Behavior is cooperative.         Thought Content: Thought content normal.         Cognition and Memory: Cognition and memory normal.         Judgment: Judgment normal.        Result Review :     The following data was reviewed by: MELVIN Wadsworth on 11/10/2021:    Data reviewed: Previous note, medication history          Assessment and Plan    Problem List Items Addressed This Visit        Mental Health    Severe episode of recurrent major depressive disorder, without psychotic features (HCC)    Relevant Medications    escitalopram (LEXAPRO) 10 MG tablet      Other Visit Diagnoses     Generalized anxiety disorder  (Chronic)       Relevant Medications    escitalopram (LEXAPRO) 10 MG tablet          PHQ-9 Score:   PHQ-9 Total Score: 0    Depression Screening:  Patient screened positive for depression based on a PHQ-9 score of 0 on 11/10/2021. Follow-up recommendations include: Prescribed antidepressant medication treatment and Suicide Risk Assessment performed.      Tobacco Cessation:  Yousuf Barrera  reports that he " has never smoked. His smokeless tobacco use includes snuff.. I have educated him on the risk of diseases from using tobacco products such as cancer, COPD and heart disease.     I advised him to quit and he is not willing to quit.    I spent 3  minutes counseling the patient.      Impression/Plan:  -This follow-up appointment.  Patient presents today reports he has done well since his last appointment.  He endorses taking his medications on a daily basis, and denies any adverse effects associated with them.  Patient endorses continued efficacy from his medication and likes his current medication regimen.  -Maintain Lexapro 10 mg daily.  -Maintain propranolol 20 mg 3 times daily as needed.  -Encouraged patient to maintain upcoming therapy appointments.  -Schedule follow-up for 3 months or as needed.    MEDS ORDERED DURING VISIT:  New Medications Ordered This Visit   Medications   • escitalopram (LEXAPRO) 10 MG tablet     Sig: Take 1 tablet by mouth Daily.     Dispense:  90 tablet     Refill:  1         Follow Up   Return in about 3 months (around 2/10/2022), or if symptoms worsen or fail to improve, for Next scheduled follow up.  Patient was given instructions and counseling regarding his condition or for health maintenance advice. Please see specific information pulled into the AVS if appropriate.       TREATMENT PLAN/GOALS: Continue supportive psychotherapy efforts and medications as indicated. Treatment and medication options discussed during today's visit. Patient acknowledged and verbally consented to continue with current treatment plan and was educated on the importance of compliance with treatment and follow-up appointments.    MEDICATION ISSUES:  Discussed medication options and treatment plan of prescribed medication as well as the risks, benefits, and side effects including potential falls, possible impaired driving and metabolic adversities among others. Patient is agreeable to call the office with any  worsening of symptoms or onset of side effects. Patient is agreeable to call 911 or go to the nearest ER should he/she begin having SI/HI.          This document has been electronically signed by MELVIN Montoya, PMHNP-BC  November 11, 2021 09:54 EST      Part of this note may be an electronic transcription/translation of spoken language to printed text using the Dragon Dictation System.

## 2021-11-12 ENCOUNTER — OFFICE VISIT (OUTPATIENT)
Dept: PSYCHIATRY | Facility: CLINIC | Age: 56
End: 2021-11-12

## 2021-11-12 DIAGNOSIS — F33.0 MILD EPISODE OF RECURRENT MAJOR DEPRESSIVE DISORDER (HCC): Primary | ICD-10-CM

## 2021-11-12 DIAGNOSIS — F10.20 ALCOHOL USE DISORDER, SEVERE, DEPENDENCE (HCC): ICD-10-CM

## 2021-11-12 DIAGNOSIS — F41.1 GENERALIZED ANXIETY DISORDER: ICD-10-CM

## 2021-11-12 PROCEDURE — 90837 PSYTX W PT 60 MINUTES: CPT | Performed by: SOCIAL WORKER
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
## 2021-12-01 ENCOUNTER — OFFICE VISIT (OUTPATIENT)
Dept: PSYCHIATRY | Facility: CLINIC | Age: 56
End: 2021-12-01

## 2021-12-01 DIAGNOSIS — F33.2 SEVERE EPISODE OF RECURRENT MAJOR DEPRESSIVE DISORDER, WITHOUT PSYCHOTIC FEATURES (HCC): Primary | ICD-10-CM

## 2021-12-01 DIAGNOSIS — F10.20 ALCOHOL USE DISORDER, SEVERE, DEPENDENCE (HCC): ICD-10-CM

## 2021-12-01 DIAGNOSIS — F41.1 GAD (GENERALIZED ANXIETY DISORDER): ICD-10-CM

## 2021-12-01 PROCEDURE — 90837 PSYTX W PT 60 MINUTES: CPT | Performed by: SOCIAL WORKER

## 2021-12-01 NOTE — PROGRESS NOTES
"Date: 12/01/2021   Time In: 1435  Time Out:   1538    PROGRESS NOTE  Data:  Yousuf Barrera is a 56 y.o. male who presents today for individual therapy session at [facility].     ICD-10-CM ICD-9-CM   1. Severe episode of recurrent major depressive disorder, without psychotic features (HCC)  F33.2 296.33   2. Alcohol use disorder, severe, dependence (HCC)  F10.20 303.90   3. HALI (generalized anxiety disorder)  F41.1 300.02     Patient reports \"I let my emotions get the best of me\". Patient discussed relapsing on alcohol before the holiday. Patient reports he also drank all weekend. Patient discussed being triggered to drink because his wife wants to move forward with the divorce. Patient reports feeling disappointed and reports he was hopeful they would reconcile.  Patient reports he is now 2 days sober and has resumed going to meetings. Patient discussed needing a new sponsor and reports his current sponsor broke his anonymity. Patient reports he had also decided to distance himself from his wife at this time. Patient discussed needing to practice acceptance regarding the relationship with his wife. Patient discussed he did check the phone logs a few times since she asked for the divorce. Patient denies suicidal ideations. Patient goal \"dont check the phone logs\".     Clinical Maneuvering/Intervention:    (Scales based on 0 - 10 with 10 being the worst)  Depression:   2 Anxiety:  5       Assisted patient in processing above session content; acknowledged and normalized patient’s thoughts, feelings, and concerns. Discussed with patient the importance of identifying triggers and using coping skills to prevent relapse. Discussed the importance of establishing boundaries with wife. Discussed triggers associated with patient's anxiety.  Also discussed coping skills for patient to implement such as mindfulness. Encouraged patient to continue practicing acceptance regarding the relationship.     Allowed " patient to freely discuss issues without interruption or judgment. Provided safe, confidential environment to facilitate the development of positive therapeutic relationship and encourage open, honest communication. Assisted patient in identifying risk factors which would indicate the need for higher level of care including thoughts to harm self or others and/or self-harming behavior and encouraged patient to contact this office, call 911, or present to the nearest emergency room should any of these events occur. Discussed crisis intervention services and means to access. Patient adamantly and convincingly denies current suicidal or homicidal ideation or perceptual disturbance.    Assessment   Patient appears to maintain relative stability as compared to their baseline.  However, patient continues to struggle with depression, anxiety and alcohol use which continues to cause impairment in important areas of functioning.  As a result, they can be reasonably expected to continue to benefit from treatment and would likely be at increased risk for decompensation otherwise.    Mental Status Exam:   Hygiene:   good  Cooperation:  Cooperative  Eye Contact:  Fair  Psychomotor Behavior:  Appropriate  Affect:  Appropriate  Mood: sad and anxious  Speech:  Normal  Thought Process:  Goal directed  Thought Content:  Normal  Suicidal:  None  Homicidal:  None  Hallucinations:  None  Delusion:  None  Memory:  Intact  Orientation:  Grossly intact  Reliability:  fair  Insight:  Poor  Judgement:  Poor  Impulse Control:  Poor  Physical/Medical Issues:  No      Patient's Support Network Includes:  daughter    Functional Status: Moderate impairment     Progress toward goal: Not at goal    Prognosis: Good with Ongoing Treatment          Plan     Patient will continue in individual outpatient therapy with focus on improved functioning and coping skills, maintaining stability, and avoiding decompensation and the need for higher level of  care.    Patient will adhere to medication regimen as prescribed and report any side effects. Patient will contact this office, call 911 or present to the nearest emergency room should suicidal or homicidal ideations occur. Provide Cognitive Behavioral Therapy and Solution Focused Therapy to improve functioning, maintain stability, and avoid decompensation and the need for higher level of care.     Return in about Return in about 2 weeks (around 12/15/2021) for Next scheduled follow up. or earlier if symptoms worsen or fail to improve.            This document has been electronically signed by Carolina Lynn LCSW  December 1, 2021 14:38 EST      Part of this note may be an electronic transcription/translation of spoken language to printed text using the Dragon Dictation System.

## 2021-12-08 ENCOUNTER — OFFICE VISIT (OUTPATIENT)
Dept: PSYCHIATRY | Facility: CLINIC | Age: 56
End: 2021-12-08

## 2021-12-08 DIAGNOSIS — F10.20 ALCOHOL USE DISORDER, SEVERE, DEPENDENCE (HCC): ICD-10-CM

## 2021-12-08 DIAGNOSIS — F41.1 GAD (GENERALIZED ANXIETY DISORDER): ICD-10-CM

## 2021-12-08 DIAGNOSIS — F33.2 SEVERE EPISODE OF RECURRENT MAJOR DEPRESSIVE DISORDER, WITHOUT PSYCHOTIC FEATURES (HCC): Primary | ICD-10-CM

## 2021-12-08 PROCEDURE — 90834 PSYTX W PT 45 MINUTES: CPT | Performed by: SOCIAL WORKER

## 2021-12-10 NOTE — PROGRESS NOTES
"Date: 12/08/2021   Time In: 1430  Time Out:   1514    PROGRESS NOTE  Data:  Yousuf Barrera is a 56 y.o. male who presents today for individual therapy session at [facility].     ICD-10-CM ICD-9-CM   1. Severe episode of recurrent major depressive disorder, without psychotic features (HCC)  F33.2 296.33   2. Alcohol use disorder, severe, dependence (HCC)  F10.20 303.90   3. HALI (generalized anxiety disorder)  F41.1 300.02     Patient reports he met his previous goal of \"not checking phone logs\". Patient discussed firing his sponsor and reports he has identified a new one he would like to work with. Patient discussed he has not been attending many AA meetings and reports not attending his daughter's horse show last Saturday. Patient reports he has janis communicating with his wife but reports he does not want to discus the past when she mentions in. Patient reports needing to make amends to his daughter soon. Patient reports goal is to get new sponsor. Patient denies suicidal ideations.    Clinical Maneuvering/Intervention:    (Scales based on 0 - 10 with 10 being the worst)  Depression:   2 Anxiety:  5       Assisted patient in processing above session content; acknowledged and normalized patient’s thoughts, feelings, and concerns. Discussed with patient the importance of identifying triggers and using coping skills to prevent relapse. Discussed the importance of establishing boundaries with wife. Discussed triggers associated with patient's anxiety.  Also discussed coping skills for patient to implement such as mindfulness. Encouraged patient to continue practicing acceptance regarding the relationship.     Allowed patient to freely discuss issues without interruption or judgment. Provided safe, confidential environment to facilitate the development of positive therapeutic relationship and encourage open, honest communication. Assisted patient in identifying risk factors which would indicate the need " for higher level of care including thoughts to harm self or others and/or self-harming behavior and encouraged patient to contact this office, call 911, or present to the nearest emergency room should any of these events occur. Discussed crisis intervention services and means to access. Patient adamantly and convincingly denies current suicidal or homicidal ideation or perceptual disturbance.    Assessment   Patient appears to maintain relative stability as compared to their baseline.  However, patient continues to struggle with depression, anxiety and alcohol use which continues to cause impairment in important areas of functioning.  As a result, they can be reasonably expected to continue to benefit from treatment and would likely be at increased risk for decompensation otherwise.    Mental Status Exam:   Hygiene:   good  Cooperation:  Cooperative  Eye Contact:  Fair  Psychomotor Behavior:  Appropriate  Affect:  Appropriate  Mood: sad  Speech:  Normal  Thought Process:  Goal directed  Thought Content:  Normal  Suicidal:  None  Homicidal:  None  Hallucinations:  None  Delusion:  None  Memory:  Intact  Orientation:  Grossly intact  Reliability:  fair  Insight:  Poor  Judgement:  Poor  Impulse Control:  Poor  Physical/Medical Issues:  No      Patient's Support Network Includes:  daughter    Functional Status: Moderate impairment     Progress toward goal: Not at goal    Prognosis: Good with Ongoing Treatment          Plan     Patient will continue in individual outpatient therapy with focus on improved functioning and coping skills, maintaining stability, and avoiding decompensation and the need for higher level of care.    Patient will adhere to medication regimen as prescribed and report any side effects. Patient will contact this office, call 911 or present to the nearest emergency room should suicidal or homicidal ideations occur. Provide Cognitive Behavioral Therapy and Solution Focused Therapy to improve  functioning, maintain stability, and avoid decompensation and the need for higher level of care.     Return in about Return in about 2 weeks (around 12/22/2021) for Next scheduled follow up. or earlier if symptoms worsen or fail to improve.            This document has been electronically signed by Carolina Lynn LCSW  December 10, 2021 12:27 EST      Part of this note may be an electronic transcription/translation of spoken language to printed text using the Dragon Dictation System.

## 2021-12-30 ENCOUNTER — OFFICE VISIT (OUTPATIENT)
Dept: PSYCHIATRY | Facility: CLINIC | Age: 56
End: 2021-12-30

## 2021-12-30 DIAGNOSIS — F10.20 ALCOHOL USE DISORDER, SEVERE, DEPENDENCE (HCC): ICD-10-CM

## 2021-12-30 DIAGNOSIS — F41.1 GAD (GENERALIZED ANXIETY DISORDER): ICD-10-CM

## 2021-12-30 DIAGNOSIS — F33.2 SEVERE EPISODE OF RECURRENT MAJOR DEPRESSIVE DISORDER, WITHOUT PSYCHOTIC FEATURES (HCC): Primary | ICD-10-CM

## 2021-12-30 PROCEDURE — 90837 PSYTX W PT 60 MINUTES: CPT | Performed by: SOCIAL WORKER

## 2021-12-30 NOTE — PROGRESS NOTES
"Date: 12/30/2021   Time In: 1430  Time Out: 1529    PROGRESS NOTE  Data:  Yousuf Barrera is a 56 y.o. male who presents today for individual therapy session at [facility].     ICD-10-CM ICD-9-CM   1. Severe episode of recurrent major depressive disorder, without psychotic features (HCC)  F33.2 296.33   2. HALI (generalized anxiety disorder)  F41.1 300.02   3. Alcohol use disorder, severe, dependence (HCC)  F10.20 303.90     Patient discussed obtaining closure with previous sponsor. Patient reports feeling hopeful to meet up with new potential sponsor tonight. Patient discussed a recent relapse on alcohol 2 weeks ago and reports drinking alcohol on the christmas holiday. Patient reports feeling he was at \"rockbottom\". Patient discussed having a visit with wife and daughter on navjot day but reports feeling alone the rest of the day. Patient reports he has not attended an AA meeting since 12/23 and is no longer doing stepwork. Patient reports his wife has not proceeded with the divorce process and reports \"I think she's going to drag her feet.\" Patient reports he has not been checking the phone logs. Patient goal \"working the steps:\"    Clinical Maneuvering/Intervention:    (Scales based on 0 - 10 with 10 being the worst)  Depression:   2 Anxiety:  5       Assisted patient in processing above session content; acknowledged and normalized patient’s thoughts, feelings, and concerns. Discussed with patient the importance of identifying triggers and using coping skills to prevent relapse. Discussed the importance of establishing boundaries with wife. Discussed the concept of codependency regarding his relationship with wife and friends. Discussed triggers associated with patient's anxiety.  Also discussed coping skills for patient to implement such as mindfulness.     Allowed patient to freely discuss issues without interruption or judgment. Provided safe, confidential environment to facilitate the " development of positive therapeutic relationship and encourage open, honest communication. Assisted patient in identifying risk factors which would indicate the need for higher level of care including thoughts to harm self or others and/or self-harming behavior and encouraged patient to contact this office, call 911, or present to the nearest emergency room should any of these events occur. Discussed crisis intervention services and means to access. Patient adamantly and convincingly denies current suicidal or homicidal ideation or perceptual disturbance.    Assessment   Patient appears to maintain relative stability as compared to their baseline.  However, patient continues to struggle with depression, anxiety and alcohol use which continues to cause impairment in important areas of functioning.  As a result, they can be reasonably expected to continue to benefit from treatment and would likely be at increased risk for decompensation otherwise.    Mental Status Exam:   Hygiene:   good  Cooperation:  Cooperative  Eye Contact:  Good  Psychomotor Behavior:  Appropriate  Affect:  Appropriate  Mood: normal  Speech:  Normal  Thought Process:  Goal directed  Thought Content:  Normal  Suicidal:  None  Homicidal:  None  Hallucinations:  None  Delusion:  None  Memory:  Intact  Orientation:  Grossly intact  Reliability:  fair  Insight:  Fair  Judgement:  Poor  Impulse Control:  Poor  Physical/Medical Issues:  No      Patient's Support Network Includes:  daughter    Functional Status: Moderate impairment     Progress toward goal: Not at goal    Prognosis: Good with Ongoing Treatment          Plan     Patient will continue in individual outpatient therapy with focus on improved functioning and coping skills, maintaining stability, and avoiding decompensation and the need for higher level of care.    Patient will adhere to medication regimen as prescribed and report any side effects. Patient will contact this office, call 911 or  present to the nearest emergency room should suicidal or homicidal ideations occur. Provide Cognitive Behavioral Therapy and Solution Focused Therapy to improve functioning, maintain stability, and avoid decompensation and the need for higher level of care.     Return in about Return in about 2 weeks (around 1/13/2022). or earlier if symptoms worsen or fail to improve.            This document has been electronically signed by Carolina Lynn LCSW  December 30, 2021 14:31 EST      Part of this note may be an electronic transcription/translation of spoken language to printed text using the Dragon Dictation System.

## 2022-01-05 PROCEDURE — U0004 COV-19 TEST NON-CDC HGH THRU: HCPCS | Performed by: NURSE PRACTITIONER

## 2022-03-08 NOTE — PROGRESS NOTES
"Date: 06/01/2021   Time In: 855  Time Out: 955    PROGRESS NOTE  Data:  Yousuf Barrera is a 56 y.o. male who presents today for individual therapy session at [facility].     ICD-10-CM ICD-9-CM   1. Severe episode of recurrent major depressive disorder, without psychotic features (CMS/HCC)  F33.2 296.33   2. Alcohol use disorder, severe, dependence (CMS/HCC)  F10.20 303.90   3. HALI (generalized anxiety disorder)  F41.1 300.02     Patient reports he is feeling \"pretty good\". Patient reports he continues to exercise, read the Big book, attend AA meetings, and stay in contact with his sponsor. Patient reports he received his 30 day chip of sobriety last week. Patient reports he has decreased the amount of time he is checking his wife's phone log to 4 times last week. Patient acknowledged that the behavior is not helpful to him working towards acceptance. Patient reports he is working towards steps 6 and 7 in his recovery this week and he is practicing gratitude. Patient reports he had an \"emotional roller coaster\" day last Friday but was able to work through it. Patient reports goal for this week is to decreased time spent checking phone logs to 3 times. Patient denies suicidal thoughts.       Clinical Maneuvering/Intervention:    (Scales based on 0 - 10 with 10 being the worst)  Depression:   1 Anxiety:  1       Assisted patient in processing above session content; acknowledged and normalized patient’s thoughts, feelings, and concerns.  Discussed triggers associated with patient's anxiety.  Also discussed coping skills for patient to implement such as acceptance, mindfulness and contacting supports.    Allowed patient to freely discuss issues without interruption or judgment. Provided safe, confidential environment to facilitate the development of positive therapeutic relationship and encourage open, honest communication. Assisted patient in identifying risk factors which would indicate the need for " higher level of care including thoughts to harm self or others and/or self-harming behavior and encouraged patient to contact this office, call 911, or present to the nearest emergency room should any of these events occur. Discussed crisis intervention services and means to access. Patient adamantly and convincingly denies current suicidal or homicidal ideation or perceptual disturbance.    Assessment   Patient appears to maintain relative stability as compared to their baseline.  However, patient continues to struggle with depression and anxiety which continues to cause impairment in important areas of functioning.  As a result, they can be reasonably expected to continue to benefit from treatment and would likely be at increased risk for decompensation otherwise.    Mental Status Exam:   Hygiene:   good  Cooperation:  Cooperative  Eye Contact:  Good  Psychomotor Behavior:  Appropriate  Affect:  Appropriate  Mood: normal  Speech:  Normal  Thought Process:  Goal directed and Linear  Thought Content:  Normal  Suicidal:  None  Homicidal:  None  Hallucinations:  None  Delusion:  None  Memory:  Intact  Orientation:  Person, Place, Time and Situation  Reliability:  good  Insight:  Fair  Judgement:  Poor  Impulse Control:  Fair  Physical/Medical Issues:  No      Patient's Support Network Includes:  wife, parents and sponsor    Functional Status: Severe impairment    Progress toward goal: Not at goal    Prognosis: Good with Ongoing Treatment          Plan     Patient will continue in individual outpatient therapy with focus on improved functioning and coping skills, maintaining stability, and avoiding decompensation and the need for higher level of care.    Patient will adhere to medication regimen as prescribed and report any side effects. Patient will contact this office, call 911 or present to the nearest emergency room should suicidal or homicidal ideations occur. Provide Cognitive Behavioral Therapy and Solution  Focused Therapy to improve functioning, maintain stability, and avoid decompensation and the need for higher level of care.     Return in about Return in about 1 week (around 6/8/2021). or earlier if symptoms worsen or fail to improve.            This document has been electronically signed by Carolina Lynn LCSW  June 1, 2021 08:55 EDT      Part of this note may be an electronic transcription/translation of spoken language to printed text using the Dragon Dictation System.   08-Mar-2022 15:00

## 2022-03-17 ENCOUNTER — OFFICE VISIT (OUTPATIENT)
Dept: PSYCHIATRY | Facility: CLINIC | Age: 57
End: 2022-03-17

## 2022-03-17 VITALS
WEIGHT: 176 LBS | SYSTOLIC BLOOD PRESSURE: 116 MMHG | HEIGHT: 72 IN | HEART RATE: 59 BPM | DIASTOLIC BLOOD PRESSURE: 74 MMHG | BODY MASS INDEX: 23.84 KG/M2

## 2022-03-17 DIAGNOSIS — F33.2 SEVERE EPISODE OF RECURRENT MAJOR DEPRESSIVE DISORDER, WITHOUT PSYCHOTIC FEATURES: Primary | Chronic | ICD-10-CM

## 2022-03-17 DIAGNOSIS — F41.1 GAD (GENERALIZED ANXIETY DISORDER): Chronic | ICD-10-CM

## 2022-03-17 PROCEDURE — 99214 OFFICE O/P EST MOD 30 MIN: CPT | Performed by: NURSE PRACTITIONER

## 2022-03-17 NOTE — PROGRESS NOTES
"Chief Complaint  Anxiety and Depression    Subjective          Yousuf Barrera presents to [facility] for medication management of his anxiety and depression.    History of Present Illness: Patient presents today for follow-up appointment after last being seen on 11/10/2021.  Patient reports today \"I am doing really good\".  He is 79 days sober today.  Patient is very proud of himself for this, and acknowledges it has not been easy, but he has been able to push through cravings and says he no longer has them.  Patient reports he has been very busy at work, and that can often be stressful for him.  However he feels he is dealing with things in his life well.  He says he has good days and bad days, but more good than bad.  Patient has been taking his medication as prescribed.  He says he is taking the propranolol anywhere from 1-2 times a day, but primarily just once in the morning.  He denies any new or significant issues with sleep or appetite.  Patient denies any SI/HI, A/V hallucinations.    Current Medications:   Current Outpatient Medications   Medication Sig Dispense Refill   • aspirin 325 MG tablet Take 325 mg by mouth Daily.     • cholecalciferol (VITAMIN D3) 25 MCG (1000 UT) tablet Take 1,000 Units by mouth 2 (Two) Times a Day.     • escitalopram (LEXAPRO) 10 MG tablet Take 1 tablet by mouth Daily. 90 tablet 1   • levocetirizine (XYZAL) 5 MG tablet Take 5 mg by mouth Every Night.     • losartan (COZAAR) 50 MG tablet Take 50 mg by mouth Daily.     • multivitamin with minerals tablet tablet Take 1 tablet by mouth Daily.     • propranolol (INDERAL) 20 MG tablet Take 1 tablet by mouth 3 (Three) Times a Day As Needed (Anxiety). 90 tablet 2   • vitamin B-12 (CYANOCOBALAMIN) 1000 MCG tablet Take 1,000 mcg by mouth Daily.     • amoxicillin-clavulanate (Augmentin) 875-125 MG per tablet Take 1 tablet by mouth Every 12 (Twelve) Hours. 20 tablet 0     No current facility-administered " "medications for this visit.       Mental Status Exam:   Hygiene:   good  Cooperation:  Cooperative  Eye Contact:  Good  Psychomotor Behavior:  Appropriate  Affect:  Appropriate  Mood: normal and euthymic  Speech:  Normal  Thought Process:  Goal directed  Thought Content:  Mood congruent  Suicidal:  None  Homicidal:  None  Hallucinations:  None  Delusion:  None  Memory:  Intact  Orientation:  Person, Place, Time and Situation  Reliability:  good  Insight:  Good  Judgement:  Good  Impulse Control:  Good  Physical/Medical Issues:  HTN       Objective   Vital Signs:   /74   Pulse 59   Ht 182.9 cm (72\")   Wt 79.8 kg (176 lb)   BMI 23.87 kg/m²     Physical Exam  Neurological:      Mental Status: He is oriented to person, place, and time. Mental status is at baseline.      Coordination: Coordination is intact.      Gait: Gait is intact.   Psychiatric:         Behavior: Behavior is cooperative.        Result Review :     The following data was reviewed by: MELVIN Wadsworth on 03/17/2022:    Data reviewed: Previous note, medication history          Assessment and Plan { Review (Popup)  Quality  BestPractice :23}   Problem List Items Addressed This Visit        Mental Health    Severe episode of recurrent major depressive disorder, without psychotic features (HCC) - Primary    HALI (generalized anxiety disorder)          PHQ-9 Score:   PHQ-9 Total Score: PHQ-9 Total Score: 0      Depression Screening:  Patient screened positive for depression based on a PHQ-9 score of 0 on 3/17/2022. Follow-up recommendations include: Prescribed antidepressant medication treatment and Suicide Risk Assessment performed.      Tobacco Cessation:  Yousuf Barrera  reports that he has never smoked. His smokeless tobacco use includes snuff.. I have educated him on the risk of diseases from using tobacco products such as cancer, COPD and heart disease.     I advised him to quit and he is not willing to quit.    I spent 3  minutes " counseling the patient.      Impression/Plan:  -This is a follow-up appointment.  Patient presents today and reports he has been doing well since his last appointment.  He endorses taking his medications as prescribed, and denies any adverse effects associated with them.  Patient is very happy with the progress he has made, and without far he has come with regards to his mental health.  Patient is happy with his current medication regimen and feels it continues to help.  -Maintain Lexapro 10 mg daily.  -Maintain propranolol 20 mg 3 times daily as needed.  -Schedule follow-up for 6 months or as needed.    MEDS ORDERED DURING VISIT:  No orders of the defined types were placed in this encounter.        Follow Up   Return in about 6 months (around 9/17/2022), or if symptoms worsen or fail to improve, for Next scheduled follow up.  Patient was given instructions and counseling regarding his condition or for health maintenance advice. Please see specific information pulled into the AVS if appropriate.       TREATMENT PLAN/GOALS: Continue supportive psychotherapy efforts and medications as indicated. Treatment and medication options discussed during today's visit. Patient acknowledged and verbally consented to continue with current treatment plan and was educated on the importance of compliance with treatment and follow-up appointments.    MEDICATION ISSUES:  Discussed medication options and treatment plan of prescribed medication as well as the risks, benefits, and side effects including potential falls, possible impaired driving and metabolic adversities among others. Patient is agreeable to call the office with any worsening of symptoms or onset of side effects. Patient is agreeable to call 911 or go to the nearest ER should he/she begin having SI/HI.          This document has been electronically signed by MELVIN Montoya, PMHNP-BC  March 17, 2022 14:45 EDT      Part of this note may be an electronic  transcription/translation of spoken language to printed text using the Dragon Dictation System.

## 2022-03-25 ENCOUNTER — OFFICE VISIT (OUTPATIENT)
Dept: PSYCHIATRY | Facility: CLINIC | Age: 57
End: 2022-03-25

## 2022-03-25 DIAGNOSIS — F33.2 SEVERE EPISODE OF RECURRENT MAJOR DEPRESSIVE DISORDER, WITHOUT PSYCHOTIC FEATURES: Primary | ICD-10-CM

## 2022-03-25 DIAGNOSIS — F41.1 GENERALIZED ANXIETY DISORDER: ICD-10-CM

## 2022-03-25 PROCEDURE — 90834 PSYTX W PT 45 MINUTES: CPT | Performed by: SOCIAL WORKER

## 2022-03-25 NOTE — PROGRESS NOTES
"Date: 03/25/2022  Time In: 1225  Time Out: 1310    PROGRESS NOTE  Data:  Yousuf Barrera is a 56 y.o. male who presents today for individual therapy session at [facility].     ICD-10-CM ICD-9-CM   1. Severe episode of recurrent major depressive disorder, without psychotic features (HCC)  F33.2 296.33   2. Generalized anxiety disorder  F41.1 300.02     Patient reports feeling \"really good\" since his last session. Patient discussed getting another sponsor a few weeks ago and reports it is going well. Patient reports 90 days sober this coming Wednesday. Patient reports being consistent with AA meetings and reports working the steps again. Patient reports experiencing the \"emotional roller coaster\" at times but reports those days do not last as long. Patient reports having his first divorce hearing on Monday and reports feeling \"fine\" with the process.  Patient discussed spending time with his daughter and attending her horse shows. Patient reports he is considering moving closer to [place] for convenience. Patient goal is maintaining sobriety.     Clinical Maneuvering/Intervention:    (Scales based on 0 - 10 with 10 being the worst)  Depression:   2 Anxiety:  5       Assisted patient in processing above session content; acknowledged and normalized patient’s thoughts, feelings, and concerns. Discussed with patient the importance of identifying triggers and using coping skills to prevent relapse.  Discussed triggers associated with patient's anxiety.  Also discussed coping skills for patient to implement such as mindfulness.     Allowed patient to freely discuss issues without interruption or judgment. Provided safe, confidential environment to facilitate the development of positive therapeutic relationship and encourage open, honest communication. Assisted patient in identifying risk factors which would indicate the need for higher level of care including thoughts to harm self or others and/or " self-harming behavior and encouraged patient to contact this office, call 911, or present to the nearest emergency room should any of these events occur. Discussed crisis intervention services and means to access. Patient adamantly and convincingly denies current suicidal or homicidal ideation or perceptual disturbance.    Assessment   Patient appears to maintain relative stability as compared to their baseline.  However, patient continues to struggle with depression, anxiety and alcohol use which continues to cause impairment in important areas of functioning.  As a result, they can be reasonably expected to continue to benefit from treatment and would likely be at increased risk for decompensation otherwise.    Mental Status Exam:   Hygiene:   good  Cooperation:  Cooperative  Eye Contact:  Good  Psychomotor Behavior:  Appropriate  Affect:  Appropriate  Mood: normal  Speech:  Normal  Thought Process:  Goal directed  Thought Content:  Normal  Suicidal:  None  Homicidal:  None  Hallucinations:  None  Delusion:  None  Memory:  Intact  Orientation:  Grossly intact  Reliability:  good  Insight:  Fair  Judgement:  Fair  Impulse Control:  Fair  Physical/Medical Issues:  No      Patient's Support Network Includes:  daughter    Functional Status: Mild impairment     Progress toward goal: Not at goal    Prognosis: Good with Ongoing Treatment          Plan     Patient will continue in individual outpatient therapy with focus on improved functioning and coping skills, maintaining stability, and avoiding decompensation and the need for higher level of care.    Patient will adhere to medication regimen as prescribed and report any side effects. Patient will contact this office, call 911 or present to the nearest emergency room should suicidal or homicidal ideations occur. Provide Cognitive Behavioral Therapy and Solution Focused Therapy to improve functioning, maintain stability, and avoid decompensation and the need for higher  level of care.     Return in about Return in about 4 weeks (around 4/22/2022). or earlier if symptoms worsen or fail to improve.            This document has been electronically signed by Carolina Lynn LCSW  March 25, 2022 12:23 EDT      Part of this note may be an electronic transcription/translation of spoken language to printed text using the Dragon Dictation System.

## 2022-05-27 ENCOUNTER — OFFICE VISIT (OUTPATIENT)
Dept: PSYCHIATRY | Facility: CLINIC | Age: 57
End: 2022-05-27

## 2022-05-27 DIAGNOSIS — F41.1 GENERALIZED ANXIETY DISORDER: ICD-10-CM

## 2022-05-27 DIAGNOSIS — F33.2 SEVERE EPISODE OF RECURRENT MAJOR DEPRESSIVE DISORDER, WITHOUT PSYCHOTIC FEATURES: Primary | ICD-10-CM

## 2022-05-27 PROCEDURE — 90834 PSYTX W PT 45 MINUTES: CPT | Performed by: SOCIAL WORKER

## 2022-05-27 NOTE — PROGRESS NOTES
Date: 05/27/2022  Time In: 1228  Time Out: 1315    PROGRESS NOTE  Data:  Yousuf Barrera is a 57 y.o. male who presents today for individual therapy session at [facility].     ICD-10-CM ICD-9-CM   1. Severe episode of recurrent major depressive disorder, without psychotic features (HCC)  F33.2 296.33   2. Generalized anxiety disorder  F41.1 300.02        Patient reports he is now on Day 136 of his sobriety.  Patient reports he continues to participate in meetings, meet with his sponsor and work the steps. Patient discussed interactions with his wife have improved but reports some resentments he has not addressed yet. Patient reports continued work towards acceptance and maintaining sobriety.      Clinical Maneuvering/Intervention:    (Scales based on 0 - 10 with 10 being the worst)  Depression:   2 Anxiety:  5       Assisted patient in processing above session content; acknowledged and normalized patient’s thoughts, feelings, and concerns. Discussed boundaries regarding communication with wife. Discussed with patient the importance of identifying triggers and using coping skills to prevent relapse.  Discussed triggers associated with patient's anxiety.  Also discussed coping skills for patient to implement such as mindfulness.     Allowed patient to freely discuss issues without interruption or judgment. Provided safe, confidential environment to facilitate the development of positive therapeutic relationship and encourage open, honest communication. Assisted patient in identifying risk factors which would indicate the need for higher level of care including thoughts to harm self or others and/or self-harming behavior and encouraged patient to contact this office, call 911, or present to the nearest emergency room should any of these events occur. Discussed crisis intervention services and means to access. Patient adamantly and convincingly denies current suicidal or homicidal ideation or perceptual  disturbance.    Assessment   Patient appears to maintain relative stability as compared to their baseline.  However, patient continues to struggle with depression, anxiety and alcohol use which continues to cause impairment in important areas of functioning.  As a result, they can be reasonably expected to continue to benefit from treatment and would likely be at increased risk for decompensation otherwise.    Mental Status Exam:   Hygiene:   good  Cooperation:  Cooperative  Eye Contact:  Good  Psychomotor Behavior:  Appropriate  Affect:  Appropriate  Mood: normal  Speech:  Normal  Thought Process:  Goal directed  Thought Content:  Normal  Suicidal:  None  Homicidal:  None  Hallucinations:  None  Delusion:  None  Memory:  Intact  Orientation:  Grossly intact  Reliability:  good  Insight:  Fair  Judgement:  Fair  Impulse Control:  Fair  Physical/Medical Issues:  No      Patient's Support Network Includes:  daughter    Functional Status: Mild impairment     Progress toward goal: Not at goal    Prognosis: Good with Ongoing Treatment          Plan     Patient will continue in individual outpatient therapy with focus on improved functioning and coping skills, maintaining stability, and avoiding decompensation and the need for higher level of care.    Patient will adhere to medication regimen as prescribed and report any side effects. Patient will contact this office, call 911 or present to the nearest emergency room should suicidal or homicidal ideations occur. Provide Cognitive Behavioral Therapy and Solution Focused Therapy to improve functioning, maintain stability, and avoid decompensation and the need for higher level of care.     Return in about Return in about 2 weeks (around 6/10/2022). or earlier if symptoms worsen or fail to improve.            This document has been electronically signed by Carolina Lynn LCSW  May 27, 2022 12:15 EDT      Part of this note may be an electronic transcription/translation of  spoken language to printed text using the Dragon Dictation System.

## 2022-06-02 DIAGNOSIS — F41.1 GENERALIZED ANXIETY DISORDER: Chronic | ICD-10-CM

## 2022-06-02 RX ORDER — PROPRANOLOL HYDROCHLORIDE 20 MG/1
20 TABLET ORAL 3 TIMES DAILY PRN
Qty: 90 TABLET | Refills: 3 | Status: SHIPPED | OUTPATIENT
Start: 2022-06-02

## 2022-06-02 NOTE — TELEPHONE ENCOUNTER
Rx Refill Note  Requested Prescriptions     Pending Prescriptions Disp Refills   • propranolol (INDERAL) 20 MG tablet 90 tablet 2     Sig: Take 1 tablet by mouth 3 (Three) Times a Day As Needed (Anxiety).      Last office visit with prescribing clinician: 3/17/2022      Next office visit with prescribing clinician: 9/22/2022            Soumya Still MA  06/02/22, 09:35 EDT

## 2022-06-17 DIAGNOSIS — F33.2 SEVERE EPISODE OF RECURRENT MAJOR DEPRESSIVE DISORDER, WITHOUT PSYCHOTIC FEATURES: Chronic | ICD-10-CM

## 2022-06-17 DIAGNOSIS — F41.1 GENERALIZED ANXIETY DISORDER: Chronic | ICD-10-CM

## 2022-06-17 RX ORDER — ESCITALOPRAM OXALATE 10 MG/1
TABLET ORAL
Qty: 30 TABLET | OUTPATIENT
Start: 2022-06-17

## 2022-06-17 RX ORDER — ESCITALOPRAM OXALATE 10 MG/1
10 TABLET ORAL DAILY
Qty: 90 TABLET | Refills: 1 | Status: SHIPPED | OUTPATIENT
Start: 2022-06-17 | End: 2022-12-22 | Stop reason: SDUPTHER

## 2022-06-17 NOTE — TELEPHONE ENCOUNTER
Rx Refill Note  Requested Prescriptions     Pending Prescriptions Disp Refills   • escitalopram (LEXAPRO) 10 MG tablet 90 tablet 1     Sig: Take 1 tablet by mouth Daily.     Refused Prescriptions Disp Refills   • escitalopram (LEXAPRO) 10 MG tablet [Pharmacy Med Name: ESCITALOPRAM 10 MG TABLET] 30 tablet      Sig: TAKE ONE TABLET BY MOUTH DAILY     Refused By: TRAE WALKER     Reason for Refusal: Duplicate renewal request      Last office visit with prescribing clinician: 3/17/2022      Next office visit with prescribing clinician: 9/22/2022            Vianey Leroy Rep  06/17/22, 11:12 EDT

## 2022-06-24 ENCOUNTER — OFFICE VISIT (OUTPATIENT)
Dept: PSYCHIATRY | Facility: CLINIC | Age: 57
End: 2022-06-24

## 2022-06-24 DIAGNOSIS — F33.2 SEVERE EPISODE OF RECURRENT MAJOR DEPRESSIVE DISORDER, WITHOUT PSYCHOTIC FEATURES: ICD-10-CM

## 2022-06-24 DIAGNOSIS — F10.20 ALCOHOL USE DISORDER, SEVERE, DEPENDENCE: Primary | ICD-10-CM

## 2022-06-24 DIAGNOSIS — F41.1 GAD (GENERALIZED ANXIETY DISORDER): ICD-10-CM

## 2022-06-24 PROCEDURE — 90832 PSYTX W PT 30 MINUTES: CPT | Performed by: SOCIAL WORKER

## 2022-06-24 NOTE — PROGRESS NOTES
"Date: 06/24/2022   Time In: 1220  Time Out: 1255    PROGRESS NOTE  Data:  Yousuf Barrera is a 57 y.o. male who presents today for individual therapy session at [facility].     ICD-10-CM ICD-9-CM   1. Alcohol use disorder, severe, dependence (HCC)  F10.20 303.90   2. Severe episode of recurrent major depressive disorder, without psychotic features (HCC)  F33.2 296.33   3. HALI (generalized anxiety disorder)  F41.1 300.02     Patient reports feeling good today. He discussed maintaining his sobriety, working and spending time with friends and his daughter. He discussed the progress he has made towards acceptance with his divorce. He has some anxiety about the upcoming holiday because io the relapse from last year on the holiday. He reports having a plan to stay busy and possibly \"house sit\" for one of his friends. Patient will continue using his coping skills and participating in his recovery.      Clinical Maneuvering/Intervention:    (Scales based on 0 - 10 with 10 being the worst)  Depression:   na Anxiety:  na       Assisted patient in processing above session content; acknowledged and normalized patient’s thoughts, feelings, and concerns. Discussed coping skills to maintain sobriety such as calling his sponsor, participating in a distracting activity, attending a meeting or calling a friend.    Allowed patient to freely discuss issues without interruption or judgment. Provided safe, confidential environment to facilitate the development of positive therapeutic relationship and encourage open, honest communication. Assisted patient in identifying risk factors which would indicate the need for higher level of care including thoughts to harm self or others and/or self-harming behavior and encouraged patient to contact this office, call 911, or present to the nearest emergency room should any of these events occur. Discussed crisis intervention services and means to access. Patient adamantly and " convincingly denies current suicidal or homicidal ideation or perceptual disturbance.    Assessment   Patient appears to maintain relative stability as compared to their baseline.  However, patient continues to struggle with anxiety which continues to cause impairment in important areas of functioning.  As a result, they can be reasonably expected to continue to benefit from treatment and would likely be at increased risk for decompensation otherwise.    Mental Status Exam:   Hygiene:   good  Cooperation:  Cooperative  Eye Contact:  Good  Psychomotor Behavior:  Appropriate  Affect:  Appropriate  Mood: normal  Speech:  Normal  Thought Process:  Goal directed  Thought Content:  Normal  Suicidal:  None  Homicidal:  None  Hallucinations:  None  Delusion:  None  Memory:  Intact  Orientation:  Person, Place, Time and Situation  Reliability:  good  Insight:  Good  Judgement:  Good  Impulse Control:  Good  Physical/Medical Issues:  No      Patient's Support Network Includes:  friends    Functional Status: Mild impairment     Progress toward goal: Not at goal    Prognosis: Good with Ongoing Treatment          Plan     Patient will continue in individual outpatient therapy with focus on improved functioning and coping skills, maintaining stability, and avoiding decompensation and the need for higher level of care.    Patient will adhere to medication regimen as prescribed and report any side effects. Patient will contact this office, call 911 or present to the nearest emergency room should suicidal or homicidal ideations occur. Provide Cognitive Behavioral Therapy and Solution Focused Therapy to improve functioning, maintain stability, and avoid decompensation and the need for higher level of care.     Return in about Return in about 2 weeks (around 7/8/2022). or earlier if symptoms worsen or fail to improve.            This document has been electronically signed by Carolina Lynn LCSW  June 24, 2022 12:22 EDT      Part of  this note may be an electronic transcription/translation of spoken language to printed text using the Dragon Dictation System.

## 2022-09-22 ENCOUNTER — OFFICE VISIT (OUTPATIENT)
Dept: PSYCHIATRY | Facility: CLINIC | Age: 57
End: 2022-09-22

## 2022-09-22 VITALS
HEIGHT: 72 IN | WEIGHT: 189 LBS | BODY MASS INDEX: 25.6 KG/M2 | HEART RATE: 61 BPM | SYSTOLIC BLOOD PRESSURE: 138 MMHG | DIASTOLIC BLOOD PRESSURE: 72 MMHG

## 2022-09-22 DIAGNOSIS — F33.0 MILD EPISODE OF RECURRENT MAJOR DEPRESSIVE DISORDER: Chronic | ICD-10-CM

## 2022-09-22 DIAGNOSIS — F41.1 GENERALIZED ANXIETY DISORDER: Primary | Chronic | ICD-10-CM

## 2022-09-22 DIAGNOSIS — F10.21 ALCOHOL DEPENDENCE IN REMISSION: ICD-10-CM

## 2022-09-22 PROCEDURE — 99214 OFFICE O/P EST MOD 30 MIN: CPT | Performed by: NURSE PRACTITIONER

## 2022-09-22 NOTE — PROGRESS NOTES
"Chief Complaint  Anxiety and Depression    Subjective          Yousuf Barrera presents to [facility] for medication management of his anxiety and depression.    History of Present Illness: Patient presents today for follow-up appointment titus seen on 03/17/2022.  Patient says \"I am doing really great\".  He says overall things have gone very well since his last appointment.  He is continue to take his medications, and is still sober.  He is going to 5 AA meetings a week, and continuing to work his way through the process.  He is currently on step 9.  He changed sponsors recently and says \"he is very thorough, and gives me homework\".  He is still in the divorce process with his wife, and says they have their third mediation coming up.  He says he has been dragging his feet through the process some, and says it just took him a while to accept it.  He says he feels his daughter is doing well with the divorce overall, and he sees her approximately 2 times per week.  Patient feels his mood and anxiety remain stable, and he denies any concerns with either.  He is very happy with how well he is doing.  Patient denies any new or significant issues with sleep or appetite.  Patient denies any SI/HI, A/V hallucinations.      The following portions of the patient's history were reviewed and updated as appropriate: allergies, current medications, past family history, past medical history, past social history, past surgical history and problem list.      Current Medications:   Current Outpatient Medications   Medication Sig Dispense Refill   • aspirin 325 MG tablet Take 325 mg by mouth Daily.     • cholecalciferol (VITAMIN D3) 25 MCG (1000 UT) tablet Take 1,000 Units by mouth 2 (Two) Times a Day.     • escitalopram (LEXAPRO) 10 MG tablet Take 1 tablet by mouth Daily. 90 tablet 1   • levocetirizine (XYZAL) 5 MG tablet Take 5 mg by mouth Every Night.     • losartan (COZAAR) 50 MG " "tablet Take 50 mg by mouth Daily.     • multivitamin with minerals tablet tablet Take 1 tablet by mouth Daily.     • propranolol (INDERAL) 20 MG tablet Take 1 tablet by mouth 3 (Three) Times a Day As Needed (Anxiety). 90 tablet 3   • vitamin B-12 (CYANOCOBALAMIN) 1000 MCG tablet Take 1,000 mcg by mouth Daily.       No current facility-administered medications for this visit.       Mental Status Exam:   Hygiene:   good  Cooperation:  Cooperative  Eye Contact:  Good  Psychomotor Behavior:  Appropriate  Affect:  Appropriate  Mood: euthymic  Speech:  Normal  Thought Process:  Goal directed  Thought Content:  Mood congruent  Suicidal:  None  Homicidal:  None  Hallucinations:  None  Delusion:  None  Memory:  Intact  Orientation:  Person, Place, Time and Situation  Reliability:  good  Insight:  Good  Judgement:  Good  Impulse Control:  Good  Physical/Medical Issues:  HTN       Objective   Vital Signs:   /72   Pulse 61   Ht 182.9 cm (72\")   Wt 85.7 kg (189 lb)   BMI 25.63 kg/m²     Physical Exam  Neurological:      Mental Status: He is oriented to person, place, and time. Mental status is at baseline.      Coordination: Coordination is intact.      Gait: Gait is intact.   Psychiatric:         Behavior: Behavior is cooperative.        Result Review :     The following data was reviewed by: MELVIN Wadsworth on 09/22/2022:    Data reviewed: Previous note, medication history          Assessment and Plan    Diagnoses and all orders for this visit:    1. Generalized anxiety disorder (Primary)    2. Mild episode of recurrent major depressive disorder (HCC)    3. Alcohol dependence in remission (HCC)         PHQ-9 Score:   PHQ-9 Total Score: 0      Depression Screening:  Patient screened positive for depression based on a PHQ-9 score of 0 on 9/22/2022. Follow-up recommendations include: Prescribed antidepressant medication treatment and Suicide Risk Assessment performed.        Tobacco Cessation:  Yousuf Barrera  reports " that he has been smoking. His smokeless tobacco use includes snuff.. I have educated him on the risk of diseases from using tobacco products such as cancer, COPD and heart disease.     I advised him to quit and he is not willing to quit.    I spent 3  minutes counseling the patient.      Impression/Plan:  -This a follow-up appointment.  Patient presents today reports he has been doing well overall since his last appointment.  He endorses taking his medication as prescribed, and denies any adverse effects associated with them.  Encouraged the patient to continue taking his medication and participating in talk therapy.  He has missed a few therapy appointments, but has several upcoming appointment scheduled.  Patient says overall he is happy with how well he is doing.  -Maintain Lexapro 10 mg daily.  -Maintain propranolol 20 mg 3 times daily as needed.  -Patient's LUCIA report reviewed and deemed appropriate.  Patient counseled on use of controlled substances.  -Reviewed available lab work.  -Schedule follow-up appointment for 3 months or as needed.      MEDS ORDERED DURING VISIT:  No orders of the defined types were placed in this encounter.        Follow Up   Return in about 3 months (around 12/22/2022), or if symptoms worsen or fail to improve, for Next scheduled follow up.  Patient was given instructions and counseling regarding his condition or for health maintenance advice. Please see specific information pulled into the AVS if appropriate.       TREATMENT PLAN/GOALS: Continue supportive psychotherapy efforts and medications as indicated. Treatment and medication options discussed during today's visit. Patient acknowledged and verbally consented to continue with current treatment plan and was educated on the importance of compliance with treatment and follow-up appointments.    MEDICATION ISSUES:  Discussed medication options and treatment plan of prescribed medication as well as the risks, benefits, and side  effects including potential falls, possible impaired driving and metabolic adversities among others. Patient is agreeable to call the office with any worsening of symptoms or onset of side effects. Patient is agreeable to call 911 or go to the nearest ER should he/she begin having SI/HI.          This document has been electronically signed by MELVIN Montoya, PMHNP-BC  September 22, 2022 14:46 EDT      Part of this note may be an electronic transcription/translation of spoken language to printed text using the Dragon Dictation System.

## 2022-10-03 ENCOUNTER — TELEPHONE (OUTPATIENT)
Dept: PSYCHIATRY | Facility: CLINIC | Age: 57
End: 2022-10-03

## 2022-10-05 ENCOUNTER — TELEPHONE (OUTPATIENT)
Dept: PSYCHIATRY | Facility: CLINIC | Age: 57
End: 2022-10-05

## 2022-10-05 NOTE — TELEPHONE ENCOUNTER
Patient has not been seen by Carolina since 6/24/22. He has late cancelled five times since February 2022, and has no-showed one time. He has been mailed a copy of our policy letter on 6/9/22. He has one upcoming appointment, if he comes to this appointment please only schedule one at a time. If he late cancels or no-shows, we will not schedule back with Carolina.     Late cancellations: 2/3/22, 4/15/22, 6/9/22, 7/8/22, 9/30/22  No-shows: 7/29/22

## 2022-12-22 ENCOUNTER — OFFICE VISIT (OUTPATIENT)
Dept: PSYCHIATRY | Facility: CLINIC | Age: 57
End: 2022-12-22

## 2022-12-22 VITALS
WEIGHT: 188 LBS | DIASTOLIC BLOOD PRESSURE: 98 MMHG | BODY MASS INDEX: 25.47 KG/M2 | HEART RATE: 64 BPM | SYSTOLIC BLOOD PRESSURE: 138 MMHG | HEIGHT: 72 IN

## 2022-12-22 DIAGNOSIS — F10.21 ALCOHOL DEPENDENCE IN REMISSION: Chronic | ICD-10-CM

## 2022-12-22 DIAGNOSIS — F41.1 GENERALIZED ANXIETY DISORDER: Primary | Chronic | ICD-10-CM

## 2022-12-22 DIAGNOSIS — F33.0 MILD EPISODE OF RECURRENT MAJOR DEPRESSIVE DISORDER: Chronic | ICD-10-CM

## 2022-12-22 PROCEDURE — 99214 OFFICE O/P EST MOD 30 MIN: CPT | Performed by: NURSE PRACTITIONER

## 2022-12-22 RX ORDER — ESCITALOPRAM OXALATE 10 MG/1
10 TABLET ORAL DAILY
Qty: 90 TABLET | Refills: 1 | Status: SHIPPED | OUTPATIENT
Start: 2022-12-22

## 2022-12-22 NOTE — PROGRESS NOTES
"Chief Complaint  Anxiety and Depression    Subjective          Yousuf Barrera presents to [facility] for medication management of his anxiety and depression.    History of Present Illness: Patient presents today for follow-up appointment titus seen on 09/22/2022.  He reports \"I am doing pretty good\".  He has been very busy with work, and attending his AA meetings.  He also moved back to [place] from [place] in October.  He says he did this because all of his AA meetings are in [place] and he was tired of driving so many times a day from [place].  Patient says work is been good, but has been very stressful this week because of it being a short week and other people being on vacation.  He says he feels he is managing it okay.  Patient reports today he has not been taking his medication very consistently.  He thought he could take his Lexapro as needed, and says he did not realize it had to be taken every day.  He says he has been a little bit more anxious over the last few weeks, and does attribute it to the holiday season.  He is planning to see his daughter at [place] and is looking forward to that.  He denies any significant cravings for alcohol.  He reports his sleep has not been as good as it was, but denies any issues with his appetite.  Patient denies any SI/HI, A/V hallucinations.      The following portions of the patient's history were reviewed and updated as appropriate: allergies, current medications, past family history, past medical history, past social history, past surgical history and problem list.      Current Medications:   Current Outpatient Medications   Medication Sig Dispense Refill   • aspirin 325 MG tablet Take 325 mg by mouth Daily.     • cholecalciferol (VITAMIN D3) 25 MCG (1000 UT) tablet Take 1,000 Units by mouth 2 (Two) Times a Day.     • escitalopram (LEXAPRO) 10 MG tablet Take 1 tablet by mouth Daily. 90 tablet 1   • levocetirizine (XYZAL) " "5 MG tablet Take 5 mg by mouth Every Night.     • losartan (COZAAR) 50 MG tablet Take 50 mg by mouth Daily.     • multivitamin with minerals tablet tablet Take 1 tablet by mouth Daily.     • propranolol (INDERAL) 20 MG tablet Take 1 tablet by mouth 3 (Three) Times a Day As Needed (Anxiety). 90 tablet 3   • vitamin B-12 (CYANOCOBALAMIN) 1000 MCG tablet Take 1,000 mcg by mouth Daily.       No current facility-administered medications for this visit.       Mental Status Exam:   Hygiene:   good  Cooperation:  Cooperative  Eye Contact:  Good  Psychomotor Behavior:  Appropriate  Affect:  Appropriate  Mood: euthymic  Speech:  Normal  Thought Process:  Goal directed  Thought Content:  Mood congruent  Suicidal:  None  Homicidal:  None  Hallucinations:  None  Delusion:  None  Memory:  Intact  Orientation:  Person, Place, Time and Situation  Reliability:  good  Insight:  Good  Judgement:  Good  Impulse Control:  Good  Physical/Medical Issues:  HTN       Objective   Vital Signs:   /98   Pulse 64   Ht 182.9 cm (72\")   Wt 85.3 kg (188 lb)   BMI 25.50 kg/m²     Physical Exam  Neurological:      Mental Status: He is oriented to person, place, and time. Mental status is at baseline.      Coordination: Coordination is intact.      Gait: Gait is intact.   Psychiatric:         Behavior: Behavior is cooperative.        Result Review :     The following data was reviewed by: MELVIN Wadsworth on 12/22/2022:    Data reviewed: Previous note, indication history          Assessment and Plan    Diagnoses and all orders for this visit:    1. Generalized anxiety disorder (Primary)  -     escitalopram (LEXAPRO) 10 MG tablet; Take 1 tablet by mouth Daily.  Dispense: 90 tablet; Refill: 1    2. Mild episode of recurrent major depressive disorder (HCC)  -     escitalopram (LEXAPRO) 10 MG tablet; Take 1 tablet by mouth Daily.  Dispense: 90 tablet; Refill: 1    3. Alcohol dependence in remission (HCC)         PHQ-9 Score:   PHQ-9 Total " Score: 0      Depression Screening:  Patient screened positive for depression based on a PHQ-9 score of 0 on 12/22/2022. Follow-up recommendations include: Prescribed antidepressant medication treatment and Suicide Risk Assessment performed.        Tobacco Cessation:  Yousuf Barrera  reports that he has been smoking. His smokeless tobacco use includes snuff.. I have educated him on the risk of diseases from using tobacco products such as cancer, COPD and heart disease.     I advised him to quit and he is not willing to quit.    I spent 3  minutes counseling the patient.           Impression/Plan:  -This is a follow-up appointment.  Patient presents today and reports he has been doing okay since his last appointment, but has been experiencing a little bit more anxiety than he was.  He also reports he had not been taking his Lexapro on a daily basis, thinking because he was doing well he could begin taking it as needed.  Advised the patient Lexapro needs to be taken on a daily basis and I encouraged him to return to taking it that way.  I also encouraged him to try taking his propranolol more due to his anxiety.  He does say he has been taking that more.  Patient also reports he has been sleeping on his couch since last January.  I encouraged him to try sleeping in his bed again as he will likely get better sleep.  -Maintain Lexapro 10 mg daily.  Take every day.   -Maintain propranolol 20 mg 3 times daily as needed.  -Patient's LUCIA report reviewed and deemed appropriate.  Patient counseled on use of controlled substances.  -Reviewed available lab work.  -Schedule follow-up appointment for 8 weeks or as needed.      MEDS ORDERED DURING VISIT:  New Medications Ordered This Visit   Medications   • escitalopram (LEXAPRO) 10 MG tablet     Sig: Take 1 tablet by mouth Daily.     Dispense:  90 tablet     Refill:  1         Follow Up   Return in about 8 weeks (around 2/16/2023), or if symptoms worsen or fail to improve, for  Next scheduled follow up, In-Person Appt.  Patient was given instructions and counseling regarding his condition or for health maintenance advice. Please see specific information pulled into the AVS if appropriate.       TREATMENT PLAN/GOALS: Continue supportive psychotherapy efforts and medications as indicated. Treatment and medication options discussed during today's visit. Patient acknowledged and verbally consented to continue with current treatment plan and was educated on the importance of compliance with treatment and follow-up appointments.    MEDICATION ISSUES:  Discussed medication options and treatment plan of prescribed medication as well as the risks, benefits, and side effects including potential falls, possible impaired driving and metabolic adversities among others. Patient is agreeable to call the office with any worsening of symptoms or onset of side effects. Patient is agreeable to call 911 or go to the nearest ER should he/she begin having SI/HI.          This document has been electronically signed by MELVIN Montoya, PMHNP-BC  December 22, 2022 13:00 EST      Part of this note may be an electronic transcription/translation of spoken language to printed text using the Dragon Dictation System.

## 2023-01-11 ENCOUNTER — OFFICE VISIT (OUTPATIENT)
Dept: PSYCHIATRY | Facility: CLINIC | Age: 58
End: 2023-01-11
Payer: COMMERCIAL

## 2023-01-11 DIAGNOSIS — F41.1 GENERALIZED ANXIETY DISORDER: Primary | ICD-10-CM

## 2023-01-11 DIAGNOSIS — F10.21 ALCOHOL USE DISORDER, SEVERE, IN EARLY REMISSION: ICD-10-CM

## 2023-01-11 DIAGNOSIS — F33.0 MILD EPISODE OF RECURRENT MAJOR DEPRESSIVE DISORDER: ICD-10-CM

## 2023-01-11 PROCEDURE — 90832 PSYTX W PT 30 MINUTES: CPT | Performed by: SOCIAL WORKER

## 2023-01-11 NOTE — PROGRESS NOTES
Date: 01/11/2023   Time In: 1424  Time Out: 1447    PROGRESS NOTE  Data:  Yousuf Barrera is a 57 y.o. male who presents today for individual therapy session at [facility].     ICD-10-CM ICD-9-CM   1. Generalized anxiety disorder  F41.1 300.02   2. Mild episode of recurrent major depressive disorder (HCC)  F33.0 296.31   3. Alcohol use disorder, severe, in early remission (HCC)  F10.21 303.93     Patient reports he is doing well. He says he has maintained his sobriety since April 2022. He reports attending AA meetings and meeting with his sponsor weekly. He discussed being accepting of the divorce process and says they have a final court hearing in February. Patient reports feeling good about his progress.       Clinical Maneuvering/Intervention:    Assisted patient in processing above session content; acknowledged and normalized patient’s thoughts, feelings, and concerns.  Assisted patient in processing his thoughts and feelings regarding his sobriety and the divorce process. Encouraged continued use of healthy coping skills and participation in his recovery.    Allowed patient to freely discuss issues without interruption or judgment. Provided safe, confidential environment to facilitate the development of positive therapeutic relationship and encourage open, honest communication. Assisted patient in identifying risk factors which would indicate the need for higher level of care including thoughts to harm self or others and/or self-harming behavior and encouraged patient to contact this office, call 911, or present to the nearest emergency room should any of these events occur. Discussed crisis intervention services and means to access. Patient adamantly and convincingly denies current suicidal or homicidal ideation or perceptual disturbance.    Assessment   Patient appears to maintain relative stability as compared to their baseline.  However, patient continues to struggle with anxiety which  continues to cause impairment in important areas of functioning.  As a result, they can be reasonably expected to continue to benefit from treatment and would likely be at increased risk for decompensation otherwise.    Mental Status Exam:   Hygiene:   good  Cooperation:  Cooperative  Eye Contact:  Good  Psychomotor Behavior:  Appropriate  Affect:  Appropriate  Mood: anxious  Speech:  Normal  Thought Process:  Goal directed  Thought Content:  Normal  Suicidal:  None  Homicidal:  None  Hallucinations:  None  Delusion:  None  Memory:  Intact  Orientation:  Person, Place, Time and Situation  Reliability:  good  Insight:  Good  Judgement:  Good  Impulse Control:  Good  Physical/Medical Issues:  No      Patient's Support Network Includes:  daughter and friends    Functional Status: Mild impairment     Progress toward goal: Not at goal    Prognosis: Good with Ongoing Treatment          Plan     Patient will continue in individual outpatient therapy with focus on improved functioning and coping skills, maintaining stability, and avoiding decompensation and the need for higher level of care.    Patient will adhere to medication regimen as prescribed and report any side effects. Patient will contact this office, call 911 or present to the nearest emergency room should suicidal or homicidal ideations occur. Provide Cognitive Behavioral Therapy and Solution Focused Therapy to improve functioning, maintain stability, and avoid decompensation and the need for higher level of care.     Return in about Return in about 2 weeks (around 1/25/2023). or earlier if symptoms worsen or fail to improve.            This document has been electronically signed by Carolina Lynn LCSW  January 11, 2023 14:19 EST      Part of this note may be an electronic transcription/translation of spoken language to printed text using the Dragon Dictation System.

## 2023-02-15 ENCOUNTER — OFFICE VISIT (OUTPATIENT)
Dept: PSYCHIATRY | Facility: CLINIC | Age: 58
End: 2023-02-15
Payer: COMMERCIAL

## 2023-02-15 VITALS
BODY MASS INDEX: 24.52 KG/M2 | HEIGHT: 72 IN | SYSTOLIC BLOOD PRESSURE: 144 MMHG | WEIGHT: 181 LBS | HEART RATE: 72 BPM | DIASTOLIC BLOOD PRESSURE: 100 MMHG

## 2023-02-15 DIAGNOSIS — F33.0 MILD EPISODE OF RECURRENT MAJOR DEPRESSIVE DISORDER: Chronic | ICD-10-CM

## 2023-02-15 DIAGNOSIS — F41.1 GENERALIZED ANXIETY DISORDER: Primary | Chronic | ICD-10-CM

## 2023-02-15 DIAGNOSIS — F10.21 ALCOHOL USE DISORDER, SEVERE, IN EARLY REMISSION: ICD-10-CM

## 2023-02-15 PROCEDURE — 99214 OFFICE O/P EST MOD 30 MIN: CPT | Performed by: NURSE PRACTITIONER

## 2023-02-15 RX ORDER — FLUTICASONE PROPIONATE 50 MCG
SPRAY, SUSPENSION (ML) NASAL
COMMUNITY
Start: 2023-02-09

## 2023-02-15 RX ORDER — AMOXICILLIN AND CLAVULANATE POTASSIUM 875; 125 MG/1; MG/1
TABLET, FILM COATED ORAL
COMMUNITY
Start: 2023-02-09

## 2023-02-15 NOTE — PROGRESS NOTES
"Chief Complaint  Anxiety and Depression    Subjective          Yousuf Barrera presents to [facility] for medication management of his anxiety and depression.    History of Present Illness: Patient presents today for follow-up appointment titus seen on 12/22/2022.  Patient says \"I am doing really good\".  He says he is staying on top of his AA meetings.  He is meeting with his sponsor tonight, and says that has been going very well.  Patient says over the last several weeks he has gotten back to taking his medication on a more consistent basis.  He is taking his Lexapro every night, and generally takes propranolol with it.  He also carries propranolol with him in case he needs it throughout the day.  Patient says he and his wife have signed everything for their divorce, and are just waiting for the paperwork from the court.  Patient says work has been busy, and says it has been going well.  He is eating and sleeping well and denies issues with either.  He has continued to participate in monthly therapy with Carolina Lynn LCSW.  Patient denies any SI/HI, A/V hallucinations.      The following portions of the patient's history were reviewed and updated as appropriate: allergies, current medications, past family history, past medical history, past social history, past surgical history and problem list.      Current Medications:   Current Outpatient Medications   Medication Sig Dispense Refill   • amoxicillin-clavulanate (AUGMENTIN) 875-125 MG per tablet      • aspirin 325 MG tablet Take 325 mg by mouth Daily.     • cholecalciferol (VITAMIN D3) 25 MCG (1000 UT) tablet Take 1,000 Units by mouth 2 (Two) Times a Day.     • escitalopram (LEXAPRO) 10 MG tablet Take 1 tablet by mouth Daily. 90 tablet 1   • fluticasone (FLONASE) 50 MCG/ACT nasal spray      • levocetirizine (XYZAL) 5 MG tablet Take 5 mg by mouth Every Night.     • losartan (COZAAR) 50 MG tablet Take 50 mg by mouth " "Daily.     • multivitamin with minerals tablet tablet Take 1 tablet by mouth Daily.     • propranolol (INDERAL) 20 MG tablet Take 1 tablet by mouth 3 (Three) Times a Day As Needed (Anxiety). 90 tablet 3   • vitamin B-12 (CYANOCOBALAMIN) 1000 MCG tablet Take 1,000 mcg by mouth Daily.       No current facility-administered medications for this visit.       Mental Status Exam:   Hygiene:   good  Cooperation:  Cooperative  Eye Contact:  Good  Psychomotor Behavior:  Appropriate  Affect:  Appropriate  Mood: euthymic  Speech:  Normal  Thought Process:  Goal directed  Thought Content:  Mood congruent  Suicidal:  None  Homicidal:  None  Hallucinations:  None  Delusion:  None  Memory:  Intact  Orientation:  Person, Place, Time and Situation  Reliability:  good  Insight:  Good  Judgement:  Good  Impulse Control:  Good  Physical/Medical Issues:  HTN       Objective   Vital Signs:   /100   Pulse 72   Ht 182.9 cm (72\")   Wt 82.1 kg (181 lb)   BMI 24.55 kg/m²     Physical Exam  Neurological:      Mental Status: He is oriented to person, place, and time. Mental status is at baseline.      Coordination: Coordination is intact.      Gait: Gait is intact.   Psychiatric:         Behavior: Behavior is cooperative.        Result Review :     The following data was reviewed by: MELVIN Wadsworth on 02/15/2023:    Data reviewed: Previous note, medication history          Assessment and Plan    Diagnoses and all orders for this visit:    1. Generalized anxiety disorder (Primary)    2. Mild episode of recurrent major depressive disorder (HCC)    3. Alcohol use disorder, severe, in early remission (HCC)         PHQ-9 Score:   PHQ-9 Total Score: 0      Depression Screening:  Patient screened positive for depression based on a PHQ-9 score of 0 on 2/15/2023. Follow-up recommendations include: Prescribed antidepressant medication treatment and Suicide Risk Assessment performed.        Tobacco Cessation:  Yousuf Barrera  reports that he " has never smoked. His smokeless tobacco use includes snuff.. I have educated him on the risk of diseases from using tobacco products such as cancer, COPD and heart disease.     I advised him to quit and he is not willing to quit.    I spent 3  minutes counseling the patient.           Impression/Plan:  -This is a follow-up appointment.  Patient presents today and reports he has been doing well overall since his last appointment.  He has been taking his medication as prescribed, and denies any adverse effects associated with them.  Patient is happy overall with how well he is doing and his current medication regimen.  He feels his depression and anxiety have been better controlled.  -Maintain Lexapro 10 mg daily.  -Maintain propranolol 20 mg 3 times daily as needed.  -Patient's LUCIA report reviewed and deemed appropriate.  Patient counseled on use of controlled substances.  -Reviewed available lab work.  -Schedule follow-up appointment for 12 weeks or as needed.      MEDS ORDERED DURING VISIT:  No orders of the defined types were placed in this encounter.        Follow Up   Return in about 12 weeks (around 5/10/2023), or if symptoms worsen or fail to improve, for Next scheduled follow up, In-Person Appt.  Patient was given instructions and counseling regarding his condition or for health maintenance advice. Please see specific information pulled into the AVS if appropriate.       TREATMENT PLAN/GOALS: Continue supportive psychotherapy efforts and medications as indicated. Treatment and medication options discussed during today's visit. Patient acknowledged and verbally consented to continue with current treatment plan and was educated on the importance of compliance with treatment and follow-up appointments.    MEDICATION ISSUES:  Discussed medication options and treatment plan of prescribed medication as well as the risks, benefits, and side effects including potential falls, possible impaired driving and  metabolic adversities among others. Patient is agreeable to call the office with any worsening of symptoms or onset of side effects. Patient is agreeable to call 911 or go to the nearest ER should he/she begin having SI/HI.          This document has been electronically signed by MELVIN Montoya, PMHNP-BC  February 15, 2023 12:48 EST      Part of this note may be an electronic transcription/translation of spoken language to printed text using the Dragon Dictation System.

## 2023-04-05 ENCOUNTER — OFFICE VISIT (OUTPATIENT)
Dept: PSYCHIATRY | Facility: CLINIC | Age: 58
End: 2023-04-05
Payer: COMMERCIAL

## 2023-04-05 DIAGNOSIS — F41.1 GENERALIZED ANXIETY DISORDER: ICD-10-CM

## 2023-04-05 DIAGNOSIS — F33.0 MILD EPISODE OF RECURRENT MAJOR DEPRESSIVE DISORDER: Primary | ICD-10-CM

## 2023-04-05 DIAGNOSIS — F10.20 ALCOHOL USE DISORDER, SEVERE, DEPENDENCE: ICD-10-CM

## 2023-04-05 PROCEDURE — 90837 PSYTX W PT 60 MINUTES: CPT | Performed by: SOCIAL WORKER

## 2023-04-05 NOTE — PROGRESS NOTES
"Date: 04/05/2023   Time In: 1628  Time Out: 2834    PROGRESS NOTE  Data:  Yousuf Barrera is a 57 y.o. male who presents today for individual therapy session at [facility]. Chief Complaint: depression, anxiety and alcohol use    Patient discussed his divorce being finalized and reports it was difficult for him to sign the papers. He reports experiencing an emotional roller coaster. He reports being happy about the income and feeling like he \"won\". He reports meeting with friends has been helpful. He reports being able to maintain his sobriety and continues to attend meetings and talk with sponsor.     Clinical Maneuvering/Intervention:    Assisted patient in processing above session content; acknowledged and normalized patient’s thoughts, feelings, and concerns. Assisted patient in processing his thoughts and feelings regarding the divorce and changes in emotional state.We reviewed healthy coping and acceptance.    Allowed patient to freely discuss issues without interruption or judgment. Provided safe, confidential environment to facilitate the development of positive therapeutic relationship and encourage open, honest communication. Assisted patient in identifying risk factors which would indicate the need for higher level of care including thoughts to harm self or others and/or self-harming behavior and encouraged patient to contact this office, call 911, or present to the nearest emergency room should any of these events occur. Discussed crisis intervention services and means to access. Patient adamantly and convincingly denies current suicidal or homicidal ideation or perceptual disturbance.    Assessment   Patient appears to maintain relative stability as compared to their baseline.  However, patient continues to struggle with depression, anxiety which continues to cause impairment in important areas of functioning.  As a result, they can be reasonably expected to continue to benefit from " treatment and would likely be at increased risk for decompensation otherwise.    Mental Status Exam:   Hygiene:   good  Cooperation:  Cooperative  Eye Contact:  Good  Psychomotor Behavior:  Appropriate  Affect:  Appropriate  Mood: anxious  Speech:  Normal  Thought Process:  Goal directed  Thought Content:  Normal  Suicidal:  None  Homicidal:  None  Hallucinations:  None  Delusion:  None  Memory:  Intact  Orientation:  Person, Place, Time and Situation  Reliability:  good  Insight:  Good  Judgement:  Good  Impulse Control:  Good  Physical/Medical Issues:  Yes reports allergies     Patient's Support Network Includes:  daughter and friends    Functional Status: Mild impairment     Progress toward goal: Not at goal    Prognosis: Good with Ongoing Treatment          Plan     VISIT DIAGNOSIS:     ICD-10-CM ICD-9-CM   1. Mild episode of recurrent major depressive disorder  F33.0 296.31   2. Generalized anxiety disorder  F41.1 300.02   3. Alcohol use disorder, severe, dependence  F10.20 303.90        Patient will continue in individual outpatient therapy with focus on improved functioning and coping skills, maintaining stability, and avoiding decompensation and the need for higher level of care.    Patient will adhere to medication regimen as prescribed and report any side effects. Patient will contact this office, call 911 or present to the nearest emergency room should suicidal or homicidal ideations occur. Provide Cognitive Behavioral Therapy and Solution Focused Therapy to improve functioning, maintain stability, and avoid decompensation and the need for higher level of care.     Return in about Return in about 4 weeks (around 5/3/2023). or earlier if symptoms worsen or fail to improve.            This document has been electronically signed by Carolina Lynn LCSW  April 5, 2023 16:27 EDT      Part of this note may be an electronic transcription/translation of spoken language to printed text using the Dragon Dictation  System.

## 2023-04-14 ENCOUNTER — TELEPHONE (OUTPATIENT)
Dept: SURGERY | Facility: CLINIC | Age: 58
End: 2023-04-14
Payer: COMMERCIAL

## 2023-04-14 RX ORDER — POLYETHYLENE GLYCOL 3350 17 G/17G
238 POWDER, FOR SOLUTION ORAL ONCE
Qty: 17 PACKET | Refills: 0 | Status: SHIPPED | OUTPATIENT
Start: 2023-04-14 | End: 2023-04-14

## 2023-04-14 RX ORDER — BISACODYL 5 MG/1
5 TABLET, DELAYED RELEASE ORAL TAKE AS DIRECTED
Qty: 4 TABLET | Refills: 0 | Status: SHIPPED | OUTPATIENT
Start: 2023-04-14 | End: 2024-04-13

## 2023-04-14 NOTE — TELEPHONE ENCOUNTER
PRESCREENING FOR OPEN ACCESS SCHEDULING    Yousuf Barrera, 1965  [id number]    04/14/23    If, the patient answers yes to any of the following questions the provider will be informed prior to scheduling open access for approval and documented in the chart.    [x]  Yes  [] No    1. Have you ever had a colonoscopy in the past?      When: 2018       Where:       Polyps or other:     []  Yes  [x] No    2. Family history of colon cancer?      Relation:       Age of onset:       Do you currently have any of the following?    []  Yes  [x] No  Rectal bleeding, if so, how long?     []  Yes  [x] No  Abdominal pain, if so, how long?    []  Yes  [x] No  Constipation, if so, how long?    []  Yes  [x] No  Diarrhea, if so, how long?    []  Yes  [x] No  Weight loss, is so, how much?    [] Yes  [x] No  Small caliber stool, if so, how long?      Have you ever had any of the following conditions?    [] Yes  [x] No  Heart attack?      When?       Last cardiac workup?     Blood thinners?    [] Yes  [x] No   Lung problems, asthma or COPD?  [] Yes  [x] No  Oxygen required?       [] Yes  [x] No  Stroke?     [] Yes  [x] No  Have you ever had a reaction to anesthesia?

## 2023-04-18 ENCOUNTER — PREP FOR SURGERY (OUTPATIENT)
Dept: OTHER | Facility: HOSPITAL | Age: 58
End: 2023-04-18
Payer: COMMERCIAL

## 2023-04-18 DIAGNOSIS — Z12.11 ENCOUNTER FOR COLONOSCOPY DUE TO HISTORY OF ADENOMATOUS COLONIC POLYPS: Primary | ICD-10-CM

## 2023-04-18 DIAGNOSIS — Z86.010 ENCOUNTER FOR COLONOSCOPY DUE TO HISTORY OF ADENOMATOUS COLONIC POLYPS: Primary | ICD-10-CM

## 2023-05-08 ENCOUNTER — TELEPHONE (OUTPATIENT)
Dept: SURGERY | Facility: CLINIC | Age: 58
End: 2023-05-08
Payer: COMMERCIAL

## 2023-05-08 NOTE — TELEPHONE ENCOUNTER
----- Message from Ana Carmichael MA sent at 5/8/2023 10:21 AM EDT -----  I spoke to the patient and he has cancelled his colonoscopy, pt will call the office back when he can reschedule.

## 2023-05-10 ENCOUNTER — OFFICE VISIT (OUTPATIENT)
Dept: PSYCHIATRY | Facility: CLINIC | Age: 58
End: 2023-05-10
Payer: COMMERCIAL

## 2023-05-10 VITALS
WEIGHT: 188 LBS | HEIGHT: 72 IN | SYSTOLIC BLOOD PRESSURE: 122 MMHG | HEART RATE: 76 BPM | DIASTOLIC BLOOD PRESSURE: 76 MMHG | BODY MASS INDEX: 25.47 KG/M2

## 2023-05-10 DIAGNOSIS — F33.0 MILD EPISODE OF RECURRENT MAJOR DEPRESSIVE DISORDER: Primary | ICD-10-CM

## 2023-05-10 DIAGNOSIS — F41.1 GENERALIZED ANXIETY DISORDER: ICD-10-CM

## 2023-05-10 PROCEDURE — 99214 OFFICE O/P EST MOD 30 MIN: CPT | Performed by: NURSE PRACTITIONER

## 2023-05-10 RX ORDER — PROPRANOLOL HYDROCHLORIDE 20 MG/1
20 TABLET ORAL 3 TIMES DAILY PRN
Qty: 90 TABLET | Refills: 3 | Status: SHIPPED | OUTPATIENT
Start: 2023-05-10

## 2023-05-10 RX ORDER — ESCITALOPRAM OXALATE 10 MG/1
10 TABLET ORAL DAILY
Qty: 90 TABLET | Refills: 3 | Status: SHIPPED | OUTPATIENT
Start: 2023-05-10

## 2023-05-10 NOTE — PROGRESS NOTES
"Chief Complaint  Anxiety and Depression    Subjective          Yousuf Barrera presents to [facility] for medication management of his anxiety and depression.    History of Present Illness: Patient presents today for follow-up appointment at last been seen on 02/15/2023.  Patient says \"I am good, just doing a lot of work and going to meetings\".  He says he is still going to somewhere between 4 and 5 AA meetings on a weekly basis.  He feels they have continued to be helpful to him.  He is almost 18 months sober at this time.  Patient's divorce was finalized last month, and he is glad that process is now over.  He says aside from that nothing else new has been going on.  He says he goes to work on a daily basis, and stays busy there.  He says he is taking his medications on a consistent basis at night, and is doing well with them.  Patient says he continues to be happy with his symptom burden relief and denies any significant concerns today.  He says he is sleeping and eating well and denies issues with either.  He is trying to spend as much time as he can with his daughter in his free time.  Patient denies any SI/HI, A/V hallucinations.      The following portions of the patient's history were reviewed and updated as appropriate: allergies, current medications, past family history, past medical history, past social history, past surgical history and problem list.      Current Medications:   Current Outpatient Medications   Medication Sig Dispense Refill   • aspirin 325 MG tablet Take 1 tablet by mouth Daily.     • cholecalciferol (VITAMIN D3) 25 MCG (1000 UT) tablet Take 1 tablet by mouth 2 (Two) Times a Day. Indications: Vitamin D Deficiency     • escitalopram (LEXAPRO) 10 MG tablet Take 1 tablet by mouth Daily. 90 tablet 3   • fluticasone (FLONASE) 50 MCG/ACT nasal spray      • levocetirizine (XYZAL) 5 MG tablet Take 1 tablet by mouth Every Night. Indications: Perennial " What Type Of Note Output Would You Prefer (Optional)?: Standard Output "Allergic Rhinitis     • losartan (COZAAR) 50 MG tablet Take 1 tablet by mouth Daily. Indications: High Blood Pressure Disorder     • multivitamin with minerals tablet tablet Take 1 tablet by mouth Daily.     • propranolol (INDERAL) 20 MG tablet Take 1 tablet by mouth 3 (Three) Times a Day As Needed (Anxiety). 90 tablet 3   • vitamin B-12 (CYANOCOBALAMIN) 1000 MCG tablet Take 1 tablet by mouth Daily. Indications: Inadequate Vitamin B12       No current facility-administered medications for this visit.       Mental Status Exam:   Hygiene:   good  Cooperation:  Cooperative  Eye Contact:  Good  Psychomotor Behavior:  Appropriate  Affect:  Appropriate  Mood: euthymic  Speech:  Normal  Thought Process:  Goal directed  Thought Content:  Mood congruent  Suicidal:  None  Homicidal:  None  Hallucinations:  None  Delusion:  None  Memory:  Intact  Orientation:  Person, Place, Time and Situation  Reliability:  good  Insight:  Good  Judgement:  Good  Impulse Control:  Good  Physical/Medical Issues:  HTN       Objective   Vital Signs:   /76   Pulse 76   Ht 182.9 cm (72\")   Wt 85.3 kg (188 lb)   BMI 25.50 kg/m²     Physical Exam  Neurological:      Mental Status: He is oriented to person, place, and time. Mental status is at baseline.      Coordination: Coordination is intact.      Gait: Gait is intact.   Psychiatric:         Behavior: Behavior is cooperative.        Result Review :     The following data was reviewed by: MELVIN Wadsworth on 05/10/2023:    Data reviewed: Previous note, medication history          Assessment and Plan    Diagnoses and all orders for this visit:    1. Mild episode of recurrent major depressive disorder (Primary)  -     escitalopram (LEXAPRO) 10 MG tablet; Take 1 tablet by mouth Daily.  Dispense: 90 tablet; Refill: 3    2. Generalized anxiety disorder  -     escitalopram (LEXAPRO) 10 MG tablet; Take 1 tablet by mouth Daily.  Dispense: 90 tablet; Refill: 3  -     propranolol (INDERAL) 20 " Hpi Title: Evaluation of Skin Lesions How Severe Are Your Spot(S)?: mild MG tablet; Take 1 tablet by mouth 3 (Three) Times a Day As Needed (Anxiety).  Dispense: 90 tablet; Refill: 3         PHQ-9 Score:   PHQ-9 Total Score: 0      Depression Screening:  Patient screened positive for depression based on a PHQ-9 score of 0 on 5/10/2023. Follow-up recommendations include: Prescribed antidepressant medication treatment and Suicide Risk Assessment performed.        Tobacco Cessation:  Yousuf Barrera  reports that he has never smoked. His smokeless tobacco use includes snuff.. I have educated him on the risk of diseases from using tobacco products such as cancer, COPD and heart disease.     I advised him to quit and he is not willing to quit.    I spent 3  minutes counseling the patient.           Impression/Plan:  -This is a follow-up appointment.  Patient presents today and reports he has been doing well overall since his last appointment.  He has been taking his medication as prescribed and denies any adverse effects associated with them.  Patient feels his current medication regimen is sufficient for his symptom burden.  He has no new or significant concerns today and is happy with how well he is doing.  -Maintain Lexapro 10 mg daily.  -Maintain propranolol 20 mg 3 times daily as needed.  -Patient's LUCIA report reviewed and deemed appropriate.  Patient counseled on use of controlled substances.  -Reviewed available lab work.  -Schedule follow-up appointment for 6 months or as needed.      MEDS ORDERED DURING VISIT:  New Medications Ordered This Visit   Medications   • escitalopram (LEXAPRO) 10 MG tablet     Sig: Take 1 tablet by mouth Daily.     Dispense:  90 tablet     Refill:  3   • propranolol (INDERAL) 20 MG tablet     Sig: Take 1 tablet by mouth 3 (Three) Times a Day As Needed (Anxiety).     Dispense:  90 tablet     Refill:  3         Follow Up   Return in about 6 months (around 11/10/2023), or if symptoms worsen or fail to improve, for Next scheduled follow up, In-Person  Appt.  Patient was given instructions and counseling regarding his condition or for health maintenance advice. Please see specific information pulled into the AVS if appropriate.       TREATMENT PLAN/GOALS: Continue supportive psychotherapy efforts and medications as indicated. Treatment and medication options discussed during today's visit. Patient acknowledged and verbally consented to continue with current treatment plan and was educated on the importance of compliance with treatment and follow-up appointments.    MEDICATION ISSUES:  Discussed medication options and treatment plan of prescribed medication as well as the risks, benefits, and side effects including potential falls, possible impaired driving and metabolic adversities among others. Patient is agreeable to call the office with any worsening of symptoms or onset of side effects. Patient is agreeable to call 911 or go to the nearest ER should he/she begin having SI/HI.          This document has been electronically signed by MELVIN Montoya, PMHNP-BC  May 10, 2023 12:36 EDT      Part of this note may be an electronic transcription/translation of spoken language to printed text using the Dragon Dictation System.   Have Your Spot(S) Been Treated In The Past?: has not been treated

## 2023-10-19 ENCOUNTER — OFFICE VISIT (OUTPATIENT)
Dept: PSYCHIATRY | Facility: CLINIC | Age: 58
End: 2023-10-19
Payer: MEDICAID

## 2023-10-19 DIAGNOSIS — F33.0 MILD EPISODE OF RECURRENT MAJOR DEPRESSIVE DISORDER: ICD-10-CM

## 2023-10-19 DIAGNOSIS — F10.21 ALCOHOL DEPENDENCE IN REMISSION: Primary | ICD-10-CM

## 2023-10-19 DIAGNOSIS — F41.1 GENERALIZED ANXIETY DISORDER: ICD-10-CM
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
## 2023-11-16 ENCOUNTER — OFFICE VISIT (OUTPATIENT)
Dept: PSYCHIATRY | Facility: CLINIC | Age: 58
End: 2023-11-16
Payer: MEDICAID

## 2023-11-16 VITALS
HEART RATE: 67 BPM | HEIGHT: 72 IN | BODY MASS INDEX: 23.32 KG/M2 | DIASTOLIC BLOOD PRESSURE: 86 MMHG | SYSTOLIC BLOOD PRESSURE: 132 MMHG | OXYGEN SATURATION: 98 % | WEIGHT: 172.2 LBS

## 2023-11-16 DIAGNOSIS — F41.1 GENERALIZED ANXIETY DISORDER: Chronic | ICD-10-CM

## 2023-11-16 DIAGNOSIS — F33.0 MILD EPISODE OF RECURRENT MAJOR DEPRESSIVE DISORDER: Primary | Chronic | ICD-10-CM

## 2023-11-16 PROCEDURE — 99214 OFFICE O/P EST MOD 30 MIN: CPT | Performed by: NURSE PRACTITIONER

## 2023-11-16 RX ORDER — METHOCARBAMOL 500 MG/1
500 TABLET, FILM COATED ORAL 4 TIMES DAILY
COMMUNITY
Start: 2023-06-22 | End: 2023-11-16

## 2023-11-16 RX ORDER — PROPRANOLOL HYDROCHLORIDE 20 MG/1
20 TABLET ORAL 3 TIMES DAILY PRN
Qty: 90 TABLET | Refills: 3 | Status: SHIPPED | OUTPATIENT
Start: 2023-11-16

## 2023-11-16 RX ORDER — FOLIC ACID 1 MG/1
TABLET ORAL
COMMUNITY
Start: 2023-06-30 | End: 2023-11-16

## 2023-11-16 RX ORDER — ESCITALOPRAM OXALATE 10 MG/1
10 TABLET ORAL DAILY
Qty: 90 TABLET | Refills: 3 | Status: SHIPPED | OUTPATIENT
Start: 2023-11-16

## 2023-11-16 RX ORDER — LOSARTAN POTASSIUM 25 MG/1
TABLET ORAL
COMMUNITY
Start: 2023-06-30 | End: 2023-11-16

## 2023-11-16 RX ORDER — DIAPER,BRIEF,INFANT-TODD,DISP
EACH MISCELLANEOUS 2 TIMES DAILY
COMMUNITY
Start: 2023-06-22 | End: 2023-11-16

## 2023-11-16 RX ORDER — LORATADINE 10 MG/1
10 TABLET ORAL DAILY
COMMUNITY
End: 2023-11-16

## 2023-11-16 RX ORDER — NALOXONE HYDROCHLORIDE 4 MG/.1ML
4 SPRAY NASAL
COMMUNITY
Start: 2023-06-22 | End: 2023-11-16

## 2023-11-16 RX ORDER — ASPIRIN 81 MG/1
81 TABLET ORAL DAILY
COMMUNITY
End: 2023-11-16

## 2023-11-16 RX ORDER — ACETAMINOPHEN 500 MG
500 TABLET ORAL 4 TIMES DAILY
COMMUNITY
Start: 2023-06-22 | End: 2023-11-16

## 2023-11-16 RX ORDER — FLUTICASONE PROPIONATE 50 MCG
1 SPRAY, SUSPENSION (ML) NASAL DAILY
COMMUNITY
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
## 2023-12-15 ENCOUNTER — TELEPHONE (OUTPATIENT)
Dept: PSYCHIATRY | Facility: CLINIC | Age: 58
End: 2023-12-15
Payer: COMMERCIAL

## 2023-12-15 NOTE — TELEPHONE ENCOUNTER
If Yousuf calls in to schedule another appointment with Carolina Lynn, please advise him of the policy. A copy of the policy letter was mailed on 12/15/24. He has no showed once and late cancelled twice this year. He last seen Carolina on 10/19/23.     No shows: 12/14/23  Late cancels: 5/17/23, 10/19/23

## 2024-06-20 ENCOUNTER — HOSPITAL ENCOUNTER (EMERGENCY)
Facility: HOSPITAL | Age: 59
Discharge: HOME | End: 2024-06-20
Attending: STUDENT IN AN ORGANIZED HEALTH CARE EDUCATION/TRAINING PROGRAM
Payer: COMMERCIAL

## 2024-06-20 ENCOUNTER — APPOINTMENT (OUTPATIENT)
Dept: GENERAL RADIOLOGY | Facility: HOSPITAL | Age: 59
End: 2024-06-20
Payer: COMMERCIAL

## 2024-06-20 VITALS
RESPIRATION RATE: 20 BRPM | HEART RATE: 87 BPM | OXYGEN SATURATION: 95 % | SYSTOLIC BLOOD PRESSURE: 138 MMHG | DIASTOLIC BLOOD PRESSURE: 92 MMHG | HEIGHT: 72 IN | WEIGHT: 170 LBS | TEMPERATURE: 98.7 F | BODY MASS INDEX: 23.03 KG/M2

## 2024-06-20 DIAGNOSIS — M54.16 ACUTE RIGHT LUMBAR RADICULOPATHY: Primary | ICD-10-CM

## 2024-06-20 PROCEDURE — 96372 THER/PROPH/DIAG INJ SC/IM: CPT

## 2024-06-20 PROCEDURE — 99283 EMERGENCY DEPT VISIT LOW MDM: CPT

## 2024-06-20 PROCEDURE — 73502 X-RAY EXAM HIP UNI 2-3 VIEWS: CPT

## 2024-06-20 PROCEDURE — 25010000002 KETOROLAC TROMETHAMINE PER 15 MG: Performed by: STUDENT IN AN ORGANIZED HEALTH CARE EDUCATION/TRAINING PROGRAM

## 2024-06-20 RX ORDER — KETOROLAC TROMETHAMINE 30 MG/ML
30 INJECTION, SOLUTION INTRAMUSCULAR; INTRAVENOUS ONCE
Status: COMPLETED | OUTPATIENT
Start: 2024-06-20 | End: 2024-06-20

## 2024-06-20 RX ORDER — ONDANSETRON 4 MG/1
4 TABLET, FILM COATED ORAL EVERY 8 HOURS PRN
Qty: 12 TABLET | Refills: 0 | Status: SHIPPED | OUTPATIENT
Start: 2024-06-20

## 2024-06-20 RX ORDER — IBUPROFEN 800 MG/1
800 TABLET ORAL EVERY 8 HOURS PRN
Qty: 15 TABLET | Refills: 0 | Status: SHIPPED | OUTPATIENT
Start: 2024-06-20

## 2024-06-20 RX ADMIN — KETOROLAC TROMETHAMINE 30 MG: 30 INJECTION, SOLUTION INTRAMUSCULAR; INTRAVENOUS at 06:59

## 2024-06-20 NOTE — ED PROVIDER NOTES
"EMERGENCY DEPARTMENT ENCOUNTER    Pt Name: Yousuf Barrera  MRN: [id number]  Pt :   1965  Room Number:    Date of encounter:  2024  PCP: Isidra Pemberton APRN  ED Physician: Jean Pierre Katz DO      HPI:  Chief Complaint: Hip pain    Context: Yousuf Barrera is a 59 y.o. male who presents to the ED with chief complaint of right hip pain.  Patient reports gradual onset of symptoms 2 days ago.  Complains of pain in the low back that radiates to the right lateral hip and right lateral thigh.  Duration constant.  No modifying factors.  Has been taking OTC Aleve with some improvement of symptoms.  No other associated symptoms including fever, chills, chest pain or shortness of breath, abdominal pain, problems urination or bowel movements, urinary or bowel incontinence, saddle anesthesia, numbness or weakness in the lower extremities.  Denies any trauma to the back.    PAST MEDICAL HISTORY  Past Medical History:   Diagnosis Date    Alcohol abuse     Anxiety     H/O exercise stress test     HAD DONE WITH WORK.  \"IT WAS OK\"    Hypertension     Wears glasses     Withdrawal symptoms, alcohol      Current Outpatient Medications   Medication Instructions    aspirin 325 mg, Oral, Daily    cholecalciferol (VITAMIN D3) 1,000 Units, Oral, 2 Times Daily    escitalopram (LEXAPRO) 10 mg, Oral, Daily    fluticasone (FLONASE) 50 MCG/ACT nasal spray 1 spray, Nasal, Daily    ibuprofen (ADVIL,MOTRIN) 800 mg, Oral, Every 8 Hours PRN    levocetirizine (XYZAL) 5 mg, Oral, Nightly    losartan (COZAAR) 50 mg, Oral, Daily    multivitamin with minerals tablet tablet 1 tablet, Oral, Daily    ondansetron (ZOFRAN) 4 mg, Oral, Every 8 Hours PRN    propranolol (INDERAL) 20 mg, Oral, 3 Times Daily PRN        PAST SURGICAL HISTORY  Past Surgical History:   Procedure Laterality Date    COLONOSCOPY N/A 2018    Procedure: COLONOSCOPY WITH COLD SNARE POLYECTOMY X 1;  Surgeon: Carlos A Moreno MD;  Location: [facility] ENDOSCOPY;  Service: " Gastroenterology    CYSTOSCOPY  2016    WISDOM TOOTH EXTRACTION         FAMILY HISTORY  Family History   Problem Relation Age of Onset    Hypertension Mother     Hypertension Father     Dementia Father     ADD / ADHD Neg Hx     Alcohol abuse Neg Hx     Anxiety disorder Neg Hx     Bipolar disorder Neg Hx     Depression Neg Hx     Drug abuse Neg Hx     OCD Neg Hx     Paranoid behavior Neg Hx     Schizophrenia Neg Hx     Seizures Neg Hx     Self-Injurious Behavior  Neg Hx     Suicide Attempts Neg Hx        SOCIAL HISTORY  Social History     Socioeconomic History    Marital status:     Number of children: 1    Highest education level: Some college, no degree   Tobacco Use    Smoking status: Never    Smokeless tobacco: Current     Types: Snuff   Vaping Use    Vaping status: Never Used   Substance and Sexual Activity    Alcohol use: No    Drug use: No    Sexual activity: Defer       ALLERGIES  Tetracycline and Tetracyclines & related    REVIEW OF SYSTEMS  All systems reviewed and negative except for those discussed in HPI.     PHYSICAL EXAM  ED Triage Vitals [06/20/24 0559]   Temp Heart Rate Resp BP SpO2   98.7 °F (37.1 °C) 92 17 (!) 145/112 96 %      Temp src Heart Rate Source Patient Position BP Location FiO2 (%)   Oral Monitor Lying Left arm --     I have reviewed the triage vital signs and nursing notes.    General: Alert.  Nontoxic appearance.  No acute distress.  Head: Normocephalic.  Atraumatic.  Eyes: No scleral icterus.  ENT: Moist mucous membranes.  Cardiovascular: Regular rate and rhythm.  No murmurs.  No rubs.  2+ distal pulses bilaterally.  Respiratory: Equal breath sounds bilaterally.  No rales.  No rhonchi.  No wheezing.  GI: Abdomen is soft.  Nondistended.  Nontender to palpation.  No rebound.  No guarding.  No CVA tenderness.  MSK: Moves all 4 extremities.  No cervical, thoracic, lumbar midline tenderness or step-off.  Full range of motion of the right hip joint without any pain.  No bony  tenderness to the right lower extremity.  Neurologic: Oriented x 3.  No focal deficits. Strength 5/5 bilateral hip flexion/extension, knee flexion/extension, dorsiflexion, and plantarflexion.  Sensation to touch grossly intact bilaterally.  2+ patellar and Achilles tendon reflexes.  2+ dorsalis pedis and posterior tibial pulses bilaterally.  Skin: No erythema.  No edema. No pallor. No cyanosis.  Psych: Normal mood and affect.    LAB RESULTS  No results found for this or any previous visit (from the past 24 hour(s)).    RADIOLOGY  No Radiology Exams Resulted Within Past 24 Hours    PROCEDURES  Procedures    MEDICATIONS GIVEN IN ER  Medications   ketorolac (TORADOL) injection 30 mg (30 mg Intramuscular Given 6/20/24 0659)       MEDICAL DECISION MAKING  59 y.o. male with past medical history listed above who presents with low back pain with radiculopathy to the right lateral hip and leg.    Vital signs remarkable for hypertension, otherwise within normal limits.    Based on clinical presentation and physical exam, differential diagnosis includes, but is not limited to, disc herniation, lumbar stenosis, MSK etiology. Doubt osseous abnormality of the hip. No clinical evidence of spinal cord compression, infection, intra-abdominal process.    No clinical indication for labs or MRI. X-rays right hip ordered in triage.    Please see ED course below for my interpretation of the ED workup.  ED Course as of 06/20/24 0706   Thu Jun 20, 2024   0656 XR Hip With or Without Pelvis 2 - 3 View Right  I have independently reviewed and interpreted the x-ray right hip with pelvis.  My interpretation is negative for fracture or dislocation.   [JS]      ED Course User Index  [JS] Jean Pierre Katz DO      On re-evaluation, patient resting comfortably.  States symptoms have improved following therapy. Vital signs remained stable on room air.  Patient was ambulatory in the ED with steady gait.  Able to tolerate oral intake  appropriately.    I discussed the findings of the ED workup with the patient at bedside.  No clinical indication for admission.  I recommended outpatient follow-up with PCP/orthopedics.  Patient was deemed medically stable for discharge with close outpatient follow-up and strict ED return precautions. Patient agreeable with plan and disposition.    Home medications were reviewed.  Prescriptions for discharge: Ibuprofen, Prednisone    Chronic conditions affecting care: None    Social determinants of health impacting treatment or disposition: None    REPEAT VITAL SIGNS  AS OF 07:06 EDT VITALS:  BP - (!) 145/112  HR - 92  TEMP - 98.7 °F (37.1 °C) (Oral)  O2 SATS - 96%    DIAGNOSIS  Final diagnoses:   Acute right lumbar radiculopathy       DISPOSITION  ED Disposition       ED Disposition   Discharge    Condition   Stable    Comment   --                     Please note that portions of this document were completed with voice recognition software.        Jean Pierre Katz DO  06/21/24 0704

## 2024-06-20 NOTE — ED NOTES
Called cab company att requesting transportation to [address] [place] [address]. Cab voucher for the amount of $8 provided to pt by this PCT.

## 2024-06-26 ENCOUNTER — HOSPITAL ENCOUNTER (EMERGENCY)
Facility: HOSPITAL | Age: 59
Discharge: HOME | End: 2024-06-27
Attending: EMERGENCY MEDICINE
Payer: COMMERCIAL

## 2024-06-26 DIAGNOSIS — F10.920 ALCOHOLIC INTOXICATION WITHOUT COMPLICATION: ICD-10-CM

## 2024-06-26 DIAGNOSIS — M54.41 ACUTE RIGHT-SIDED LOW BACK PAIN WITH RIGHT-SIDED SCIATICA: Primary | ICD-10-CM

## 2024-06-26 LAB
AMPHET+METHAMPHET UR QL: NEGATIVE
AMPHETAMINES UR QL: NEGATIVE
BACTERIA UR QL AUTO: ABNORMAL /HPF
BARBITURATES UR QL SCN: NEGATIVE
BASOPHILS # BLD AUTO: 0.01 10*3/MM3 (ref 0–0.2)
BASOPHILS NFR BLD AUTO: 0.1 % (ref 0–1.5)
BENZODIAZ UR QL SCN: NEGATIVE
BILIRUB UR QL STRIP: NEGATIVE
BUPRENORPHINE SERPL-MCNC: NEGATIVE NG/ML
CANNABINOIDS SERPL QL: NEGATIVE
CLARITY UR: CLEAR
COCAINE UR QL: NEGATIVE
COLOR UR: YELLOW
DEPRECATED RDW RBC AUTO: 40.1 FL (ref 37–54)
EOSINOPHIL # BLD AUTO: 0 10*3/MM3 (ref 0–0.4)
EOSINOPHIL NFR BLD AUTO: 0 % (ref 0.3–6.2)
ERYTHROCYTE [DISTWIDTH] IN BLOOD BY AUTOMATED COUNT: 12.4 % (ref 12.3–15.4)
FENTANYL UR-MCNC: NEGATIVE NG/ML
GLUCOSE UR STRIP-MCNC: NEGATIVE MG/DL
HCT VFR BLD AUTO: 41.8 % (ref 37.5–51)
HGB BLD-MCNC: 14.9 G/DL (ref 13–17.7)
HGB UR QL STRIP.AUTO: NEGATIVE
HYALINE CASTS UR QL AUTO: ABNORMAL /LPF
IMM GRANULOCYTES # BLD AUTO: 0.11 10*3/MM3 (ref 0–0.05)
IMM GRANULOCYTES NFR BLD AUTO: 0.8 % (ref 0–0.5)
KETONES UR QL STRIP: NEGATIVE
LEUKOCYTE ESTERASE UR QL STRIP.AUTO: ABNORMAL
LYMPHOCYTES # BLD AUTO: 1.62 10*3/MM3 (ref 0.7–3.1)
LYMPHOCYTES NFR BLD AUTO: 11.6 % (ref 19.6–45.3)
MCH RBC QN AUTO: 31.9 PG (ref 26.6–33)
MCHC RBC AUTO-ENTMCNC: 35.6 G/DL (ref 31.5–35.7)
MCV RBC AUTO: 89.5 FL (ref 79–97)
METHADONE UR QL SCN: NEGATIVE
MONOCYTES # BLD AUTO: 0.84 10*3/MM3 (ref 0.1–0.9)
MONOCYTES NFR BLD AUTO: 6 % (ref 5–12)
NEUTROPHILS NFR BLD AUTO: 11.38 10*3/MM3 (ref 1.7–7)
NEUTROPHILS NFR BLD AUTO: 81.5 % (ref 42.7–76)
NITRITE UR QL STRIP: NEGATIVE
NRBC BLD AUTO-RTO: 0 /100 WBC (ref 0–0.2)
OPIATES UR QL: NEGATIVE
OXYCODONE UR QL SCN: NEGATIVE
PCP UR QL SCN: NEGATIVE
PH UR STRIP.AUTO: 6 [PH] (ref 5–8)
PLATELET # BLD AUTO: 165 10*3/MM3 (ref 140–450)
PMV BLD AUTO: 9.8 FL (ref 6–12)
PROT UR QL STRIP: NEGATIVE
RBC # BLD AUTO: 4.67 10*6/MM3 (ref 4.14–5.8)
RBC # UR STRIP: ABNORMAL /HPF
REF LAB TEST METHOD: ABNORMAL
SP GR UR STRIP: <=1.005 (ref 1–1.03)
SQUAMOUS #/AREA URNS HPF: ABNORMAL /HPF
TRICYCLICS UR QL SCN: NEGATIVE
UROBILINOGEN UR QL STRIP: ABNORMAL
WBC # UR STRIP: ABNORMAL /HPF
WBC NRBC COR # BLD AUTO: 13.96 10*3/MM3 (ref 3.4–10.8)

## 2024-06-26 PROCEDURE — 80053 COMPREHEN METABOLIC PANEL: CPT | Performed by: PHYSICIAN ASSISTANT

## 2024-06-26 PROCEDURE — 83735 ASSAY OF MAGNESIUM: CPT | Performed by: PHYSICIAN ASSISTANT

## 2024-06-26 PROCEDURE — 85025 COMPLETE CBC W/AUTO DIFF WBC: CPT | Performed by: PHYSICIAN ASSISTANT

## 2024-06-26 PROCEDURE — 99283 EMERGENCY DEPT VISIT LOW MDM: CPT

## 2024-06-26 PROCEDURE — 99284 EMERGENCY DEPT VISIT MOD MDM: CPT

## 2024-06-26 PROCEDURE — 80307 DRUG TEST PRSMV CHEM ANLYZR: CPT | Performed by: PHYSICIAN ASSISTANT

## 2024-06-26 PROCEDURE — 82077 ASSAY SPEC XCP UR&BREATH IA: CPT | Performed by: PHYSICIAN ASSISTANT

## 2024-06-26 PROCEDURE — 81001 URINALYSIS AUTO W/SCOPE: CPT | Performed by: PHYSICIAN ASSISTANT

## 2024-06-26 RX ORDER — SODIUM CHLORIDE 0.9 % (FLUSH) 0.9 %
10 SYRINGE (ML) INJECTION AS NEEDED
Status: DISCONTINUED | OUTPATIENT
Start: 2024-06-26 | End: 2024-06-27 | Stop reason: HOSPADM

## 2024-06-27 VITALS
OXYGEN SATURATION: 96 % | DIASTOLIC BLOOD PRESSURE: 80 MMHG | BODY MASS INDEX: 23.03 KG/M2 | HEART RATE: 82 BPM | TEMPERATURE: 98.6 F | SYSTOLIC BLOOD PRESSURE: 119 MMHG | HEIGHT: 72 IN | RESPIRATION RATE: 18 BRPM | WEIGHT: 170 LBS

## 2024-06-27 LAB
ALBUMIN SERPL-MCNC: 4.6 G/DL (ref 3.5–5.2)
ALBUMIN/GLOB SERPL: 1.5 G/DL
ALP SERPL-CCNC: 59 U/L (ref 39–117)
ALT SERPL W P-5'-P-CCNC: 19 U/L (ref 1–41)
ANION GAP SERPL CALCULATED.3IONS-SCNC: 16.5 MMOL/L (ref 5–15)
AST SERPL-CCNC: 29 U/L (ref 1–40)
BILIRUB SERPL-MCNC: 0.9 MG/DL (ref 0–1.2)
BUN SERPL-MCNC: 20 MG/DL (ref 6–20)
BUN/CREAT SERPL: 25 (ref 7–25)
CALCIUM SPEC-SCNC: 9.5 MG/DL (ref 8.6–10.5)
CHLORIDE SERPL-SCNC: 99 MMOL/L (ref 98–107)
CO2 SERPL-SCNC: 20.5 MMOL/L (ref 22–29)
CREAT SERPL-MCNC: 0.8 MG/DL (ref 0.76–1.27)
EGFRCR SERPLBLD CKD-EPI 2021: 103.2 ML/MIN/1.73
ETHANOL BLD-MCNC: 138 MG/DL (ref 0–10)
ETHANOL BLD-MCNC: 200 MG/DL (ref 0–10)
ETHANOL BLD-MCNC: 94 MG/DL (ref 0–10)
ETHANOL UR QL: 0.09 %
ETHANOL UR QL: 0.14 %
ETHANOL UR QL: 0.2 %
GLOBULIN UR ELPH-MCNC: 3.1 GM/DL
GLUCOSE SERPL-MCNC: 110 MG/DL (ref 65–99)
MAGNESIUM SERPL-MCNC: 2.1 MG/DL (ref 1.6–2.6)
POTASSIUM SERPL-SCNC: 3.7 MMOL/L (ref 3.5–5.2)
PROT SERPL-MCNC: 7.7 G/DL (ref 6–8.5)
SODIUM SERPL-SCNC: 136 MMOL/L (ref 136–145)

## 2024-06-27 PROCEDURE — 82077 ASSAY SPEC XCP UR&BREATH IA: CPT | Performed by: PHYSICIAN ASSISTANT

## 2024-06-27 PROCEDURE — 36415 COLL VENOUS BLD VENIPUNCTURE: CPT

## 2024-06-27 PROCEDURE — 82077 ASSAY SPEC XCP UR&BREATH IA: CPT | Performed by: EMERGENCY MEDICINE

## 2024-06-27 NOTE — ED PROVIDER NOTES
I evaluated this patient in conjunction with the JOHNNY.  I personally performed a history and physical examination.  I agree with JOHNNY's documented history, physical and medical decision making.    Patient presents to the emergency department with back pain radiating to the right leg and with alcohol intoxication requesting alcohol detox.    Labs initially remarkable for significantly elevated alcohol level.  This has been repeated and is now below 100.    Regarding his back pain and right leg pain is consistent with radicular pain.  He has no red flag signs or symptoms of cauda equina syndrome.    Behavioral health has evaluated the patient and reports that he has placement at [facility].  They report that he is appropriate for discharge with plans for alcohol abuse treatment at [facility].     Diagnosis Plan   1. Acute right-sided low back pain with right-sided sciatica        2. Alcoholic intoxication without complication            ED Disposition       ED Disposition   Discharge    Condition   Stable    Comment   --                  Roger De Anda MD  06/27/24 5066

## 2024-06-27 NOTE — DISCHARGE INSTRUCTIONS
You should follow-up with your alcohol treatment at [facility].  You should turn to the emerged department for suicidal ideation, concerning symptoms, worsening symptoms or new concerns.

## 2024-06-27 NOTE — ED PROVIDER NOTES
" EMERGENCY DEPARTMENT ENCOUNTER    Pt Name: Yousuf Barrera  MRN: [id number]  Pt :   1965  Room Number:  19SF/19  Date of encounter:  2024  PCP: Isidra Pemberton APRN  ED Provider: Zeke Rossi PA-C    Historian: Patient      HPI:  Chief Complaint   Patient presents with    Back Pain    Alcohol Problem          Context: Yousuf Barrera is a 59 y.o. male who presents to the ED c/o alcohol use disorder requesting detox.  Was here 2 days ago diagnosed with right low back pain and sciatica has already followed with Ortho/spine plans to start physical therapy soon.  On prednisone.  States drinks 1.5 pints of bourbon per day.  Last drink just before coming in.  Is on a wait list for [facility] but wishing to expedite his detox.  No bowel or bladder incontinence, no saddle anesthesias.      PAST MEDICAL HISTORY  Past Medical History:   Diagnosis Date    Alcohol abuse     Anxiety     H/O exercise stress test     HAD DONE WITH WORK.  \"IT WAS OK\"    Hypertension     Wears glasses     Withdrawal symptoms, alcohol          PAST SURGICAL HISTORY  Past Surgical History:   Procedure Laterality Date    COLONOSCOPY N/A 2018    Procedure: COLONOSCOPY WITH COLD SNARE POLYECTOMY X 1;  Surgeon: Carlos A Moreno MD;  Location: [facility] ENDOSCOPY;  Service: Gastroenterology    CYSTOSCOPY  2016    WISDOM TOOTH EXTRACTION           FAMILY HISTORY  Family History   Problem Relation Age of Onset    Hypertension Mother     Hypertension Father     Dementia Father     ADD / ADHD Neg Hx     Alcohol abuse Neg Hx     Anxiety disorder Neg Hx     Bipolar disorder Neg Hx     Depression Neg Hx     Drug abuse Neg Hx     OCD Neg Hx     Paranoid behavior Neg Hx     Schizophrenia Neg Hx     Seizures Neg Hx     Self-Injurious Behavior  Neg Hx     Suicide Attempts Neg Hx          SOCIAL HISTORY  Social History     Socioeconomic History    Marital status:     Number of children: 1    Highest education level: Some college, " no degree   Tobacco Use    Smoking status: Never    Smokeless tobacco: Current     Types: Snuff   Vaping Use    Vaping status: Never Used   Substance and Sexual Activity    Alcohol use: No    Drug use: No    Sexual activity: Defer         ALLERGIES  Tetracycline and Tetracyclines & related        REVIEW OF SYSTEMS  Review of Systems   Constitutional: Negative.    HENT: Negative.     Eyes: Negative.    Respiratory: Negative.     Cardiovascular: Negative.    Gastrointestinal:  Positive for nausea. Negative for blood in stool and vomiting.   Genitourinary: Negative.    Musculoskeletal:  Positive for back pain.   Skin: Negative.    Allergic/Immunologic: Negative.    Neurological: Negative.    Psychiatric/Behavioral: Negative.     All other systems reviewed and are negative.       All systems reviewed and negative except for those discussed in HPI.       PHYSICAL EXAM    I have reviewed the triage vital signs and nursing notes.    ED Triage Vitals [06/26/24 2259]   Temp Heart Rate Resp BP SpO2   98.6 °F (37 °C) 82 18 155/97 96 %      Temp src Heart Rate Source Patient Position BP Location FiO2 (%)   Oral Monitor Sitting Left arm --       Physical Exam  Vitals and nursing note reviewed.   Constitutional:       General: He is not in acute distress.     Appearance: Normal appearance. He is normal weight. He is not ill-appearing, toxic-appearing or diaphoretic.   HENT:      Head: Normocephalic and atraumatic.      Nose: Nose normal.   Eyes:      Extraocular Movements: Extraocular movements intact.   Cardiovascular:      Rate and Rhythm: Normal rate and regular rhythm.      Heart sounds: Normal heart sounds.   Pulmonary:      Effort: Pulmonary effort is normal.   Abdominal:      General: Abdomen is flat. Bowel sounds are normal. There is no distension.      Palpations: Abdomen is soft.      Tenderness: There is no abdominal tenderness. There is no guarding or rebound.   Musculoskeletal:         General: Normal range of  motion.      Cervical back: Normal range of motion.        Back:       Comments: Pain and tenderness to palpation right low back.  No rash.   Skin:     General: Skin is warm and dry.   Neurological:      General: No focal deficit present.      Mental Status: He is alert and oriented to person, place, and time. Mental status is at baseline.   Psychiatric:         Mood and Affect: Mood normal.         Behavior: Behavior normal.         Thought Content: Thought content normal.            LAB RESULTS  Recent Results (from the past 24 hour(s))   Comprehensive Metabolic Panel    Collection Time: 06/26/24 11:33 PM    Specimen: Blood   Result Value Ref Range    Glucose 110 (H) 65 - 99 mg/dL    BUN 20 6 - 20 mg/dL    Creatinine 0.80 0.76 - 1.27 mg/dL    Sodium 136 136 - 145 mmol/L    Potassium 3.7 3.5 - 5.2 mmol/L    Chloride 99 98 - 107 mmol/L    CO2 20.5 (L) 22.0 - 29.0 mmol/L    Calcium 9.5 8.6 - 10.5 mg/dL    Total Protein 7.7 6.0 - 8.5 g/dL    Albumin 4.6 3.5 - 5.2 g/dL    ALT (SGPT) 19 1 - 41 U/L    AST (SGOT) 29 1 - 40 U/L    Alkaline Phosphatase 59 39 - 117 U/L    Total Bilirubin 0.9 0.0 - 1.2 mg/dL    Globulin 3.1 gm/dL    A/G Ratio 1.5 g/dL    BUN/Creatinine Ratio 25.0 7.0 - 25.0    Anion Gap 16.5 (H) 5.0 - 15.0 mmol/L    eGFR 103.2 >60.0 mL/min/1.73   Ethanol    Collection Time: 06/26/24 11:33 PM    Specimen: Blood   Result Value Ref Range    Ethanol 200 (H) 0 - 10 mg/dL    Ethanol % 0.200 %   Magnesium    Collection Time: 06/26/24 11:33 PM    Specimen: Blood   Result Value Ref Range    Magnesium 2.1 1.6 - 2.6 mg/dL   CBC Auto Differential    Collection Time: 06/26/24 11:33 PM    Specimen: Blood   Result Value Ref Range    WBC 13.96 (H) 3.40 - 10.80 10*3/mm3    RBC 4.67 4.14 - 5.80 10*6/mm3    Hemoglobin 14.9 13.0 - 17.7 g/dL    Hematocrit 41.8 37.5 - 51.0 %    MCV 89.5 79.0 - 97.0 fL    MCH 31.9 26.6 - 33.0 pg    MCHC 35.6 31.5 - 35.7 g/dL    RDW 12.4 12.3 - 15.4 %    RDW-SD 40.1 37.0 - 54.0 fl    MPV 9.8 6.0 -  12.0 fL    Platelets 165 140 - 450 10*3/mm3    Neutrophil % 81.5 (H) 42.7 - 76.0 %    Lymphocyte % 11.6 (L) 19.6 - 45.3 %    Monocyte % 6.0 5.0 - 12.0 %    Eosinophil % 0.0 (L) 0.3 - 6.2 %    Basophil % 0.1 0.0 - 1.5 %    Immature Grans % 0.8 (H) 0.0 - 0.5 %    Neutrophils, Absolute 11.38 (H) 1.70 - 7.00 10*3/mm3    Lymphocytes, Absolute 1.62 0.70 - 3.10 10*3/mm3    Monocytes, Absolute 0.84 0.10 - 0.90 10*3/mm3    Eosinophils, Absolute 0.00 0.00 - 0.40 10*3/mm3    Basophils, Absolute 0.01 0.00 - 0.20 10*3/mm3    Immature Grans, Absolute 0.11 (H) 0.00 - 0.05 10*3/mm3    nRBC 0.0 0.0 - 0.2 /100 WBC   Urinalysis With Culture If Indicated - Urine, Clean Catch    Collection Time: 06/26/24 11:39 PM    Specimen: Urine, Clean Catch   Result Value Ref Range    Color, UA Yellow Yellow, Straw    Appearance, UA Clear Clear    pH, UA 6.0 5.0 - 8.0    Specific Gravity, UA <=1.005 1.005 - 1.030    Glucose, UA Negative Negative    Ketones, UA Negative Negative    Bilirubin, UA Negative Negative    Blood, UA Negative Negative    Protein, UA Negative Negative    Leuk Esterase, UA Trace (A) Negative    Nitrite, UA Negative Negative    Urobilinogen, UA 0.2 E.U./dL 0.2 - 1.0 E.U./dL   Urine Drug Screen - Urine, Clean Catch    Collection Time: 06/26/24 11:39 PM    Specimen: Urine, Clean Catch   Result Value Ref Range    THC, Screen, Urine Negative Negative    Phencyclidine (PCP), Urine Negative Negative    Cocaine Screen, Urine Negative Negative    Methamphetamine, Ur Negative Negative    Opiate Screen Negative Negative    Amphetamine Screen, Urine Negative Negative    Benzodiazepine Screen, Urine Negative Negative    Tricyclic Antidepressants Screen Negative Negative    Methadone Screen, Urine Negative Negative    Barbiturates Screen, Urine Negative Negative    Oxycodone Screen, Urine Negative Negative    Buprenorphine, Screen, Urine Negative Negative   Fentanyl, Urine - Urine, Clean Catch    Collection Time: 06/26/24 11:39 PM     Specimen: Urine, Clean Catch   Result Value Ref Range    Fentanyl, Urine Negative Negative   Urinalysis, Microscopic Only - Urine, Clean Catch    Collection Time: 06/26/24 11:39 PM    Specimen: Urine, Clean Catch   Result Value Ref Range    RBC, UA None Seen None Seen, 0-2 /HPF    WBC, UA None Seen None Seen, 0-2 /HPF    Bacteria, UA Trace (A) None Seen /HPF    Squamous Epithelial Cells, UA None Seen None Seen, 0-2 /HPF    Hyaline Casts, UA None Seen None Seen /LPF    Methodology Manual Light Microscopy    Ethanol    Collection Time: 06/27/24  2:37 AM    Specimen: Blood   Result Value Ref Range    Ethanol 138 (H) 0 - 10 mg/dL    Ethanol % 0.138 %   Ethanol    Collection Time: 06/27/24  4:28 AM    Specimen: Blood   Result Value Ref Range    Ethanol 94 (H) 0 - 10 mg/dL    Ethanol % 0.094 %       If labs were ordered, I independently reviewed the results and considered them in treating the patient.        RADIOLOGY  No Radiology Exams Resulted Within Past 24 Hours        PROCEDURES    Procedures    Interpretations    O2 Sat: The patients oxygen saturation was 96% on Room Air.  This was independently interpreted by me as Normal    MEDICATIONS GIVEN IN ER    Medications - No data to display        MEDICAL DECISION MAKING, PROGRESS, and CONSULTS    All labs, if obtained, have been independently reviewed by me.  All radiology studies, if obtained, have been reviewed by me and the radiologist dictating the report.  All EKG's, if obtained, have been independently viewed and interpreted by me      Discussion below represents my analysis of pertinent findings related to patient's condition, differential diagnosis, treatment plan and final disposition.      Differential diagnosis:    Alcohol use disorder, electrolyte abnormality, sciatica among others    Additional Sources:  None      Orders placed during this visit:  Orders Placed This Encounter   Procedures    Comprehensive Metabolic Panel    Urinalysis With Culture If  Indicated - Urine, Clean Catch    Ethanol    Urine Drug Screen - Urine, Clean Catch    Magnesium    CBC Auto Differential    Fentanyl, Urine - Urine, Clean Catch    Urinalysis, Microscopic Only - Urine, Clean Catch    Ethanol    Ethanol    CBC & Differential         Additional orders considered but not ordered:  None    ED Course:    Consultants:  None    ED Course as of 06/27/24 1709   Thu Jun 27, 2024   0022 Urine Drug Screen - Urine, Clean Catch [TM]   0022 Urinalysis, Microscopic Only - Urine, Clean Catch(!) [TM]   0023 Ethanol(!): 200 [TM]   0212 Patient walking to the bathroom without difficulty.  Explained to him are still waiting on his alcohol to be under 100 and redraw at 0230 hrs.  He is willing to wait. [TM]      ED Course User Index  [TM] Zeke Rossi PA-C           After my consideration of clinical presentation and any laboratory/radiology studies obtained, I discussed the findings with the patient/patient representative who is in agreement with the treatment plan and the final disposition. Risks and benefits of discharge were discussed.     AS OF 17:09 EDT VITALS:    BP - 119/80  HR - 82  TEMP - 98.6 °F (37 °C) (Oral)  O2 SATS - 96%    I reviewed the patients prescription monitoring report if available prior to discharge    DIAGNOSIS  Final diagnoses:   Acute right-sided low back pain with right-sided sciatica   Alcoholic intoxication without complication         DISPOSITION  ED Disposition       ED Disposition   Discharge    Condition   Stable    Comment   --                   Please note that portions of this document were completed with voice recognition software.        Zeke Rossi PA-C  06/27/24 7685

## 2024-08-13 ENCOUNTER — HOSPITAL ENCOUNTER (EMERGENCY)
Facility: HOSPITAL | Age: 59
Discharge: HOME | End: 2024-08-13
Attending: STUDENT IN AN ORGANIZED HEALTH CARE EDUCATION/TRAINING PROGRAM
Payer: COMMERCIAL

## 2024-08-13 ENCOUNTER — HOSPITAL ENCOUNTER (EMERGENCY)
Facility: HOSPITAL | Age: 59
Discharge: HOME | End: 2024-08-13
Attending: EMERGENCY MEDICINE
Payer: COMMERCIAL

## 2024-08-13 VITALS
WEIGHT: 170 LBS | SYSTOLIC BLOOD PRESSURE: 114 MMHG | HEIGHT: 72 IN | HEART RATE: 79 BPM | DIASTOLIC BLOOD PRESSURE: 73 MMHG | TEMPERATURE: 98.3 F | RESPIRATION RATE: 14 BRPM | OXYGEN SATURATION: 90 % | BODY MASS INDEX: 23.03 KG/M2

## 2024-08-13 VITALS
DIASTOLIC BLOOD PRESSURE: 98 MMHG | OXYGEN SATURATION: 95 % | HEIGHT: 72 IN | WEIGHT: 170 LBS | BODY MASS INDEX: 23.03 KG/M2 | HEART RATE: 85 BPM | SYSTOLIC BLOOD PRESSURE: 135 MMHG | RESPIRATION RATE: 20 BRPM | TEMPERATURE: 98.7 F

## 2024-08-13 DIAGNOSIS — F41.9 ANXIETY: Primary | ICD-10-CM

## 2024-08-13 DIAGNOSIS — F41.1 GENERALIZED ANXIETY DISORDER: Chronic | ICD-10-CM

## 2024-08-13 DIAGNOSIS — I10 HYPERTENSION, UNSPECIFIED TYPE: ICD-10-CM

## 2024-08-13 DIAGNOSIS — F10.10 ALCOHOL ABUSE: Primary | ICD-10-CM

## 2024-08-13 DIAGNOSIS — Z76.0 ENCOUNTER FOR MEDICATION REFILL: ICD-10-CM

## 2024-08-13 PROCEDURE — 93005 ELECTROCARDIOGRAM TRACING: CPT | Performed by: EMERGENCY MEDICINE

## 2024-08-13 PROCEDURE — 99283 EMERGENCY DEPT VISIT LOW MDM: CPT

## 2024-08-13 RX ORDER — LOSARTAN POTASSIUM 25 MG/1
50 TABLET ORAL ONCE
Status: COMPLETED | OUTPATIENT
Start: 2024-08-13 | End: 2024-08-13

## 2024-08-13 RX ORDER — LOSARTAN POTASSIUM 50 MG/1
50 TABLET ORAL DAILY
Qty: 14 TABLET | Refills: 0 | Status: SHIPPED | OUTPATIENT
Start: 2024-08-13

## 2024-08-13 RX ORDER — PROPRANOLOL HYDROCHLORIDE 20 MG/1
20 TABLET ORAL 3 TIMES DAILY PRN
Qty: 10 TABLET | Refills: 0 | Status: SHIPPED | OUTPATIENT
Start: 2024-08-13

## 2024-08-13 RX ORDER — PROPRANOLOL HYDROCHLORIDE 20 MG/1
20 TABLET ORAL ONCE
Status: COMPLETED | OUTPATIENT
Start: 2024-08-13 | End: 2024-08-13

## 2024-08-13 RX ADMIN — PROPRANOLOL HYDROCHLORIDE 20 MG: 20 TABLET ORAL at 02:16

## 2024-08-13 RX ADMIN — LOSARTAN POTASSIUM 50 MG: 25 TABLET, FILM COATED ORAL at 02:16

## 2024-08-13 NOTE — ED PROVIDER NOTES
"     [facility] EMERGENCY DEPARTMENT  Emergency Department Encounter  Emergency Medicine Physician Note       Pt Name: Yousuf Barrera  MRN: [id number]  Pt :   1965  Room Number:    Date of encounter:  2024  PCP: Isidra Pemberton APRN  ED Physician: Jean Pierre Katz DO    HPI:  Yousuf Barrera is a 59 y.o. male who presents to the ED with chief complaint of anxiety and hypertension. States that he has been out of his propanolol and losartan for quite some time.  Denies any other associated symptoms including headache, blurred vision, chest pain or shortness of breath, abdominal or back pain, numbness or wheezing extremities, problems with urination or bowel movements.  Denies suicidal and homicidal ideations.    PAST MEDICAL HISTORY  Past Medical History:   Diagnosis Date    Alcohol abuse     Anxiety     H/O exercise stress test     HAD DONE WITH WORK.  \"IT WAS OK\"    Hypertension     Wears glasses     Withdrawal symptoms, alcohol      Current Outpatient Medications   Medication Instructions    aspirin 325 mg, Oral, Daily    cholecalciferol (VITAMIN D3) 1,000 Units, Oral, 2 Times Daily    escitalopram (LEXAPRO) 10 mg, Oral, Daily    fluticasone (FLONASE) 50 MCG/ACT nasal spray 1 spray, Nasal, Daily    ibuprofen (ADVIL,MOTRIN) 800 mg, Oral, Every 8 Hours PRN    levocetirizine (XYZAL) 5 mg, Oral, Nightly    losartan (COZAAR) 50 mg, Oral, Daily    multivitamin with minerals tablet tablet 1 tablet, Oral, Daily    ondansetron (ZOFRAN) 4 mg, Oral, Every 8 Hours PRN    propranolol (INDERAL) 20 mg, Oral, 3 Times Daily PRN      PAST SURGICAL HISTORY  Past Surgical History:   Procedure Laterality Date    COLONOSCOPY N/A 2018    Procedure: COLONOSCOPY WITH COLD SNARE POLYECTOMY X 1;  Surgeon: Carlos A Moreno MD;  Location: [facility] ENDOSCOPY;  Service: Gastroenterology    CYSTOSCOPY  2016    WISDOM TOOTH EXTRACTION         FAMILY HISTORY  Family History   Problem Relation Age of Onset    " "Hypertension Mother     Hypertension Father     Dementia Father     ADD / ADHD Neg Hx     Alcohol abuse Neg Hx     Anxiety disorder Neg Hx     Bipolar disorder Neg Hx     Depression Neg Hx     Drug abuse Neg Hx     OCD Neg Hx     Paranoid behavior Neg Hx     Schizophrenia Neg Hx     Seizures Neg Hx     Self-Injurious Behavior  Neg Hx     Suicide Attempts Neg Hx        SOCIAL HISTORY  Social History     Socioeconomic History    Marital status:     Number of children: 1    Highest education level: Some college, no degree   Tobacco Use    Smoking status: Never    Smokeless tobacco: Current     Types: Snuff   Vaping Use    Vaping status: Never Used   Substance and Sexual Activity    Alcohol use: Yes     Comment: \"1 pint liquor/day\"    Drug use: No    Sexual activity: Defer     ALLERGIES  Tetracycline and Tetracyclines & related    REVIEW OF SYSTEMS  All systems reviewed and negative except for those discussed in HPI.     PHYSICAL EXAM  ED Triage Vitals [08/13/24 0139]   Temp Heart Rate Resp BP SpO2   98.7 °F (37.1 °C) 104 18 (!) 173/121 96 %      Temp src Heart Rate Source Patient Position BP Location FiO2 (%)   Oral Monitor Sitting Left arm --     I have reviewed the triage vital signs and nursing notes.    General: Alert.  Nontoxic appearance.  No acute distress.  Head: Normocephalic.  Atraumatic.  Eyes: No scleral icterus.  ENT: Moist mucous membranes.  Cardiovascular: Regular rate and rhythm.  No murmurs.  No rubs.  2+ distal pulses bilaterally.  Respiratory: Equal breath sounds bilaterally.  No rales.  No rhonchi.  No wheezing.  GI: Abdomen is soft.  Nondistended.  Nontender to palpation.  No rebound.  No guarding.  No CVA tenderness.  MSK: Moves all 4 extremities.  Neurologic: Oriented x 3.  No focal deficits.  Skin: No erythema.  No edema. No pallor. No cyanosis.  Psych: Anxious mood and affect.  Denies suicidal or homicidal ideations.  Cooperative.    LAB RESULTS  No results found for this or any previous " visit (from the past 24 hour(s)).    RADIOLOGY  No Radiology Exams Resulted Within Past 24 Hours    PROCEDURES  Procedures    RISK STRATIFICATION    MEDICAL DECISION MAKING  59 y.o. male with past medical history listed above who presents with anxiety and asymptomatic hypertension.    Patient arrives via EMS.  I have reviewed the EMS documentation/notes and included that information in my HPI.    Vital signs remarkable for hypertension, /121, otherwise within normal limits.    Clinical presentation and physical exam consistent with asymptomatic hypertension.  No clinical evidence of endorgan damage, acute coronary syndrome, stroke.     No clinical indication for labs or imaging.    Home medication list reviewed. Propranolol and losartan ordered.    Medications administered in ED:  Medications   losartan (COZAAR) tablet 50 mg (50 mg Oral Given 8/13/24 0216)   propranolol (INDERAL) tablet 20 mg (20 mg Oral Given 8/13/24 0216)     Patient observed in the ED for an extended period of time.  On re-evaluation, patient resting comfortably.  Repeat blood pressure 126/82.  Otherwise, vital signs stable on room air.  Patient tolerated oral intake appropriately.  Has a steady gait and nonslurred speech.  No tremor.  Denies active suicidal or homicidal ideations. No clinical indication for psychiatric hold, psychiatric or medical admission.    I discussed the findings of the ED workup with the patient at bedside. I recommended outpatient follow-up with PCP.  Patient was deemed medically stable for discharge with close outpatient follow-up and strict ED return precautions. Patient agreeable with plan and disposition.    Home medications were reviewed.  Prescriptions for discharge: Propranolol, losartan    Chronic conditions affecting care: Hypertension, anxiety    Social determinants of health impacting treatment or disposition: Alcohol abuse    REPEAT VITAL SIGNS  AS OF 02:50 EDT VITALS:  BP - 126/82  HR - 75  TEMP - 98.7  °F (37.1 °C) (Oral)  O2 SATS - 93%    DIAGNOSIS  Final diagnoses:   Anxiety   Hypertension, unspecified type   Encounter for medication refill     DISPOSITION  ED Disposition       ED Disposition   Discharge    Condition   Stable    Comment   --               PATIENT REFERRALS  Isidra Pemberton, APRN  [address] [place], [address] [address] [phone]      PCP. 48 hours.            Please note that portions of this document were completed with voice recognition software.        Jean Pierre Katz DO  08/14/24 1023

## 2024-08-14 ENCOUNTER — HOSPITAL ENCOUNTER (EMERGENCY)
Facility: HOSPITAL | Age: 59
Discharge: HOME | End: 2024-08-14
Attending: EMERGENCY MEDICINE
Payer: COMMERCIAL

## 2024-08-14 VITALS
TEMPERATURE: 98 F | OXYGEN SATURATION: 94 % | SYSTOLIC BLOOD PRESSURE: 153 MMHG | BODY MASS INDEX: 23.03 KG/M2 | RESPIRATION RATE: 18 BRPM | WEIGHT: 170 LBS | HEIGHT: 72 IN | HEART RATE: 70 BPM | DIASTOLIC BLOOD PRESSURE: 97 MMHG

## 2024-08-14 DIAGNOSIS — F10.920 ALCOHOLIC INTOXICATION WITHOUT COMPLICATION: Primary | ICD-10-CM

## 2024-08-14 LAB
ALBUMIN SERPL-MCNC: 4.5 G/DL (ref 3.5–5.2)
ALBUMIN/GLOB SERPL: 1.7 G/DL
ALP SERPL-CCNC: 55 U/L (ref 39–117)
ALT SERPL W P-5'-P-CCNC: 20 U/L (ref 1–41)
ANION GAP SERPL CALCULATED.3IONS-SCNC: 17.7 MMOL/L (ref 5–15)
APAP SERPL-MCNC: <5 MCG/ML (ref 0–30)
AST SERPL-CCNC: 35 U/L (ref 1–40)
BASOPHILS # BLD AUTO: 0.04 10*3/MM3 (ref 0–0.2)
BASOPHILS NFR BLD AUTO: 0.5 % (ref 0–1.5)
BILIRUB SERPL-MCNC: 0.7 MG/DL (ref 0–1.2)
BUN SERPL-MCNC: 16 MG/DL (ref 6–20)
BUN/CREAT SERPL: 19 (ref 7–25)
CALCIUM SPEC-SCNC: 9.2 MG/DL (ref 8.6–10.5)
CHLORIDE SERPL-SCNC: 103 MMOL/L (ref 98–107)
CO2 SERPL-SCNC: 21.3 MMOL/L (ref 22–29)
CREAT SERPL-MCNC: 0.84 MG/DL (ref 0.76–1.27)
DEPRECATED RDW RBC AUTO: 41.1 FL (ref 37–54)
EGFRCR SERPLBLD CKD-EPI 2021: 101.7 ML/MIN/1.73
EOSINOPHIL # BLD AUTO: 0.07 10*3/MM3 (ref 0–0.4)
EOSINOPHIL NFR BLD AUTO: 0.9 % (ref 0.3–6.2)
ERYTHROCYTE [DISTWIDTH] IN BLOOD BY AUTOMATED COUNT: 12.5 % (ref 12.3–15.4)
ETHANOL BLD-MCNC: 320 MG/DL (ref 0–10)
ETHANOL UR QL: 0.32 %
GLOBULIN UR ELPH-MCNC: 2.7 GM/DL
GLUCOSE SERPL-MCNC: 69 MG/DL (ref 65–99)
HCT VFR BLD AUTO: 46.8 % (ref 37.5–51)
HGB BLD-MCNC: 16.4 G/DL (ref 13–17.7)
HOLD SPECIMEN: NORMAL
HOLD SPECIMEN: NORMAL
IMM GRANULOCYTES # BLD AUTO: 0.03 10*3/MM3 (ref 0–0.05)
IMM GRANULOCYTES NFR BLD AUTO: 0.4 % (ref 0–0.5)
LYMPHOCYTES # BLD AUTO: 2.52 10*3/MM3 (ref 0.7–3.1)
LYMPHOCYTES NFR BLD AUTO: 31.7 % (ref 19.6–45.3)
MCH RBC QN AUTO: 31.8 PG (ref 26.6–33)
MCHC RBC AUTO-ENTMCNC: 35 G/DL (ref 31.5–35.7)
MCV RBC AUTO: 90.7 FL (ref 79–97)
MONOCYTES # BLD AUTO: 0.61 10*3/MM3 (ref 0.1–0.9)
MONOCYTES NFR BLD AUTO: 7.7 % (ref 5–12)
NEUTROPHILS NFR BLD AUTO: 4.69 10*3/MM3 (ref 1.7–7)
NEUTROPHILS NFR BLD AUTO: 58.8 % (ref 42.7–76)
NRBC BLD AUTO-RTO: 0 /100 WBC (ref 0–0.2)
PLATELET # BLD AUTO: 201 10*3/MM3 (ref 140–450)
PMV BLD AUTO: 9.2 FL (ref 6–12)
POTASSIUM SERPL-SCNC: 4 MMOL/L (ref 3.5–5.2)
PROT SERPL-MCNC: 7.2 G/DL (ref 6–8.5)
RBC # BLD AUTO: 5.16 10*6/MM3 (ref 4.14–5.8)
SALICYLATES SERPL-MCNC: <0.3 MG/DL
SODIUM SERPL-SCNC: 142 MMOL/L (ref 136–145)
TSH SERPL DL<=0.05 MIU/L-ACNC: 0.5 UIU/ML (ref 0.27–4.2)
WBC NRBC COR # BLD AUTO: 7.96 10*3/MM3 (ref 3.4–10.8)
WHOLE BLOOD HOLD COAG: NORMAL
WHOLE BLOOD HOLD SPECIMEN: NORMAL

## 2024-08-14 PROCEDURE — 36415 COLL VENOUS BLD VENIPUNCTURE: CPT

## 2024-08-14 PROCEDURE — 80050 GENERAL HEALTH PANEL: CPT

## 2024-08-14 PROCEDURE — 99283 EMERGENCY DEPT VISIT LOW MDM: CPT

## 2024-08-14 PROCEDURE — 82077 ASSAY SPEC XCP UR&BREATH IA: CPT

## 2024-08-14 PROCEDURE — 80179 DRUG ASSAY SALICYLATE: CPT

## 2024-08-14 PROCEDURE — 80143 DRUG ASSAY ACETAMINOPHEN: CPT

## 2024-08-14 RX ORDER — ACETAMINOPHEN 325 MG/1
650 TABLET ORAL ONCE
Status: COMPLETED | OUTPATIENT
Start: 2024-08-14 | End: 2024-08-14

## 2024-08-14 RX ORDER — SODIUM CHLORIDE 0.9 % (FLUSH) 0.9 %
10 SYRINGE (ML) INJECTION AS NEEDED
Status: DISCONTINUED | OUTPATIENT
Start: 2024-08-14 | End: 2024-08-15 | Stop reason: HOSPADM

## 2024-08-14 RX ADMIN — ACETAMINOPHEN 650 MG: 325 TABLET, FILM COATED ORAL at 20:54

## 2024-08-14 NOTE — ED PROVIDER NOTES
" EMERGENCY DEPARTMENT ENCOUNTER    Pt Name: Yousuf Barrera  MRN: [id number]  Pt :   1965  Room Number:    Date of encounter:  2024  PCP: Isidra Pemberton APRN  ED Provider: Roger De Anda MD    Historian: Patient      HPI:  Chief Complaint: Anxiety        Context: Yousuf Barrera is a 59 y.o. male who presents to the ED c/o anxiety.  Patient has a past history significant for alcohol use disorder.  The patient was evaluated in the emergency department earlier this morning for similar symptoms.  Patient was discharged home.  Patient returns emerged apartment this evening stating that he is feeling anxious.  He says that he has been drinking since he left the hospital.  Patient denies having any suicidal homicidal ideations.  He denies having any acute injuries.  He says he is not interested in pursuing alcohol detox at this time.  He denies any other complaints.      PAST MEDICAL HISTORY  Past Medical History:   Diagnosis Date    Alcohol abuse     Anxiety     H/O exercise stress test     HAD DONE WITH WORK.  \"IT WAS OK\"    Hypertension     Wears glasses     Withdrawal symptoms, alcohol          PAST SURGICAL HISTORY  Past Surgical History:   Procedure Laterality Date    COLONOSCOPY N/A 2018    Procedure: COLONOSCOPY WITH COLD SNARE POLYECTOMY X 1;  Surgeon: Carlos A Moreno MD;  Location: [facility] ENDOSCOPY;  Service: Gastroenterology    CYSTOSCOPY  2016    WISDOM TOOTH EXTRACTION           FAMILY HISTORY  Family History   Problem Relation Age of Onset    Hypertension Mother     Hypertension Father     Dementia Father     ADD / ADHD Neg Hx     Alcohol abuse Neg Hx     Anxiety disorder Neg Hx     Bipolar disorder Neg Hx     Depression Neg Hx     Drug abuse Neg Hx     OCD Neg Hx     Paranoid behavior Neg Hx     Schizophrenia Neg Hx     Seizures Neg Hx     Self-Injurious Behavior  Neg Hx     Suicide Attempts Neg Hx          SOCIAL HISTORY  Social History     Socioeconomic History    Marital status: " "    Number of children: 1    Highest education level: Some college, no degree   Tobacco Use    Smoking status: Never    Smokeless tobacco: Current     Types: Snuff   Vaping Use    Vaping status: Never Used   Substance and Sexual Activity    Alcohol use: Yes     Comment: \"1 pint liquor/day\"    Drug use: No    Sexual activity: Defer         ALLERGIES  Tetracycline and Tetracyclines & related        REVIEW OF SYSTEMS    All systems reviewed and negative except for those discussed in HPI.       PHYSICAL EXAM    I have reviewed the triage vital signs and nursing notes.    ED Triage Vitals [08/13/24 2050]   Temp Heart Rate Resp BP SpO2   98.3 °F (36.8 °C) 65 14 143/94 93 %      Temp src Heart Rate Source Patient Position BP Location FiO2 (%)   Oral Monitor Sitting Left arm --         General: no acute distress, well-appearing, non-toxic  Skin: normal color, warm and dry  Head: normocephalic, atraumatic  Eyes: Pupils equally round and reactive to light.  Nose: normal nasal mucosa, no visible deformity.  Mouth: moist mucous membranes.  Neck: supple.  Chest: no retractions, no visible deformity  Cardiovascular: Regular rate and rhythm.  Lungs: clear to auscultation bilaterally.  Abdomen: soft, non-tender, non-distended. No rebound tenderness, no guarding.  Extremities: no cyanosis or edema. Palpable radial pulses bilaterally.  Neuro:  alert and oriented x3, no focal neurological deficits.  Psych:  appropriate mood and behavior.  Denies suicidal homicidal ideation.        LAB RESULTS  No results found for this or any previous visit (from the past 24 hour(s)).    If labs were ordered, I independently reviewed the results and considered them in treating the patient.  See medical decision making discussion section for my interpretation of lab results.        RADIOLOGY  No Radiology Exams Resulted Within Past 24 Hours      PROCEDURES    Procedures    ECG 12 Lead Bradycardia   Final Result          MEDICATIONS GIVEN IN " ER    Medications - No data to display      MEDICAL DECISION MAKING, PROGRESS, and CONSULTS    All labs, if obtained, have been independently reviewed by me.  All radiology studies, if obtained, have been reviewed by me and the radiologist dictating the report.  All EKG's, if obtained, have been independently viewed and interpreted by me/my attending physician.      Discussion below represents my analysis of pertinent findings related to patient's condition, differential diagnosis, treatment plan and final disposition.                         Differential diagnosis:    Differential includes alcohol intoxication, anxiety, other acute emergency.    Medical Decision Making Discussion:    Vitals reviewed and are normal.    EKG obtained and based on my independent reading demonstrates sinus bradycardia without acute ischemic changes.    After being in the emergency department for a little over hours the patient is now requesting to be discharged.  On repeat exam the patient is clinically sober.  He is able to ambulate without any assistance and without an ataxic gait.  He continues to deny having any suicidal ideations.  He declines resources for his alcohol abuse.  He does not wish to pursue alcohol rehab and/or detox at this time.    Patient discharged with instructions to pursue alcohol detox and to return to the emergency department for any suicidal ideation, concerning symptoms, worsening symptoms or new concerns.    Additional sources:    - External (non-ED) record review: Internal medicine note from  from June 27, 2024 documenting admission for alcohol detox.    - Chronic or social conditions impacting care: Alcohol abuse    Shared Decision Making:  After my consideration of clinical presentation and any laboratory/radiology studies obtained, I discussed the findings with the patient/patient representative who is in agreement with the treatment plan and the final disposition.   Risks and benefits of discharge  and/or observation/admission were discussed.    Orders placed during this visit:  Orders Placed This Encounter   Procedures    ECG 12 Lead Bradycardia           AS OF 01:32 EDT VITALS:    BP - 114/73  HR - 79  TEMP - 98.3 °F (36.8 °C) (Oral)  O2 SATS - 90%                  DIAGNOSIS  Final diagnoses:   Alcohol abuse         DISPOSITION  Discharge      Please note that portions of this document were completed with voice recognition software.        Roger De Anda MD  08/14/24 0133

## 2024-08-14 NOTE — DISCHARGE INSTRUCTIONS
You should pursue alcohol detox/rehab.  Return to the emergency department for suicidal nation, homicidal ideation, or for any concerning symptoms, worsening symptoms or new concerns.  You should otherwise follow-up with primary care within 1 week.

## 2024-08-14 NOTE — ED PROVIDER NOTES
" EMERGENCY DEPARTMENT ENCOUNTER    Pt Name: Yousuf Barrera  MRN: [id number]  Pt :   1965  Room Number:  CDU3/03  Date of encounter:  2024  PCP: Isidra Pemberton APRN  ED Provider: Bernard Reilly PA-C    Historian: Patient, nursing notes      HPI:  Chief Complaint: Alcohol intoxication        Context: Yousuf Barrera is a 59 y.o. male who presents to the ED c/o alcohol intoxication.  Patient states he \"drank a lot today\".  Patient states he wants help with stopping drinking.  Patient states he normally drinks about 1 pint per day but has been drinking more lately.  Patient denies history of alcohol withdrawal seizure.  Patient denies any homicidal or suicidal ideation today.  He denies any access to guns or knives at home.  He denies any drug use today.      PAST MEDICAL HISTORY  Past Medical History:   Diagnosis Date    Alcohol abuse     Anxiety     H/O exercise stress test 2015    HAD DONE WITH WORK.  \"IT WAS OK\"    Hypertension     Wears glasses     Withdrawal symptoms, alcohol          PAST SURGICAL HISTORY  Past Surgical History:   Procedure Laterality Date    COLONOSCOPY N/A 2018    Procedure: COLONOSCOPY WITH COLD SNARE POLYECTOMY X 1;  Surgeon: Carlos A Moreno MD;  Location: [facility] ENDOSCOPY;  Service: Gastroenterology    CYSTOSCOPY  2016    WISDOM TOOTH EXTRACTION           FAMILY HISTORY  Family History   Problem Relation Age of Onset    Hypertension Mother     Hypertension Father     Dementia Father     ADD / ADHD Neg Hx     Alcohol abuse Neg Hx     Anxiety disorder Neg Hx     Bipolar disorder Neg Hx     Depression Neg Hx     Drug abuse Neg Hx     OCD Neg Hx     Paranoid behavior Neg Hx     Schizophrenia Neg Hx     Seizures Neg Hx     Self-Injurious Behavior  Neg Hx     Suicide Attempts Neg Hx          SOCIAL HISTORY  Social History     Socioeconomic History    Marital status:     Number of children: 1    Highest education level: Some college, no degree   Tobacco Use    Smoking " "status: Never    Smokeless tobacco: Current     Types: Snuff   Vaping Use    Vaping status: Never Used   Substance and Sexual Activity    Alcohol use: Yes     Comment: \"1 pint liquor/day\"    Drug use: No    Sexual activity: Defer         ALLERGIES  Tetracycline and Tetracyclines & related        REVIEW OF SYSTEMS  Review of Systems   Constitutional:  Negative for chills and fever.   HENT:  Negative for congestion and sore throat.    Respiratory:  Negative for cough and shortness of breath.    Cardiovascular:  Negative for chest pain.   Gastrointestinal:  Negative for abdominal pain, nausea and vomiting.   Genitourinary:  Negative for dysuria.   Musculoskeletal:  Negative for back pain.   Skin:  Negative for wound.   Neurological:  Negative for dizziness and headaches.   Psychiatric/Behavioral:  Negative for confusion.    All other systems reviewed and are negative.         All systems reviewed and negative except for those discussed in HPI.       PHYSICAL EXAM    I have reviewed the triage vital signs and nursing notes.    ED Triage Vitals [08/14/24 1814]   Temp Heart Rate Resp BP SpO2   98 °F (36.7 °C) 85 20 154/94 98 %      Temp src Heart Rate Source Patient Position BP Location FiO2 (%)   Oral Monitor Sitting Left arm --       Physical Exam  Vitals and nursing note reviewed.   Constitutional:       General: He is not in acute distress.     Appearance: Normal appearance. He is not ill-appearing, toxic-appearing or diaphoretic.   HENT:      Head: Normocephalic and atraumatic.      Right Ear: External ear normal.      Left Ear: External ear normal.      Nose: No congestion or rhinorrhea.      Mouth/Throat:      Mouth: Mucous membranes are moist.      Pharynx: Oropharynx is clear.   Eyes:      Conjunctiva/sclera: Conjunctivae normal.   Cardiovascular:      Rate and Rhythm: Normal rate.      Heart sounds: Normal heart sounds.   Pulmonary:      Effort: Pulmonary effort is normal. No respiratory distress.      Breath " sounds: Normal breath sounds. No wheezing.   Abdominal:      Tenderness: There is no abdominal tenderness.   Musculoskeletal:      Cervical back: Normal range of motion and neck supple.      Right lower leg: No edema.      Left lower leg: No edema.   Skin:     Findings: No rash.   Neurological:      Mental Status: He is alert.   Psychiatric:         Speech: Speech is slurred.             LAB RESULTS  Recent Results (from the past 24 hour(s))   Comprehensive Metabolic Panel    Collection Time: 08/14/24  7:19 PM    Specimen: Blood   Result Value Ref Range    Glucose 69 65 - 99 mg/dL    BUN 16 6 - 20 mg/dL    Creatinine 0.84 0.76 - 1.27 mg/dL    Sodium 142 136 - 145 mmol/L    Potassium 4.0 3.5 - 5.2 mmol/L    Chloride 103 98 - 107 mmol/L    CO2 21.3 (L) 22.0 - 29.0 mmol/L    Calcium 9.2 8.6 - 10.5 mg/dL    Total Protein 7.2 6.0 - 8.5 g/dL    Albumin 4.5 3.5 - 5.2 g/dL    ALT (SGPT) 20 1 - 41 U/L    AST (SGOT) 35 1 - 40 U/L    Alkaline Phosphatase 55 39 - 117 U/L    Total Bilirubin 0.7 0.0 - 1.2 mg/dL    Globulin 2.7 gm/dL    A/G Ratio 1.7 g/dL    BUN/Creatinine Ratio 19.0 7.0 - 25.0    Anion Gap 17.7 (H) 5.0 - 15.0 mmol/L    eGFR 101.7 >60.0 mL/min/1.73   Acetaminophen Level    Collection Time: 08/14/24  7:19 PM    Specimen: Blood   Result Value Ref Range    Acetaminophen <5.0 0.0 - 30.0 mcg/mL   Ethanol    Collection Time: 08/14/24  7:19 PM    Specimen: Blood   Result Value Ref Range    Ethanol 320 (H) 0 - 10 mg/dL    Ethanol % 0.320 %   Salicylate Level    Collection Time: 08/14/24  7:19 PM    Specimen: Blood   Result Value Ref Range    Salicylate <0.3 <=30.0 mg/dL   TSH    Collection Time: 08/14/24  7:19 PM    Specimen: Blood   Result Value Ref Range    TSH 0.500 0.270 - 4.200 uIU/mL   Green Top (Gel)    Collection Time: 08/14/24  7:19 PM   Result Value Ref Range    Extra Tube Hold for add-ons.    Lavender Top    Collection Time: 08/14/24  7:19 PM   Result Value Ref Range    Extra Tube hold for add-on    Gold Top -  SST    Collection Time: 08/14/24  7:19 PM   Result Value Ref Range    Extra Tube Hold for add-ons.    Light Blue Top    Collection Time: 08/14/24  7:19 PM   Result Value Ref Range    Extra Tube Hold for add-ons.    CBC Auto Differential    Collection Time: 08/14/24  7:19 PM    Specimen: Blood   Result Value Ref Range    WBC 7.96 3.40 - 10.80 10*3/mm3    RBC 5.16 4.14 - 5.80 10*6/mm3    Hemoglobin 16.4 13.0 - 17.7 g/dL    Hematocrit 46.8 37.5 - 51.0 %    MCV 90.7 79.0 - 97.0 fL    MCH 31.8 26.6 - 33.0 pg    MCHC 35.0 31.5 - 35.7 g/dL    RDW 12.5 12.3 - 15.4 %    RDW-SD 41.1 37.0 - 54.0 fl    MPV 9.2 6.0 - 12.0 fL    Platelets 201 140 - 450 10*3/mm3    Neutrophil % 58.8 42.7 - 76.0 %    Lymphocyte % 31.7 19.6 - 45.3 %    Monocyte % 7.7 5.0 - 12.0 %    Eosinophil % 0.9 0.3 - 6.2 %    Basophil % 0.5 0.0 - 1.5 %    Immature Grans % 0.4 0.0 - 0.5 %    Neutrophils, Absolute 4.69 1.70 - 7.00 10*3/mm3    Lymphocytes, Absolute 2.52 0.70 - 3.10 10*3/mm3    Monocytes, Absolute 0.61 0.10 - 0.90 10*3/mm3    Eosinophils, Absolute 0.07 0.00 - 0.40 10*3/mm3    Basophils, Absolute 0.04 0.00 - 0.20 10*3/mm3    Immature Grans, Absolute 0.03 0.00 - 0.05 10*3/mm3    nRBC 0.0 0.0 - 0.2 /100 WBC       If labs were ordered, I independently reviewed the results and considered them in treating the patient.        RADIOLOGY  No Radiology Exams Resulted Within Past 24 Hours      PROCEDURES    Procedures    No orders to display       MEDICATIONS GIVEN IN ER    Medications   acetaminophen (TYLENOL) tablet 650 mg (650 mg Oral Given 8/14/24 2054)         MEDICAL DECISION MAKING, PROGRESS, and CONSULTS    All labs, if obtained, have been independently reviewed by me.  All radiology studies, if obtained, have been reviewed by me and the radiologist dictating the report.  All EKG's, if obtained, have been independently viewed and interpreted by me/my attending physician.      Discussion below represents my analysis of pertinent findings related to  patient's condition, differential diagnosis, treatment plan and final disposition.    59-year-old man presenting to the emergency department for evaluation of alcohol dependence.  On exam the patient is sleeping on the exam chair when I arouse him he becomes startled but then normalizes to communicating with slurred speech.  Patient appears intoxicated on my exam.  He is vital signs as interpreted by me are all stable and within normal limits.  Patient did ask for help in treating his alcohol use disorder.  Therefore laboratory workup was initiated and behavioral health consult ordered.  CBC showed no leukocytosis and a normal H&H CMP was clinically unremarkable.  Acetaminophen and salicylate levels undetectable TSH normal patient's ethanol was 320.  Patient was observed in the emergency department for 5 hours and 45 minutes as a behavioral health assessment cannot be performed until his alcohol was 100 or less.  Before we were able to recheck his ethanol, the patient requested to be discharged and he no longer wanted help with placement.  He is now communicating much more normally and appears clinically sober on my exam.  The patient has eaten and drinking fluids and tolerated it well during the ED course.  He can walk with no ataxia and speaking with no slurred speech and communicating normally appearing clinically sober.  As the patient no longer wished to stay in the ER he was discharged home with a cab ride secured by nursing staff.                     Differential diagnosis:    Differential diagnosis included but was not limited to alcohol intoxication, drug intoxication, depression      Additional sources:    - Discussed/ obtained information from independent historians: None    - External (non-ED) record review: Emergency department visit notes from both Dr. De Anda and Dr. Katz for patient's 2 emergency department visit records at [facility]'s the ER.  Provider note from Nandini Trejo at  [facility] emergency department where patient presented yesterday.  Hospital admission record from 6/27/2024 regarding patient's admission to [facility] for alcohol abuse    - Chronic or social conditions impacting care: Alcohol use disorder    Orders placed during this visit:  Orders Placed This Encounter   Procedures    COVID-19, FLU A/B, RSV PCR 1 HR TAT - Swab, Nasopharynx    [place] Draw    Comprehensive Metabolic Panel    Acetaminophen Level    Ethanol    Salicylate Level    TSH    CBC Auto Differential    CBC & Differential    Green Top (Gel)    Lavender Top    Gold Top - SST    Light Blue Top         Additional orders considered but not ordered: None      ED Course:    Consultants: None    ED Course as of 08/15/24 0219   Wed Aug 14, 2024   2013 Ethanol(!): 320 [TG]      ED Course User Index  [TG] Bernard Reilly PA-C              Shared Decision Making:  After my consideration of clinical presentation and any laboratory/radiology studies obtained, I discussed the findings with the patient/patient representative who is in agreement with the treatment plan and the final disposition.   Risks and benefits of discharge and/or observation/admission were discussed.       AS OF 02:19 EDT VITALS:    BP - 153/97  HR - 70  TEMP - 98 °F (36.7 °C) (Oral)  O2 SATS - 94%                  DIAGNOSIS  Final diagnoses:   Alcoholic intoxication without complication         DISPOSITION  Discharge      Please note that portions of this document were completed with voice recognition software.      Bernard Reilly PA-C  08/15/24 0219

## 2024-08-14 NOTE — ED NOTES
Pt refusing iv and nursing care at this time. Pt sleeping and when this rn awoke pt for iv placement and urine sample pt refused and went back to sleep. Rn attempted x2 without success.

## 2024-08-15 ENCOUNTER — HOSPITAL ENCOUNTER (EMERGENCY)
Facility: HOSPITAL | Age: 59
Discharge: HOME | End: 2024-08-15
Attending: EMERGENCY MEDICINE
Payer: COMMERCIAL

## 2024-08-15 ENCOUNTER — APPOINTMENT (OUTPATIENT)
Dept: GENERAL RADIOLOGY | Facility: HOSPITAL | Age: 59
End: 2024-08-15
Payer: COMMERCIAL

## 2024-08-15 VITALS
TEMPERATURE: 98.2 F | OXYGEN SATURATION: 95 % | SYSTOLIC BLOOD PRESSURE: 135 MMHG | DIASTOLIC BLOOD PRESSURE: 96 MMHG | WEIGHT: 170 LBS | HEART RATE: 82 BPM | HEIGHT: 72 IN | BODY MASS INDEX: 23.03 KG/M2 | RESPIRATION RATE: 18 BRPM

## 2024-08-15 VITALS
WEIGHT: 169.97 LBS | SYSTOLIC BLOOD PRESSURE: 146 MMHG | TEMPERATURE: 97.6 F | DIASTOLIC BLOOD PRESSURE: 97 MMHG | HEART RATE: 64 BPM | RESPIRATION RATE: 18 BRPM | OXYGEN SATURATION: 94 % | HEIGHT: 72 IN | BODY MASS INDEX: 23.02 KG/M2

## 2024-08-15 DIAGNOSIS — M25.561 ACUTE PAIN OF RIGHT KNEE: ICD-10-CM

## 2024-08-15 DIAGNOSIS — G89.29 CHRONIC PAIN OF RIGHT KNEE: Primary | ICD-10-CM

## 2024-08-15 DIAGNOSIS — M25.561 CHRONIC PAIN OF RIGHT KNEE: Primary | ICD-10-CM

## 2024-08-15 DIAGNOSIS — F10.920 ALCOHOLIC INTOXICATION WITHOUT COMPLICATION: Primary | ICD-10-CM

## 2024-08-15 LAB
ALBUMIN SERPL-MCNC: 4.3 G/DL (ref 3.5–5.2)
ALBUMIN/GLOB SERPL: 1.5 G/DL
ALP SERPL-CCNC: 54 U/L (ref 39–117)
ALT SERPL W P-5'-P-CCNC: 20 U/L (ref 1–41)
ANION GAP SERPL CALCULATED.3IONS-SCNC: 16.5 MMOL/L (ref 5–15)
APAP SERPL-MCNC: <5 MCG/ML (ref 0–30)
AST SERPL-CCNC: 38 U/L (ref 1–40)
BASOPHILS # BLD AUTO: 0.05 10*3/MM3 (ref 0–0.2)
BASOPHILS NFR BLD AUTO: 0.4 % (ref 0–1.5)
BILIRUB SERPL-MCNC: 1 MG/DL (ref 0–1.2)
BUN SERPL-MCNC: 17 MG/DL (ref 6–20)
BUN/CREAT SERPL: 20.2 (ref 7–25)
CALCIUM SPEC-SCNC: 8.8 MG/DL (ref 8.6–10.5)
CHLORIDE SERPL-SCNC: 102 MMOL/L (ref 98–107)
CO2 SERPL-SCNC: 22.5 MMOL/L (ref 22–29)
CREAT SERPL-MCNC: 0.84 MG/DL (ref 0.76–1.27)
DEPRECATED RDW RBC AUTO: 42.5 FL (ref 37–54)
EGFRCR SERPLBLD CKD-EPI 2021: 101.7 ML/MIN/1.73
EOSINOPHIL # BLD AUTO: 0.12 10*3/MM3 (ref 0–0.4)
EOSINOPHIL NFR BLD AUTO: 1 % (ref 0.3–6.2)
ERYTHROCYTE [DISTWIDTH] IN BLOOD BY AUTOMATED COUNT: 12.7 % (ref 12.3–15.4)
ETHANOL BLD-MCNC: 290 MG/DL (ref 0–10)
ETHANOL UR QL: 0.29 %
GLOBULIN UR ELPH-MCNC: 2.8 GM/DL
GLUCOSE SERPL-MCNC: 76 MG/DL (ref 65–99)
HCT VFR BLD AUTO: 46.3 % (ref 37.5–51)
HGB BLD-MCNC: 16.3 G/DL (ref 13–17.7)
IMM GRANULOCYTES # BLD AUTO: 0.04 10*3/MM3 (ref 0–0.05)
IMM GRANULOCYTES NFR BLD AUTO: 0.3 % (ref 0–0.5)
LYMPHOCYTES # BLD AUTO: 2.61 10*3/MM3 (ref 0.7–3.1)
LYMPHOCYTES NFR BLD AUTO: 22.6 % (ref 19.6–45.3)
MCH RBC QN AUTO: 32.2 PG (ref 26.6–33)
MCHC RBC AUTO-ENTMCNC: 35.2 G/DL (ref 31.5–35.7)
MCV RBC AUTO: 91.5 FL (ref 79–97)
MONOCYTES # BLD AUTO: 0.99 10*3/MM3 (ref 0.1–0.9)
MONOCYTES NFR BLD AUTO: 8.6 % (ref 5–12)
NEUTROPHILS NFR BLD AUTO: 67.1 % (ref 42.7–76)
NEUTROPHILS NFR BLD AUTO: 7.74 10*3/MM3 (ref 1.7–7)
NRBC BLD AUTO-RTO: 0 /100 WBC (ref 0–0.2)
PLATELET # BLD AUTO: 202 10*3/MM3 (ref 140–450)
PMV BLD AUTO: 9.4 FL (ref 6–12)
POTASSIUM SERPL-SCNC: 4 MMOL/L (ref 3.5–5.2)
PROT SERPL-MCNC: 7.1 G/DL (ref 6–8.5)
RBC # BLD AUTO: 5.06 10*6/MM3 (ref 4.14–5.8)
SALICYLATES SERPL-MCNC: <0.3 MG/DL
SODIUM SERPL-SCNC: 141 MMOL/L (ref 136–145)
WBC NRBC COR # BLD AUTO: 11.55 10*3/MM3 (ref 3.4–10.8)

## 2024-08-15 PROCEDURE — 73562 X-RAY EXAM OF KNEE 3: CPT

## 2024-08-15 PROCEDURE — 99283 EMERGENCY DEPT VISIT LOW MDM: CPT

## 2024-08-15 PROCEDURE — 80143 DRUG ASSAY ACETAMINOPHEN: CPT | Performed by: EMERGENCY MEDICINE

## 2024-08-15 PROCEDURE — 85025 COMPLETE CBC W/AUTO DIFF WBC: CPT | Performed by: EMERGENCY MEDICINE

## 2024-08-15 PROCEDURE — 80179 DRUG ASSAY SALICYLATE: CPT | Performed by: EMERGENCY MEDICINE

## 2024-08-15 PROCEDURE — 80053 COMPREHEN METABOLIC PANEL: CPT | Performed by: EMERGENCY MEDICINE

## 2024-08-15 PROCEDURE — 36415 COLL VENOUS BLD VENIPUNCTURE: CPT

## 2024-08-15 PROCEDURE — 82077 ASSAY SPEC XCP UR&BREATH IA: CPT | Performed by: EMERGENCY MEDICINE

## 2024-08-15 RX ORDER — PREDNISONE 5 MG/1
1 TABLET ORAL DAILY
Qty: 48 TABLET | Refills: 0 | Status: SHIPPED | OUTPATIENT
Start: 2024-08-15
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
## 2024-08-15 NOTE — ED PROVIDER NOTES
" EMERGENCY DEPARTMENT ENCOUNTER    Pt Name: Yousuf Barrera  MRN: [id number]  Pt :   1965  Room Number:  CDU3/03  Date of encounter:  8/15/2024  PCP: Isidra Pemberton APRN  ED Provider: Roger De Anda MD    Historian: Patient      HPI:  Chief Complaint: Right knee pain        Context: Yousuf Barrera is a 59 y.o. male who presents to the ED c/o right knee pain.  Patient has a past medical history significant for alcohol use disorder.  This is the patient's fourth emergency department visit in the past 2 days.  Patient presented here on the morning of  with a complaint of anxiety and elevated blood pressure.  Patient discharged home.  Patient presented back here the evening of  with alcohol intoxication and was again discharged home. History and physical fromVitals reviewed and demonstrate tachycardia but otherwise are normal.  Patient was seen here yesterday afternoon/evening for alcohol intoxication and was again discharged home.    Patient presents back here this morning complaining of right knee pain.  He denies having any falls.  He says that the knee pain has been ongoing for a few days.  He denies fever.      PAST MEDICAL HISTORY  Past Medical History:   Diagnosis Date    Alcohol abuse     Anxiety     H/O exercise stress test     HAD DONE WITH WORK.  \"IT WAS OK\"    Hypertension     Wears glasses     Withdrawal symptoms, alcohol          PAST SURGICAL HISTORY  Past Surgical History:   Procedure Laterality Date    COLONOSCOPY N/A 2018    Procedure: COLONOSCOPY WITH COLD SNARE POLYECTOMY X 1;  Surgeon: Carlos A Moreno MD;  Location: [facility] ENDOSCOPY;  Service: Gastroenterology    CYSTOSCOPY  2016    WISDOM TOOTH EXTRACTION           FAMILY HISTORY  Family History   Problem Relation Age of Onset    Hypertension Mother     Hypertension Father     Dementia Father     ADD / ADHD Neg Hx     Alcohol abuse Neg Hx     Anxiety disorder Neg Hx     Bipolar disorder Neg Hx     Depression Neg Hx " "    Drug abuse Neg Hx     OCD Neg Hx     Paranoid behavior Neg Hx     Schizophrenia Neg Hx     Seizures Neg Hx     Self-Injurious Behavior  Neg Hx     Suicide Attempts Neg Hx          SOCIAL HISTORY  Social History     Socioeconomic History    Marital status:     Number of children: 1    Highest education level: Some college, no degree   Tobacco Use    Smoking status: Never    Smokeless tobacco: Current     Types: Snuff   Vaping Use    Vaping status: Never Used   Substance and Sexual Activity    Alcohol use: Yes     Comment: \"1 pint liquor/day\"    Drug use: No    Sexual activity: Defer         ALLERGIES  Tetracycline and Tetracyclines & related        REVIEW OF SYSTEMS    All systems reviewed and negative except for those discussed in HPI.       PHYSICAL EXAM    I have reviewed the triage vital signs and nursing notes.    ED Triage Vitals [08/15/24 0338]   Temp Heart Rate Resp BP SpO2   97.6 °F (36.4 °C) 90 18 (!) 148/107 94 %      Temp src Heart Rate Source Patient Position BP Location FiO2 (%)   Oral Monitor Lying Left arm --         General: no acute distress, well-appearing, non-toxic  Skin: normal color, warm and dry.  No erythema or warmth about the right knee.  No open wounds or lacerations.  Head: normocephalic, atraumatic  Eyes: Pupils equally round and reactive to light.  Nose: normal nasal mucosa, no visible deformity.  Mouth: moist mucous membranes.  Neck: supple.  Chest: no retractions, no visible deformity  Cardiovascular: Regular rate and rhythm.  Lungs: clear to auscultation bilaterally.  Abdomen: soft, non-tender, non-distended. No rebound tenderness, no guarding.  Extremities: no cyanosis or edema. Palpable radial pulses bilaterally. Palpable dorsalis pedis pulses bilaterally.  Full range of motion about the right knee without pain.  No erythema, warmth or effusion about the right knee.  Strong right dorsalis pedis pulse.  Neuro:  alert and oriented x3, no focal neurological " deficits.  Psych:  appropriate mood and behavior.        LAB RESULTS  Recent Results (from the past 24 hour(s))   Comprehensive Metabolic Panel    Collection Time: 08/14/24  7:19 PM    Specimen: Blood   Result Value Ref Range    Glucose 69 65 - 99 mg/dL    BUN 16 6 - 20 mg/dL    Creatinine 0.84 0.76 - 1.27 mg/dL    Sodium 142 136 - 145 mmol/L    Potassium 4.0 3.5 - 5.2 mmol/L    Chloride 103 98 - 107 mmol/L    CO2 21.3 (L) 22.0 - 29.0 mmol/L    Calcium 9.2 8.6 - 10.5 mg/dL    Total Protein 7.2 6.0 - 8.5 g/dL    Albumin 4.5 3.5 - 5.2 g/dL    ALT (SGPT) 20 1 - 41 U/L    AST (SGOT) 35 1 - 40 U/L    Alkaline Phosphatase 55 39 - 117 U/L    Total Bilirubin 0.7 0.0 - 1.2 mg/dL    Globulin 2.7 gm/dL    A/G Ratio 1.7 g/dL    BUN/Creatinine Ratio 19.0 7.0 - 25.0    Anion Gap 17.7 (H) 5.0 - 15.0 mmol/L    eGFR 101.7 >60.0 mL/min/1.73   Acetaminophen Level    Collection Time: 08/14/24  7:19 PM    Specimen: Blood   Result Value Ref Range    Acetaminophen <5.0 0.0 - 30.0 mcg/mL   Ethanol    Collection Time: 08/14/24  7:19 PM    Specimen: Blood   Result Value Ref Range    Ethanol 320 (H) 0 - 10 mg/dL    Ethanol % 0.320 %   Salicylate Level    Collection Time: 08/14/24  7:19 PM    Specimen: Blood   Result Value Ref Range    Salicylate <0.3 <=30.0 mg/dL   TSH    Collection Time: 08/14/24  7:19 PM    Specimen: Blood   Result Value Ref Range    TSH 0.500 0.270 - 4.200 uIU/mL   Green Top (Gel)    Collection Time: 08/14/24  7:19 PM   Result Value Ref Range    Extra Tube Hold for add-ons.    Lavender Top    Collection Time: 08/14/24  7:19 PM   Result Value Ref Range    Extra Tube hold for add-on    Gold Top - SST    Collection Time: 08/14/24  7:19 PM   Result Value Ref Range    Extra Tube Hold for add-ons.    Light Blue Top    Collection Time: 08/14/24  7:19 PM   Result Value Ref Range    Extra Tube Hold for add-ons.    CBC Auto Differential    Collection Time: 08/14/24  7:19 PM    Specimen: Blood   Result Value Ref Range    WBC 7.96  3.40 - 10.80 10*3/mm3    RBC 5.16 4.14 - 5.80 10*6/mm3    Hemoglobin 16.4 13.0 - 17.7 g/dL    Hematocrit 46.8 37.5 - 51.0 %    MCV 90.7 79.0 - 97.0 fL    MCH 31.8 26.6 - 33.0 pg    MCHC 35.0 31.5 - 35.7 g/dL    RDW 12.5 12.3 - 15.4 %    RDW-SD 41.1 37.0 - 54.0 fl    MPV 9.2 6.0 - 12.0 fL    Platelets 201 140 - 450 10*3/mm3    Neutrophil % 58.8 42.7 - 76.0 %    Lymphocyte % 31.7 19.6 - 45.3 %    Monocyte % 7.7 5.0 - 12.0 %    Eosinophil % 0.9 0.3 - 6.2 %    Basophil % 0.5 0.0 - 1.5 %    Immature Grans % 0.4 0.0 - 0.5 %    Neutrophils, Absolute 4.69 1.70 - 7.00 10*3/mm3    Lymphocytes, Absolute 2.52 0.70 - 3.10 10*3/mm3    Monocytes, Absolute 0.61 0.10 - 0.90 10*3/mm3    Eosinophils, Absolute 0.07 0.00 - 0.40 10*3/mm3    Basophils, Absolute 0.04 0.00 - 0.20 10*3/mm3    Immature Grans, Absolute 0.03 0.00 - 0.05 10*3/mm3    nRBC 0.0 0.0 - 0.2 /100 WBC   Comprehensive Metabolic Panel    Collection Time: 08/15/24  4:08 AM    Specimen: Blood   Result Value Ref Range    Glucose 76 65 - 99 mg/dL    BUN 17 6 - 20 mg/dL    Creatinine 0.84 0.76 - 1.27 mg/dL    Sodium 141 136 - 145 mmol/L    Potassium 4.0 3.5 - 5.2 mmol/L    Chloride 102 98 - 107 mmol/L    CO2 22.5 22.0 - 29.0 mmol/L    Calcium 8.8 8.6 - 10.5 mg/dL    Total Protein 7.1 6.0 - 8.5 g/dL    Albumin 4.3 3.5 - 5.2 g/dL    ALT (SGPT) 20 1 - 41 U/L    AST (SGOT) 38 1 - 40 U/L    Alkaline Phosphatase 54 39 - 117 U/L    Total Bilirubin 1.0 0.0 - 1.2 mg/dL    Globulin 2.8 gm/dL    A/G Ratio 1.5 g/dL    BUN/Creatinine Ratio 20.2 7.0 - 25.0    Anion Gap 16.5 (H) 5.0 - 15.0 mmol/L    eGFR 101.7 >60.0 mL/min/1.73   CBC Auto Differential    Collection Time: 08/15/24  4:08 AM    Specimen: Blood   Result Value Ref Range    WBC 11.55 (H) 3.40 - 10.80 10*3/mm3    RBC 5.06 4.14 - 5.80 10*6/mm3    Hemoglobin 16.3 13.0 - 17.7 g/dL    Hematocrit 46.3 37.5 - 51.0 %    MCV 91.5 79.0 - 97.0 fL    MCH 32.2 26.6 - 33.0 pg    MCHC 35.2 31.5 - 35.7 g/dL    RDW 12.7 12.3 - 15.4 %    RDW-SD  42.5 37.0 - 54.0 fl    MPV 9.4 6.0 - 12.0 fL    Platelets 202 140 - 450 10*3/mm3    Neutrophil % 67.1 42.7 - 76.0 %    Lymphocyte % 22.6 19.6 - 45.3 %    Monocyte % 8.6 5.0 - 12.0 %    Eosinophil % 1.0 0.3 - 6.2 %    Basophil % 0.4 0.0 - 1.5 %    Immature Grans % 0.3 0.0 - 0.5 %    Neutrophils, Absolute 7.74 (H) 1.70 - 7.00 10*3/mm3    Lymphocytes, Absolute 2.61 0.70 - 3.10 10*3/mm3    Monocytes, Absolute 0.99 (H) 0.10 - 0.90 10*3/mm3    Eosinophils, Absolute 0.12 0.00 - 0.40 10*3/mm3    Basophils, Absolute 0.05 0.00 - 0.20 10*3/mm3    Immature Grans, Absolute 0.04 0.00 - 0.05 10*3/mm3    nRBC 0.0 0.0 - 0.2 /100 WBC   Ethanol    Collection Time: 08/15/24  4:08 AM    Specimen: Blood   Result Value Ref Range    Ethanol 290 (H) 0 - 10 mg/dL    Ethanol % 0.290 %   Acetaminophen Level    Collection Time: 08/15/24  4:08 AM    Specimen: Blood   Result Value Ref Range    Acetaminophen <5.0 0.0 - 30.0 mcg/mL   Salicylate Level    Collection Time: 08/15/24  4:08 AM    Specimen: Blood   Result Value Ref Range    Salicylate <0.3 <=30.0 mg/dL       If labs were ordered, I independently reviewed the results and considered them in treating the patient.  See medical decision making discussion section for my interpretation of lab results.        RADIOLOGY  No Radiology Exams Resulted Within Past 24 Hours    I ordered and independently reviewed the above noted radiographic studies.  See radiologist's dictation for official interpretation.    Per my independent reading:      Plain film of the right knee obtained and is negative for acute osseous abnormality based on my independent reading.            PROCEDURES    Procedures    No orders to display       MEDICATIONS GIVEN IN ER    Medications - No data to display      MEDICAL DECISION MAKING, PROGRESS, and CONSULTS    All labs, if obtained, have been independently reviewed by me.  All radiology studies, if obtained, have been reviewed by me and the radiologist dictating the report.   All EKG's, if obtained, have been independently viewed and interpreted by me/my attending physician.      Discussion below represents my analysis of pertinent findings related to patient's condition, differential diagnosis, treatment plan and final disposition.                         Differential diagnosis:    Differential includes septic arthritis, fracture, strain, other acute emergency.    Medical Decision Making Discussion:    Vitals reviewed and are normal.    Plain film of the right knee is obtained and based on my independent reading is negative for acute osseous abnormality.    Labs obtained and are consistent with alcohol intoxication.  Labs otherwise unremarkable.  Patient has been observed in the emergency department and he is now clinically sober.  He is requesting discharge home.  He was offered alcohol detox but has declined.  He is able to ambulate in the emergency department without difficulty. He does not have an ataxic gait.    Patient discharged with instructions to pursue alcohol detox and to not abuse alcohol.  He is instructed to return for any concerning symptoms or new concerns.      Additional sources:    - Chronic or social conditions impacting care: Alcohol abuse    Shared Decision Making:  After my consideration of clinical presentation and any laboratory/radiology studies obtained, I discussed the findings with the patient/patient representative who is in agreement with the treatment plan and the final disposition.   Risks and benefits of discharge and/or observation/admission were discussed.    Orders placed during this visit:  Orders Placed This Encounter   Procedures    XR Knee 3 View Right    Comprehensive Metabolic Panel    CBC Auto Differential    Ethanol    Acetaminophen Level    Salicylate Level    Urinalysis With Microscopic If Indicated (No Culture) - Urine, Clean Catch    Urine Drug Screen - Urine, Clean Catch    Ethanol           AS OF 07:12 EDT VITALS:    BP - 146/97  HR -  64  TEMP - 97.6 °F (36.4 °C) (Oral)  O2 SATS - 94%                  DIAGNOSIS  Final diagnoses:   Alcoholic intoxication without complication   Acute pain of right knee         DISPOSITION  Discharge      Please note that portions of this document were completed with voice recognition software.        Roger De Anda MD  08/15/24 0770

## 2024-08-15 NOTE — ED NOTES
Attempted to call Jeniffer  att requesting transport home. Call unanswered. Will follow up w/ a message.

## 2024-08-15 NOTE — DISCHARGE INSTRUCTIONS
Do not abuse alcohol.  You should seek alcohol rehab.  Follow-up with primary care within 1 week.  Return to the emerged department for any concerning symptoms, worsening symptoms or new concerns.

## 2024-08-15 NOTE — DISCHARGE INSTRUCTIONS
Recommend using the outpatient behavioral health resources you have been supplied with to arrange for admission to an alcohol recovery program.  Return to the ER for any acute changes or worsening of condition.  Recommend also following up with your primary care provider in the next 2 to 3 days.

## 2024-08-15 NOTE — CASE MANAGEMENT/SOCIAL WORK
Case Management/Social Work    Patient Name:  Yousuf Barrera  YOB: 1965  MRN: [id number]  Admit Date:  8/15/2024    1252:  Received message from ED tech requesting transportation for patient.  Immediately went to waiting room to speak to patient.  Spent 15 minutes with the patient explaining the [facility] process and Transportation Waiver/Release Form.  The patient finally expressed that he did not want to sign the waiver form and would rather take a cab.  Informed the patient that until 530PM the hospital assists with transportation needs via [facility].  The patient still opts to use a cab.  Patient is aware that he will be self pay for a cab.  This  ensured the patient has [facility] number.  Patient acknowledged that he has the numbers he needs.  The patient's RN, Toni, is aware.      Electronically signed by:  Jeniffer Alvarado RN  08/15/24 13:09 EDT

## 2024-08-15 NOTE — DISCHARGE INSTRUCTIONS
Have called in prescription for steroid at the pharmacy.  Please call Dr. Hagen's office to set up appointment.  Referral has been sent.  Please follow-up with your PCP in 1 week.  Follow-up ER for new or worse symptoms.  Please wear brace until follow-up.

## 2024-08-18 ENCOUNTER — HOSPITAL ENCOUNTER (EMERGENCY)
Facility: HOSPITAL | Age: 59
Discharge: HOME | End: 2024-08-18
Attending: EMERGENCY MEDICINE | Admitting: EMERGENCY MEDICINE
Payer: COMMERCIAL

## 2024-08-18 VITALS
TEMPERATURE: 98.4 F | DIASTOLIC BLOOD PRESSURE: 99 MMHG | BODY MASS INDEX: 23.08 KG/M2 | WEIGHT: 170.42 LBS | OXYGEN SATURATION: 98 % | SYSTOLIC BLOOD PRESSURE: 165 MMHG | HEIGHT: 72 IN | RESPIRATION RATE: 18 BRPM | HEART RATE: 103 BPM

## 2024-08-18 DIAGNOSIS — R11.2 NAUSEA AND VOMITING, UNSPECIFIED VOMITING TYPE: Primary | ICD-10-CM

## 2024-08-18 LAB
ALBUMIN SERPL-MCNC: 5.1 G/DL (ref 3.5–5.2)
ALBUMIN/GLOB SERPL: 1.5 G/DL
ALP SERPL-CCNC: 57 U/L (ref 39–117)
ALT SERPL W P-5'-P-CCNC: 33 U/L (ref 1–41)
ANION GAP SERPL CALCULATED.3IONS-SCNC: 27.5 MMOL/L (ref 5–15)
AST SERPL-CCNC: 46 U/L (ref 1–40)
BASOPHILS # BLD AUTO: 0.03 10*3/MM3 (ref 0–0.2)
BASOPHILS NFR BLD AUTO: 0.3 % (ref 0–1.5)
BILIRUB SERPL-MCNC: 1.9 MG/DL (ref 0–1.2)
BUN SERPL-MCNC: 24 MG/DL (ref 6–20)
BUN/CREAT SERPL: 16.8 (ref 7–25)
CALCIUM SPEC-SCNC: 9.9 MG/DL (ref 8.6–10.5)
CHLORIDE SERPL-SCNC: 96 MMOL/L (ref 98–107)
CO2 SERPL-SCNC: 14.5 MMOL/L (ref 22–29)
CREAT SERPL-MCNC: 1.43 MG/DL (ref 0.76–1.27)
DEPRECATED RDW RBC AUTO: 43.2 FL (ref 37–54)
EGFRCR SERPLBLD CKD-EPI 2021: 57.2 ML/MIN/1.73
EOSINOPHIL # BLD AUTO: 0 10*3/MM3 (ref 0–0.4)
EOSINOPHIL NFR BLD AUTO: 0 % (ref 0.3–6.2)
ERYTHROCYTE [DISTWIDTH] IN BLOOD BY AUTOMATED COUNT: 12.6 % (ref 12.3–15.4)
ETHANOL BLD-MCNC: <10 MG/DL (ref 0–10)
ETHANOL UR QL: <0.01 %
GLOBULIN UR ELPH-MCNC: 3.3 GM/DL
GLUCOSE SERPL-MCNC: 137 MG/DL (ref 65–99)
HCT VFR BLD AUTO: 51.1 % (ref 37.5–51)
HGB BLD-MCNC: 17.3 G/DL (ref 13–17.7)
HOLD SPECIMEN: NORMAL
HOLD SPECIMEN: NORMAL
IMM GRANULOCYTES # BLD AUTO: 0.04 10*3/MM3 (ref 0–0.05)
IMM GRANULOCYTES NFR BLD AUTO: 0.3 % (ref 0–0.5)
LIPASE SERPL-CCNC: 78 U/L (ref 13–60)
LYMPHOCYTES # BLD AUTO: 0.99 10*3/MM3 (ref 0.7–3.1)
LYMPHOCYTES NFR BLD AUTO: 8.5 % (ref 19.6–45.3)
MCH RBC QN AUTO: 31.6 PG (ref 26.6–33)
MCHC RBC AUTO-ENTMCNC: 33.9 G/DL (ref 31.5–35.7)
MCV RBC AUTO: 93.4 FL (ref 79–97)
MONOCYTES # BLD AUTO: 0.85 10*3/MM3 (ref 0.1–0.9)
MONOCYTES NFR BLD AUTO: 7.3 % (ref 5–12)
NEUTROPHILS NFR BLD AUTO: 83.6 % (ref 42.7–76)
NEUTROPHILS NFR BLD AUTO: 9.75 10*3/MM3 (ref 1.7–7)
NRBC BLD AUTO-RTO: 0 /100 WBC (ref 0–0.2)
PLATELET # BLD AUTO: 216 10*3/MM3 (ref 140–450)
PMV BLD AUTO: 9.7 FL (ref 6–12)
POTASSIUM SERPL-SCNC: 5 MMOL/L (ref 3.5–5.2)
PROT SERPL-MCNC: 8.4 G/DL (ref 6–8.5)
RBC # BLD AUTO: 5.47 10*6/MM3 (ref 4.14–5.8)
SODIUM SERPL-SCNC: 138 MMOL/L (ref 136–145)
WBC NRBC COR # BLD AUTO: 11.66 10*3/MM3 (ref 3.4–10.8)
WHOLE BLOOD HOLD COAG: NORMAL
WHOLE BLOOD HOLD SPECIMEN: NORMAL

## 2024-08-18 PROCEDURE — 36415 COLL VENOUS BLD VENIPUNCTURE: CPT

## 2024-08-18 PROCEDURE — 83690 ASSAY OF LIPASE: CPT | Performed by: PHYSICIAN ASSISTANT

## 2024-08-18 PROCEDURE — 25010000002 DIAZEPAM PER 5 MG: Performed by: EMERGENCY MEDICINE

## 2024-08-18 PROCEDURE — 96375 TX/PRO/DX INJ NEW DRUG ADDON: CPT

## 2024-08-18 PROCEDURE — 85025 COMPLETE CBC W/AUTO DIFF WBC: CPT | Performed by: PHYSICIAN ASSISTANT

## 2024-08-18 PROCEDURE — 99283 EMERGENCY DEPT VISIT LOW MDM: CPT

## 2024-08-18 PROCEDURE — 80053 COMPREHEN METABOLIC PANEL: CPT | Performed by: PHYSICIAN ASSISTANT

## 2024-08-18 PROCEDURE — 25010000002 ONDANSETRON PER 1 MG: Performed by: PHYSICIAN ASSISTANT

## 2024-08-18 PROCEDURE — 25810000003 SODIUM CHLORIDE 0.9 % SOLUTION: Performed by: PHYSICIAN ASSISTANT

## 2024-08-18 PROCEDURE — 96374 THER/PROPH/DIAG INJ IV PUSH: CPT

## 2024-08-18 PROCEDURE — 82077 ASSAY SPEC XCP UR&BREATH IA: CPT | Performed by: PHYSICIAN ASSISTANT

## 2024-08-18 PROCEDURE — 96376 TX/PRO/DX INJ SAME DRUG ADON: CPT

## 2024-08-18 RX ORDER — ONDANSETRON 4 MG/1
4 TABLET, ORALLY DISINTEGRATING ORAL EVERY 6 HOURS PRN
Qty: 20 TABLET | Refills: 0 | Status: SHIPPED | OUTPATIENT
Start: 2024-08-18

## 2024-08-18 RX ORDER — PANTOPRAZOLE SODIUM 40 MG/10ML
40 INJECTION, POWDER, LYOPHILIZED, FOR SOLUTION INTRAVENOUS ONCE
Status: COMPLETED | OUTPATIENT
Start: 2024-08-18 | End: 2024-08-18

## 2024-08-18 RX ORDER — ONDANSETRON 2 MG/ML
4 INJECTION INTRAMUSCULAR; INTRAVENOUS ONCE
Status: COMPLETED | OUTPATIENT
Start: 2024-08-18 | End: 2024-08-18

## 2024-08-18 RX ORDER — LIDOCAINE HYDROCHLORIDE 20 MG/ML
10 SOLUTION OROPHARYNGEAL ONCE
Status: COMPLETED | OUTPATIENT
Start: 2024-08-18 | End: 2024-08-18

## 2024-08-18 RX ORDER — PANTOPRAZOLE SODIUM 40 MG/1
40 TABLET, DELAYED RELEASE ORAL DAILY
Qty: 30 TABLET | Refills: 0 | Status: SHIPPED | OUTPATIENT
Start: 2024-08-18

## 2024-08-18 RX ORDER — DIAZEPAM 5 MG/ML
5 INJECTION, SOLUTION INTRAMUSCULAR; INTRAVENOUS ONCE
Status: COMPLETED | OUTPATIENT
Start: 2024-08-18 | End: 2024-08-18

## 2024-08-18 RX ORDER — SODIUM CHLORIDE 0.9 % (FLUSH) 0.9 %
10 SYRINGE (ML) INJECTION AS NEEDED
Status: DISCONTINUED | OUTPATIENT
Start: 2024-08-18 | End: 2024-08-19 | Stop reason: HOSPADM

## 2024-08-18 RX ADMIN — PANTOPRAZOLE SODIUM 40 MG: 40 INJECTION, POWDER, FOR SOLUTION INTRAVENOUS at 20:34

## 2024-08-18 RX ADMIN — SODIUM CHLORIDE 1000 ML: 9 INJECTION, SOLUTION INTRAVENOUS at 20:34

## 2024-08-18 RX ADMIN — LIDOCAINE HYDROCHLORIDE 10 ML: 20 SOLUTION ORAL at 21:50

## 2024-08-18 RX ADMIN — ALUMINUM HYDROXIDE, MAGNESIUM HYDROXIDE, DIMETHICONE: 400; 400; 40 SUSPENSION ORAL at 21:50

## 2024-08-18 RX ADMIN — DIAZEPAM 5 MG: 5 INJECTION INTRAMUSCULAR; INTRAVENOUS at 22:03

## 2024-08-18 RX ADMIN — ONDANSETRON 4 MG: 2 INJECTION INTRAMUSCULAR; INTRAVENOUS at 21:36

## 2024-08-18 RX ADMIN — ONDANSETRON 4 MG: 2 INJECTION INTRAMUSCULAR; INTRAVENOUS at 20:34

## 2024-08-19 NOTE — ED PROVIDER NOTES
" EMERGENCY DEPARTMENT ENCOUNTER    Pt Name: Yousuf Barrera  MRN: [id number]  Pt :   1965  Room Number:    Date of encounter:  2024  PCP: Isidra Pemberton APRN  ED Provider: Zeke Rossi PA-C    Historian: Patient      HPI:  Chief Complaint   Patient presents with    Vomiting    Alcohol Problem          Context: Yousuf Barrera is a 59 y.o. male who presents to the ED c/o nausea vomiting requesting EtOH detox.  States drinks a pint of bourbon a day.  Last drink was 2 PM.  No blood in his emesis.  No abdominal pain.  Has chronic right knee pain that he is following with orthopedics for on Tuesday.  Denies history of esophageal varices.  Has never had an EGD.      PAST MEDICAL HISTORY  Past Medical History:   Diagnosis Date    Alcohol abuse     Anxiety     H/O exercise stress test 2015    HAD DONE WITH WORK.  \"IT WAS OK\"    Hypertension     Wears glasses     Withdrawal symptoms, alcohol          PAST SURGICAL HISTORY  Past Surgical History:   Procedure Laterality Date    COLONOSCOPY N/A 2018    Procedure: COLONOSCOPY WITH COLD SNARE POLYECTOMY X 1;  Surgeon: Carlos A Moreno MD;  Location: [facility] ENDOSCOPY;  Service: Gastroenterology    CYSTOSCOPY  2016    WISDOM TOOTH EXTRACTION           FAMILY HISTORY  Family History   Problem Relation Age of Onset    Hypertension Mother     Hypertension Father     Dementia Father     ADD / ADHD Neg Hx     Alcohol abuse Neg Hx     Anxiety disorder Neg Hx     Bipolar disorder Neg Hx     Depression Neg Hx     Drug abuse Neg Hx     OCD Neg Hx     Paranoid behavior Neg Hx     Schizophrenia Neg Hx     Seizures Neg Hx     Self-Injurious Behavior  Neg Hx     Suicide Attempts Neg Hx          SOCIAL HISTORY  Social History     Socioeconomic History    Marital status: Single    Number of children: 1    Highest education level: Some college, no degree   Tobacco Use    Smoking status: Never    Smokeless tobacco: Current     Types: Snuff   Vaping Use    Vaping " "status: Never Used   Substance and Sexual Activity    Alcohol use: Yes     Comment: \"1 pint liquor/day\"    Drug use: No    Sexual activity: Defer         ALLERGIES  Tetracycline and Tetracyclines & related        REVIEW OF SYSTEMS  Review of Systems   Constitutional: Negative.    HENT: Negative.     Eyes: Negative.    Respiratory: Negative.     Cardiovascular: Negative.    Gastrointestinal:  Positive for nausea and vomiting.   Genitourinary: Negative.    Musculoskeletal: Negative.    Skin: Negative.    Allergic/Immunologic: Negative.    Neurological: Negative.    Psychiatric/Behavioral: Negative.     All other systems reviewed and are negative.       All systems reviewed and negative except for those discussed in HPI.       PHYSICAL EXAM    I have reviewed the triage vital signs and nursing notes.    ED Triage Vitals [08/18/24 1922]   Temp Heart Rate Resp BP SpO2   98.4 °F (36.9 °C) (!) 137 22 (!) 163/117 96 %      Temp src Heart Rate Source Patient Position BP Location FiO2 (%)   Oral Monitor Sitting Left arm --       Physical Exam  Vitals and nursing note reviewed.   Constitutional:       General: He is not in acute distress.     Appearance: Normal appearance. He is normal weight. He is not ill-appearing, toxic-appearing or diaphoretic.   HENT:      Head: Normocephalic and atraumatic.      Nose: Nose normal.   Eyes:      Extraocular Movements: Extraocular movements intact.   Cardiovascular:      Rate and Rhythm: Normal rate.   Pulmonary:      Effort: Pulmonary effort is normal.   Abdominal:      General: Abdomen is flat. Bowel sounds are normal. There is no distension.      Palpations: Abdomen is soft.      Tenderness: There is no abdominal tenderness. There is no guarding or rebound.   Musculoskeletal:         General: Normal range of motion.      Cervical back: Normal range of motion.   Skin:     General: Skin is warm and dry.   Neurological:      General: No focal deficit present.      Mental Status: He is " alert. Mental status is at baseline.   Psychiatric:         Mood and Affect: Mood normal.         Behavior: Behavior normal.            LAB RESULTS  Recent Results (from the past 24 hour(s))   Green Top (Gel)    Collection Time: 08/18/24  8:19 PM   Result Value Ref Range    Extra Tube Hold for add-ons.    Lavender Top    Collection Time: 08/18/24  8:19 PM   Result Value Ref Range    Extra Tube hold for add-on    Gold Top - SST    Collection Time: 08/18/24  8:19 PM   Result Value Ref Range    Extra Tube Hold for add-ons.    Light Blue Top    Collection Time: 08/18/24  8:19 PM   Result Value Ref Range    Extra Tube Hold for add-ons.    Comprehensive Metabolic Panel    Collection Time: 08/18/24  8:19 PM    Specimen: Blood   Result Value Ref Range    Glucose 137 (H) 65 - 99 mg/dL    BUN 24 (H) 6 - 20 mg/dL    Creatinine 1.43 (H) 0.76 - 1.27 mg/dL    Sodium 138 136 - 145 mmol/L    Potassium 5.0 3.5 - 5.2 mmol/L    Chloride 96 (L) 98 - 107 mmol/L    CO2 14.5 (L) 22.0 - 29.0 mmol/L    Calcium 9.9 8.6 - 10.5 mg/dL    Total Protein 8.4 6.0 - 8.5 g/dL    Albumin 5.1 3.5 - 5.2 g/dL    ALT (SGPT) 33 1 - 41 U/L    AST (SGOT) 46 (H) 1 - 40 U/L    Alkaline Phosphatase 57 39 - 117 U/L    Total Bilirubin 1.9 (H) 0.0 - 1.2 mg/dL    Globulin 3.3 gm/dL    A/G Ratio 1.5 g/dL    BUN/Creatinine Ratio 16.8 7.0 - 25.0    Anion Gap 27.5 (H) 5.0 - 15.0 mmol/L    eGFR 57.2 (L) >60.0 mL/min/1.73   Lipase    Collection Time: 08/18/24  8:19 PM    Specimen: Blood   Result Value Ref Range    Lipase 78 (H) 13 - 60 U/L   Ethanol    Collection Time: 08/18/24  8:19 PM    Specimen: Blood   Result Value Ref Range    Ethanol <10 0 - 10 mg/dL    Ethanol % <0.010 %   CBC Auto Differential    Collection Time: 08/18/24  9:18 PM    Specimen: Blood   Result Value Ref Range    WBC 11.66 (H) 3.40 - 10.80 10*3/mm3    RBC 5.47 4.14 - 5.80 10*6/mm3    Hemoglobin 17.3 13.0 - 17.7 g/dL    Hematocrit 51.1 (H) 37.5 - 51.0 %    MCV 93.4 79.0 - 97.0 fL    MCH 31.6 26.6 -  33.0 pg    MCHC 33.9 31.5 - 35.7 g/dL    RDW 12.6 12.3 - 15.4 %    RDW-SD 43.2 37.0 - 54.0 fl    MPV 9.7 6.0 - 12.0 fL    Platelets 216 140 - 450 10*3/mm3    Neutrophil % 83.6 (H) 42.7 - 76.0 %    Lymphocyte % 8.5 (L) 19.6 - 45.3 %    Monocyte % 7.3 5.0 - 12.0 %    Eosinophil % 0.0 (L) 0.3 - 6.2 %    Basophil % 0.3 0.0 - 1.5 %    Immature Grans % 0.3 0.0 - 0.5 %    Neutrophils, Absolute 9.75 (H) 1.70 - 7.00 10*3/mm3    Lymphocytes, Absolute 0.99 0.70 - 3.10 10*3/mm3    Monocytes, Absolute 0.85 0.10 - 0.90 10*3/mm3    Eosinophils, Absolute 0.00 0.00 - 0.40 10*3/mm3    Basophils, Absolute 0.03 0.00 - 0.20 10*3/mm3    Immature Grans, Absolute 0.04 0.00 - 0.05 10*3/mm3    nRBC 0.0 0.0 - 0.2 /100 WBC       If labs were ordered, I independently reviewed the results and considered them in treating the patient.        RADIOLOGY  No Radiology Exams Resulted Within Past 24 Hours        PROCEDURES    Procedures    Interpretations    O2 Sat: The patients oxygen saturation was 97% on Room Air.  This was independently interpreted by me as Normal    MEDICATIONS GIVEN IN ER    Medications   sodium chloride 0.9 % flush 10 mL (has no administration in time range)   sodium chloride 0.9 % bolus 1,000 mL (0 mL Intravenous Stopped 8/18/24 2155)   ondansetron (ZOFRAN) injection 4 mg (4 mg Intravenous Given 8/18/24 2034)   pantoprazole (PROTONIX) injection 40 mg (40 mg Intravenous Given 8/18/24 2034)   aluminum-magnesium hydroxide-simethicone (MAALOX MAX) 400-400-40 MG/5ML 15 mL suspension ( Oral Given 8/18/24 2150)   Lidocaine Viscous HCl (XYLOCAINE) 2 % solution 10 mL (10 mL Mouth/Throat Given 8/18/24 2150)   ondansetron (ZOFRAN) injection 4 mg (4 mg Intravenous Given 8/18/24 4704)   diazePAM (VALIUM) injection 5 mg (5 mg Intravenous Given 8/18/24 2202)         MEDICAL DECISION MAKING, PROGRESS, and CONSULTS    All labs, if obtained, have been independently reviewed by me.  All radiology studies, if obtained, have been reviewed by me and  the radiologist dictating the report.  All EKG's, if obtained, have been independently viewed and interpreted by me      Discussion below represents my analysis of pertinent findings related to patient's condition, differential diagnosis, treatment plan and final disposition.      Differential diagnosis:    Alcohol intoxication, alcohol withdrawal, pancreatitis, alcoholic gastritis    Additional Sources:  None      Orders placed during this visit:  Orders Placed This Encounter   Procedures    [place] Draw    Comprehensive Metabolic Panel    Lipase    Ethanol    Urinalysis With Culture If Indicated - Urine, Clean Catch    Urine Drug Screen - Urine, Clean Catch    CBC Auto Differential    Psychiatric Diagnostic Interview    Insert Peripheral IV    Green Top (Gel)    Lavender Top    Gold Top - SST    Light Blue Top    CBC & Differential         Additional orders considered but not ordered:  None    ED Course:    Consultants:  None    ED Course as of 08/18/24 2341   Sun Aug 18, 2024   2106 Ethanol: <10 [TM]      ED Course User Index  [TM] Zeke Rossi PA-C           After my consideration of clinical presentation and any laboratory/radiology studies obtained, I discussed the findings with the patient/patient representative who is in agreement with the treatment plan and the final disposition. Risks and benefits of discharge were discussed.     AS OF 23:41 EDT VITALS:    BP - (!) 154/109  HR - 93  TEMP - 98.4 °F (36.9 °C) (Oral)  O2 SATS - 97%    I reviewed the patients prescription monitoring report if available prior to discharge    DIAGNOSIS  Final diagnoses:   Nausea and vomiting, unspecified vomiting type         DISPOSITION  ED Disposition       ED Disposition   Discharge    Condition   Stable    Comment   --                   Please note that portions of this document were completed with voice recognition software.        Zeke Rossi PA-C  08/18/24 2344
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
## 2024-09-14 ENCOUNTER — HOSPITAL ENCOUNTER (EMERGENCY)
Facility: HOSPITAL | Age: 59
Discharge: HOME | End: 2024-09-14
Attending: EMERGENCY MEDICINE
Payer: COMMERCIAL

## 2024-09-14 VITALS
SYSTOLIC BLOOD PRESSURE: 153 MMHG | HEIGHT: 72 IN | WEIGHT: 170.42 LBS | BODY MASS INDEX: 23.08 KG/M2 | RESPIRATION RATE: 18 BRPM | HEART RATE: 73 BPM | DIASTOLIC BLOOD PRESSURE: 94 MMHG | OXYGEN SATURATION: 95 % | TEMPERATURE: 98.3 F

## 2024-09-14 DIAGNOSIS — F10.920 ALCOHOLIC INTOXICATION WITHOUT COMPLICATION: Primary | ICD-10-CM

## 2024-09-14 LAB
ALBUMIN SERPL-MCNC: 4.2 G/DL (ref 3.5–5.2)
ALBUMIN/GLOB SERPL: 1.9 G/DL
ALP SERPL-CCNC: 76 U/L (ref 39–117)
ALT SERPL W P-5'-P-CCNC: 30 U/L (ref 1–41)
ANION GAP SERPL CALCULATED.3IONS-SCNC: 15.6 MMOL/L (ref 5–15)
APAP SERPL-MCNC: <5 MCG/ML (ref 0–30)
AST SERPL-CCNC: 38 U/L (ref 1–40)
BASOPHILS # BLD AUTO: 0.03 10*3/MM3 (ref 0–0.2)
BASOPHILS NFR BLD AUTO: 0.4 % (ref 0–1.5)
BILIRUB SERPL-MCNC: 0.5 MG/DL (ref 0–1.2)
BUN SERPL-MCNC: 12 MG/DL (ref 6–20)
BUN/CREAT SERPL: 12.4 (ref 7–25)
CALCIUM SPEC-SCNC: 8.6 MG/DL (ref 8.6–10.5)
CHLORIDE SERPL-SCNC: 104 MMOL/L (ref 98–107)
CO2 SERPL-SCNC: 23.4 MMOL/L (ref 22–29)
CREAT SERPL-MCNC: 0.97 MG/DL (ref 0.76–1.27)
DEPRECATED RDW RBC AUTO: 42.5 FL (ref 37–54)
EGFRCR SERPLBLD CKD-EPI 2021: 91.1 ML/MIN/1.73
EOSINOPHIL # BLD AUTO: 0.42 10*3/MM3 (ref 0–0.4)
EOSINOPHIL NFR BLD AUTO: 5.6 % (ref 0.3–6.2)
ERYTHROCYTE [DISTWIDTH] IN BLOOD BY AUTOMATED COUNT: 12.5 % (ref 12.3–15.4)
ETHANOL BLD-MCNC: 397 MG/DL (ref 0–10)
ETHANOL UR QL: 0.4 %
GLOBULIN UR ELPH-MCNC: 2.2 GM/DL
GLUCOSE SERPL-MCNC: 100 MG/DL (ref 65–99)
HCT VFR BLD AUTO: 42.3 % (ref 37.5–51)
HGB BLD-MCNC: 14.5 G/DL (ref 13–17.7)
HOLD SPECIMEN: NORMAL
HOLD SPECIMEN: NORMAL
IMM GRANULOCYTES # BLD AUTO: 0.03 10*3/MM3 (ref 0–0.05)
IMM GRANULOCYTES NFR BLD AUTO: 0.4 % (ref 0–0.5)
LYMPHOCYTES # BLD AUTO: 1.84 10*3/MM3 (ref 0.7–3.1)
LYMPHOCYTES NFR BLD AUTO: 24.5 % (ref 19.6–45.3)
MCH RBC QN AUTO: 31.7 PG (ref 26.6–33)
MCHC RBC AUTO-ENTMCNC: 34.3 G/DL (ref 31.5–35.7)
MCV RBC AUTO: 92.6 FL (ref 79–97)
MONOCYTES # BLD AUTO: 0.6 10*3/MM3 (ref 0.1–0.9)
MONOCYTES NFR BLD AUTO: 8 % (ref 5–12)
NEUTROPHILS NFR BLD AUTO: 4.6 10*3/MM3 (ref 1.7–7)
NEUTROPHILS NFR BLD AUTO: 61.1 % (ref 42.7–76)
NRBC BLD AUTO-RTO: 0 /100 WBC (ref 0–0.2)
PLATELET # BLD AUTO: 142 10*3/MM3 (ref 140–450)
PMV BLD AUTO: 9.2 FL (ref 6–12)
POTASSIUM SERPL-SCNC: 4.3 MMOL/L (ref 3.5–5.2)
PROT SERPL-MCNC: 6.4 G/DL (ref 6–8.5)
RBC # BLD AUTO: 4.57 10*6/MM3 (ref 4.14–5.8)
SALICYLATES SERPL-MCNC: <0.3 MG/DL
SODIUM SERPL-SCNC: 143 MMOL/L (ref 136–145)
TSH SERPL DL<=0.05 MIU/L-ACNC: 1.53 UIU/ML (ref 0.27–4.2)
WBC NRBC COR # BLD AUTO: 7.52 10*3/MM3 (ref 3.4–10.8)
WHOLE BLOOD HOLD COAG: NORMAL
WHOLE BLOOD HOLD SPECIMEN: NORMAL

## 2024-09-14 PROCEDURE — 99283 EMERGENCY DEPT VISIT LOW MDM: CPT

## 2024-09-14 PROCEDURE — 25010000002 THIAMINE PER 100 MG

## 2024-09-14 PROCEDURE — 80179 DRUG ASSAY SALICYLATE: CPT

## 2024-09-14 PROCEDURE — 36415 COLL VENOUS BLD VENIPUNCTURE: CPT

## 2024-09-14 PROCEDURE — 96375 TX/PRO/DX INJ NEW DRUG ADDON: CPT

## 2024-09-14 PROCEDURE — 80050 GENERAL HEALTH PANEL: CPT

## 2024-09-14 PROCEDURE — 80143 DRUG ASSAY ACETAMINOPHEN: CPT

## 2024-09-14 PROCEDURE — 25810000003 SODIUM CHLORIDE 0.9 % SOLUTION

## 2024-09-14 PROCEDURE — 96365 THER/PROPH/DIAG IV INF INIT: CPT

## 2024-09-14 PROCEDURE — 82077 ASSAY SPEC XCP UR&BREATH IA: CPT

## 2024-09-14 RX ORDER — SODIUM CHLORIDE 9 MG/ML
1000 INJECTION, SOLUTION INTRAVENOUS ONCE
Status: COMPLETED | OUTPATIENT
Start: 2024-09-14 | End: 2024-09-14

## 2024-09-14 RX ORDER — THIAMINE HYDROCHLORIDE 100 MG/ML
200 INJECTION, SOLUTION INTRAMUSCULAR; INTRAVENOUS ONCE
Status: COMPLETED | OUTPATIENT
Start: 2024-09-14 | End: 2024-09-14

## 2024-09-14 RX ORDER — SODIUM CHLORIDE 0.9 % (FLUSH) 0.9 %
10 SYRINGE (ML) INJECTION AS NEEDED
Status: DISCONTINUED | OUTPATIENT
Start: 2024-09-14 | End: 2024-09-14 | Stop reason: HOSPADM

## 2024-09-14 RX ADMIN — FOLIC ACID 1 MG: 5 INJECTION, SOLUTION INTRAMUSCULAR; INTRAVENOUS; SUBCUTANEOUS at 12:01

## 2024-09-14 RX ADMIN — SODIUM CHLORIDE 1000 ML: 9 INJECTION, SOLUTION INTRAVENOUS at 11:59

## 2024-09-14 RX ADMIN — THIAMINE HYDROCHLORIDE 200 MG: 100 INJECTION, SOLUTION INTRAMUSCULAR; INTRAVENOUS at 12:00

## 2024-09-14 NOTE — DISCHARGE INSTRUCTIONS
Please follow-up with your primary care provider soon as possible for further evaluation and return to the ER for any acute changes or worsening of your condition.

## 2024-09-14 NOTE — ED PROVIDER NOTES
" EMERGENCY DEPARTMENT ENCOUNTER    Pt Name: Yousuf Barrera  MRN: [id number]  Pt :   1965  Room Number:  CDU3/03  Date of encounter:  2024  PCP: Isidra Pemberton APRN  ED Provider: Beranrd Reilly PA-C    Historian: patient, nursing notes, EMS paramedic      HPI:  Chief Complaint: alcohol intoxication, alcohol dependency        Context: Yousuf Barrera is a 59 y.o. male who presents to the ED c/o alcohol intoxication and is requesting help with becoming sober.  Patient states he is a daily alcohol drinker and has consumed 1 pint of bourbon since this morning.  Patient states he has decided he wants to stop drinking and that he needs help with placement.  Patient denies SI, HI, self harm, or drug use today.  Patient denies access to guns or knives at home.  Patient states otherwise he has no physical complaints today.        PAST MEDICAL HISTORY  Past Medical History:   Diagnosis Date    Alcohol abuse     Anxiety     H/O exercise stress test 2015    HAD DONE WITH WORK.  \"IT WAS OK\"    Hypertension     Wears glasses     Withdrawal symptoms, alcohol          PAST SURGICAL HISTORY  Past Surgical History:   Procedure Laterality Date    COLONOSCOPY N/A 2018    Procedure: COLONOSCOPY WITH COLD SNARE POLYECTOMY X 1;  Surgeon: Carlos A Moreno MD;  Location: [facility] ENDOSCOPY;  Service: Gastroenterology    CYSTOSCOPY  2016    WISDOM TOOTH EXTRACTION           FAMILY HISTORY  Family History   Problem Relation Age of Onset    Hypertension Mother     Hypertension Father     Dementia Father     ADD / ADHD Neg Hx     Alcohol abuse Neg Hx     Anxiety disorder Neg Hx     Bipolar disorder Neg Hx     Depression Neg Hx     Drug abuse Neg Hx     OCD Neg Hx     Paranoid behavior Neg Hx     Schizophrenia Neg Hx     Seizures Neg Hx     Self-Injurious Behavior  Neg Hx     Suicide Attempts Neg Hx          SOCIAL HISTORY  Social History     Socioeconomic History    Marital status: Single    Number of children: 1    Highest " "education level: Some college, no degree   Tobacco Use    Smoking status: Never    Smokeless tobacco: Current     Types: Snuff   Vaping Use    Vaping status: Never Used   Substance and Sexual Activity    Alcohol use: Yes     Comment: \"1 pint liquor/day\"    Drug use: No    Sexual activity: Defer         ALLERGIES  Tetracycline and Tetracyclines & related        REVIEW OF SYSTEMS  Review of Systems   Constitutional:  Negative for chills and fever.   HENT:  Negative for congestion and sore throat.    Respiratory:  Negative for cough and shortness of breath.    Cardiovascular:  Negative for chest pain.   Gastrointestinal:  Negative for abdominal pain, nausea and vomiting.   Genitourinary:  Negative for dysuria.   Musculoskeletal:  Negative for back pain.   Skin:  Negative for wound.   Neurological:  Negative for dizziness and headaches.   Psychiatric/Behavioral:  Negative for agitation, confusion and suicidal ideas.    All other systems reviewed and are negative.         All systems reviewed and negative except for those discussed in HPI.       PHYSICAL EXAM    I have reviewed the triage vital signs and nursing notes.    ED Triage Vitals   Temp Pulse Resp BP SpO2   -- -- -- -- --      Temp src Heart Rate Source Patient Position BP Location FiO2 (%)   -- -- -- -- --       Physical Exam  Vitals and nursing note reviewed.   Constitutional:       General: He is not in acute distress.     Appearance: Normal appearance. He is not ill-appearing, toxic-appearing or diaphoretic.   HENT:      Head: Normocephalic and atraumatic.      Right Ear: External ear normal.      Left Ear: External ear normal.      Nose: No congestion or rhinorrhea.      Mouth/Throat:      Mouth: Mucous membranes are moist.      Pharynx: Oropharynx is clear.   Eyes:      Conjunctiva/sclera: Conjunctivae normal.   Cardiovascular:      Rate and Rhythm: Normal rate.      Heart sounds: Normal heart sounds.   Pulmonary:      Effort: Pulmonary effort is normal. " No respiratory distress.      Breath sounds: Normal breath sounds. No wheezing.   Abdominal:      Tenderness: There is no abdominal tenderness.   Musculoskeletal:      Cervical back: Normal range of motion and neck supple.      Right lower leg: No edema.      Left lower leg: No edema.   Skin:     Findings: No rash.   Neurological:      Mental Status: He is alert.   Psychiatric:         Attention and Perception: Attention normal. He does not perceive auditory or visual hallucinations.         Speech: Speech is slurred.         Behavior: Behavior is not agitated or aggressive. Behavior is cooperative.         Thought Content: Thought content is not paranoid or delusional. Thought content does not include homicidal or suicidal ideation. Thought content does not include homicidal or suicidal plan.             LAB RESULTS  Recent Results (from the past 24 hour(s))   Comprehensive Metabolic Panel    Collection Time: 09/14/24 11:24 AM    Specimen: Blood   Result Value Ref Range    Glucose 100 (H) 65 - 99 mg/dL    BUN 12 6 - 20 mg/dL    Creatinine 0.97 0.76 - 1.27 mg/dL    Sodium 143 136 - 145 mmol/L    Potassium 4.3 3.5 - 5.2 mmol/L    Chloride 104 98 - 107 mmol/L    CO2 23.4 22.0 - 29.0 mmol/L    Calcium 8.6 8.6 - 10.5 mg/dL    Total Protein 6.4 6.0 - 8.5 g/dL    Albumin 4.2 3.5 - 5.2 g/dL    ALT (SGPT) 30 1 - 41 U/L    AST (SGOT) 38 1 - 40 U/L    Alkaline Phosphatase 76 39 - 117 U/L    Total Bilirubin 0.5 0.0 - 1.2 mg/dL    Globulin 2.2 gm/dL    A/G Ratio 1.9 g/dL    BUN/Creatinine Ratio 12.4 7.0 - 25.0    Anion Gap 15.6 (H) 5.0 - 15.0 mmol/L    eGFR 91.1 >60.0 mL/min/1.73   Acetaminophen Level    Collection Time: 09/14/24 11:24 AM    Specimen: Blood   Result Value Ref Range    Acetaminophen <5.0 0.0 - 30.0 mcg/mL   Ethanol    Collection Time: 09/14/24 11:24 AM    Specimen: Blood   Result Value Ref Range    Ethanol 397 (H) 0 - 10 mg/dL    Ethanol % 0.397 %   Salicylate Level    Collection Time: 09/14/24 11:24 AM     Specimen: Blood   Result Value Ref Range    Salicylate <0.3 <=30.0 mg/dL   TSH    Collection Time: 09/14/24 11:24 AM    Specimen: Blood   Result Value Ref Range    TSH 1.530 0.270 - 4.200 uIU/mL   Green Top (Gel)    Collection Time: 09/14/24 11:24 AM   Result Value Ref Range    Extra Tube Hold for add-ons.    Lavender Top    Collection Time: 09/14/24 11:24 AM   Result Value Ref Range    Extra Tube hold for add-on    Gold Top - SST    Collection Time: 09/14/24 11:24 AM   Result Value Ref Range    Extra Tube Hold for add-ons.    Light Blue Top    Collection Time: 09/14/24 11:24 AM   Result Value Ref Range    Extra Tube Hold for add-ons.    CBC Auto Differential    Collection Time: 09/14/24 11:24 AM    Specimen: Blood   Result Value Ref Range    WBC 7.52 3.40 - 10.80 10*3/mm3    RBC 4.57 4.14 - 5.80 10*6/mm3    Hemoglobin 14.5 13.0 - 17.7 g/dL    Hematocrit 42.3 37.5 - 51.0 %    MCV 92.6 79.0 - 97.0 fL    MCH 31.7 26.6 - 33.0 pg    MCHC 34.3 31.5 - 35.7 g/dL    RDW 12.5 12.3 - 15.4 %    RDW-SD 42.5 37.0 - 54.0 fl    MPV 9.2 6.0 - 12.0 fL    Platelets 142 140 - 450 10*3/mm3    Neutrophil % 61.1 42.7 - 76.0 %    Lymphocyte % 24.5 19.6 - 45.3 %    Monocyte % 8.0 5.0 - 12.0 %    Eosinophil % 5.6 0.3 - 6.2 %    Basophil % 0.4 0.0 - 1.5 %    Immature Grans % 0.4 0.0 - 0.5 %    Neutrophils, Absolute 4.60 1.70 - 7.00 10*3/mm3    Lymphocytes, Absolute 1.84 0.70 - 3.10 10*3/mm3    Monocytes, Absolute 0.60 0.10 - 0.90 10*3/mm3    Eosinophils, Absolute 0.42 (H) 0.00 - 0.40 10*3/mm3    Basophils, Absolute 0.03 0.00 - 0.20 10*3/mm3    Immature Grans, Absolute 0.03 0.00 - 0.05 10*3/mm3    nRBC 0.0 0.0 - 0.2 /100 WBC       If labs were ordered, I independently reviewed the results and considered them in treating the patient.        RADIOLOGY  No Radiology Exams Resulted Within Past 24 Hours       PROCEDURES    Procedures    No orders to display       MEDICATIONS GIVEN IN ER    Medications   sodium chloride 0.9 % flush 10 mL (has no  administration in time range)   sodium chloride 0.9 % infusion 1,000 mL (0 mL Intravenous Stopped 9/14/24 1335)   folic acid 1 mg in sodium chloride 0.9 % 50 mL IVPB (0 mg Intravenous Stopped 9/14/24 1235)   thiamine (B-1) injection 200 mg (200 mg Intravenous Given 9/14/24 1200)         MEDICAL DECISION MAKING, PROGRESS, and CONSULTS    All labs, if obtained, have been independently reviewed by me.  All radiology studies, if obtained, have been reviewed by me and the radiologist dictating the report.  All EKG's, if obtained, have been independently viewed and interpreted by me/my attending physician.      Discussion below represents my analysis of pertinent findings related to patient's condition, differential diagnosis, treatment plan and final disposition.    Patient is a 59-year-old male presented to ER via EMS for evaluation of alcohol intoxication and is requesting help for detox.  Patient today has slurred speech but is otherwise unremarkable on physical exam.  His vital signs as independently interpreted by me are all stable and within normal limits.  There is no evidence of trauma.  Patient is afebrile, with no tremors or seizure-like activity or any other physical signs or symptoms of alcohol withdrawal syndrome.  He is communicating in full sentences.  Extensive laboratory workup was initiated.  Behavioral health consult was ordered.    CBC showed no leukocytosis and a normal H&H.  CMP was clinically unremarkable with normal liver enzymes and total bilirubin.  Acetaminophen and salicylate levels are undetectable TSH was within normal limits.  Initial alcohol level was 397 showing patient is intoxicated currently with alcohol which explains his slurred speech.  Patient was given a banana bag for rehydration.  He was monitored in the emergency department for almost 6 hours.  Patient then changed his mind stating he no longer was looking for help with his alcohol use disorder and did not want placement at  this time.  He denies SI HI or self-harm today and is requesting discharge.  When I reevaluated the patient he no longer has slurred speech he is communicating normally in full sentences and is tolerating p.o.  I feel the patient is now stable for discharge and arrangement for the patient to be transported back to his home via cab was made by nursing.                     Differential diagnosis:    Differential diagnosis included but was not limited to alcohol intoxication, drug intoxication      Additional sources:    - Discussed/ obtained information from independent historians: None    - External (non-ED) record review: I performed an extensive review of patient's recent and previous emergency department visits for alcohol intoxication without complication including review of patient's visits on 8/18/2024 8/15/2024 and 8/14/2024.    - Chronic or social conditions impacting care: None    Orders placed during this visit:  Orders Placed This Encounter   Procedures    [place] Draw    Comprehensive Metabolic Panel    Acetaminophen Level    Ethanol    Salicylate Level    Urine Drug Screen - Urine, Clean Catch    TSH    CBC Auto Differential    Ethanol    NPO Diet NPO Type: Strict NPO    Vital Signs    Continuous Pulse Oximetry    Psych / Access to See    Oxygen Therapy- Nasal Cannula; Titrate 1-6 LPM Per SpO2; 90 - 95%    POC Glucose Once    Insert Peripheral IV    CBC & Differential    Green Top (Gel)    Lavender Top    Gold Top - SST    Light Blue Top         Additional orders considered but not ordered: None      ED Course:    Consultants: None    ED Course as of 09/14/24 1649   Sat Sep 14, 2024   1206 Ethanol(!): 397 [TG]   1532   EKG Interpretation    Evaluated and interpreted by emergency department physician    Rhythm: Normal Sinus Rhythm  Rate: Normal  Axis: left  Ectopy: none  Conduction: Normal  ST Segments: Normal  T Waves: T wave versions in 2 3 and aVF. V5, V6  Q Waves: None    Clinical Impression: Normal  sinus arrhythmia    Kevin Isaac DO   [CR]      ED Course User Index  [CR] Kevin Isaac DO  [TG] Bernard Reilly PA-C              Shared Decision Making:  After my consideration of clinical presentation and any laboratory/radiology studies obtained, I discussed the findings with the patient/patient representative who is in agreement with the treatment plan and the final disposition.   Risks and benefits of discharge and/or observation/admission were discussed.       AS OF 16:49 EDT VITALS:    BP - 116/99  HR - 71  TEMP - 98.3 °F (36.8 °C) (Axillary)  O2 SATS - 93%                  DIAGNOSIS  Final diagnoses:   Alcoholic intoxication without complication         DISPOSITION  Discharge      Please note that portions of this document were completed with voice recognition software.      Bernard Reilly PA-C  09/14/24 9009

## 2025-02-07 ENCOUNTER — TRANSCRIBE ORDERS (OUTPATIENT)
Dept: ADMINISTRATIVE | Facility: HOSPITAL | Age: 60
End: 2025-02-07
Payer: COMMERCIAL

## 2025-02-07 DIAGNOSIS — K76.9 LIVER DISEASE: Primary | ICD-10-CM

## 2025-06-17 ENCOUNTER — HOSPITAL ENCOUNTER (EMERGENCY)
Facility: HOSPITAL | Age: 60
Discharge: HOME | End: 2025-06-17
Attending: EMERGENCY MEDICINE | Admitting: EMERGENCY MEDICINE
Payer: COMMERCIAL

## 2025-06-17 VITALS
HEIGHT: 72 IN | WEIGHT: 190 LBS | DIASTOLIC BLOOD PRESSURE: 83 MMHG | HEART RATE: 79 BPM | SYSTOLIC BLOOD PRESSURE: 133 MMHG | RESPIRATION RATE: 18 BRPM | TEMPERATURE: 98.3 F | BODY MASS INDEX: 25.73 KG/M2 | OXYGEN SATURATION: 96 %

## 2025-06-17 DIAGNOSIS — F41.9 ANXIETY: Primary | ICD-10-CM

## 2025-06-17 PROCEDURE — 99283 EMERGENCY DEPT VISIT LOW MDM: CPT | Performed by: EMERGENCY MEDICINE

## 2025-06-17 RX ORDER — LOSARTAN POTASSIUM 25 MG/1
50 TABLET ORAL ONCE
Status: COMPLETED | OUTPATIENT
Start: 2025-06-17 | End: 2025-06-17

## 2025-06-17 RX ADMIN — LOSARTAN POTASSIUM 50 MG: 25 TABLET, FILM COATED ORAL at 22:10

## 2025-06-18 NOTE — ED PROVIDER NOTES
" EMERGENCY DEPARTMENT ENCOUNTER    Pt Name: Yousuf Barrera  MRN: [id number]  Pt :   1965  Room Number:    Date of encounter:  2025  PCP: Eamon Andrew APRN  ED Provider: Roger De Anda MD    Historian: Patient      HPI:  Chief Complaint: Anxiety        Context: Yousuf Barrera is a 60 y.o. male who presents to the ED c/o anxiety.  The patient has past history of stenting for hypertension and alcohol abuse.  The patient presents emergency department by needle site reporting anxiety.  He denies having suicidal or homicidal ideation.  He says that he left a detox facility a couple days ago.  He says he is currently staying in a hotel.  He denies any other complaints.      PAST MEDICAL HISTORY  Past Medical History:   Diagnosis Date    Alcohol abuse     Anxiety     H/O exercise stress test     HAD DONE WITH WORK.  \"IT WAS OK\"    Hypertension     Wears glasses     Withdrawal symptoms, alcohol          PAST SURGICAL HISTORY  Past Surgical History:   Procedure Laterality Date    COLONOSCOPY N/A 2018    Procedure: COLONOSCOPY WITH COLD SNARE POLYECTOMY X 1;  Surgeon: Carlos A Moreno MD;  Location: [facility] ENDOSCOPY;  Service: Gastroenterology    CYSTOSCOPY  2016    WISDOM TOOTH EXTRACTION           FAMILY HISTORY  Family History   Problem Relation Age of Onset    Hypertension Mother     Hypertension Father     Dementia Father     ADD / ADHD Neg Hx     Alcohol abuse Neg Hx     Anxiety disorder Neg Hx     Bipolar disorder Neg Hx     Depression Neg Hx     Drug abuse Neg Hx     OCD Neg Hx     Paranoid behavior Neg Hx     Schizophrenia Neg Hx     Seizures Neg Hx     Self-Injurious Behavior  Neg Hx     Suicide Attempts Neg Hx          SOCIAL HISTORY  Social History     Socioeconomic History    Marital status: Single    Number of children: 1    Highest education level: Some college, no degree   Tobacco Use    Smoking status: Never    Smokeless tobacco: Current     Types: Snuff   Vaping Use    Vaping status: " "Never Used   Substance and Sexual Activity    Alcohol use: Yes     Comment: \"1 pint liquor/day\"    Drug use: No    Sexual activity: Defer         ALLERGIES  Tetracycline and Tetracyclines & related        REVIEW OF SYSTEMS    All systems reviewed and negative except for those discussed in HPI.       PHYSICAL EXAM    I have reviewed the triage vital signs and nursing notes.    ED Triage Vitals [06/17/25 2142]   Temp Heart Rate Resp BP SpO2   98.3 °F (36.8 °C) 82 18 (!) 183/118 95 %      Temp src Heart Rate Source Patient Position BP Location FiO2 (%)   Oral Monitor Sitting Right arm --         General: no acute distress, well-appearing, non-toxic  Skin: normal color, warm and dry  Head: normocephalic, atraumatic  Eyes: Pupils equally round and reactive to light.  Nose: normal nasal mucosa, no visible deformity.  Mouth: moist mucous membranes.  Neck: supple.  Chest: no retractions, no visible deformity  Cardiovascular: Regular rate and rhythm.  Lungs: clear to auscultation bilaterally.  Abdomen: soft, non-tender, non-distended. No rebound tenderness, no guarding.  No peritonitis.  Neuro:  alert and oriented x3, no focal neurological deficits.  GCS 15.  Clinically sober.  Psych: Reports anxiety but denies suicidal or homicidal ideation.        LAB RESULTS  No results found for this or any previous visit (from the past 24 hours).    If labs were ordered, I independently reviewed the results and considered them in treating the patient.  See medical decision making discussion section for my interpretation of lab results.        RADIOLOGY  No Radiology Exams Resulted Within Past 24 Hours        PROCEDURES    Procedures    No orders to display       MEDICATIONS GIVEN IN ER    Medications   losartan (COZAAR) tablet 50 mg (50 mg Oral Given 6/17/25 2210)         MEDICAL DECISION MAKING, PROGRESS, and CONSULTS    All labs, if obtained, have been independently reviewed by me.  All radiology studies, if obtained, have been " reviewed by me and the radiologist dictating the report.  All EKG's, if obtained, have been independently viewed and interpreted by me/my attending physician.      Discussion below represents my analysis of pertinent findings related to patient's condition, differential diagnosis, treatment plan and final disposition.                         Differential diagnosis:    Differential includes alcohol intoxication, behavioral health crisis, other acute emergency.    Medical Decision Making Discussion:    Triage vitals reviewed and remarkable for hypertension but otherwise are normal.  Patient reports he has not taken his prescribed losartan for an unknown amount of time.  He was provided with his dose of losartan here.  Repeat blood pressure is significantly improved.    Patient endorses anxiety but denies suicidal or homicidal ideation.  He is clinically sober at the time of my evaluation.  Patient endorsed to nursing staff that he would like to go back to alcohol detox.  Outpatient detox resources were provided to the patient by behavioral health.  No current evidence of alcohol withdrawal.    Additional sources:    - External (non-ED) record review: History and physical from June 2024 documenting history of alcohol use disorder and hypertension.    - Chronic or social conditions impacting care: Alcohol abuse, hypertension    Shared Decision Making:  After my consideration of clinical presentation and any laboratory/radiology studies obtained, I discussed the findings with the patient/patient representative who is in agreement with the treatment plan and the final disposition.   Risks and benefits of discharge and/or observation/admission were discussed.    Orders placed during this visit:  Orders Placed This Encounter   Procedures    [facility] [place] Withdrawal Assessment         AS OF 01:09 EDT VITALS:    BP - 133/83  HR - 79  TEMP - 98.3 °F (36.8 °C) (Oral)  O2 SATS - 96%                  DIAGNOSIS  Final  diagnoses:   Anxiety         DISPOSITION  Discharge      Please note that portions of this document were completed with voice recognition software.        Roger De Anda MD  06/18/25 0104

## 2025-06-18 NOTE — DISCHARGE INSTRUCTIONS
As we discussed, take your blood pressure medication as prescribed.  Patient should follow up with their primary care physician/provider within one week and return to the emergency department before then for any concerning symptoms, worsening symptoms or new concerns.

## 2025-06-18 NOTE — CONSULTS
This therapist received a call from [facility] staff  GLADIS Hurtado for a consult to provide patient with behavioral health resources for outpatient alcohol treatment. Met with patient at Bedside. Patient presents to Emergency Department for Alcohol intoxication and anxiety. Patient does serve as His own guardian. Patient is alert and oriented to oriented to person, place, time, and general circumstances. Patient mood is calm. There are no signs of hallucinations or delusions. At this time, the patient does not require a full behavioral health evaluation. The patient does not present with criteria to warrant an involuntary petition or psychiatric hold at this time as he  are not actively suicidal, homicidal, or displaying symptoms of an acute psychotic episode. Therapist offered resources for outpatient mental health providers and support in the area.  Therapist also discussed the availability of emergency behavioral health services 24/7 at through the [facility].  Therapist assisted the patient in identifying risk factors that would indicate the need for higher level of care, such as acute withdrawal symptoms, thoughts to harm self or others and/or self-harming behavior(s). Encouraged patient to call 911, crisis hotlines, or present to the nearest emergency department should symptoms worsen, or in any crisis/emergency. The patient is agreeable and voiced understanding.    [place]-SUICIDE SEVERITY RATING SCALE  Psychiatric Inpatient Setting - Discharge Screener    Ask questions that are bold and underlined Discharge   Ask Questions 1 and 2 YES NO   Wish to be Dead:   Person endorses thoughts about a wish to be dead or not alive anymore, or wish to fall asleep and not wake up.  While you were here in the hospital, have you wished you were dead or wished you could go to sleep and not wake up?  X   Suicidal Thoughts:   General non-specific thoughts of wanting to end  one's life/die by suicide, “I've thought about killing myself” without general thoughts of ways to kill oneself/associated methods, intent, or plan.   While you were here in the hospital, have you actually had thoughts about killing yourself?   X   If YES to 2, ask questions 3, 4, 5, and 6.  If NO to 2, go directly to question 6   3) Suicidal Thoughts with Method (without Specific Plan or Intent to Act):   Person endorses thoughts of suicide and has thought of a least one method during the assessment period. This is different than a specific plan with time, place or method details worked out. “I thought about taking an overdose but I never made a specific plan as to when where or how I would actually do it….and I would never go through with it.”   Have you been thinking about how you might kill yourself?      4) Suicidal Intent (without Specific Plan):   Active suicidal thoughts of killing oneself and patient reports having some intent to act on such thoughts, as opposed to “I have the thoughts but I definitely will not do anything about them.”   Have you had these thoughts and had some intention of acting on them or do you have some intention of acting on them after you leave the hospital?      5) Suicide Intent with Specific Plan:   Thoughts of killing oneself with details of plan fully or partially worked out and person has some intent to carry it out.   Have you started to work out or worked out the details of how to kill yourself either for while you were here in the hospital or for after you leave the hospital? Do you intend to carry out this plan?        6) Suicide Behavior    While you were here in the hospital, have you done anything, started to do anything, or prepared to do anything to end your life?    Examples: Took pills, cut yourself, tried to hang yourself, took out pills but didn't swallow any because you changed your mind or someone took them from you, collected pills, secured a means of obtaining  a gun, gave away valuables, wrote a will or suicide note, etc.  X     Shanae Garber,ABENA  06/17/2025

## (undated) DEVICE — TRAP,MUCUS SPECIMEN,40CC: Brand: MEDLINE

## (undated) DEVICE — SNAR POLYP SENSATION STDOVL 27 240 BX40

## (undated) DEVICE — Device

## (undated) DEVICE — ENDOGATOR AUXILIARY WATER JET CONNECTOR: Brand: ENDOGATOR